# Patient Record
Sex: FEMALE | Race: WHITE | NOT HISPANIC OR LATINO | Employment: OTHER | URBAN - METROPOLITAN AREA
[De-identification: names, ages, dates, MRNs, and addresses within clinical notes are randomized per-mention and may not be internally consistent; named-entity substitution may affect disease eponyms.]

---

## 2016-10-25 LAB
ANION GAP SERPL CALCULATED.3IONS-SCNC: NORMAL MMOL/L
BUN SERPL-MCNC: 14 MG/DL
CALCIUM SERPL-MCNC: 9.1 MG/DL
CHLORIDE SERPLBLD-SCNC: 99 MMOL/L
CO2 SERPL-SCNC: 26 MMOL/L
CREAT SERPL-MCNC: 0.69 MG/DL
GFR SERPL CREATININE-BSD FRML MDRD: 93 ML/MIN/1.73M2
GLUCOSE SERPL-MCNC: 83 MG/DL (ref 70–99)
POTASSIUM SERPL-SCNC: 4 MMOL/L
SODIUM SERPL-SCNC: 133 MMOL/L

## 2017-01-12 ENCOUNTER — TELEPHONE (OUTPATIENT)
Dept: NEUROLOGY | Facility: CLINIC | Age: 62
End: 2017-01-12

## 2017-01-12 NOTE — TELEPHONE ENCOUNTER
Message received in in basket:    Subject: NFUL                                             Caller: Adia     Relationship to Patient: Self     Call Back Number: [phone]     Reason for Call: Patient has an antidepressant from  and would like to discuss possibly having Dr. Barboza prescribe it. She will be away from her phone from 12:30-3pm today. Thanks.     Marta Ulrich CMA     Before writer had a chance to respond to message, patient called ft. Desk and left a message to disregard THIS message.    Neeraj Nieto

## 2017-01-19 ENCOUNTER — PRE VISIT (OUTPATIENT)
Dept: CARDIOLOGY | Facility: CLINIC | Age: 62
End: 2017-01-19

## 2017-01-19 DIAGNOSIS — R55 SYNCOPE AND COLLAPSE: Primary | ICD-10-CM

## 2017-01-23 DIAGNOSIS — R55 SYNCOPE AND COLLAPSE: Primary | ICD-10-CM

## 2017-02-27 ENCOUNTER — TELEPHONE (OUTPATIENT)
Dept: NEUROLOGY | Facility: CLINIC | Age: 62
End: 2017-02-27

## 2017-02-27 NOTE — TELEPHONE ENCOUNTER
[facility] pharmacist wanted ok to give patient her Fycompa one month earlier, because she was going on a trip.  Approved to give her the medication for this purpose one month ahead.

## 2017-03-29 DIAGNOSIS — G40.211 LOCALZ-RLTED SYMPTOMATIC EPILEPSY W CMPLX PARTL SZ, INTRACT, W STATUS (H): ICD-10-CM

## 2017-03-29 RX ORDER — PERAMPANEL 4 MG/1
TABLET ORAL
Qty: 30 TABLET | Refills: 5 | Status: SHIPPED | OUTPATIENT
Start: 2017-03-29 | End: 2017-10-02

## 2017-04-04 ENCOUNTER — TELEPHONE (OUTPATIENT)
Dept: NEUROLOGY | Facility: CLINIC | Age: 62
End: 2017-04-04

## 2017-04-04 DIAGNOSIS — G40.219 PARTIAL EPILEPSY WITH IMPAIRMENT OF CONSCIOUSNESS, WITH INTRACTABLE EPILEPSY (H): Primary | ICD-10-CM

## 2017-04-04 NOTE — TELEPHONE ENCOUNTER
----- Message from Blair Reyes RN sent at 4/4/2017 11:38 AM CDT -----  Regarding: FW: trinity      ----- Message -----     From: Jessie Brumfield CMA     Sent: 4/4/2017  10:50 AM       To: Wes Gutierrez Rn Pool  Subject: trinity                                             Caller: andi    Relationship to Patient: self    Call Back Number: [phone]    Reason for Call: patient has been having frequent auras and dizziness.

## 2017-04-04 NOTE — TELEPHONE ENCOUNTER
Patient feels she has been having increased aura's and dizziness.  Patient was reducing her TPM (under MD direction), but felt auras were worsening so increased the dose again. Currently she is taking 0.5 tabs noon and 1.5 pm.  Aura's have been happening once a month or there about.  Dizziness has increased, for a period of up to three hours after taking meds in the morning.  It is to the point she will need to steady herself because she is concerned she may fall. She has not fallen  Patient has a cardiac pacemaker and is scheduled to see the cardiologist in a few week time. She is not aware that it has needed to pace at any time recently  Patient has a VNS in place, however at the last visit it was reading EOS, and the decision was made to not replace it.  It was not turned off at that time so it is not possible to say when it ceased to provide stimulation. She does not feel it has stimulated at all this year, possible for the past 6 months.  She has recently had a sodium level checked, patient reports it was 134.  Follow up with  is scheduled for June, this was reviewed with Adia.  Plan at last visit was to switch to [facility] if dizziness continued, and if financially viable.  Discussed with .  Will plan to check levels of AEDs  Follow up with  to discuss conversion to [facility], and other treatment options

## 2017-04-06 ENCOUNTER — PRE VISIT (OUTPATIENT)
Dept: CARDIOLOGY | Facility: CLINIC | Age: 62
End: 2017-04-06

## 2017-04-13 ENCOUNTER — TRANSFERRED RECORDS (OUTPATIENT)
Dept: HEALTH INFORMATION MANAGEMENT | Facility: CLINIC | Age: 62
End: 2017-04-13

## 2017-04-13 LAB
LEVETIRACETAM SERPL-MCNC: 28.3 UG/ML
OXCARBAZEPINE: 29.2 MCG/ML (ref 8–35)
TOPIRAMATE: 1.6 MCG/ML

## 2017-04-19 ENCOUNTER — HOSPITAL ENCOUNTER (OUTPATIENT)
Dept: BONE DENSITY | Facility: CLINIC | Age: 62
Discharge: HOME | End: 2017-04-19
Attending: PEDIATRICS | Admitting: PEDIATRICS
Payer: COMMERCIAL

## 2017-04-19 DIAGNOSIS — Z78.0 POST-MENOPAUSE: ICD-10-CM

## 2017-04-19 DIAGNOSIS — Z13.820 SCREENING FOR OSTEOPOROSIS: ICD-10-CM

## 2017-04-19 DIAGNOSIS — G40.219 PARTIAL EPILEPSY WITH IMPAIRMENT OF CONSCIOUSNESS, WITH INTRACTABLE EPILEPSY (H): ICD-10-CM

## 2017-04-19 PROCEDURE — 77080 DXA BONE DENSITY AXIAL: CPT

## 2017-04-24 ENCOUNTER — ALLIED HEALTH/NURSE VISIT (OUTPATIENT)
Dept: CARDIOLOGY | Facility: CLINIC | Age: 62
End: 2017-04-24
Payer: COMMERCIAL

## 2017-04-24 ENCOUNTER — OFFICE VISIT (OUTPATIENT)
Dept: CARDIOLOGY | Facility: CLINIC | Age: 62
End: 2017-04-24
Payer: COMMERCIAL

## 2017-04-24 VITALS
HEIGHT: 66 IN | WEIGHT: 151 LBS | HEART RATE: 66 BPM | SYSTOLIC BLOOD PRESSURE: 130 MMHG | BODY MASS INDEX: 24.27 KG/M2 | DIASTOLIC BLOOD PRESSURE: 84 MMHG

## 2017-04-24 DIAGNOSIS — Z95.0 CARDIAC PACEMAKER IN SITU: Primary | ICD-10-CM

## 2017-04-24 DIAGNOSIS — R55 SYNCOPE AND COLLAPSE: Primary | ICD-10-CM

## 2017-04-24 DIAGNOSIS — Z82.49 FAMILY HISTORY OF ISCHEMIC HEART DISEASE: ICD-10-CM

## 2017-04-24 DIAGNOSIS — I49.5 SINOATRIAL NODE DYSFUNCTION (H): Chronic | ICD-10-CM

## 2017-04-24 DIAGNOSIS — R06.02 SHORTNESS OF BREATH: ICD-10-CM

## 2017-04-24 DIAGNOSIS — Z95.0 CARDIAC PACEMAKER IN SITU: Chronic | ICD-10-CM

## 2017-04-24 PROCEDURE — 93280 PM DEVICE PROGR EVAL DUAL: CPT | Performed by: INTERNAL MEDICINE

## 2017-04-24 PROCEDURE — 99214 OFFICE O/P EST MOD 30 MIN: CPT | Performed by: INTERNAL MEDICINE

## 2017-04-24 NOTE — PROGRESS NOTES
HPI and Plan:   See dictation    Orders Placed This Encounter   Procedures     CT Coronary Calcium Scan     Follow-Up with Cardiac Advanced Practice Provider     Follow-Up with Cardiologist       No orders of the defined types were placed in this encounter.      Medications Discontinued During This Encounter   Medication Reason     escitalopram (LEXAPRO) 10 MG tablet          Encounter Diagnoses   Name Primary?     Syncope and collapse Yes     Shortness of breath      Cardiac pacemaker in situ      Family history of ischemic heart disease      Sinoatrial node dysfunction (H)        CURRENT MEDICATIONS:  Current Outpatient Prescriptions   Medication Sig Dispense Refill     FYCOMPA 4 MG tablet TAKE ONE TABLET BY MOUTH EVERY NIGHT AT BEDTIME 30 tablet 5     OXcarbazepine (TRILEPTAL) 300 MG tablet TAKE 1.5 TABS BY MOUTH IN THE MORNING, 1.5 TABLETS AT NOON, 2 TABLETS IN THE EVENING AND 2 TABLETS AT BEDTIME 210 tablet 5     clonazePAM (KLONOPIN) 1 MG tablet 1 tab  po at night 30 tablet 2     escitalopram (LEXAPRO) 5 MG tablet Take 1 tablet (5 mg) by mouth daily 30 tablet 11     escitalopram (LEXAPRO) 10 MG tablet Take one at bedtime 30 tablet 11     SUMAtriptan (IMITREX) 100 MG tablet Take one by mouth prn severe headache. No more than four per month. 9 tablet 0     topiramate (TOPAMAX) 100 MG tablet Take 1 tablet (100 mg) by mouth 2 times daily 60 tablet 11     levETIRAcetam (KEPPRA) 750 MG tablet Take one po TID and one and one half at HS (total 3375 mg per day) 135 tablet 11     diazepam (DIAZEPAM INTENSOL) 5 MG/ML solution Administer 1 ml as needed into cheek pouch for seizures 30 mL 0     acetaZOLAMIDE (DIAMOX) 250 MG tablet 1 tab BID after 2 consecutive days of seizures 14 tablet 0     esomeprazole (NEXIUM) 40 MG capsule Take by mouth every morning (before breakfast) Take 30-60 minutes before eating.       Cholecalciferol (VITAMIN D) 2000 UNITS CAPS daily.       Cetirizine HCl (ZYRTEC PO) daily.       Multiple  "Vitamin (MULTIVITAMINS PO) daily.         ALLERGIES     Allergies   Allergen Reactions     Lactose        PAST MEDICAL HISTORY:  Past Medical History:   Diagnosis Date     Depression      Epilepsy (H)      GERD (gastroesophageal reflux disease)      Migraine      Seizure (H)     vagal nerve stimulator     Shortness of breath      Syncope and collapse     bradyarrhythmia: dual chamber pacemaker implanted 2011       PAST SURGICAL HISTORY:  Past Surgical History:   Procedure Laterality Date     IMPLANT PACEMAKER  April 2011    [place] [facility] [place] [address],      IMPLANT STIMULATOR VAGUS NERVE  DEC  2008       FAMILY HISTORY:  No family history on file.    SOCIAL HISTORY:  Social History     Social History     Marital status:      Spouse name: N/A     Number of children: N/A     Years of education: N/A     Social History Main Topics     Smoking status: Never Smoker     Smokeless tobacco: Never Used     Alcohol use No     Drug use: No     Sexual activity: Not Asked     Other Topics Concern     Caffeine Concern Yes     3 CUPS coffee daily      Weight Concern No     Special Diet No     Exercise Yes     Seat Belt Yes     Social History Narrative       Review of Systems:  Skin:  Negative       Eyes:  Positive for glasses    ENT:  Negative      Respiratory:  Negative       Cardiovascular:    syncope or near-syncope (due to medications)    Gastroenterology: Positive for reflux    Genitourinary:  Negative      Musculoskeletal:  Positive for back pain;neck pain;joint stiffness    Neurologic:  Negative      Psychiatric:  Negative      Heme/Lymph/Imm:  Positive for allergies    Endocrine:  Negative        Physical Exam:  Vitals: /84  Pulse 66  Ht 1.676 m (5' 6\")  Wt 68.5 kg (151 lb)  BMI 24.37 kg/m2    Constitutional:  cooperative, alert and oriented, well developed, well nourished, in no acute distress        Skin:  warm and dry to the touch, no apparent skin lesions or masses noted        Head:  normocephalic, no " masses or lesions        Eyes:  pupils equal and round, conjunctivae and lids unremarkable, sclera white, no xanthalasma, EOMS intact, no nystagmus        ENT:  no pallor or cyanosis, dentition good        Neck:  carotid pulses are full and equal bilaterally;no carotid bruit        Chest:  normal breath sounds, clear to auscultation, normal A-P diameter, normal symmetry, normal respiratory excursion, no use of accessory muscles   pacemaker incision in the left infraclavicular area was well-healed      Cardiac: regular rhythm;normal S1 and S2;no S3 or S4       systolic ejection murmur;RUSB;grade 1          Abdomen:           Vascular: pulses full and equal                                        Extremities and Back:  no edema;no spinal abnormalities noted;normal muscle strength and tone              Neurological:  affect appropriate, oriented to time, person and place;no gross motor deficits              CC  Noam Barboza MD  [facility] EPILEPSY [facility]  [address]  [place] [address]

## 2017-04-24 NOTE — MR AVS SNAPSHOT
After Visit Summary   4/24/2017    Adia Briceno    MRN: [id number]           Patient Information     Date Of Birth          1955        Visit Information        Provider Department      4/24/2017 3:45 PM Lance Richmond MD [facility] [place]        Today's Diagnoses     Syncope and collapse    -  1    Shortness of breath        Cardiac pacemaker in situ        Family history of ischemic heart disease        Sinoatrial node dysfunction (H)           Follow-ups after your visit        Additional Services     Follow-Up with Cardiac Advanced Practice Provider           Follow-Up with Cardiologist                 Your next 10 appointments already scheduled     Jun 13, 2017 11:00 AM CDT   Vagus Nerve Stimulation with Noam Barboza MD   [facility] ([facility])    [address], [address]  [facility] [address] [phone]              Future tests that were ordered for you today     Open Future Orders        Priority Expected Expires Ordered    CT Coronary Calcium Scan Routine 4/25/2017 4/24/2018 4/24/2017    Follow-Up with Cardiac Advanced Practice Provider Routine 4/24/2018 9/6/2018 4/24/2017    Follow-Up with Cardiologist Routine 4/24/2019 5/14/2019 4/24/2017            Who to contact     If you have questions or need follow up information about today's clinic visit or your schedule please contact [facility] directly at [phone].  Normal or non-critical lab and imaging results will be communicated to you by MyChart, letter or phone within 4 business days after the clinic has received the results. If you do not hear from us within 7 days, please contact the clinic through MyChart or phone. If you have a critical or abnormal lab result, we will notify you by phone as soon as possible.  Submit refill requests through [employer] or call your pharmacy and they will forward the  "refill request to us. Please allow 3 business days for your refill to be completed.          Additional Information About Your Visit        [employer] Information     [employer] lets you send messages to your doctor, view your test results, renew your prescriptions, schedule appointments and more. To sign up, go to [email or web address] . Click on \"Log in\" on the left side of the screen, which will take you to the Welcome page. Then click on \"Sign up Now\" on the right side of the page.     You will be asked to enter the access code listed below, as well as some personal information. Please follow the directions to create your username and password.     Your access code is: [email or web address]  Expires: 2017  4:48 PM     Your access code will  in 90 days. If you need help or a new code, please call your [place] clinic or [phone].        Care EveryWhere ID     This is your Care EveryWhere ID. This could be used by other organizations to access your [place] medical records  [id number]        Your Vitals Were     Pulse Height BMI (Body Mass Index)             66 1.676 m (5' 6\") 24.37 kg/m2          Blood Pressure from Last 3 Encounters:   17 130/84   16 170/87   16 142/74    Weight from Last 3 Encounters:   17 68.5 kg (151 lb)   16 68 kg (150 lb)   16 67.6 kg (149 lb)                 Today's Medication Changes          These changes are accurate as of: 17  5:19 PM.  If you have any questions, ask your nurse or doctor.               These medicines have changed or have updated prescriptions.        Dose/Directions    * escitalopram 5 MG tablet   Commonly known as:  LEXAPRO   This may have changed:  Another medication with the same name was removed. Continue taking this medication, and follow the directions you see here.   Used for:  Adjustment disorder with depressed mood   Changed by:  Noam Barboza MD        Dose:  5 mg   Take 1 tablet (5 mg) by mouth daily "   Quantity:  30 tablet   Refills:  11       * escitalopram 10 MG tablet   Commonly known as:  LEXAPRO   This may have changed:  Another medication with the same name was removed. Continue taking this medication, and follow the directions you see here.   Used for:  Adjustment disorder with depressed mood   Changed by:  Noam Barboza MD        Take one at bedtime   Quantity:  30 tablet   Refills:  11       * Notice:  This list has 2 medication(s) that are the same as other medications prescribed for you. Read the directions carefully, and ask your doctor or other care provider to review them with you.             Primary Care Provider Office Phone # Fax #    Mary Rodgers [phone]       [facility] [address]  [facility] [address]        Thank you!     Thank you for choosing [facility] AT [place]  for your care. Our goal is always to provide you with excellent care. Hearing back from our patients is one way we can continue to improve our services. Please take a few minutes to complete the written survey that you may receive in the mail after your visit with us. Thank you!             Your Updated Medication List - Protect others around you: Learn how to safely use, store and throw away your medicines at www.disposemymeds.org.          This list is accurate as of: 4/24/17  5:19 PM.  Always use your most recent med list.                   Brand Name Dispense Instructions for use    acetaZOLAMIDE 250 MG tablet    DIAMOX    14 tablet    1 tab BID after 2 consecutive days of seizures       clonazePAM 1 MG tablet    klonoPIN    30 tablet    1 tab  po at night       diazepam 5 MG/ML (HIGH CONC) solution    DIAZEPAM INTENSOL    30 mL    Administer 1 ml as needed into cheek pouch for seizures       * escitalopram 5 MG tablet    LEXAPRO    30 tablet    Take 1 tablet (5 mg) by mouth daily       * escitalopram 10 MG tablet    LEXAPRO    30 tablet     Take one at bedtime       FYCOMPA 4 MG tablet   Generic drug:  perampanel     30 tablet    TAKE ONE TABLET BY MOUTH EVERY NIGHT AT BEDTIME       levETIRAcetam 750 MG tablet    KEPPRA    135 tablet    Take one po TID and one and one half at HS (total 3375 mg per day)       MULTIVITAMINS PO      daily.       nexIUM 40 MG CR capsule   Generic drug:  esomeprazole      Take by mouth every morning (before breakfast) Take 30-60 minutes before eating.       OXcarbazepine 300 MG tablet    TRILEPTAL    210 tablet    TAKE 1.5 TABS BY MOUTH IN THE MORNING, 1.5 TABLETS AT NOON, 2 TABLETS IN THE EVENING AND 2 TABLETS AT BEDTIME       SUMAtriptan 100 MG tablet    IMITREX    9 tablet    Take one by mouth prn severe headache. No more than four per month.       topiramate 100 MG tablet    TOPAMAX    60 tablet    Take 1 tablet (100 mg) by mouth 2 times daily       vitamin D 2000 UNITS Caps      daily.       ZYRTEC PO      daily.       * Notice:  This list has 2 medication(s) that are the same as other medications prescribed for you. Read the directions carefully, and ask your doctor or other care provider to review them with you.

## 2017-04-24 NOTE — MR AVS SNAPSHOT
After Visit Summary   4/24/2017    Adia Briceno    MRN: [id number]           Patient Information     Date Of Birth          1955        Visit Information        Provider Department      4/24/2017 4:45 PM [facility]        Today's Diagnoses     Cardiac pacemaker in situ    -  1       Follow-ups after your visit        Your next 10 appointments already scheduled     Jun 13, 2017 11:00 AM CDT   Vagus Nerve Stimulation with Noam Barboza MD   [facility] ([facility])    [address], [address]  [facility] [address] [phone]              Future tests that were ordered for you today     Open Future Orders        Priority Expected Expires Ordered    CT Coronary Calcium Scan Routine 4/25/2017 4/24/2018 4/24/2017    Follow-Up with Cardiac Advanced Practice Provider Routine 4/24/2018 9/6/2018 4/24/2017    Follow-Up with Cardiologist Routine 4/24/2019 5/14/2019 4/24/2017            Who to contact     If you have questions or need follow up information about today's clinic visit or your schedule please contact [facility] directly at [phone].  Normal or non-critical lab and imaging results will be communicated to you by MyChart, letter or phone within 4 business days after the clinic has received the results. If you do not hear from us within 7 days, please contact the clinic through MyChart or phone. If you have a critical or abnormal lab result, we will notify you by phone as soon as possible.  Submit refill requests through [employer] or call your pharmacy and they will forward the refill request to us. Please allow 3 business days for your refill to be completed.          Additional Information About Your Visit        [employer] Information     [employer] lets you send messages to your doctor, view your test results, renew your prescriptions, schedule appointments  "and more. To sign up, go to [email or web address] . Click on \"Log in\" on the left side of the screen, which will take you to the Welcome page. Then click on \"Sign up Now\" on the right side of the page.     You will be asked to enter the access code listed below, as well as some personal information. Please follow the directions to create your username and password.     Your access code is: [email or web address]  Expires: 2017  4:48 PM     Your access code will  in 90 days. If you need help or a new code, please call your [place] clinic or [phone].        Care EveryWhere ID     This is your Care EveryWhere ID. This could be used by other organizations to access your [place] medical records  [id number]         Blood Pressure from Last 3 Encounters:   17 130/84   16 170/87   16 142/74    Weight from Last 3 Encounters:   17 68.5 kg (151 lb)   16 68 kg (150 lb)   16 67.6 kg (149 lb)              We Performed the Following     PM DEVICE PROGRAMMING EVAL, DUAL LEAD PACER (07950)          Today's Medication Changes          These changes are accurate as of: 17  4:48 PM.  If you have any questions, ask your nurse or doctor.               These medicines have changed or have updated prescriptions.        Dose/Directions    * escitalopram 5 MG tablet   Commonly known as:  LEXAPRO   This may have changed:  Another medication with the same name was removed. Continue taking this medication, and follow the directions you see here.   Used for:  Adjustment disorder with depressed mood   Changed by:  Noam Barboza MD        Dose:  5 mg   Take 1 tablet (5 mg) by mouth daily   Quantity:  30 tablet   Refills:  11       * escitalopram 10 MG tablet   Commonly known as:  LEXAPRO   This may have changed:  Another medication with the same name was removed. Continue taking this medication, and follow the directions you see here.   Used for:  Adjustment disorder with depressed " mood   Changed by:  Noam Barbzoa MD        Take one at bedtime   Quantity:  30 tablet   Refills:  11       * Notice:  This list has 2 medication(s) that are the same as other medications prescribed for you. Read the directions carefully, and ask your doctor or other care provider to review them with you.             Primary Care Provider Office Phone # Fax #    Mary Rodgers [phone]       [facility] [address]  [facility] [address]        Thank you!     Thank you for choosing [facility] AT [place]  for your care. Our goal is always to provide you with excellent care. Hearing back from our patients is one way we can continue to improve our services. Please take a few minutes to complete the written survey that you may receive in the mail after your visit with us. Thank you!             Your Updated Medication List - Protect others around you: Learn how to safely use, store and throw away your medicines at www.disposemymeds.org.          This list is accurate as of: 4/24/17  4:48 PM.  Always use your most recent med list.                   Brand Name Dispense Instructions for use    acetaZOLAMIDE 250 MG tablet    DIAMOX    14 tablet    1 tab BID after 2 consecutive days of seizures       clonazePAM 1 MG tablet    klonoPIN    30 tablet    1 tab  po at night       diazepam 5 MG/ML (HIGH CONC) solution    DIAZEPAM INTENSOL    30 mL    Administer 1 ml as needed into cheek pouch for seizures       * escitalopram 5 MG tablet    LEXAPRO    30 tablet    Take 1 tablet (5 mg) by mouth daily       * escitalopram 10 MG tablet    LEXAPRO    30 tablet    Take one at bedtime       FYCOMPA 4 MG tablet   Generic drug:  perampanel     30 tablet    TAKE ONE TABLET BY MOUTH EVERY NIGHT AT BEDTIME       levETIRAcetam 750 MG tablet    KEPPRA    135 tablet    Take one po TID and one and one half at HS (total 3375 mg per day)       MULTIVITAMINS PO       daily.       nexIUM 40 MG CR capsule   Generic drug:  esomeprazole      Take by mouth every morning (before breakfast) Take 30-60 minutes before eating.       OXcarbazepine 300 MG tablet    TRILEPTAL    210 tablet    TAKE 1.5 TABS BY MOUTH IN THE MORNING, 1.5 TABLETS AT NOON, 2 TABLETS IN THE EVENING AND 2 TABLETS AT BEDTIME       SUMAtriptan 100 MG tablet    IMITREX    9 tablet    Take one by mouth prn severe headache. No more than four per month.       topiramate 100 MG tablet    TOPAMAX    60 tablet    Take 1 tablet (100 mg) by mouth 2 times daily       vitamin D 2000 UNITS Caps      daily.       ZYRTEC PO      daily.       * Notice:  This list has 2 medication(s) that are the same as other medications prescribed for you. Read the directions carefully, and ask your doctor or other care provider to review them with you.

## 2017-04-24 NOTE — PROGRESS NOTES
[employer] Pacemaker Device Check  AP: 17 % : 0 %  Mode: DDD        Underlying Rhythm: SR  Heart Rate: Adequate variation per histogram  Sensing: stable    Pacing Threshold: stable   Impedance: stable  Battery Status: 5 years  Atrial Arrhythmia: none  Ventricular Arrhythmia: none  Setting Change: none    Care Plan: f/u 6 months threshold. Sharyn

## 2017-04-24 NOTE — LETTER
4/24/2017    Noam Barboza MD  [facility] EPILEPSY [facility]  [address]  [place] [address]    RE: Adia Briceno       Dear Colleague,    I had the pleasure of seeing Adia Briceno in the [facility].    Adia is a very nice 61-year-old woman.  She is the wife of Nemesio Briceno in Endocrinology.  Her past cardiac history is significant for a question of seizure disorder versus syncopal spells.  CardioNet monitoring did demonstrate 7- and 10-second pauses.  When [employer] called the daughter, Ms. Briceno appeared to be confused and consultation with electrophysiologist decided to proceed with pacemaker insertion.  This was unfortunately complicated by a micro lead dislodgement with R-wave sensing reduced to 3.  Adjustments were made in her pacemaker and appeared to function appropriately.        Other issues include a seizure disorder, depression, migraines and gastroesophageal reflux disease.  She also has a strong family history of coronary artery disease with her father dying in his 60s of myocardial infarction, but having heart problems as early as mid to late 40s.  Adia also has significant hypercholesterolemia with a very elevated HDL.      Adia returns to clinic stating she is doing quite well.  She has had no further syncopal spells that she is aware of.  She does continue to have seizure activity.  She has no exertional chest, arm, neck, jaw or shoulder discomfort.  No dyspnea on exertion, orthopnea or PND.  No palpitations, lightheadedness, dizziness, syncope or near-syncope.      Adia has not been following up with the [facility] Device [facility].  Notes in the chart state that it was due to the fact that she did not think the pacemaker was even necessary or indicated.     Outpatient Encounter Prescriptions as of 4/24/2017   Medication Sig Dispense Refill     FYCOMPA 4 MG tablet TAKE ONE TABLET BY MOUTH EVERY NIGHT AT BEDTIME 30 tablet 5     clonazePAM  (KLONOPIN) 1 MG tablet 1 tab  po at night (Patient taking differently: 1/2 tab  po at night) 30 tablet 2     escitalopram (LEXAPRO) 5 MG tablet Take 1 tablet (5 mg) by mouth daily 30 tablet 11     escitalopram (LEXAPRO) 10 MG tablet Take one at bedtime 30 tablet 11     [DISCONTINUED] OXcarbazepine (TRILEPTAL) 300 MG tablet TAKE 1.5 TABS BY MOUTH IN THE MORNING, 1.5 TABLETS AT NOON, 2 TABLETS IN THE EVENING AND 2 TABLETS AT BEDTIME 210 tablet 5     SUMAtriptan (IMITREX) 100 MG tablet Take one by mouth prn severe headache. No more than four per month. 9 tablet 0     topiramate (TOPAMAX) 100 MG tablet Take 1 tablet (100 mg) by mouth 2 times daily (Patient taking differently: Take 100 mg by mouth 2 times daily 1/2 TABLET 3 TIMES DAILY) 60 tablet 11     levETIRAcetam (KEPPRA) 750 MG tablet Take one po TID and one and one half at HS (total 3375 mg per day) 135 tablet 11     diazepam (DIAZEPAM INTENSOL) 5 MG/ML solution Administer 1 ml as needed into cheek pouch for seizures 30 mL 0     acetaZOLAMIDE (DIAMOX) 250 MG tablet 1 tab BID after 2 consecutive days of seizures 14 tablet 0     esomeprazole (NEXIUM) 40 MG capsule Take by mouth every morning (before breakfast) Take 30-60 minutes before eating.       Cholecalciferol (VITAMIN D) 2000 UNITS CAPS daily.       Cetirizine HCl (ZYRTEC PO) daily.       Multiple Vitamin (MULTIVITAMINS PO) daily.       [DISCONTINUED] escitalopram (LEXAPRO) 10 MG tablet Take 1 tab po am 30 tablet 5     No facility-administered encounter medications on file as of 4/24/2017.       ASSESSMENT AND PLAN:  Adia appears to be doing well from a cardiac standpoint without clinical evidence of ischemia, heart failure or significant arrhythmia.  We will interrogate her pacemaker today she is here.  I have explained to her that at our last interrogation, she had 19% atrial pacing, which is as the pacemaker is expected to do with her atrial pauses.      At this time, she has no symptoms to suggest  ischemia, although she does have a very strong family history.  I discussed her cholesterol with Nemesio, who does check her numbers.  Last fasting lipid profile I see in Care Everywhere is from 04/2016 at which time her total cholesterol was 294, triglycerides were 50, her HDL was 111 and her LDL was 173.  We talked about this.  As she is having no symptoms and she is hurt her knee and is unable to perform an exercise test, we decided we would just do a calcium score at this time to get a feel for whether we should be doing more about her cholesterol and whether we should consider putting her on an aspirin a day as she is now 61.  As stated, she has no symptoms to suggest angina, so I do not feel strongly inclined to proceed with any sort of stress testing.      Blood pressure is okay today at 130/84.  I will follow up on her calcium scoring test and her pacemaker interrogation.  Otherwise, will have her follow up in 1 year.  She states she does want to set up pacemaker followups again and we will do this on a quarterly basis over the phone.     Again, thank you for allowing me to participate in the care of your patient.      Sincerely,    Lance Richmond MD     [facility]    cc:   Mary Rodgers MD  [facility]   [address]  [facility] [address]

## 2017-04-25 NOTE — PROGRESS NOTES
HISTORY OF PRESENT ILLNESS:  Adia is a very nice 61-year-old woman.  She is the wife of Nemesio Briceno in Endocrinology.  Her past cardiac history is significant for a question of seizure disorder versus syncopal spells.  CardioNet monitoring did demonstrate 7- and 10-second pauses.  When [employer] called the daughter, Ms. Briceno appeared to be confused and consultation with electrophysiologist decided to proceed with pacemaker insertion.  This was unfortunately complicated by a micro lead dislodgement with R-wave sensing reduced to 3.  Adjustments were made in her pacemaker and appeared to function appropriately.        Other issues include a seizure disorder, depression, migraines and gastroesophageal reflux disease.  She also has a strong family history of coronary artery disease with her father dying in his 60s of myocardial infarction, but having heart problems as early as mid to late 40s.  Adia also has significant hypercholesterolemia with a very elevated HDL.      Adia returns to clinic stating she is doing quite well.  She has had no further syncopal spells that she is aware of.  She does continue to have seizure activity.  She has no exertional chest, arm, neck, jaw or shoulder discomfort.  No dyspnea on exertion, orthopnea or PND.  No palpitations, lightheadedness, dizziness, syncope or near-syncope.      Adia has not been following up with the Pacemaker Device Clinic.  Notes in the chart state that it was due to the fact that she did not think the pacemaker was even necessary or indicated.      ASSESSMENT AND PLAN:  Adia appears to be doing well from a cardiac standpoint without clinical evidence of ischemia, heart failure or significant arrhythmia.  We will interrogate her pacemaker today she is here.  I have explained to her that at our last interrogation, she had 19% atrial pacing, which is as the pacemaker is expected to do with her atrial pauses.      At this time, she has no symptoms to suggest  ischemia, although she does have a very strong family history.  I discussed her cholesterol with Nemesio, who does check her numbers.  Last fasting lipid profile I see in Care Everywhere is from 2016 at which time her total cholesterol was 294, triglycerides were 50, her HDL was 111 and her LDL was 173.  We talked about this.  As she is having no symptoms and she is hurt her knee and is unable to perform an exercise test, we decided we would just do a calcium score at this time to get a feel for whether we should be doing more about her cholesterol and whether we should consider putting her on an aspirin a day as she is now 61.  As stated, she has no symptoms to suggest angina, so I do not feel strongly inclined to proceed with any sort of stress testing.      Blood pressure is okay today at 130/84.  I will follow up on her calcium scoring test and her pacemaker interrogation.  Otherwise, will have her follow up in 1 year.  She states she does want to set up pacemaker followups again and we will do this on a quarterly basis over the phone.         DAVY JOSEPH MD, [facility]             D: 2017 17:18   T: 2017 10:49   MT: JOSE RAFAEL      Name:     BESSY MORGAN   MRN:      [id number]        Account:      [id number]   :      1955           Service Date: 2017      Document: [id number]

## 2017-05-09 ENCOUNTER — TELEPHONE (OUTPATIENT)
Dept: NEUROLOGY | Facility: CLINIC | Age: 62
End: 2017-05-09

## 2017-05-09 DIAGNOSIS — G40.211 LOCALZ-RLTED SYMPTOMATIC EPILEPSY W CMPLX PARTL SZ, INTRACT, W STATUS (H): ICD-10-CM

## 2017-05-09 RX ORDER — OXCARBAZEPINE 300 MG/1
TABLET, FILM COATED ORAL
Qty: 210 TABLET | Refills: 0 | Status: SHIPPED | OUTPATIENT
Start: 2017-05-09 | End: 2017-07-02

## 2017-05-09 NOTE — TELEPHONE ENCOUNTER
----- Message from Rosie Roland CMA sent at 5/8/2017  3:57 PM CDT -----  Drug Name: OXcarbazepine (TRILEPTAL) 300 MG tablet      Dose: 300 MG    generic  SIG: TAKE 1.5 TABS BY MOUTH IN THE MORNING, 1.5 TABLETS AT NOON, 2 TABLETS IN THE EVENING AND 2 TABLETS AT BEDTIME                                 Days Supply Needed: Patient will need 5 days worth until she comes in on 06/13/17.  Patient has 30 days as of today.    Pharmacy: [facility] [address] [place] [address] [address] AT [facility] [address]    Date of Last Appointment: 12/18/2016  Next RTC Date: 06/13/2017  Has a script already been sent recently: No    Additonal Notes: Patient only needs enough to get through from 06/08/2017 until appointment 06/13/2017

## 2017-06-06 DIAGNOSIS — G40.211 LOCALZ-RLTED SYMPTOMATIC EPILEPSY W CMPLX PARTL SZ, INTRACT, W STATUS (H): ICD-10-CM

## 2017-06-06 RX ORDER — OXCARBAZEPINE 300 MG/1
TABLET, FILM COATED ORAL
Qty: 210 TABLET | Refills: 0 | Status: SHIPPED | OUTPATIENT
Start: 2017-06-06 | End: 2017-06-13

## 2017-07-02 DIAGNOSIS — G40.211 LOCALZ-RLTED SYMPTOMATIC EPILEPSY W CMPLX PARTL SZ, INTRACT, W STATUS (H): ICD-10-CM

## 2017-07-03 RX ORDER — OXCARBAZEPINE 300 MG/1
TABLET, FILM COATED ORAL
Qty: 210 TABLET | Refills: 5 | Status: SHIPPED | OUTPATIENT
Start: 2017-07-03 | End: 2017-12-21

## 2017-08-07 DIAGNOSIS — G40.211 LOCALZ-RLTED SYMPTOMATIC EPILEPSY W CMPLX PARTL SZ, INTRACT, W STATUS (H): ICD-10-CM

## 2017-08-08 RX ORDER — CLONAZEPAM 1 MG/1
TABLET ORAL
Qty: 30 TABLET | Refills: 5 | Status: SHIPPED | OUTPATIENT
Start: 2017-08-08 | End: 2018-03-30

## 2017-08-23 ENCOUNTER — HOSPITAL ENCOUNTER (OUTPATIENT)
Dept: MAMMOGRAPHY | Facility: CLINIC | Age: 62
Discharge: HOME | End: 2017-08-23
Attending: PEDIATRICS | Admitting: PEDIATRICS
Payer: COMMERCIAL

## 2017-08-23 DIAGNOSIS — Z12.31 VISIT FOR SCREENING MAMMOGRAM: ICD-10-CM

## 2017-08-23 PROCEDURE — 77063 BREAST TOMOSYNTHESIS BI: CPT

## 2017-08-28 ENCOUNTER — ALLIED HEALTH/NURSE VISIT (OUTPATIENT)
Dept: CARDIOLOGY | Facility: CLINIC | Age: 62
End: 2017-08-28
Payer: COMMERCIAL

## 2017-08-28 DIAGNOSIS — Z95.0 CARDIAC PACEMAKER IN SITU: ICD-10-CM

## 2017-08-28 PROCEDURE — 93293 PM PHONE R-STRIP DEVICE EVAL: CPT | Performed by: INTERNAL MEDICINE

## 2017-08-28 NOTE — PROGRESS NOTES
~ 90 day phone teletrace ~   Intrinsic rhythm at time of check. Magnet response WNL. F/U scheduled q 3 months. Swapnil SHEFFIELD

## 2017-08-28 NOTE — MR AVS SNAPSHOT
"              After Visit Summary   8/28/2017    Adia Briceno    MRN: [id number]           Patient Information     Date Of Birth          1955        Visit Information        Provider Department      8/28/2017 10:45 AM BECERRA TECH1 [facility]        Today's Diagnoses     Cardiac pacemaker in situ           Follow-ups after your visit        Your next 10 appointments already scheduled     Dec 04, 2017 10:45 AM CST   Phone Device Check with BECERRA TECH1   [facility] ([facility])    [address] [place] [id number] [phone]              Who to contact     If you have questions or need follow up information about today's clinic visit or your schedule please contact [facility] directly at [phone].  Normal or non-critical lab and imaging results will be communicated to you by op5hart, letter or phone within 4 business days after the clinic has received the results. If you do not hear from us within 7 days, please contact the clinic through MyChart or phone. If you have a critical or abnormal lab result, we will notify you by phone as soon as possible.  Submit refill requests through [employer] or call your pharmacy and they will forward the refill request to us. Please allow 3 business days for your refill to be completed.          Additional Information About Your Visit        op5hart Information     [employer] lets you send messages to your doctor, view your test results, renew your prescriptions, schedule appointments and more. To sign up, go to [email or web address] . Click on \"Log in\" on the left side of the screen, which will take you to the Welcome page. Then click on \"Sign up Now\" on the right side of the page.     You will be asked to enter the access code listed below, as well as some personal information. Please follow the directions to create your " username and password.     Your access code is: [email or web address]  Expires: 2017 10:51 AM     Your access code will  in 90 days. If you need help or a new code, please call your [place] clinic or [phone].        Care EveryWhere ID     This is your Care EveryWhere ID. This could be used by other organizations to access your [place] medical records  [id number]         Blood Pressure from Last 3 Encounters:   17 90/75   17 130/84   16 170/87    Weight from Last 3 Encounters:   17 69.6 kg (153 lb 6.4 oz)   17 68.5 kg (151 lb)   16 68 kg (150 lb)              We Performed the Following     Follow-Up with Device Clinic     PM PHONE R RHEA MADELINE UP TO 90 DAYS ([id number])          Today's Medication Changes          These changes are accurate as of: 17 10:51 AM.  If you have any questions, ask your nurse or doctor.               These medicines have changed or have updated prescriptions.        Dose/Directions    topiramate 100 MG tablet   Commonly known as:  TOPAMAX   This may have changed:  additional instructions   Used for:  Localz-rlted symptomatic epilepsy w cmplx partl sz, intract, w status (H)        Dose:  100 mg   Take 1 tablet (100 mg) by mouth 2 times daily   Quantity:  60 tablet   Refills:  11                Primary Care Provider Office Phone # Fax #    Mary Rodgers [phone]       [facility] [address] [id number]        Equal Access to Services     [facility] : Hadii kasys ku hadasho Soomaali, waaxda luqadaha, qaybta kaalmada adeeglorin, jeanne rodriguez . So [facility] [phone].    ATENCIÓN: Si habla español, tiene a castro disposición servicios gratuitos de asistencia lingüística. Llame al [phone].    We comply with applicable federal civil rights laws and [place] laws. We do not discriminate on the basis of race, color, national origin, age, disability sex, sexual orientation or  gender identity.            Thank you!     Thank you for choosing [facility] [place]  for your care. Our goal is always to provide you with excellent care. Hearing back from our patients is one way we can continue to improve our services. Please take a few minutes to complete the written survey that you may receive in the mail after your visit with us. Thank you!             Your Updated Medication List - Protect others around you: Learn how to safely use, store and throw away your medicines at www.disposemymeds.org.          This list is accurate as of: 8/28/17 10:51 AM.  Always use your most recent med list.                   Brand Name Dispense Instructions for use Diagnosis    acetaZOLAMIDE 250 MG tablet    DIAMOX    14 tablet    1 tab BID after 2 consecutive days of seizures    Localization-related (focal) (partial) epilepsy and epileptic syndromes with simple partial seizures, with intractable epilepsy       clonazePAM 1 MG tablet    klonoPIN    30 tablet    TAKE ONE TABLET BY MOUTH EVERY EVENING    Localz-rlted symptomatic epilepsy w cmplx partl sz, intract, w status (H)       diazepam 5 MG/ML (HIGH CONC) solution    DIAZEPAM INTENSOL    30 mL    Administer 1 ml as needed into cheek pouch for seizures    Localization-related (focal) (partial) epilepsy and epileptic syndromes with complex partial seizures, with intractable epilepsy       * escitalopram 5 MG tablet    LEXAPRO    30 tablet    Take 1 tablet (5 mg) by mouth daily    Adjustment disorder with depressed mood       * escitalopram 10 MG tablet    LEXAPRO    30 tablet    Take one at bedtime    Adjustment disorder with depressed mood       FYCOMPA 4 MG tablet   Generic drug:  perampanel     30 tablet    TAKE ONE TABLET BY MOUTH EVERY NIGHT AT BEDTIME    Localz-rlted symptomatic epilepsy w cmplx partl sz, intract, w status (H)       levETIRAcetam 750 MG tablet    KEPPRA    135 tablet    Take one po TID and one and one  half at HS (total 3375 mg per day)    Localization-related symptomatic epilepsy and epileptic syndromes with complex partial seizures, intractable, with status epilepticus (H)       MULTIVITAMINS PO      daily.        nexIUM 40 MG CR capsule   Generic drug:  esomeprazole      Take by mouth every morning (before breakfast) Take 30-60 minutes before eating.        OXcarbazepine 300 MG tablet    TRILEPTAL    210 tablet    TAKE ONE AND ONE-HALF TABLET IN THE MORNING, ONE AND ONE-HALF TABLET AT NOON, 2 TABLETS IN THE EVENING AND 2 TABLETS AT BEDTIME    Localz-rlted symptomatic epilepsy w cmplx partl sz, intract, w status (H)       SUMAtriptan 100 MG tablet    IMITREX    9 tablet    Take one by mouth prn severe headache. No more than four per month.    Localization-related symptomatic epilepsy and epileptic syndromes with complex partial seizures, intractable, with status epilepticus (H)       topiramate 100 MG tablet    TOPAMAX    60 tablet    Take 1 tablet (100 mg) by mouth 2 times daily    Localz-rlted symptomatic epilepsy w cmplx partl sz, intract, w status (H)       vitamin D 2000 UNITS Caps      daily.        ZYRTEC PO      daily.        * Notice:  This list has 2 medication(s) that are the same as other medications prescribed for you. Read the directions carefully, and ask your doctor or other care provider to review them with you.

## 2017-10-02 DIAGNOSIS — G40.211 LOCALZ-RLTED SYMPTOMATIC EPILEPSY W CMPLX PARTL SZ, INTRACT, W STATUS (H): ICD-10-CM

## 2017-10-02 DIAGNOSIS — G40.211 LOCALIZATION-RELATED SYMPTOMATIC EPILEPSY AND EPILEPTIC SYNDROMES WITH COMPLEX PARTIAL SEIZURES, INTRACTABLE, WITH STATUS EPILEPTICUS (H): Primary | ICD-10-CM

## 2017-10-05 RX ORDER — PERAMPANEL 4 MG/1
TABLET ORAL
Qty: 30 TABLET | Refills: 5 | Status: SHIPPED | OUTPATIENT
Start: 2017-10-05 | End: 2017-12-21

## 2017-10-06 RX ORDER — LEVETIRACETAM 750 MG/1
TABLET ORAL
Qty: 135 TABLET | Refills: 3 | Status: SHIPPED | OUTPATIENT
Start: 2017-10-06 | End: 2017-12-21

## 2017-10-06 RX ORDER — TOPIRAMATE 100 MG/1
TABLET, FILM COATED ORAL
Qty: 60 TABLET | Refills: 3 | Status: SHIPPED | OUTPATIENT
Start: 2017-10-06 | End: 2017-12-21

## 2017-12-04 ENCOUNTER — ALLIED HEALTH/NURSE VISIT (OUTPATIENT)
Dept: CARDIOLOGY | Facility: CLINIC | Age: 62
End: 2017-12-04
Payer: COMMERCIAL

## 2017-12-04 DIAGNOSIS — Z95.0 CARDIAC PACEMAKER IN SITU: Primary | ICD-10-CM

## 2017-12-04 PROCEDURE — 93293 PM PHONE R-STRIP DEVICE EVAL: CPT | Performed by: INTERNAL MEDICINE

## 2017-12-04 NOTE — PROGRESS NOTES
~90 day phone teletrace ~   Intrinsic rhythm at time of check. Magnet response WNL. Order placed for annual threshold to be scheduled in April. Swapnil SHEFFIELD

## 2017-12-04 NOTE — MR AVS SNAPSHOT
"              After Visit Summary   12/4/2017    Adia Briceno    MRN: [id number]           Patient Information     Date Of Birth          1955        Visit Information        Provider Department      12/4/2017 10:45 AM [facility] TECH1 [facility]        Today's Diagnoses     Cardiac pacemaker in situ    -  1       Follow-ups after your visit        Your next 10 appointments already scheduled     Dec 21, 2017  2:00 PM CST   Return Visit with Noam Barboza MD   [facility]    [address], [address]  [facility] [id number] [phone]              Who to contact     If you have questions or need follow up information about today's clinic visit or your schedule please contact [facility] directly at [phone].  Normal or non-critical lab and imaging results will be communicated to you by AppSensehart, letter or phone within 4 business days after the clinic has received the results. If you do not hear from us within 7 days, please contact the clinic through MyChart or phone. If you have a critical or abnormal lab result, we will notify you by phone as soon as possible.  Submit refill requests through [employer] or call your pharmacy and they will forward the refill request to us. Please allow 3 business days for your refill to be completed.          Additional Information About Your Visit        MyChart Information     [employer] lets you send messages to your doctor, view your test results, renew your prescriptions, schedule appointments and more. To sign up, go to [email or web address] . Click on \"Log in\" on the left side of the screen, which will take you to the Welcome page. Then click on \"Sign up Now\" on the right side of the page.     You will be asked to enter the access code listed below, as well as some personal information. Please follow the directions to create your username and " password.     Your access code is: [email or web address]  Expires: 3/4/2018 11:10 AM     Your access code will  in 90 days. If you need help or a new code, please call your [facility] or [phone].        Care EveryWhere ID     This is your Care EveryWhere ID. This could be used by other organizations to access your [place] medical records  [id number]         Blood Pressure from Last 3 Encounters:   17 90/75   17 130/84   16 170/87    Weight from Last 3 Encounters:   17 69.6 kg (153 lb 6.4 oz)   17 68.5 kg (151 lb)   16 68 kg (150 lb)              We Performed the Following     PM PHONE R STRIP EVAL UP TO 90 DAYS ([id number])        Primary Care Provider Office Phone # Fax #    Mary Rodgers [phone]       [facility] [address]  [facility] [address]        Equal Access to Services     [facility]: Hadii aad ku hadasho Soomaali, waaxda luqadaha, qaybta kaalmada adeegyada, waxay kiki rodriguez . So [facility] [phone].    ATENCIÓN: Si habla español, tiene a castro disposición servicios gratuitos de asistencia lingüística. Llame al [phone].    We comply with applicable federal civil rights laws and [place] laws. We do not discriminate on the basis of race, color, national origin, age, disability, sex, sexual orientation, or gender identity.            Thank you!     Thank you for choosing [facility]  for your care. Our goal is always to provide you with excellent care. Hearing back from our patients is one way we can continue to improve our services. Please take a few minutes to complete the written survey that you may receive in the mail after your visit with us. Thank you!             Your Updated Medication List - Protect others around you: Learn how to safely use, store and throw away your medicines at www.disposemymeds.org.          This list is accurate as of: 17 11:10  AM.  Always use your most recent med list.                   Brand Name Dispense Instructions for use Diagnosis    acetaZOLAMIDE 250 MG tablet    DIAMOX    14 tablet    1 tab BID after 2 consecutive days of seizures    Localization-related (focal) (partial) epilepsy and epileptic syndromes with simple partial seizures, with intractable epilepsy       clonazePAM 1 MG tablet    klonoPIN    30 tablet    TAKE ONE TABLET BY MOUTH EVERY EVENING    [facility] symptomatic epilepsy w cmplx partl sz, intract, w status (H)       diazepam 5 MG/ML (HIGH CONC) solution    DIAZEPAM INTENSOL    30 mL    Administer 1 ml as needed into cheek pouch for seizures    Localization-related (focal) (partial) epilepsy and epileptic syndromes with complex partial seizures, with intractable epilepsy       * escitalopram 5 MG tablet    LEXAPRO    30 tablet    Take 1 tablet (5 mg) by mouth daily    Adjustment disorder with depressed mood       * escitalopram 10 MG tablet    LEXAPRO    30 tablet    Take one at bedtime    Adjustment disorder with depressed mood       FYCOMPA 4 MG tablet   Generic drug:  perampanel     30 tablet    TAKE ONE TABLET BY MOUTH EVERY NIGHT AT BEDTIME    [facility] symptomatic epilepsy w cmplx partl sz, intract, w status (H)       levETIRAcetam 750 MG tablet    KEPPRA    135 tablet    TAKE 1 TABLET BY MOUTH THREE TIMES DAILY AND TAKE 1 1/2 TABLETS EVERY NIGHT AT BEDTIME(TOTAL 3375MG PER DAY).    Localization-related symptomatic epilepsy and epileptic syndromes with complex partial seizures, intractable, with status epilepticus (H)       MULTIVITAMINS PO      daily.        nexIUM 40 MG CR capsule   Generic drug:  esomeprazole      Take by mouth every morning (before breakfast) Take 30-60 minutes before eating.        OXcarbazepine 300 MG tablet    TRILEPTAL    210 tablet    TAKE ONE AND ONE-HALF TABLET IN THE MORNING, ONE AND ONE-HALF TABLET AT NOON, 2 TABLETS IN THE EVENING AND 2 TABLETS AT BEDTIME    [facility]  symptomatic epilepsy w cmplx partl sz, intract, w status (H)       SUMAtriptan 100 MG tablet    IMITREX    9 tablet    Take one by mouth prn severe headache. No more than four per month.    Localization-related symptomatic epilepsy and epileptic syndromes with complex partial seizures, intractable, with status epilepticus (H)       topiramate 100 MG tablet    TOPAMAX    60 tablet    TAKE 1 TABLET(100 MG) BY MOUTH TWICE DAILY    Localization-related symptomatic epilepsy and epileptic syndromes with complex partial seizures, intractable, with status epilepticus (H)       vitamin D 2000 UNITS Caps      daily.        ZYRTEC PO      daily.        * Notice:  This list has 2 medication(s) that are the same as other medications prescribed for you. Read the directions carefully, and ask your doctor or other care provider to review them with you.

## 2017-12-21 ENCOUNTER — OFFICE VISIT (OUTPATIENT)
Dept: NEUROLOGY | Facility: CLINIC | Age: 62
End: 2017-12-21

## 2017-12-21 VITALS
SYSTOLIC BLOOD PRESSURE: 123 MMHG | WEIGHT: 157 LBS | HEART RATE: 70 BPM | DIASTOLIC BLOOD PRESSURE: 90 MMHG | BODY MASS INDEX: 25.23 KG/M2 | HEIGHT: 66 IN

## 2017-12-21 DIAGNOSIS — G40.211 LOCALIZATION-RELATED SYMPTOMATIC EPILEPSY AND EPILEPTIC SYNDROMES WITH COMPLEX PARTIAL SEIZURES, INTRACTABLE, WITH STATUS EPILEPTICUS (H): ICD-10-CM

## 2017-12-21 DIAGNOSIS — G40.211 LOCALZ-RLTED SYMPTOMATIC EPILEPSY W CMPLX PARTL SZ, INTRACT, W STATUS (H): ICD-10-CM

## 2017-12-21 RX ORDER — TOPIRAMATE 100 MG/1
TABLET, FILM COATED ORAL
Qty: 30 TABLET | Refills: 11 | Status: SHIPPED | OUTPATIENT
Start: 2017-12-21 | End: 2018-12-28

## 2017-12-21 RX ORDER — LEVETIRACETAM 750 MG/1
TABLET ORAL
Qty: 135 TABLET | Refills: 11 | Status: SHIPPED | OUTPATIENT
Start: 2017-12-21 | End: 2018-12-28

## 2017-12-21 RX ORDER — OXCARBAZEPINE 300 MG/1
TABLET, FILM COATED ORAL
Qty: 210 TABLET | Refills: 11 | Status: SHIPPED | OUTPATIENT
Start: 2017-12-21 | End: 2018-12-28

## 2017-12-21 NOTE — PATIENT INSTRUCTIONS
Reduce your topiramate to one half of 100 mg tablet (50 mg) at lunch and one half of 100 mg tablet (50 mg) at bedtime.    If seizures worsen we can resume topiramate the way you are taking now or we can increase th fycompa to 6 mg. You would need a new perscription if we increase the fycompa.

## 2017-12-21 NOTE — PROGRESS NOTES
[facility] Progress Note      Patient:  Adia Briceno  :  1955   Age:  62 year old   Today's Office Visit:  2017    Epilepsy Data:    Patient History  Primary Epileptologist/Provider: Noam Barboza M.D.  Epilepsy Syndrome: Localization-related epilepsy unspecified  Epilepsy Syndrome Status: Final  Age of Onset: 10  Other Relevant Dx/ Issues: Inpatient evaluation 1998.  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  Asystole  with subsequent pacemaker placement.  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 Seizure-related falls/injuries.  is endocrinologist.     Tests/Surgery History  Last EE2005  Last MRI: 2008  Last Neuropsych Testin2008  Last Case Management Conference: 2008  Epilepsy Surgery #1 Date: 08  Epilepsy Related Surgery #1 : Type: VNS placement    Seizure Record  Current Visit Date: 17  Previous Visit Date: 17  Months since last visit: 6.28    Seizure Type 1: Complex partial seizures unspecified  Description of Sz Type 1: Head slumps, amnesia  # of Type 1 Seizure since last visit: 0  Freq. Type 1 / Month: 0    Seizure Type 2: Complex partial seizures with impairment of consciousness at onset  Description of Sz Type 2: Head slumps with collapse  # of Type 2 Seizure since last visit: 0  Freq. Type 2 / Month: 0    Seizure Type 3: Simple partial seizures unspecified  Description of Sz Type 3: strange feeling in her head, 30-60 seconds  # of Type 3 Seizure since last visit: 10  Freq. Type 3 / Month: 1.59    History of Present Illness:     Overall doing well. Two bad days  and  during which had six auras.  Fell during one aura but reports that she felt herself falling and felt herself hitting the ground.  Believes that she was dizzy because of frequent seizures and fell because of multiple auras.    Current Outpatient Prescriptions   Medication Sig Dispense Refill     topiramate (TOPAMAX) 100  MG tablet TAKE 1 TABLET(100 MG) BY MOUTH TWICE DAILY 60 tablet 3     levETIRAcetam (KEPPRA) 750 MG tablet TAKE 1 TABLET BY MOUTH THREE TIMES DAILY AND TAKE 1 1/2 TABLETS EVERY NIGHT AT BEDTIME(TOTAL 3375MG PER DAY). 135 tablet 3     FYCOMPA 4 MG tablet TAKE ONE TABLET BY MOUTH EVERY NIGHT AT BEDTIME 30 tablet 5     clonazePAM (KLONOPIN) 1 MG tablet TAKE ONE TABLET BY MOUTH EVERY EVENING 30 tablet 5     OXcarbazepine (TRILEPTAL) 300 MG tablet TAKE ONE AND ONE-HALF TABLET IN THE MORNING, ONE AND ONE-HALF TABLET AT NOON, 2 TABLETS IN THE EVENING AND 2 TABLETS AT BEDTIME 210 tablet 5     escitalopram (LEXAPRO) 5 MG tablet Take 1 tablet (5 mg) by mouth daily 30 tablet 11     escitalopram (LEXAPRO) 10 MG tablet Take one at bedtime 30 tablet 11     SUMAtriptan (IMITREX) 100 MG tablet Take one by mouth prn severe headache. No more than four per month. 9 tablet 0     diazepam (DIAZEPAM INTENSOL) 5 MG/ML solution Administer 1 ml as needed into cheek pouch for seizures 30 mL 0     acetaZOLAMIDE (DIAMOX) 250 MG tablet 1 tab BID after 2 consecutive days of seizures 14 tablet 0     esomeprazole (NEXIUM) 40 MG capsule Take by mouth every morning (before breakfast) Take 30-60 minutes before eating.       Cholecalciferol (VITAMIN D) 2000 UNITS CAPS daily.       Cetirizine HCl (ZYRTEC PO) daily.       Multiple Vitamin (MULTIVITAMINS PO) daily.          Perceived AED Side Effects:  Uncertain; continues with memory complaints.    Medication Notes:    Continues taking  mg 50 mg tid.  AED Side Effects Discussed: Yes  AED Medication Compliance:  compliant all of the time  Using a pill box:  Yes    Review of Systems:  Lethargy / Tiredness:  Yes  Nausea / Vomiting:  No  Double Vision:  No  Sleepiness:  Yes  Depression:  No  Slowed Cognitive Function:  No  Memory Problems:  Yes  Poor Balance:  No  Dizziness:  No  Appetite Changes:  No  Blurred Vision:  No  Decreased Libido:  na  Sleep Changes:  No  Behavioral Changes:  No  Skin:  "negative  Respiratory: No shortness of breath, dyspnea on exertion, cough, or hemoptysis and No shortness of breath  Cardiovascular: negative  Have you experienced a traumatic fall since your last visit: YES  Are these falls related to your seizures:  NO      Other Issues:      Headaches overall the same. Uses imitrex 5 to 6 times per month. Follwed by Manjula.  No depression, suicidality.    Urinary frequency, every hour while awake; four times per night. No hematuria. NO fevers. NO dysuria,  Pacemaker battery is normal.   Is patient safe to drive:  No    Woman Care:   Patient is:  Postmenopausal.    Exam:    /90 (BP Location: Right arm, Patient Position: Chair, Cuff Size: Adult Regular)  Pulse 70  Ht 5' 5.98\" (167.6 cm)  Wt 157 lb (71.2 kg)  Breastfeeding? No  BMI 25.35 kg/m2     Wt Readings from Last 5 Encounters:   12/21/17 157 lb (71.2 kg)   06/13/17 153 lb 6.4 oz (69.6 kg)   04/24/17 151 lb (68.5 kg)   12/08/16 150 lb (68 kg)   09/20/16 149 lb (67.6 kg)     Normal gait including tandem. Unable to maintain one foot stand for more than 3 seconds.Visual fields full. Extraocular movements intact without nystagmus. Smile symmetrical. Facial sensation equal. Tongue midline and strong. No drift, pronation, tremor, or asterixis. Finger finger nose is done well.     Heart exam without murmur. RRR. No carotid bruit.     IMPRESSION   1) Partial seizures, intractable; simple partial, simple partial to complex partial seizures. Complex partial seizures often associated with falls and occasionally with trama. Independent bitemporal likely neocortical onset by scalp EEG. Improvement starting approxmiately March 2015 persists now with more than 27 months of  Freedom from complex partial seizures with falls. Simple partial seizures are somewhat worse. Tho she begs to differ I still don't think fycompa entirely explains her good run; however,  fycompa and LEV appear to be playing important roles. Completing menopause " may have been important. Seizures no worse with reduction of OXC. Refractory to all AEDs other than barbiturates, felbamate, methsuximide, ezogabine, ethetoin, vigabatrin,CLB off label.  2) Migraine headaches; topiramate thought to have helped; continue. Neurologist currently treating headaches reportedly believes that TPM has not been helpful and that headachs would be better treated with higher doses of triptans. It is not clear that TPM has helped much with seizures and may be worsening cognitive status so reasonable to taper these medications.  3) Asystole; likely primary and not related to seizures or VNS tho this has never been formally examined; has pacemaker in place.   4) VNS at end of service. Course indicates that VNS was not playing much role in seizure control as seizures continue doing well tho VNS at [facility].  5) Depression, appears improved after increase of escitalopram. Therapy also helpful. Concern that higher dose in AM playing some role in sedtion tho I think tht XOC is more liekly to be responsible. Concern about somataform features.  6) Mild hyponatremia in past likely related to oxcarbazpine and escitalopram. Not playing a role in her symptoms.      PLAN:   1) Same OXC, levetiracetam, and fycompa. Asked to reduce topiramate to 50 mg BID.  2) Continue escitalopram  3) AED options as above. As she is uncomfortable with felbatol,  clobazam off label would probably be best choice. We could also consider replacing levetiracetam with brivaracetam or use eslicarbazpine instead of oxcarbazepine if formulary will allow. RNS should be a serious consideration given the trauma she occasinally experiences with her seizures but would require thorough evaluation. If RNS placed she would have three implanted devices which could also raise concern. Neither she nor her  were excited about this possibility when we last reviewed.  4) RTC 6 months. Sooner if seizures worsen.     Noam Barboza

## 2017-12-21 NOTE — MR AVS SNAPSHOT
After Visit Summary   12/21/2017    Adia Briceno    MRN: [id number]           Patient Information     Date Of Birth          1955        Visit Information        Provider Department      12/21/2017 2:00 PM Noam Barboza MD [facility]        Today's Diagnoses     Localz-rlted symptomatic epilepsy w cmplx partl sz, intract, w status (H)        Localization-related symptomatic epilepsy and epileptic syndromes with complex partial seizures, intractable, with status epilepticus (H)          Care Instructions    Reduce your topiramate to one half of 100 mg tablet (50 mg) at lunch and one half of 100 mg tablet (50 mg) at bedtime.    If seizures worsen we can resume topiramate the way you are taking now or we can increase th fycompa to 6 mg. You would need a new perscription if we increase the fycompa.          Follow-ups after your visit        Follow-up notes from your care team     Return in about 6 months (around 6/21/2018).      Your next 10 appointments already scheduled     Jun 20, 2018  1:00 PM CDT   Return Visit with Noam Barboza MD   [facility] ([facility])    [address], [address]  [facility] [id number] [phone]              Future tests that were ordered for you today     Open Future Orders        Priority Expected Expires Ordered    Oxcarbazepine level Routine  12/21/2018 12/21/2017    Keppra (Levetiracetam) Level Routine  12/21/2018 12/21/2017    Sodium Routine  12/21/2018 12/21/2017            Who to contact     Please call your clinic at [phone] to:    Ask questions about your health    Make or cancel appointments    Discuss your medicines    Learn about your test results    Speak to your doctor   If you have compliments or concerns about an experience at your clinic, or if you wish to file a complaint, please contact [facility] Patient Relations at [phone] or email us at  [email or web address]         Additional Information About Your Visit        [employer] Information     Inkling is an electronic gateway that provides easy, online access to your medical records. With Inkling, you can request a clinic appointment, read your test results, renew a prescription or communicate with your care team.     To sign up for Inkling visit the website at [email or web address]   You will be asked to enter the access code listed below, as well as some personal information. Please follow the directions to create your username and password.     Your access code is: [email or web address]  Expires: 3/4/2018 11:10 AM     Your access code will  in 90 days. If you need help or a new code, please contact your [facility] or call [phone] for assistance.        Care EveryWhere ID     This is your Care EveryWhere ID. This could be used by other organizations to access your [place] medical records  [id number]        Your Vitals Were     Pulse Height Breastfeeding? BMI (Body Mass Index)          70 5' 5.98\" (167.6 cm) No 25.35 kg/m2         Blood Pressure from Last 3 Encounters:   17 123/90   17 90/75   17 130/84    Weight from Last 3 Encounters:   17 157 lb (71.2 kg)   17 153 lb 6.4 oz (69.6 kg)   17 151 lb (68.5 kg)                 Today's Medication Changes          These changes are accurate as of: 17  2:46 PM.  If you have any questions, ask your nurse or doctor.               These medicines have changed or have updated prescriptions.        Dose/Directions    levETIRAcetam 750 MG tablet   Commonly known as:  KEPPRA   This may have changed:  See the new instructions.   Used for:  Localization-related symptomatic epilepsy and epileptic syndromes with complex partial seizures, intractable, with status epilepticus (H)   Changed by:  Noam Barboza MD        TAKE 1 TABLET BY MOUTH THREE TIMES DAILY AND " TAKE 1 1/2 TABLETS EVERY NIGHT AT BEDTIME(TOTAL 3375MG PER DAY).   Quantity:  135 tablet   Refills:  11       OXcarbazepine 300 MG tablet   Commonly known as:  TRILEPTAL   This may have changed:  See the new instructions.   Used for:  Localz-rlted symptomatic epilepsy w cmplx partl sz, intract, w status (H)   Changed by:  Noam Barboza MD        TAKE ONE AND ONE-HALF TABLET IN THE MORNING, ONE AND ONE-HALF TABLET AT NOON, 2 TABLETS IN THE EVENING AND 2 TABLETS AT BEDTIME   Quantity:  210 tablet   Refills:  11       perampanel 4 MG tablet   Commonly known as:  FYCOMPA   This may have changed:  See the new instructions.   Used for:  Localz-rlted symptomatic epilepsy w cmplx partl sz, intract, w status (H)   Changed by:  Noam Barboza MD        Dose:  4 mg   Take 1 tablet (4 mg) by mouth At Bedtime   Quantity:  30 tablet   Refills:  5       topiramate 100 MG tablet   Commonly known as:  TOPAMAX   This may have changed:  See the new instructions.   Used for:  Localization-related symptomatic epilepsy and epileptic syndromes with complex partial seizures, intractable, with status epilepticus (H)   Changed by:  Noam Barboza MD        Take one half at lunch time and one half at dinnertime   Quantity:  30 tablet   Refills:  11            Where to get your medicines      These medications were sent to [facility]  [place], [place] - [address] AT [address]  [address] [place] [address] [place] [address]     Phone:  [phone]     levETIRAcetam 750 MG tablet    OXcarbazepine 300 MG tablet    topiramate 100 MG tablet         Some of these will need a paper prescription and others can be bought over the counter.  Ask your nurse if you have questions.     Bring a paper prescription for each of these medications     perampanel 4 MG tablet                Primary Care Provider Office Phone # Fax #    Mary Rodgers [phone]  [phone]       [facility] [address]  [facility] [address]        Equal Access to Services     HOA WADSWORTH : Carol kassy macias cait Aleman, walorneda luqalana, qadinorata kaalmada katelynn, jeanne barretodiane janell. So [facility] [phone].    ATENCIÓN: Si habla español, tiene a castro disposición servicios gratuitos de asistencia lingüística. Sandra al [phone].    We comply with applicable federal civil rights laws and [place] laws. We do not discriminate on the basis of race, color, national origin, age, disability, sex, sexual orientation, or gender identity.            Thank you!     Thank you for choosing [facility]  for your care. Our goal is always to provide you with excellent care. Hearing back from our patients is one way we can continue to improve our services. Please take a few minutes to complete the written survey that you may receive in the mail after your visit with us. Thank you!             Your Updated Medication List - Protect others around you: Learn how to safely use, store and throw away your medicines at www.disposemymeds.org.          This list is accurate as of: 12/21/17  2:46 PM.  Always use your most recent med list.                   Brand Name Dispense Instructions for use Diagnosis    acetaZOLAMIDE 250 MG tablet    DIAMOX    14 tablet    1 tab BID after 2 consecutive days of seizures    Localization-related (focal) (partial) epilepsy and epileptic syndromes with simple partial seizures, with intractable epilepsy       clonazePAM 1 MG tablet    klonoPIN    30 tablet    TAKE ONE TABLET BY MOUTH EVERY EVENING    Localz-rlted symptomatic epilepsy w cmplx partl sz, intract, w status (H)       diazepam 5 MG/ML (HIGH CONC) solution    DIAZEPAM INTENSOL    30 mL    Administer 1 ml as needed into cheek pouch for seizures    Localization-related (focal) (partial) epilepsy and epileptic syndromes with complex partial seizures, with intractable epilepsy        * escitalopram 5 MG tablet    LEXAPRO    30 tablet    Take 1 tablet (5 mg) by mouth daily    Adjustment disorder with depressed mood       * escitalopram 10 MG tablet    LEXAPRO    30 tablet    Take one at bedtime    Adjustment disorder with depressed mood       levETIRAcetam 750 MG tablet    KEPPRA    135 tablet    TAKE 1 TABLET BY MOUTH THREE TIMES DAILY AND TAKE 1 1/2 TABLETS EVERY NIGHT AT BEDTIME(TOTAL 3375MG PER DAY).    Localization-related symptomatic epilepsy and epileptic syndromes with complex partial seizures, intractable, with status epilepticus (H)       MULTIVITAMINS PO      daily.        nexIUM 40 MG CR capsule   Generic drug:  esomeprazole      Take by mouth every morning (before breakfast) Take 30-60 minutes before eating.        OXcarbazepine 300 MG tablet    TRILEPTAL    210 tablet    TAKE ONE AND ONE-HALF TABLET IN THE MORNING, ONE AND ONE-HALF TABLET AT NOON, 2 TABLETS IN THE EVENING AND 2 TABLETS AT BEDTIME    Localz-rlted symptomatic epilepsy w cmplx partl sz, intract, w status (H)       perampanel 4 MG tablet    FYCOMPA    30 tablet    Take 1 tablet (4 mg) by mouth At Bedtime    Localz-rlted symptomatic epilepsy w cmplx partl sz, intract, w status (H)       SUMAtriptan 100 MG tablet    IMITREX    9 tablet    Take one by mouth prn severe headache. No more than four per month.    Localization-related symptomatic epilepsy and epileptic syndromes with complex partial seizures, intractable, with status epilepticus (H)       topiramate 100 MG tablet    TOPAMAX    30 tablet    Take one half at lunch time and one half at dinnertime    Localization-related symptomatic epilepsy and epileptic syndromes with complex partial seizures, intractable, with status epilepticus (H)       vitamin D 2000 UNITS Caps      daily.        ZYRTEC PO      daily.        * Notice:  This list has 2 medication(s) that are the same as other medications prescribed for you. Read the directions carefully, and ask your  doctor or other care provider to review them with you.

## 2017-12-21 NOTE — LETTER
2017       RE: Adia Briceno  : 1955   MRN: [id number]      Dear Colleague,    Thank you for referring your patient, Adia Briceno, to the [facility] EPILEPSY CARE at [facility]. Please see a copy of my visit note below.    [facility]      Patient:  Adia Briceno  :  1955   Age:  62 year old   Today's Office Visit:  2017    Epilepsy Data:    Patient History  Primary Epileptologist/Provider: Noam Barboza M.D.  Epilepsy Syndrome: Localization-related epilepsy unspecified  Epilepsy Syndrome Status: Final  Age of Onset: 10  Other Relevant Dx/ Issues: Inpatient evaluation 1998.  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  Asystole  with subsequent pacemaker placement.  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 Seizure-related falls/injuries.  is endocrinologist.     Tests/Surgery History  Last EE2005  Last MRI: 2008  Last Neuropsych Testin2008  Last Case Management Conference: 2008  Epilepsy Surgery #1 Date: 08  Epilepsy Related Surgery #1 : Type: VNS placement    Seizure Record  Current Visit Date: 17  Previous Visit Date: 17  Months since last visit: 6.28    Seizure Type 1: Complex partial seizures unspecified  Description of Sz Type 1: Head slumps, amnesia  # of Type 1 Seizure since last visit: 0  Freq. Type 1 / Month: 0    Seizure Type 2: Complex partial seizures with impairment of consciousness at onset  Description of Sz Type 2: Head slumps with collapse  # of Type 2 Seizure since last visit: 0  Freq. Type 2 / Month: 0    Seizure Type 3: Simple partial seizures unspecified  Description of Sz Type 3: strange feeling in her head, 30-60 seconds  # of Type 3 Seizure since last visit: 10  Freq. Type 3 / Month: 1.59    History of Present Illness:     Overall doing well. Two bad days  and  during which had six auras.  Fell during one aura  but reports that she felt herself falling and felt herself hitting the ground.  Believes that she was dizzy because of frequent seizures and fell because of multiple auras.    Current Outpatient Prescriptions   Medication Sig Dispense Refill     topiramate (TOPAMAX) 100 MG tablet TAKE 1 TABLET(100 MG) BY MOUTH TWICE DAILY 60 tablet 3     levETIRAcetam (KEPPRA) 750 MG tablet TAKE 1 TABLET BY MOUTH THREE TIMES DAILY AND TAKE 1 1/2 TABLETS EVERY NIGHT AT BEDTIME(TOTAL 3375MG PER DAY). 135 tablet 3     FYCOMPA 4 MG tablet TAKE ONE TABLET BY MOUTH EVERY NIGHT AT BEDTIME 30 tablet 5     clonazePAM (KLONOPIN) 1 MG tablet TAKE ONE TABLET BY MOUTH EVERY EVENING 30 tablet 5     OXcarbazepine (TRILEPTAL) 300 MG tablet TAKE ONE AND ONE-HALF TABLET IN THE MORNING, ONE AND ONE-HALF TABLET AT NOON, 2 TABLETS IN THE EVENING AND 2 TABLETS AT BEDTIME 210 tablet 5     escitalopram (LEXAPRO) 5 MG tablet Take 1 tablet (5 mg) by mouth daily 30 tablet 11     escitalopram (LEXAPRO) 10 MG tablet Take one at bedtime 30 tablet 11     SUMAtriptan (IMITREX) 100 MG tablet Take one by mouth prn severe headache. No more than four per month. 9 tablet 0     diazepam (DIAZEPAM INTENSOL) 5 MG/ML solution Administer 1 ml as needed into cheek pouch for seizures 30 mL 0     acetaZOLAMIDE (DIAMOX) 250 MG tablet 1 tab BID after 2 consecutive days of seizures 14 tablet 0     esomeprazole (NEXIUM) 40 MG capsule Take by mouth every morning (before breakfast) Take 30-60 minutes before eating.       Cholecalciferol (VITAMIN D) 2000 UNITS CAPS daily.       Cetirizine HCl (ZYRTEC PO) daily.       Multiple Vitamin (MULTIVITAMINS PO) daily.          Perceived AED Side Effects:  Uncertain; continues with memory complaints.    Medication Notes:    Continues taking  mg 50 mg tid.  AED Side Effects Discussed: Yes  AED Medication Compliance:  compliant all of the time  Using a pill box:  Yes    Review of Systems:  Lethargy / Tiredness:  Yes  Nausea / Vomiting:   "No  Double Vision:  No  Sleepiness:  Yes  Depression:  No  Slowed Cognitive Function:  No  Memory Problems:  Yes  Poor Balance:  No  Dizziness:  No  Appetite Changes:  No  Blurred Vision:  No  Decreased Libido:  na  Sleep Changes:  No  Behavioral Changes:  No  Skin: negative  Respiratory: No shortness of breath, dyspnea on exertion, cough, or hemoptysis and No shortness of breath  Cardiovascular: negative  Have you experienced a traumatic fall since your last visit: YES  Are these falls related to your seizures:  NO      Other Issues:      Headaches overall the same. Uses imitrex 5 to 6 times per month. Follwed by Ryberg.  No depression, suicidality.    Urinary frequency, every hour while awake; four times per night. No hematuria. NO fevers. NO dysuria,  Pacemaker battery is normal.   Is patient safe to drive:  No    Woman Care:   Patient is:  Postmenopausal.    Exam:    /90 (BP Location: Right arm, Patient Position: Chair, Cuff Size: Adult Regular)  Pulse 70  Ht 5' 5.98\" (167.6 cm)  Wt 157 lb (71.2 kg)  Breastfeeding? No  BMI 25.35 kg/m2     Wt Readings from Last 5 Encounters:   12/21/17 157 lb (71.2 kg)   06/13/17 153 lb 6.4 oz (69.6 kg)   04/24/17 151 lb (68.5 kg)   12/08/16 150 lb (68 kg)   09/20/16 149 lb (67.6 kg)     Normal gait including tandem. Unable to maintain one foot stand for more than 3 seconds.Visual fields full. Extraocular movements intact without nystagmus. Smile symmetrical. Facial sensation equal. Tongue midline and strong. No drift, pronation, tremor, or asterixis. Finger finger nose is done well.     Heart exam without murmur. RRR. No carotid bruit.     IMPRESSION   1) Partial seizures, intractable; simple partial, simple partial to complex partial seizures. Complex partial seizures often associated with falls and occasionally with trama. Independent bitemporal likely neocortical onset by scalp EEG. Improvement starting approxmiately March 2015 persists now with more than 27 months " of  [place] from complex partial seizures with falls. Simple partial seizures are somewhat worse. Tho she begs to differ I still don't think fycompa entirely explains her good run; however,  fycompa and LEV appear to be playing important roles. Completing menopause may have been important. Seizures no worse with reduction of OXC. Refractory to all AEDs other than barbiturates, felbamate, methsuximide, ezogabine, ethetoin, vigabatrin,CLB off label.  2) Migraine headaches; topiramate thought to have helped; continue. Neurologist currently treating headaches reportedly believes that TPM has not been helpful and that headachs would be better treated with higher doses of triptans. It is not clear that TPM has helped much with seizures and may be worsening cognitive status so reasonable to taper these medications.  3) Asystole; likely primary and not related to seizures or VNS tho this has never been formally examined; has pacemaker in place.   4) VNS at end of service. Course indicates that VNS was not playing much role in seizure control as seizures continue doing well tho VNS at EOS.  5) Depression, appears improved after increase of escitalopram. Therapy also helpful. Concern that higher dose in AM playing some role in sedtion tho I think tht XOC is more liekly to be responsible. Concern about somataform features.  6) Mild hyponatremia in past likely related to oxcarbazpine and escitalopram. Not playing a role in her symptoms.      PLAN:   1) Same OXC, levetiracetam, and fycompa. Asked to reduce topiramate to 50 mg BID.  2) Continue escitalopram  3) AED options as above. As she is uncomfortable with felbatol,  clobazam off label would probably be best choice. We could also consider replacing levetiracetam with brivaracetam or use eslicarbazpine instead of oxcarbazepine if formulary will allow. RNS should be a serious consideration given the trauma she occasinally experiences with her seizures but would require  thorough evaluation. If RNS placed she would have three implanted devices which could also raise concern. Neither she nor her  were excited about this possibility when we last reviewed.  4) RTC 6 months. Sooner if seizures worsen.     Noam Barboza

## 2017-12-27 DIAGNOSIS — F43.21 ADJUSTMENT DISORDER WITH DEPRESSED MOOD: ICD-10-CM

## 2017-12-28 RX ORDER — ESCITALOPRAM OXALATE 10 MG/1
TABLET ORAL
Qty: 30 TABLET | Refills: 5 | Status: SHIPPED | OUTPATIENT
Start: 2017-12-28 | End: 2018-06-20

## 2017-12-28 RX ORDER — ESCITALOPRAM OXALATE 5 MG/1
TABLET ORAL
Qty: 30 TABLET | Refills: 5 | Status: SHIPPED | OUTPATIENT
Start: 2017-12-28 | End: 2018-06-20

## 2018-02-07 ENCOUNTER — TRANSFERRED RECORDS (OUTPATIENT)
Dept: HEALTH INFORMATION MANAGEMENT | Facility: CLINIC | Age: 63
End: 2018-02-07

## 2018-02-13 LAB
BUN SERPL-MCNC: 15 MG/DL (ref 7–25)
BUN/CREATININE RATIO: ABNORMAL (ref 6–22)
CALCIUM SERPL-MCNC: 8.7 MG/DL (ref 8.6–10.4)
CHLORIDE SERPLBLD-SCNC: 98 MMOL/L (ref 98–110)
CO2 SERPL-SCNC: 25 MMOL/L (ref 20–31)
CREAT SERPL-MCNC: 0.84 MG/DL (ref 0.5–0.99)
EGFR IF AFRICN AM: 85 ML/MIN/1.73
EGFR IF NONAFRICN AM: 73 ML/MIN/1.73
GLUCOSE SERPL-MCNC: 75 MG/DL (ref 65–99)
KEPPRA (LEVETIRACETAM) LEVEL: 39.5 UG/ML
OXCARBAZEPINE METABOLITE (MHC) S: 32.2 MCG/ML (ref 8–35)
POTASSIUM SERPL-SCNC: 3.8 MMOL/L (ref 3.5–5.3)
SODIUM SERPL-SCNC: 132 MMOL/L (ref 135–146)
SODIUM SERPL-SCNC: 132 MMOL/L (ref 135–146)
TSH SERPL-ACNC: 1.85 UIU/ML (ref 0.3–5)

## 2018-03-06 ENCOUNTER — VIRTUAL VISIT (OUTPATIENT)
Dept: NEUROLOGY | Facility: CLINIC | Age: 63
End: 2018-03-06
Payer: COMMERCIAL

## 2018-03-06 DIAGNOSIS — G40.211 LOCALIZATION-RELATED SYMPTOMATIC EPILEPSY AND EPILEPTIC SYNDROMES WITH COMPLEX PARTIAL SEIZURES, INTRACTABLE, WITH STATUS EPILEPTICUS (H): Primary | ICD-10-CM

## 2018-03-06 NOTE — MR AVS SNAPSHOT
After Visit Summary   3/6/2018    Adia Briceno    MRN: [id number]           Patient Information     Date Of Birth          1955        Visit Information        Provider Department      3/6/2018 11:30 AM Noam Barboza MD [facility]        Today's Diagnoses     Localization-related symptomatic epilepsy and epileptic syndromes with complex partial seizures, intractable, with status epilepticus (H)    -  1       Follow-ups after your visit        Your next 10 appointments already scheduled     Apr 13, 2018  9:45 AM CDT   Pacemaker Check with MARIAM MARTINEZ   [facility] ([facility])    [address]  [place] [address]   [phone]            Apr 13, 2018 10:30 AM CDT   Return Visit with Samia Wheeler PA-C   [facility] ([facility])    [address]  [place] [address]   [phone]            Jun 20, 2018  1:00 PM CDT   Return Visit with Noam Barboza MD   [facility] ([facility])    [address], [address]  [place] [address] [phone]              Who to contact     Please call your clinic at [phone] to:    Ask questions about your health    Make or cancel appointments    Discuss your medicines    Learn about your test results    Speak to your doctor            Additional Information About Your Visit        MyChart Information     [employer] is an electronic gateway that provides easy, online access to your medical records. With [employer], you can request a clinic appointment, read your test results, renew a prescription or communicate with your care team.     To sign up for [employer] visit the website at [email or web address]   You will be asked to enter the access code listed below, as well as some personal information. Please follow the directions to create your username and  password.     Your access code is: [email or web address]  Expires: 2018 11:49 AM     Your access code will  in 90 days. If you need help or a new code, please contact your [facility] or call [phone] for assistance.        Care EveryWhere ID     This is your Care EveryWhere ID. This could be used by other organizations to access your [place] medical records  [id number]         Blood Pressure from Last 3 Encounters:   17 123/90   17 90/75   17 130/84    Weight from Last 3 Encounters:   17 157 lb (71.2 kg)   17 153 lb 6.4 oz (69.6 kg)   17 151 lb (68.5 kg)              Today, you had the following     No orders found for display       Primary Care Provider Office Phone # Fax #    Mary Rodgers [phone]       [facility] [address]  [place] [address]        Equal Access to Services     [facility]: Hadii aad ku hadasho Soomaali, waaxda luqadaha, qaybta kaalmada adeegyada, waxay idiin hayreji rodriguez . So [facility] [phone].    ATENCIÓN: Si habla español, tiene a castro disposición servicios gratuitos de asistencia lingüística. Llame al [phone].    We comply with applicable federal civil rights laws and [place] laws. We do not discriminate on the basis of race, color, national origin, age, disability, sex, sexual orientation, or gender identity.            Thank you!     Thank you for choosing [facility] EPI[facility]  for your care. Our goal is always to provide you with excellent care. Hearing back from our patients is one way we can continue to improve our services. Please take a few minutes to complete the written survey that you may receive in the mail after your visit with us. Thank you!             Your Updated Medication List - Protect others around you: Learn how to safely use, store and throw away your medicines at www.disposemymeds.org.          This list is accurate as of 3/6/18  11:58 AM.  Always use your most recent med list.                   Brand Name Dispense Instructions for use Diagnosis    acetaZOLAMIDE 250 MG tablet    DIAMOX    14 tablet    1 tab BID after 2 consecutive days of seizures    Localization-related (focal) (partial) epilepsy and epileptic syndromes with simple partial seizures, with intractable epilepsy       clonazePAM 1 MG tablet    klonoPIN    30 tablet    TAKE ONE TABLET BY MOUTH EVERY EVENING    Localz-rlted symptomatic epilepsy w cmplx partl sz, intract, w status (H)       diazepam 5 MG/ML (HIGH CONC) solution    DIAZEPAM INTENSOL    30 mL    Administer 1 ml as needed into cheek pouch for seizures    Localization-related (focal) (partial) epilepsy and epileptic syndromes with complex partial seizures, with intractable epilepsy       * escitalopram 5 MG tablet    LEXAPRO    30 tablet    TAKE 1 TABLET(5 MG) BY MOUTH DAILY    Adjustment disorder with depressed mood       * escitalopram 10 MG tablet    LEXAPRO    30 tablet    TAKE 1 TABLET BY MOUTH AT BEDTIME    Adjustment disorder with depressed mood       levETIRAcetam 750 MG tablet    KEPPRA    135 tablet    TAKE 1 TABLET BY MOUTH THREE TIMES DAILY AND TAKE 1 1/2 TABLETS EVERY NIGHT AT BEDTIME(TOTAL 3375MG PER DAY).    Localization-related symptomatic epilepsy and epileptic syndromes with complex partial seizures, intractable, with status epilepticus (H)       MULTIVITAMINS PO      daily.        nexIUM 40 MG CR capsule   Generic drug:  esomeprazole      Take by mouth every morning (before breakfast) Take 30-60 minutes before eating.        OXcarbazepine 300 MG tablet    TRILEPTAL    210 tablet    TAKE ONE AND ONE-HALF TABLET IN THE MORNING, ONE AND ONE-HALF TABLET AT NOON, 2 TABLETS IN THE EVENING AND 2 TABLETS AT BEDTIME    Localz-rlted symptomatic epilepsy w cmplx partl sz, intract, w status (H)       perampanel 4 MG tablet    FYCOMPA    30 tablet    Take 1 tablet (4 mg) by mouth At Bedtime    Localz-rlted  symptomatic epilepsy w cmplx partl sz, intract, w status (H)       SUMAtriptan 100 MG tablet    IMITREX    9 tablet    Take one by mouth prn severe headache. No more than four per month.    Localization-related symptomatic epilepsy and epileptic syndromes with complex partial seizures, intractable, with status epilepticus (H)       topiramate 100 MG tablet    TOPAMAX    30 tablet    Take one half at lunch time and one half at dinnertime    Localization-related symptomatic epilepsy and epileptic syndromes with complex partial seizures, intractable, with status epilepticus (H)       vitamin D 2000 UNITS Caps      daily.        ZYRTEC PO      daily.        * Notice:  This list has 2 medication(s) that are the same as other medications prescribed for you. Read the directions carefully, and ask your doctor or other care provider to review them with you.

## 2018-03-06 NOTE — PROGRESS NOTES
Increased SPS. She describes this as unusual sensation in the head. Usually several minutes. She feels a need to move her bowels and usually goes to bathroom to do so. Retains full memory and no loss of consciousness, mess in toilet, etc. No collapses.    Denies obvious seizure precipitants. Taking medications as directed. No fevers, diarrhea, vomiting. No new medications. No significant sleep deprivation, alcohol use, street drug use. No new significant stressors. Notes however that this occurs more often when traveling.    Last visit we had reduced TPM to 50 twice a day. Asked to increase to 50-50-50. Same OXC. Can consider increasing OXC or fycompa next if needed.    Noam Barboza

## 2018-03-29 DIAGNOSIS — G40.211 LOCALZ-RLTED SYMPTOMATIC EPILEPSY W CMPLX PARTL SZ, INTRACT, W STATUS (H): ICD-10-CM

## 2018-03-29 RX ORDER — CLONAZEPAM 1 MG/1
TABLET ORAL
Qty: 30 TABLET | Refills: 0 | Status: CANCELLED | OUTPATIENT
Start: 2018-03-29

## 2018-03-30 DIAGNOSIS — G40.211 LOCALZ-RLTED SYMPTOMATIC EPILEPSY W CMPLX PARTL SZ, INTRACT, W STATUS (H): Primary | ICD-10-CM

## 2018-03-30 RX ORDER — CLONAZEPAM 1 MG/1
1 TABLET ORAL EVERY MORNING
Qty: 30 TABLET | Refills: 5 | Status: SHIPPED | OUTPATIENT
Start: 2018-03-30 | End: 2018-07-18

## 2018-04-04 ENCOUNTER — HOSPITAL ENCOUNTER (OUTPATIENT)
Dept: BONE DENSITY | Facility: CLINIC | Age: 63
Discharge: HOME | End: 2018-04-04
Attending: PEDIATRICS | Admitting: PEDIATRICS
Payer: COMMERCIAL

## 2018-04-04 DIAGNOSIS — M85.80 OSTEOPENIA, UNSPECIFIED LOCATION: ICD-10-CM

## 2018-04-04 PROCEDURE — 77080 DXA BONE DENSITY AXIAL: CPT

## 2018-06-20 DIAGNOSIS — F43.21 ADJUSTMENT DISORDER WITH DEPRESSED MOOD: ICD-10-CM

## 2018-06-21 DIAGNOSIS — G40.211 LOCALZ-RLTED SYMPTOMATIC EPILEPSY W CMPLX PARTL SZ, INTRACT, W STATUS (H): ICD-10-CM

## 2018-06-21 RX ORDER — ESCITALOPRAM OXALATE 5 MG/1
TABLET ORAL
Qty: 30 TABLET | Refills: 0 | Status: SHIPPED | OUTPATIENT
Start: 2018-06-21 | End: 2018-07-24

## 2018-06-21 RX ORDER — ESCITALOPRAM OXALATE 10 MG/1
TABLET ORAL
Qty: 30 TABLET | Refills: 0 | Status: SHIPPED | OUTPATIENT
Start: 2018-06-21 | End: 2018-07-24

## 2018-06-22 RX ORDER — PERAMPANEL 4 MG/1
TABLET ORAL
Qty: 30 TABLET | Refills: 5 | Status: SHIPPED | OUTPATIENT
Start: 2018-06-22 | End: 2018-12-28

## 2018-06-29 ENCOUNTER — OFFICE VISIT (OUTPATIENT)
Dept: CARDIOLOGY | Facility: CLINIC | Age: 63
End: 2018-06-29
Attending: INTERNAL MEDICINE
Payer: COMMERCIAL

## 2018-06-29 VITALS
HEIGHT: 66 IN | DIASTOLIC BLOOD PRESSURE: 72 MMHG | SYSTOLIC BLOOD PRESSURE: 124 MMHG | HEART RATE: 65 BPM | WEIGHT: 159 LBS | BODY MASS INDEX: 25.55 KG/M2

## 2018-06-29 DIAGNOSIS — E78.2 MIXED HYPERLIPIDEMIA: ICD-10-CM

## 2018-06-29 DIAGNOSIS — I49.5 SINOATRIAL NODE DYSFUNCTION (H): Primary | Chronic | ICD-10-CM

## 2018-06-29 DIAGNOSIS — Z95.0 CARDIAC PACEMAKER IN SITU: ICD-10-CM

## 2018-06-29 PROCEDURE — 99213 OFFICE O/P EST LOW 20 MIN: CPT | Performed by: PHYSICIAN ASSISTANT

## 2018-06-29 PROCEDURE — 93280 PM DEVICE PROGR EVAL DUAL: CPT | Performed by: INTERNAL MEDICINE

## 2018-06-29 NOTE — PROGRESS NOTES
HPI and Plan:   See dictation #[id number]    No orders of the defined types were placed in this encounter.      No orders of the defined types were placed in this encounter.      Medications Discontinued During This Encounter   Medication Reason     Multiple Vitamin (MULTIVITAMINS PO) Stopped by Patient         Encounter Diagnoses   Name Primary?     Sinoatrial node dysfunction (H) Yes     Mixed hyperlipidemia        CURRENT MEDICATIONS:  Current Outpatient Prescriptions   Medication Sig Dispense Refill     Cetirizine HCl (ZYRTEC PO) daily.       Cholecalciferol (VITAMIN D) 2000 UNITS CAPS daily.       clonazePAM (KLONOPIN) 1 MG tablet Take 1 tablet (1 mg) by mouth every morning 30 tablet 5     diazepam (DIAZEPAM INTENSOL) 5 MG/ML solution Administer 1 ml as needed into cheek pouch for seizures 30 mL 0     escitalopram (LEXAPRO) 10 MG tablet TAKE 1 TABLET BY MOUTH AT BEDTIME 30 tablet 0     escitalopram (LEXAPRO) 5 MG tablet TAKE 1 TABLET(5 MG) BY MOUTH DAILY 30 tablet 0     esomeprazole (NEXIUM) 40 MG capsule Take by mouth every morning (before breakfast) Take 30-60 minutes before eating.       FYCOMPA 4 MG tablet TAKE 1 TABLET BY MOUTH AT BEDTIME. 30 tablet 5     levETIRAcetam (KEPPRA) 750 MG tablet TAKE 1 TABLET BY MOUTH THREE TIMES DAILY AND TAKE 1 1/2 TABLETS EVERY NIGHT AT BEDTIME(TOTAL 3375MG PER DAY). 135 tablet 11     OXcarbazepine (TRILEPTAL) 300 MG tablet TAKE ONE AND ONE-HALF TABLET IN THE MORNING, ONE AND ONE-HALF TABLET AT NOON, 2 TABLETS IN THE EVENING AND 2 TABLETS AT BEDTIME 210 tablet 11     SUMAtriptan (IMITREX) 100 MG tablet Take one by mouth prn severe headache. No more than four per month. 9 tablet 0     topiramate (TOPAMAX) 100 MG tablet Take one half at lunch time and one half at dinnertime 30 tablet 11     acetaZOLAMIDE (DIAMOX) 250 MG tablet 1 tab BID after 2 consecutive days of seizures (Patient not taking: Reported on 6/29/2018) 14 tablet 0       ALLERGIES     Allergies   Allergen Reactions  "    Lactose        PAST MEDICAL HISTORY:  Past Medical History:   Diagnosis Date     Depression      Epilepsy (H)      GERD (gastroesophageal reflux disease)      Migraine      Seizure (H)     vagal nerve stimulator     Shortness of breath      Syncope and collapse     bradyarrhythmia: dual chamber pacemaker implanted 2011       PAST SURGICAL HISTORY:  Past Surgical History:   Procedure Laterality Date     IMPLANT PACEMAKER  April 2011    [place] [address] [place] [address],      IMPLANT STIMULATOR VAGUS NERVE  DEC  2008       FAMILY HISTORY:  No family history on file.    SOCIAL HISTORY:  Social History     Social History     Marital status:      Spouse name: N/A     Number of children: N/A     Years of education: N/A     Social History Main Topics     Smoking status: Never Smoker     Smokeless tobacco: Never Used     Alcohol use No     Drug use: No     Sexual activity: Not Asked     Other Topics Concern     Caffeine Concern Yes     3 CUPS coffee daily      Weight Concern No     Special Diet No     Exercise Yes     Seat Belt Yes     Social History Narrative       Review of Systems:  Skin:  Negative       Eyes:  Positive for glasses    ENT:  Negative      Respiratory:  Negative for shortness of breath;dyspnea on exertion;cough     Cardiovascular:  Negative;Negative for;palpitations;chest pain;edema;fatigue;lightheadedness;dizziness Positive for;exercise intolerance limited activity due to foot arthritis  Gastroenterology: Positive for reflux    Genitourinary:  Negative      Musculoskeletal:  Positive for back pain;neck pain;joint stiffness;foot pain    Neurologic:  Positive for seizures No seizures x 3 months (Dr. Sanchez)  Psychiatric:  Negative      Heme/Lymph/Imm:  Positive for allergies    Endocrine:  Negative        Physical Exam:  Vitals: /72  Pulse 65  Ht 1.676 m (5' 6\")  Wt 72.1 kg (159 lb)  BMI 25.66 kg/m2    Constitutional:  cooperative, alert and oriented, well developed, well nourished, in no acute " distress        Skin:  warm and dry to the touch, no apparent skin lesions or masses noted   pacemaker incision in the left infraclavicular area was well-healed      Head:  normocephalic, no masses or lesions        Eyes:  pupils equal and round;conjunctivae and lids unremarkable;sclera white;no xanthalasma        Lymph:      ENT:  no pallor or cyanosis, dentition good        Neck:  carotid pulses are full and equal bilaterally;no carotid bruit        Respiratory:  normal breath sounds, clear to auscultation, normal A-P diameter, normal symmetry, normal respiratory excursion, no use of accessory muscles         Cardiac: regular rhythm;normal S1 and S2;no S3 or S4       systolic ejection murmur;RUSB;grade 1        pulses full and equal                                        Extremities and Muscular Skeletal:  no edema;no spinal abnormalities noted;normal muscle strength and tone              Neurological:  no gross motor deficits        Psych:  Alert and Oriented x 3

## 2018-06-29 NOTE — LETTER
6/29/2018    Mary Rodgers MD  [facility] [address]  [facility] [address]    RE: Adia Briceno       Dear Colleague,    I had the pleasure of seeing Adia Briceno in the [facility].    HPI and Plan:   See dictation #[id number]    No orders of the defined types were placed in this encounter.      No orders of the defined types were placed in this encounter.      Medications Discontinued During This Encounter   Medication Reason     Multiple Vitamin (MULTIVITAMINS PO) Stopped by Patient         Encounter Diagnoses   Name Primary?     Sinoatrial node dysfunction (H) Yes     Mixed hyperlipidemia        CURRENT MEDICATIONS:  Current Outpatient Prescriptions   Medication Sig Dispense Refill     Cetirizine HCl (ZYRTEC PO) daily.       Cholecalciferol (VITAMIN D) 2000 UNITS CAPS daily.       clonazePAM (KLONOPIN) 1 MG tablet Take 1 tablet (1 mg) by mouth every morning 30 tablet 5     diazepam (DIAZEPAM INTENSOL) 5 MG/ML solution Administer 1 ml as needed into cheek pouch for seizures 30 mL 0     escitalopram (LEXAPRO) 10 MG tablet TAKE 1 TABLET BY MOUTH AT BEDTIME 30 tablet 0     escitalopram (LEXAPRO) 5 MG tablet TAKE 1 TABLET(5 MG) BY MOUTH DAILY 30 tablet 0     esomeprazole (NEXIUM) 40 MG capsule Take by mouth every morning (before breakfast) Take 30-60 minutes before eating.       FYCOMPA 4 MG tablet TAKE 1 TABLET BY MOUTH AT BEDTIME. 30 tablet 5     levETIRAcetam (KEPPRA) 750 MG tablet TAKE 1 TABLET BY MOUTH THREE TIMES DAILY AND TAKE 1 1/2 TABLETS EVERY NIGHT AT BEDTIME(TOTAL 3375MG PER DAY). 135 tablet 11     OXcarbazepine (TRILEPTAL) 300 MG tablet TAKE ONE AND ONE-HALF TABLET IN THE MORNING, ONE AND ONE-HALF TABLET AT NOON, 2 TABLETS IN THE EVENING AND 2 TABLETS AT BEDTIME 210 tablet 11     SUMAtriptan (IMITREX) 100 MG tablet Take one by mouth prn severe headache. No more than four per month. 9 tablet 0     topiramate (TOPAMAX) 100 MG tablet Take one half at lunch  time and one half at dinnertime 30 tablet 11     acetaZOLAMIDE (DIAMOX) 250 MG tablet 1 tab BID after 2 consecutive days of seizures (Patient not taking: Reported on 6/29/2018) 14 tablet 0       ALLERGIES     Allergies   Allergen Reactions     Lactose        PAST MEDICAL HISTORY:  Past Medical History:   Diagnosis Date     Depression      Epilepsy (H)      GERD (gastroesophageal reflux disease)      Migraine      Seizure (H)     vagal nerve stimulator     Shortness of breath      Syncope and collapse     bradyarrhythmia: dual chamber pacemaker implanted 2011       PAST SURGICAL HISTORY:  Past Surgical History:   Procedure Laterality Date     IMPLANT PACEMAKER  April 2011    [facility] [place] [address],      IMPLANT STIMULATOR VAGUS NERVE  DEC  2008       FAMILY HISTORY:  No family history on file.    SOCIAL HISTORY:  Social History     Social History     Marital status:      Spouse name: N/A     Number of children: N/A     Years of education: N/A     Social History Main Topics     Smoking status: Never Smoker     Smokeless tobacco: Never Used     Alcohol use No     Drug use: No     Sexual activity: Not Asked     Other Topics Concern     Caffeine Concern Yes     3 CUPS coffee daily      Weight Concern No     Special Diet No     Exercise Yes     Seat Belt Yes     Social History Narrative       Review of Systems:  Skin:  Negative       Eyes:  Positive for glasses    ENT:  Negative      Respiratory:  Negative for shortness of breath;dyspnea on exertion;cough     Cardiovascular:  Negative;Negative for;palpitations;chest pain;edema;fatigue;lightheadedness;dizziness Positive for;exercise intolerance limited activity due to foot arthritis  Gastroenterology: Positive for reflux    Genitourinary:  Negative      Musculoskeletal:  Positive for back pain;neck pain;joint stiffness;foot pain    Neurologic:  Positive for seizures No seizures x 3 months (Dr. Sanchez)  Psychiatric:  Negative      Heme/Lymph/Imm:  Positive for  "allergies    Endocrine:  Negative        Physical Exam:  Vitals: /72  Pulse 65  Ht 1.676 m (5' 6\")  Wt 72.1 kg (159 lb)  BMI 25.66 kg/m2    Constitutional:  cooperative, alert and oriented, well developed, well nourished, in no acute distress        Skin:  warm and dry to the touch, no apparent skin lesions or masses noted   pacemaker incision in the left infraclavicular area was well-healed      Head:  normocephalic, no masses or lesions        Eyes:  pupils equal and round;conjunctivae and lids unremarkable;sclera white;no xanthalasma        Lymph:      ENT:  no pallor or cyanosis, dentition good        Neck:  carotid pulses are full and equal bilaterally;no carotid bruit        Respiratory:  normal breath sounds, clear to auscultation, normal A-P diameter, normal symmetry, normal respiratory excursion, no use of accessory muscles         Cardiac: regular rhythm;normal S1 and S2;no S3 or S4       systolic ejection murmur;RUSB;grade 1        pulses full and equal                                        Extremities and Muscular Skeletal:  no edema;no spinal abnormalities noted;normal muscle strength and tone              Neurological:  no gross motor deficits        Psych:  Alert and Oriented x 3              Thank you for allowing me to participate in the care of your patient.      Sincerely,     Samia Wheeler PA-C     [facility]    cc:   Lance Richmond MD  [address]  [place] [address]        "

## 2018-06-29 NOTE — MR AVS SNAPSHOT
After Visit Summary   6/29/2018    Adia Briceno    MRN: [id number]           Patient Information     Date Of Birth          1955        Visit Information        Provider Department      6/29/2018 2:30 PM Samia Wheeler PA-C [facility]   [place]        Today's Diagnoses     Sinoatrial node dysfunction (H)    -  1    Mixed hyperlipidemia          Care Instructions    1. Nannette Leung checked device and found using top wire of pacemaker ~19% of time and bottom wire ~1% of time. No arrhythmias seen.       2. Things today look good. Slight heart murmur on exam but consistent with the echo you had in 2014 showing mild calcification of one of the valves.     3. Discussed the calcium score Dr. Richmond ordered. Scheduling's # is [phone] if you'd like to schedule this.    4. See Dr. Richmond in 2 years with cholesterol at that time (here or your 's office)    5. My nurses' # is [phone]          Follow-ups after your visit        Your next 10 appointments already scheduled     Jul 18, 2018  1:30 PM CDT   Vagus Nerve Stimulation with Noam Barboza MD   [facility] ([facility])    [address], [address]  [facility] [id number] [phone]              Future tests that were ordered for you today     Open Future Orders        Priority Expected Expires Ordered    Follow-Up with Device Clinic Routine 12/31/2018 6/29/2019 6/29/2018            Who to contact     If you have questions or need follow up information about today's clinic visit or your schedule please contact [facility]   [place] directly at [phone].  Normal or non-critical lab and imaging results will be communicated to you by MyChart, letter or phone within 4 business days after the clinic has received the results. If you do not hear from us within 7 days, please contact the clinic through MyChart or phone. If you  "have a critical or abnormal lab result, we will notify you by phone as soon as possible.  Submit refill requests through [facility] or call your pharmacy and they will forward the refill request to us. Please allow 3 business days for your refill to be completed.          Additional Information About Your Visit        Care EveryWhere ID     This is your Care EveryWhere ID. This could be used by other organizations to access your [facility] medical records  [id number]        Your Vitals Were     Pulse Height BMI (Body Mass Index)             65 1.676 m (5' 6\") 25.66 kg/m2          Blood Pressure from Last 3 Encounters:   06/29/18 124/72   12/21/17 123/90   06/13/17 90/75    Weight from Last 3 Encounters:   06/29/18 72.1 kg (159 lb)   12/21/17 71.2 kg (157 lb)   06/13/17 69.6 kg (153 lb 6.4 oz)              We Performed the Following     Follow-Up with Cardiac Advanced Practice Provider        Primary Care Provider Office Phone # Fax #    Mary Rodgers [phone]       [facility] [address]  [place] [address]        Equal Access to Services     [facility]: Hadii aad ku hadasho Soomaali, waaxda luqadaha, qaybta kaalmada adeegyada, jeanne valdesin haylisbetn remi rodriguez . So [facility] [phone].    ATENCIÓN: Si habla español, tiene a castro disposición servicios gratuitos de asistencia lingüística. Moisesame al [phone].    We comply with applicable federal civil rights laws and [place] laws. We do not discriminate on the basis of race, color, national origin, age, disability, sex, sexual orientation, or gender identity.            Thank you!     Thank you for choosing [facility]  for your care. Our goal is always to provide you with excellent care. Hearing back from our patients is one way we can continue to improve our services. Please take a few minutes to complete the written survey that you may receive in the mail after your visit with " us. Thank you!             Your Updated Medication List - Protect others around you: Learn how to safely use, store and throw away your medicines at www.disposemymeds.org.          This list is accurate as of 6/29/18  2:42 PM.  Always use your most recent med list.                   Brand Name Dispense Instructions for use Diagnosis    acetaZOLAMIDE 250 MG tablet    DIAMOX    14 tablet    1 tab BID after 2 consecutive days of seizures    Localization-related (focal) (partial) epilepsy and epileptic syndromes with simple partial seizures, with intractable epilepsy       clonazePAM 1 MG tablet    klonoPIN    30 tablet    Take 1 tablet (1 mg) by mouth every morning    Localz-rlted symptomatic epilepsy w cmplx partl sz, intract, w status (H)       diazepam 5 MG/ML (HIGH CONC) solution    DIAZEPAM INTENSOL    30 mL    Administer 1 ml as needed into cheek pouch for seizures    Localization-related (focal) (partial) epilepsy and epileptic syndromes with complex partial seizures, with intractable epilepsy       * escitalopram 5 MG tablet    LEXAPRO    30 tablet    TAKE 1 TABLET(5 MG) BY MOUTH DAILY    Adjustment disorder with depressed mood       * escitalopram 10 MG tablet    LEXAPRO    30 tablet    TAKE 1 TABLET BY MOUTH AT BEDTIME    Adjustment disorder with depressed mood       FYCOMPA 4 MG tablet   Generic drug:  perampanel     30 tablet    TAKE 1 TABLET BY MOUTH AT BEDTIME.    Localz-rlted symptomatic epilepsy w cmplx partl sz, intract, w status (H)       levETIRAcetam 750 MG tablet    KEPPRA    135 tablet    TAKE 1 TABLET BY MOUTH THREE TIMES DAILY AND TAKE 1 1/2 TABLETS EVERY NIGHT AT BEDTIME(TOTAL 3375MG PER DAY).    Localization-related symptomatic epilepsy and epileptic syndromes with complex partial seizures, intractable, with status epilepticus (H)       nexIUM 40 MG CR capsule   Generic drug:  esomeprazole      Take by mouth every morning (before breakfast) Take 30-60 minutes before eating.         OXcarbazepine 300 MG tablet    TRILEPTAL    210 tablet    TAKE ONE AND ONE-HALF TABLET IN THE MORNING, ONE AND ONE-HALF TABLET AT NOON, 2 TABLETS IN THE EVENING AND 2 TABLETS AT BEDTIME    Localz-rlted symptomatic epilepsy w cmplx partl sz, intract, w status (H)       SUMAtriptan 100 MG tablet    IMITREX    9 tablet    Take one by mouth prn severe headache. No more than four per month.    Localization-related symptomatic epilepsy and epileptic syndromes with complex partial seizures, intractable, with status epilepticus (H)       topiramate 100 MG tablet    TOPAMAX    30 tablet    Take one half at lunch time and one half at dinnertime    Localization-related symptomatic epilepsy and epileptic syndromes with complex partial seizures, intractable, with status epilepticus (H)       vitamin D 2000 units Caps      daily.        ZYRTEC PO      daily.        * Notice:  This list has 2 medication(s) that are the same as other medications prescribed for you. Read the directions carefully, and ask your doctor or other care provider to review them with you.

## 2018-06-29 NOTE — MR AVS SNAPSHOT
After Visit Summary   6/29/2018    Adia Briceno    MRN: [id number]           Patient Information     Date Of Birth          1955        Visit Information        Provider Department      6/29/2018 1:45 PM BECERRA DCRN [facility]        Today's Diagnoses     Cardiac pacemaker in situ           Follow-ups after your visit        Additional Services     Follow-Up with Device Clinic                 Your next 10 appointments already scheduled     Jul 18, 2018  1:30 PM CDT   Vagus Nerve Stimulation with Noam Barboza MD   [facility])    [address], [address]  [facility] [id number] [phone]              Future tests that were ordered for you today     Open Future Orders        Priority Expected Expires Ordered    Follow-Up with Device Clinic Routine 12/31/2018 6/29/2019 6/29/2018            Who to contact     If you have questions or need follow up information about today's clinic visit or your schedule please contact [facility] directly at [phone].  Normal or non-critical lab and imaging results will be communicated to you by MyChart, letter or phone within 4 business days after the clinic has received the results. If you do not hear from us within 7 days, please contact the clinic through MyChart or phone. If you have a critical or abnormal lab result, we will notify you by phone as soon as possible.  Submit refill requests through [facility] or call your pharmacy and they will forward the refill request to us. Please allow 3 business days for your refill to be completed.          Additional Information About Your Visit        Care EveryWhere ID     This is your Care EveryWhere ID. This could be used by other organizations to access your [facility] medical records  [id number]         Blood Pressure from Last 3 Encounters:   06/29/18 139/83   12/21/17 123/90    06/13/17 90/75    Weight from Last 3 Encounters:   06/29/18 72.1 kg (159 lb)   12/21/17 71.2 kg (157 lb)   06/13/17 69.6 kg (153 lb 6.4 oz)              We Performed the Following     Follow-Up with Device Clinic     PM DEVICE PROGRAMMING EVAL, DUAL LEAD PACER ([id number])        Primary Care Provider Office Phone # Fax #    Mary Rodgers [phone]       [facility] [address]  [facility] [address]        Equal Access to Services     [facility]: Hadii aad ku hadasho Soomaali, waaxda luqadaha, qaybta kaalmada adeegyada, jeanne rodriguez . So [facility] [phone].    ATENCIÓN: Si habla español, tiene a castro disposición servicios gratuitos de asistencia lingüística. [facility] [phone].    We comply with applicable federal civil rights laws and [place] laws. We do not discriminate on the basis of race, color, national origin, age, disability, sex, sexual orientation, or gender identity.            Thank you!     Thank you for choosing [facility]  for your care. Our goal is always to provide you with excellent care. Hearing back from our patients is one way we can continue to improve our services. Please take a few minutes to complete the written survey that you may receive in the mail after your visit with us. Thank you!             Your Updated Medication List - Protect others around you: Learn how to safely use, store and throw away your medicines at www.disposemymeds.org.          This list is accurate as of 6/29/18  2:30 PM.  Always use your most recent med list.                   Brand Name Dispense Instructions for use Diagnosis    acetaZOLAMIDE 250 MG tablet    DIAMOX    14 tablet    1 tab BID after 2 consecutive days of seizures    Localization-related (focal) (partial) epilepsy and epileptic syndromes with simple partial seizures, with intractable epilepsy       clonazePAM 1 MG tablet    klonoPIN    30 tablet     Take 1 tablet (1 mg) by mouth every morning    Localz-rlted symptomatic epilepsy w cmplx partl sz, intract, w status (H)       diazepam 5 MG/ML (HIGH CONC) solution    DIAZEPAM INTENSOL    30 mL    Administer 1 ml as needed into cheek pouch for seizures    Localization-related (focal) (partial) epilepsy and epileptic syndromes with complex partial seizures, with intractable epilepsy       * escitalopram 5 MG tablet    LEXAPRO    30 tablet    TAKE 1 TABLET(5 MG) BY MOUTH DAILY    Adjustment disorder with depressed mood       * escitalopram 10 MG tablet    LEXAPRO    30 tablet    TAKE 1 TABLET BY MOUTH AT BEDTIME    Adjustment disorder with depressed mood       FYCOMPA 4 MG tablet   Generic drug:  perampanel     30 tablet    TAKE 1 TABLET BY MOUTH AT BEDTIME.    Localz-rlted symptomatic epilepsy w cmplx partl sz, intract, w status (H)       levETIRAcetam 750 MG tablet    KEPPRA    135 tablet    TAKE 1 TABLET BY MOUTH THREE TIMES DAILY AND TAKE 1 1/2 TABLETS EVERY NIGHT AT BEDTIME(TOTAL 3375MG PER DAY).    Localization-related symptomatic epilepsy and epileptic syndromes with complex partial seizures, intractable, with status epilepticus (H)       nexIUM 40 MG CR capsule   Generic drug:  esomeprazole      Take by mouth every morning (before breakfast) Take 30-60 minutes before eating.        OXcarbazepine 300 MG tablet    TRILEPTAL    210 tablet    TAKE ONE AND ONE-HALF TABLET IN THE MORNING, ONE AND ONE-HALF TABLET AT NOON, 2 TABLETS IN THE EVENING AND 2 TABLETS AT BEDTIME    Localz-rlted symptomatic epilepsy w cmplx partl sz, intract, w status (H)       SUMAtriptan 100 MG tablet    IMITREX    9 tablet    Take one by mouth prn severe headache. No more than four per month.    Localization-related symptomatic epilepsy and epileptic syndromes with complex partial seizures, intractable, with status epilepticus (H)       topiramate 100 MG tablet    TOPAMAX    30 tablet    Take one half at lunch time and one half at  dinnertime    Localization-related symptomatic epilepsy and epileptic syndromes with complex partial seizures, intractable, with status epilepticus (H)       vitamin D 2000 units Caps      daily.        ZYRTEC PO      daily.        * Notice:  This list has 2 medication(s) that are the same as other medications prescribed for you. Read the directions carefully, and ask your doctor or other care provider to review them with you.

## 2018-06-29 NOTE — PROGRESS NOTES
[employer] Pacemaker Device Check  AP: 19 % : 1 %  Mode: DDD        Underlying Rhythm: SR  Heart Rate: Adequate variation per histogram   Sensing: stable    Pacing Threshold: stable   Impedance: stable  Battery Status: > 5 years  Device Site: well healed  Atrial Arrhythmia: none  Ventricular Arrhythmia: none  Setting Change: none    Care Plan: f/u 6 months threshold check. Order placed. And with Miroslava Wheeler today.  Sharyn

## 2018-06-29 NOTE — LETTER
6/29/2018      Mary Rodgers MD  [facility] [address]  [facility] [address]      RE: Adia K Janak       Dear Colleague,    I had the pleasure of seeing Adia Briceno in the [facility].    Service Date: 06/29/2018      HISTORY OF PRESENT ILLNESS:  I had the pleasure of seeing Adia today when she came for routine followup.  She is a very pleasant 63-year-old who sees Dr. Richmond every few years.  She has a history of migraine headaches, acid reflux, depression and seizure and epilepsy followed by Dr. Barboza.  She had episodes of syncope that were concerning for seizure versus arrhythmia, and a CardioNet was placed.  She was noted to have multiple pauses of almost 10 seconds and underwent placement of a dual-chamber [employer] pacemaker 06/2011.      She saw Dr. Richmond in 04/2017.  At that time, she was doing well without complaints.  Dr. Richmond spoke with her , Dr. Nemesio Briceno (Endocrinology) about her total cholesterol of 294, triglycerides 50,  and .  She was having absolutely no symptoms, and Dr. Richmond recommended a coronary calcium score to help determine aggressiveness of treatment.  She has not yet done this but does continue to consider it.      She denies any problems with edema, orthopnea or PND.  She continues to walk as much as she can, but she notes significant foot and ankle discomfort.  She has had no chest pain, pressure or tightness.  She denies palpitations, lightheadedness or tawanna syncope.  Overall, she feels like she has done well.      Device was interrogated by Nannette Leung.  She was 19% A paced and 1% V paced.  She had no atrial fibrillation, and battery status was stable.  They are planning on rechecking this again in 6 months instead of having her do quarterly device checks.  In 11/2014 echocardiogram showed an EF of 55%-60%.  She did have aortic sclerosis noted, which is consistent with her murmur  today.        ASSESSMENT AND PLAN:     1.  Sinus node dysfunction.  As above, she was found to have a pause on CardioNet which she was wearing after an episode of syncope.  She had a dual-chamber [employer] pacemaker placed in  that is working well with normal battery status.      At this time, she will continue routine device checks through our office, though as she has been so stable, they will likely be done only every 6 months.      2.  Dyslipidemia.  Her non-HDL is 183, which is higher than we would like it to be.  Dr. Richmond recommended a calcium score to help guide how aggressive therapy should be for her cholesterol. She has not proceeded with this but does think that she would like to proceed with that eventually.  I have given her our schedulers' number.      We did briefly talk about aspirin therapy given that she is now 63 but she is hesitant to add anything before her calcium score is done.      She will see Dr. Richmond in 2 years with a repeat lipid panel done here or at Dr. Morgan's office.  She will call us in the interim with any issues.         NERY DAWSON PA-C             D: 2018   T: 2018   MT: [facility]      Name:     BESSY MORGAN   MRN:      [id number]        Account:      [id number]   :      1955           Service Date: 2018      Document: [id number]           Outpatient Encounter Prescriptions as of 2018   Medication Sig Dispense Refill     Cetirizine HCl (ZYRTEC PO) daily.       Cholecalciferol (VITAMIN D) 2000 UNITS CAPS daily.       clonazePAM (KLONOPIN) 1 MG tablet Take 1 tablet (1 mg) by mouth every morning 30 tablet 5     diazepam (DIAZEPAM INTENSOL) 5 MG/ML solution Administer 1 ml as needed into cheek pouch for seizures 30 mL 0     escitalopram (LEXAPRO) 10 MG tablet TAKE 1 TABLET BY MOUTH AT BEDTIME 30 tablet 0     escitalopram (LEXAPRO) 5 MG tablet TAKE 1 TABLET(5 MG) BY MOUTH DAILY 30 tablet 0     esomeprazole (NEXIUM) 40 MG capsule  Take by mouth every morning (before breakfast) Take 30-60 minutes before eating.       FYCOMPA 4 MG tablet TAKE 1 TABLET BY MOUTH AT BEDTIME. 30 tablet 5     levETIRAcetam (KEPPRA) 750 MG tablet TAKE 1 TABLET BY MOUTH THREE TIMES DAILY AND TAKE 1 1/2 TABLETS EVERY NIGHT AT BEDTIME(TOTAL 3375MG PER DAY). 135 tablet 11     OXcarbazepine (TRILEPTAL) 300 MG tablet TAKE ONE AND ONE-HALF TABLET IN THE MORNING, ONE AND ONE-HALF TABLET AT NOON, 2 TABLETS IN THE EVENING AND 2 TABLETS AT BEDTIME 210 tablet 11     SUMAtriptan (IMITREX) 100 MG tablet Take one by mouth prn severe headache. No more than four per month. 9 tablet 0     topiramate (TOPAMAX) 100 MG tablet Take one half at lunch time and one half at dinnertime 30 tablet 11     acetaZOLAMIDE (DIAMOX) 250 MG tablet 1 tab BID after 2 consecutive days of seizures (Patient not taking: Reported on 6/29/2018) 14 tablet 0     [DISCONTINUED] Multiple Vitamin (MULTIVITAMINS PO) daily.       No facility-administered encounter medications on file as of 6/29/2018.        Again, thank you for allowing me to participate in the care of your patient.      Sincerely,    Samia Wheeler PA-C     [facility]

## 2018-06-29 NOTE — PATIENT INSTRUCTIONS
1. Nannette Leung checked device and found using top wire of pacemaker ~19% of time and bottom wire ~1% of time. No arrhythmias seen.       2. Things today look good. Slight heart murmur on exam but consistent with the echo you had in 2014 showing mild calcification of one of the valves.     3. Discussed the calcium score Dr. Richmond ordered. Scheduling's # is [phone] if you'd like to schedule this.    4. See Dr. Richmond in 2 years with cholesterol at that time (here or your 's office)    5. My nurses' # is [phone]

## 2018-06-30 NOTE — PROGRESS NOTES
Service Date: 06/29/2018      HISTORY OF PRESENT ILLNESS:  I had the pleasure of seeing Adia today when she came for routine followup.  She is a very pleasant 63-year-old who sees Dr. Richmond every few years.  She has a history of migraine headaches, acid reflux, depression and seizure and epilepsy followed by Dr. Barboza.  She had episodes of syncope that were concerning for seizure versus arrhythmia, and a CardioNet was placed.  She was noted to have multiple pauses of almost 10 seconds and underwent placement of a dual-chamber [employer] pacemaker 06/2011.      She saw Dr. Richmond in 04/2017.  At that time, she was doing well without complaints.  Dr. Richmond spoke with her , Dr. Nemesio Briceno (Endocrinology) about her total cholesterol of 294, triglycerides 50,  and .  She was having absolutely no symptoms, and Dr. Richmond recommended a coronary calcium score to help determine aggressiveness of treatment.  She has not yet done this but does continue to consider it.      She denies any problems with edema, orthopnea or PND.  She continues to walk as much as she can, but she notes significant foot and ankle discomfort.  She has had no chest pain, pressure or tightness.  She denies palpitations, lightheadedness or tawanna syncope.  Overall, she feels like she has done well.      Device was interrogated by Nannette Leung.  She was 19% A paced and 1% V paced.  She had no atrial fibrillation, and battery status was stable.  They are planning on rechecking this again in 6 months instead of having her do quarterly device checks.  In 11/2014 echocardiogram showed an EF of 55%-60%.  She did have aortic sclerosis noted, which is consistent with her murmur today.        ASSESSMENT AND PLAN:     1.  Sinus node dysfunction.  As above, she was found to have a pause on CardioNet which she was wearing after an episode of syncope.  She had a dual-chamber [employer] pacemaker placed in 2011 that is  working well with normal battery status.      At this time, she will continue routine device checks through our office, though as she has been so stable, they will likely be done only every 6 months.      2.  Dyslipidemia.  Her non-HDL is 183, which is higher than we would like it to be.  Dr. Richmond recommended a calcium score to help guide how aggressive therapy should be for her cholesterol. She has not proceeded with this but does think that she would like to proceed with that eventually.  I have given her our schedulers' number.      We did briefly talk about aspirin therapy given that she is now 63 but she is hesitant to add anything before her calcium score is done.      She will see Dr. Richmond in 2 years with a repeat lipid panel done here or at Dr. Morgan's office.  She will call us in the interim with any issues.         NERY DAWSON PA-C             D: 2018   T: 2018   MT: [facility]      Name:     BESSY MORGAN   MRN:      [id number]        Account:      [id number]   :      1955           Service Date: 2018      Document: [id number]

## 2018-07-18 ENCOUNTER — OFFICE VISIT (OUTPATIENT)
Dept: NEUROLOGY | Facility: CLINIC | Age: 63
End: 2018-07-18
Payer: COMMERCIAL

## 2018-07-18 VITALS
BODY MASS INDEX: 27.28 KG/M2 | HEART RATE: 63 BPM | TEMPERATURE: 96.4 F | WEIGHT: 169 LBS | SYSTOLIC BLOOD PRESSURE: 165 MMHG | DIASTOLIC BLOOD PRESSURE: 86 MMHG

## 2018-07-18 DIAGNOSIS — G40.211 LOCALZ-RLTED SYMPTOMATIC EPILEPSY W CMPLX PARTL SZ, INTRACT, W STATUS (H): ICD-10-CM

## 2018-07-18 RX ORDER — CLONAZEPAM 1 MG/1
TABLET ORAL
Qty: 30 TABLET | Refills: 5 | Status: SHIPPED | OUTPATIENT
Start: 2018-07-18 | End: 2019-10-16

## 2018-07-18 ASSESSMENT — PAIN SCALES - GENERAL: PAINLEVEL: NO PAIN (1)

## 2018-07-18 NOTE — LETTER
2018       RE: Adia Briceno  : 1955   MRN: [id number]      Dear Colleague,    Thank you for referring your patient, Adia Briceno, to the [facility]. Please see a copy of my visit note below.    [facility]      Patient:  Adia Briceno  :  1955   Age:  63 year old   Today's Office Visit:  2018    Epilepsy Data:    Patient History  Primary Epileptologist/Provider: Noam Barboza M.D.  Epilepsy Syndrome: Localization-related epilepsy unspecified  Epilepsy Syndrome Status: Final  Age of Onset: 10  Other Relevant Dx/ Issues: Inpatient evaluation 1998.  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  Asystole  with subsequent pacemaker placement.  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 Seizure-related falls/injuries.  is endocrinologist.     Tests/Surgery History  Last EE2005  Last MRI: 2008  Last Neuropsych Testin2008  Last Case Management Conference: 2008  Epilepsy Surgery #1 Date: 08  Epilepsy Related Surgery #1 : Type: VNS placement    Seizure Record  Current Visit Date: 18  Previous Visit Date: 17  Months since last visit: 6.87    Seizure Type 1: Complex partial seizures unspecified  Description of Sz Type 1: Head slumps, amnesia  # of Type 1 Seizure since last visit: 0  Freq. Type 1 / Month: 0    Seizure Type 2: Complex partial seizures with impairment of consciousness at onset  Description of Sz Type 2: Head slumps with collapse  # of Type 2 Seizure since last visit: 0  Freq. Type 2 / Month: 0    Seizure Type 3: Simple partial seizures unspecified  Description of Sz Type 3: strange feeling in her head, 30-60 seconds  # of Type 3 Seizure since last visit: 5  Freq. Type 3 / Month: 0.73    History of Present Illness:     No seizures with impariment or collapse. One spell with decreased awareness lasting one minute in March  after travel when very hot in [place].  She had a series of SPS in late February after a trip. These went away after transient increase of TPM. This caused side effects so TPM decreased back to 100 mg per day.    Current Outpatient Prescriptions   Medication Sig Dispense Refill     Cetirizine HCl (ZYRTEC PO) daily.       Cholecalciferol (VITAMIN D) 2000 UNITS CAPS daily.       clonazePAM (KLONOPIN) 1 MG tablet Take 1 tablet (1 mg) by mouth every morning 30 tablet 5     diazepam (DIAZEPAM INTENSOL) 5 MG/ML solution Administer 1 ml as needed into cheek pouch for seizures 30 mL 0     escitalopram (LEXAPRO) 10 MG tablet TAKE 1 TABLET BY MOUTH AT BEDTIME 30 tablet 0     escitalopram (LEXAPRO) 5 MG tablet TAKE 1 TABLET(5 MG) BY MOUTH DAILY 30 tablet 0     esomeprazole (NEXIUM) 40 MG capsule Take by mouth every morning (before breakfast) Take 30-60 minutes before eating.       FYCOMPA 4 MG tablet TAKE 1 TABLET BY MOUTH AT BEDTIME. 30 tablet 5     levETIRAcetam (KEPPRA) 750 MG tablet TAKE 1 TABLET BY MOUTH THREE TIMES DAILY AND TAKE 1 1/2 TABLETS EVERY NIGHT AT BEDTIME(TOTAL 3375MG PER DAY). 135 tablet 11     OXcarbazepine (TRILEPTAL) 300 MG tablet TAKE ONE AND ONE-HALF TABLET IN THE MORNING, ONE AND ONE-HALF TABLET AT NOON, 2 TABLETS IN THE EVENING AND 2 TABLETS AT BEDTIME 210 tablet 11     SUMAtriptan (IMITREX) 100 MG tablet Take one by mouth prn severe headache. No more than four per month. 9 tablet 0     topiramate (TOPAMAX) 100 MG tablet Take one half at lunch time and one half at dinnertime (Patient taking differently: Take one half at lunch time and one half at 10pm) 30 tablet 11     acetaZOLAMIDE (DIAMOX) 250 MG tablet 1 tab BID after 2 consecutive days of seizures (Patient not taking: Reported on 6/29/2018) 14 tablet 0        Perceived AED Side Effects:  Uncertain    Medication Notes:  Uses imitrex 2 to six times per month. Dr Fair perscribes this medication for her.      AED Medication Compliance:   compliant all of the time  Using a pill box:  Yes    Review of Systems:  Patient unable to provide, information obtained from caregivers. No evidence of dysphagia, cough, wheezing, hemoptysis, chest pain, leg swelling, significant weight change, fevers, nausea, vomiting, change in bowel habits, blood per rectum, frequency, kidney stones.   Have you experienced a traumatic fall since your last visit: NO  Are these falls related to your seizures:  Not Applicable    Other Issues:    DJD continues problematic.  Headaches continue about the same.  Reports that pacemaker is going off more often than prevoiusly but feels no lightheadedness or faints.  Is patient safe to drive:  No, does not want to drive.  Worried about weight gain but can't exercise because of DJD.    Woman Care:   Patient is:  postmenopausal    Exam:    /86 (BP Location: Right arm, Patient Position: Chair, Cuff Size: Adult Regular)  Pulse 63  Temp 96.4  F (35.8  C)  Wt 169 lb (76.7 kg)  BMI 27.28 kg/m2     Wt Readings from Last 5 Encounters:   07/18/18 169 lb (76.7 kg)   06/29/18 159 lb (72.1 kg)   12/21/17 157 lb (71.2 kg)   06/13/17 153 lb 6.4 oz (69.6 kg)   04/24/17 151 lb (68.5 kg)     Normal gait including tandem. Unable to maintain one foot stand for more than 3 seconds.Visual fields full. Extraocular movements intact without nystagmus. Smile symmetrical. Facial sensation equal. Tongue midline and strong. No drift, pronation, tremor, or asterixis. Finger finger nose is done well.      Heart exam without murmur. RRR. No carotid bruit.    IMPRESSION   1) Partial seizures, intractable; simple partial, simple partial to complex partial seizures. Complex partial seizures often associated with falls and occasionally with trama. Independent bitemporal likely neocortical onset by scalp EEG. Improvement starting approxmiately March 2015 persists now with more than 3 years of  freedom from complex partial seizures with falls. Simple partial  seizures continue intermittently. Tho she begs to differ I still don't think fycompa entirely explains her good run; however,  fycompa and LEV appear to be playing important roles. Completing menopause may have been important. Seizures no worse with reduction of OXC. Refractory to all AEDs other than barbiturates, felbamate, methsuximide, ezogabine, ethetoin, vigabatrin,CLB off label.  2) Migraine headaches; topiramate thought to have helped; continue. Neurologist currently treating headaches reportedly believes that TPM has not been helpful and that headachs would be better treated with higher doses of triptans. It is not clear that TPM has helped much with seizures and may be worsening cognitive status so reasonable to taper these medications.  3) Asystole; likely primary and not related to seizures or VNS tho this has never been formally examined; has pacemaker in place.   4) VNS at end of service. Course indicates that VNS was not playing much role in seizure control as seizures continue doing well tho VNS at EOS.  5) Depression, appears improved after increase of escitalopram. Therapy also helpful. Concern that higher dose in AM playing some role in sedtion tho I think tht XOC is more liekly to be responsible. Concern about somataform features.  6) Mild hyponatremia in past likely related to oxcarbazpine and escitalopram. Not playing a role in her symptoms.      PLAN:   1) Same OXC, levetiracetam, and fycompa. Asked to reduce topiramate to 50 mg BID.  2) Continue escitalopram  3) AED options as above. As she is uncomfortable with felbatol,  clobazam off label would probably be best choice. We could also consider replacing levetiracetam with brivaracetam or use eslicarbazpine instead of oxcarbazepine if formulary will allow. RNS should be a serious consideration given the trauma she occasinally experiences with her seizures but would require thorough evaluation. If RNS placed she would have three implanted  devices which could also raise concern. Neither she nor her  were excited about this possibility when we last reviewed.  4) RTC 6 months. Sooner if seizures worsen.      Noam Barboza

## 2018-07-18 NOTE — MR AVS SNAPSHOT
After Visit Summary   7/18/2018    Adia Briceno    MRN: [id number]           Patient Information     Date Of Birth          1955        Visit Information        Provider Department      7/18/2018 1:30 PM Noam Barboza MD [facility]        Today's Diagnoses     Localz-rlted symptomatic epilepsy w cmplx partl sz, intract, w status (H)          Care Instructions      Don't worry about effect of travel unless move more than four timezones.    If moving more than four time zones we need time of departure, time of arrival and duration of flight.  Call Dr Kramer with these and she will come up with a regimen for you to keep your medications straightl.          Follow-ups after your visit        Follow-up notes from your care team     Return in about 6 months (around 1/18/2019).      Who to contact     Please call your clinic at [phone] to:    Ask questions about your health    Make or cancel appointments    Discuss your medicines    Learn about your test results    Speak to your doctor            Additional Information About Your Visit        Care EveryWhere ID     This is your Care EveryWhere ID. This could be used by other organizations to access your [place] medical records  [id number]        Your Vitals Were     Pulse Temperature BMI (Body Mass Index)             63 96.4  F (35.8  C) 27.28 kg/m2          Blood Pressure from Last 3 Encounters:   07/18/18 165/86   06/29/18 124/72   12/21/17 123/90    Weight from Last 3 Encounters:   07/18/18 169 lb (76.7 kg)   06/29/18 159 lb (72.1 kg)   12/21/17 157 lb (71.2 kg)              Today, you had the following     No orders found for display         Today's Medication Changes          These changes are accurate as of 7/18/18  2:16 PM.  If you have any questions, ask your nurse or doctor.               These medicines have changed or have updated prescriptions.        Dose/Directions    topiramate 100 MG tablet   Commonly known  as:  TOPAMAX   This may have changed:  additional instructions   Used for:  Localization-related symptomatic epilepsy and epileptic syndromes with complex partial seizures, intractable, with status epilepticus (H)        Take one half at lunch time and one half at dinnertime   Quantity:  30 tablet   Refills:  11                Primary Care Provider Office Phone # Fax #    Mary Rodgers [phone]       [facility] [address]  [facility] [id number]        Equal Access to Services     [facility] ERMELINDA : Hadii aad ku hadasho Soomaali, waaxda luqadaha, qaybta kaalmada adeegyada, waxay idiin hayaan adeeg kharash laakhil . So [facility] [phone].    ATENCIÓN: Si habla español, tiene a castro disposición servicios gratuitos de asistencia lingüística. [facility] [phone].    We comply with applicable federal civil rights laws and [place] laws. We do not discriminate on the basis of race, color, national origin, age, disability, sex, sexual orientation, or gender identity.            Thank you!     Thank you for choosing [facility]  for your care. Our goal is always to provide you with excellent care. Hearing back from our patients is one way we can continue to improve our services. Please take a few minutes to complete the written survey that you may receive in the mail after your visit with us. Thank you!             Your Updated Medication List - Protect others around you: Learn how to safely use, store and throw away your medicines at www.disposemymeds.org.          This list is accurate as of 7/18/18  2:16 PM.  Always use your most recent med list.                   Brand Name Dispense Instructions for use Diagnosis    acetaZOLAMIDE 250 MG tablet    DIAMOX    14 tablet    1 tab BID after 2 consecutive days of seizures    Localization-related (focal) (partial) epilepsy and epileptic syndromes with simple partial seizures, with intractable epilepsy       clonazePAM 1 MG tablet     klonoPIN    30 tablet    Take 1 tablet (1 mg) by mouth every morning    Localz-rlted symptomatic epilepsy w cmplx partl sz, intract, w status (H)       diazepam 5 MG/ML (HIGH CONC) solution    DIAZEPAM INTENSOL    30 mL    Administer 1 ml as needed into cheek pouch for seizures    Localization-related (focal) (partial) epilepsy and epileptic syndromes with complex partial seizures, with intractable epilepsy       * escitalopram 5 MG tablet    LEXAPRO    30 tablet    TAKE 1 TABLET(5 MG) BY MOUTH DAILY    Adjustment disorder with depressed mood       * escitalopram 10 MG tablet    LEXAPRO    30 tablet    TAKE 1 TABLET BY MOUTH AT BEDTIME    Adjustment disorder with depressed mood       FYCOMPA 4 MG tablet   Generic drug:  perampanel     30 tablet    TAKE 1 TABLET BY MOUTH AT BEDTIME.    Localz-rlted symptomatic epilepsy w cmplx partl sz, intract, w status (H)       levETIRAcetam 750 MG tablet    KEPPRA    135 tablet    TAKE 1 TABLET BY MOUTH THREE TIMES DAILY AND TAKE 1 1/2 TABLETS EVERY NIGHT AT BEDTIME(TOTAL 3375MG PER DAY).    Localization-related symptomatic epilepsy and epileptic syndromes with complex partial seizures, intractable, with status epilepticus (H)       nexIUM 40 MG CR capsule   Generic drug:  esomeprazole      Take by mouth every morning (before breakfast) Take 30-60 minutes before eating.        OXcarbazepine 300 MG tablet    TRILEPTAL    210 tablet    TAKE ONE AND ONE-HALF TABLET IN THE MORNING, ONE AND ONE-HALF TABLET AT NOON, 2 TABLETS IN THE EVENING AND 2 TABLETS AT BEDTIME    Localz-rlted symptomatic epilepsy w cmplx partl sz, intract, w status (H)       SUMAtriptan 100 MG tablet    IMITREX    9 tablet    Take one by mouth prn severe headache. No more than four per month.    Localization-related symptomatic epilepsy and epileptic syndromes with complex partial seizures, intractable, with status epilepticus (H)       topiramate 100 MG tablet    TOPAMAX    30 tablet    Take one half at lunch  time and one half at dinnertime    Localization-related symptomatic epilepsy and epileptic syndromes with complex partial seizures, intractable, with status epilepticus (H)       vitamin D 2000 units Caps      daily.        ZYRTEC PO      daily.        * Notice:  This list has 2 medication(s) that are the same as other medications prescribed for you. Read the directions carefully, and ask your doctor or other care provider to review them with you.

## 2018-07-18 NOTE — PROGRESS NOTES
[facility] Progress Note      Patient:  Adia Briceno  :  1955   Age:  63 year old   Today's Office Visit:  2018    Epilepsy Data:    Patient History  Primary Epileptologist/Provider: Noam Barboza M.D.  Epilepsy Syndrome: Localization-related epilepsy unspecified  Epilepsy Syndrome Status: Final  Age of Onset: 10  Other Relevant Dx/ Issues: Inpatient evaluation 1998.  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  Asystole  with subsequent pacemaker placement.  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 Seizure-related falls/injuries.  is endocrinologist.     Tests/Surgery History  Last EE2005  Last MRI: 2008  Last Neuropsych Testin2008  Last Case Management Conference: 2008  Epilepsy Surgery #1 Date: 08  Epilepsy Related Surgery #1 : Type: VNS placement    Seizure Record  Current Visit Date: 18  Previous Visit Date: 17  Months since last visit: 6.87    Seizure Type 1: Complex partial seizures unspecified  Description of Sz Type 1: Head slumps, amnesia  # of Type 1 Seizure since last visit: 0  Freq. Type 1 / Month: 0    Seizure Type 2: Complex partial seizures with impairment of consciousness at onset  Description of Sz Type 2: Head slumps with collapse  # of Type 2 Seizure since last visit: 0  Freq. Type 2 / Month: 0    Seizure Type 3: Simple partial seizures unspecified  Description of Sz Type 3: strange feeling in her head, 30-60 seconds  # of Type 3 Seizure since last visit: 5  Freq. Type 3 / Month: 0.73    History of Present Illness:     No seizures with impariment or collapse. One spell with decreased awareness lasting one minute in March after travel when very hot in [place].  She had a series of SPS in late February after a trip. These went away after transient increase of TPM. This caused side effects so TPM decreased back to 100 mg per day.    Current Outpatient Prescriptions   Medication Sig  Dispense Refill     Cetirizine HCl (ZYRTEC PO) daily.       Cholecalciferol (VITAMIN D) 2000 UNITS CAPS daily.       clonazePAM (KLONOPIN) 1 MG tablet Take 1 tablet (1 mg) by mouth every morning 30 tablet 5     diazepam (DIAZEPAM INTENSOL) 5 MG/ML solution Administer 1 ml as needed into cheek pouch for seizures 30 mL 0     escitalopram (LEXAPRO) 10 MG tablet TAKE 1 TABLET BY MOUTH AT BEDTIME 30 tablet 0     escitalopram (LEXAPRO) 5 MG tablet TAKE 1 TABLET(5 MG) BY MOUTH DAILY 30 tablet 0     esomeprazole (NEXIUM) 40 MG capsule Take by mouth every morning (before breakfast) Take 30-60 minutes before eating.       FYCOMPA 4 MG tablet TAKE 1 TABLET BY MOUTH AT BEDTIME. 30 tablet 5     levETIRAcetam (KEPPRA) 750 MG tablet TAKE 1 TABLET BY MOUTH THREE TIMES DAILY AND TAKE 1 1/2 TABLETS EVERY NIGHT AT BEDTIME(TOTAL 3375MG PER DAY). 135 tablet 11     OXcarbazepine (TRILEPTAL) 300 MG tablet TAKE ONE AND ONE-HALF TABLET IN THE MORNING, ONE AND ONE-HALF TABLET AT NOON, 2 TABLETS IN THE EVENING AND 2 TABLETS AT BEDTIME 210 tablet 11     SUMAtriptan (IMITREX) 100 MG tablet Take one by mouth prn severe headache. No more than four per month. 9 tablet 0     topiramate (TOPAMAX) 100 MG tablet Take one half at lunch time and one half at dinnertime (Patient taking differently: Take one half at lunch time and one half at 10pm) 30 tablet 11     acetaZOLAMIDE (DIAMOX) 250 MG tablet 1 tab BID after 2 consecutive days of seizures (Patient not taking: Reported on 6/29/2018) 14 tablet 0        Perceived AED Side Effects:  Uncertain    Medication Notes:  Uses imitrex 2 to six times per month. Dr Fair perscribes this medication for her.      AED Medication Compliance:  compliant all of the time  Using a pill box:  Yes    Review of Systems:  Patient unable to provide, information obtained from caregivers. No evidence of dysphagia, cough, wheezing, hemoptysis, chest pain, leg swelling, significant weight change, fevers, nausea, vomiting,  change in bowel habits, blood per rectum, frequency, kidney stones.   Have you experienced a traumatic fall since your last visit: NO  Are these falls related to your seizures:  Not Applicable    Other Issues:    DJD continues problematic.  Headaches continue about the same.  Reports that pacemaker is going off more often than prevoiusly but feels no lightheadedness or faints.  Is patient safe to drive:  No, does not want to drive.  Worried about weight gain but can't exercise because of DJD.    Woman Care:   Patient is:  postmenopausal    Exam:    /86 (BP Location: Right arm, Patient Position: Chair, Cuff Size: Adult Regular)  Pulse 63  Temp 96.4  F (35.8  C)  Wt 169 lb (76.7 kg)  BMI 27.28 kg/m2     Wt Readings from Last 5 Encounters:   07/18/18 169 lb (76.7 kg)   06/29/18 159 lb (72.1 kg)   12/21/17 157 lb (71.2 kg)   06/13/17 153 lb 6.4 oz (69.6 kg)   04/24/17 151 lb (68.5 kg)     Normal gait including tandem. Unable to maintain one foot stand for more than 3 seconds.Visual fields full. Extraocular movements intact without nystagmus. Smile symmetrical. Facial sensation equal. Tongue midline and strong. No drift, pronation, tremor, or asterixis. Finger finger nose is done well.      Heart exam without murmur. RRR. No carotid bruit.    IMPRESSION   1) Partial seizures, intractable; simple partial, simple partial to complex partial seizures. Complex partial seizures often associated with falls and occasionally with trama. Independent bitemporal likely neocortical onset by scalp EEG. Improvement starting approxmiately March 2015 persists now with more than 3 years of  freedom from complex partial seizures with falls. Simple partial seizures continue intermittently. Tho she begs to differ I still don't think fycompa entirely explains her good run; however,  fycompa and LEV appear to be playing important roles. Completing menopause may have been important. Seizures no worse with reduction of OXC. Refractory  to all AEDs other than barbiturates, felbamate, methsuximide, ezogabine, ethetoin, vigabatrin,CLB off label.  2) Migraine headaches; topiramate thought to have helped; continue. Neurologist currently treating headaches reportedly believes that TPM has not been helpful and that headachs would be better treated with higher doses of triptans. It is not clear that TPM has helped much with seizures and may be worsening cognitive status so reasonable to taper these medications.  3) Asystole; likely primary and not related to seizures or VNS tho this has never been formally examined; has pacemaker in place.   4) VNS at end of service. Course indicates that VNS was not playing much role in seizure control as seizures continue doing well tho VNS at EOS.  5) Depression, appears improved after increase of escitalopram. Therapy also helpful. Concern that higher dose in AM playing some role in sedtion tho I think tht XOC is more liekly to be responsible. Concern about somataform features.  6) Mild hyponatremia in past likely related to oxcarbazpine and escitalopram. Not playing a role in her symptoms.      PLAN:   1) Same OXC, levetiracetam, and fycompa. Asked to reduce topiramate to 50 mg BID.  2) Continue escitalopram  3) AED options as above. As she is uncomfortable with felbatol,  clobazam off label would probably be best choice. We could also consider replacing levetiracetam with brivaracetam or use eslicarbazpine instead of oxcarbazepine if formulary will allow. RNS should be a serious consideration given the trauma she occasinally experiences with her seizures but would require thorough evaluation. If RNS placed she would have three implanted devices which could also raise concern. Neither she nor her  were excited about this possibility when we last reviewed.  4) RTC 6 months. Sooner if seizures worsen.      Noam Barboza

## 2018-07-18 NOTE — PATIENT INSTRUCTIONS
Don't worry about effect of travel unless move more than four timezones.    If moving more than four time zones we need time of departure, time of arrival and duration of flight.  Call Dr Kramer with these and she will come up with a regimen for you to keep your medications straightl.

## 2018-07-24 DIAGNOSIS — F43.21 ADJUSTMENT DISORDER WITH DEPRESSED MOOD: ICD-10-CM

## 2018-07-24 RX ORDER — ESCITALOPRAM OXALATE 10 MG/1
TABLET ORAL
Qty: 30 TABLET | Refills: 5 | Status: SHIPPED | OUTPATIENT
Start: 2018-07-24 | End: 2019-01-16

## 2018-07-24 RX ORDER — ESCITALOPRAM OXALATE 5 MG/1
TABLET ORAL
Qty: 30 TABLET | Refills: 5 | Status: SHIPPED | OUTPATIENT
Start: 2018-07-24 | End: 2019-01-16

## 2018-08-15 ENCOUNTER — TRANSFERRED RECORDS (OUTPATIENT)
Dept: HEALTH INFORMATION MANAGEMENT | Facility: CLINIC | Age: 63
End: 2018-08-15

## 2018-08-20 LAB
BUN SERPL-MCNC: 16 MG/DL (ref 7–25)
BUN/CREATININE RATIO: ABNORMAL (ref 6–22)
CALCIUM SERPL-MCNC: 8.9 MG/DL (ref 8.6–10.4)
CHLORIDE SERPLBLD-SCNC: 99 MMOL/L (ref 98–110)
CO2 SERPL-SCNC: 24 MMOL/L (ref 20–32)
CREAT SERPL-MCNC: 0.89 MG/DL (ref 0.5–0.99)
EGFR CALCULATED (BLACK REFERENCE): 79 ML/MIN
EGFR CALCULATED (NON BLACK REFERENCE): 68 ML/MIN
GLUCOSE SERPL-MCNC: 73 MG/DL (ref 65–99)
LEVETIRACETAM SERPL-MCNC: 29.4 MCG/ML (ref 5–45)
OXCARBAZEPINE METABOLITE (MHC) S: 29.6 MCG/ML (ref 8–35)
POTASSIUM SERPL-SCNC: 4 MMOL/L (ref 3.5–5.3)
SODIUM SERPL-SCNC: 133 MMOL/L (ref 135–146)

## 2018-10-03 LAB
ANION GAP SERPL CALCULATED.3IONS-SCNC: 6 MMOL/L (ref 5–18)
BUN SERPL-MCNC: 14 MG/DL (ref 8–25)
CALCIUM SERPL-MCNC: 8.7 MG/DL (ref 8.5–10.5)
CHLORIDE SERPLBLD-SCNC: 101 MMOL/L (ref 98–110)
CHOLEST SERPL-MCNC: 309 MG/DL (ref 100–199)
CO2 SERPL-SCNC: 25 MMOL/L (ref 21–31)
CREAT SERPL-MCNC: 0.74 MG/DL (ref 0.57–1.11)
GFR SERPL CREATININE-BSD FRML MDRD: >60 ML/MIN/1.73M2
GLUCOSE SERPL-MCNC: 86 MG/DL (ref 65–100)
HDLC SERPL-MCNC: 113 MG/DL
LDLC SERPL CALC-MCNC: 185 MG/DL
NONHDLC SERPL-MCNC: ABNORMAL MG/DL
POTASSIUM SERPL-SCNC: 3.7 MMOL/L (ref 3.5–5)
SODIUM SERPL-SCNC: 132 MMOL/L (ref 135–145)
TRIGL SERPL-MCNC: 54 MG/DL

## 2018-11-14 ENCOUNTER — HOSPITAL ENCOUNTER (OUTPATIENT)
Dept: MAMMOGRAPHY | Facility: CLINIC | Age: 63
Discharge: HOME | End: 2018-11-14
Attending: PEDIATRICS | Admitting: PEDIATRICS
Payer: COMMERCIAL

## 2018-11-14 DIAGNOSIS — Z12.31 VISIT FOR SCREENING MAMMOGRAM: ICD-10-CM

## 2018-11-14 PROCEDURE — 77067 SCR MAMMO BI INCL CAD: CPT

## 2018-11-27 ENCOUNTER — TELEPHONE (OUTPATIENT)
Dept: NEUROLOGY | Facility: CLINIC | Age: 63
End: 2018-11-27

## 2018-11-27 NOTE — TELEPHONE ENCOUNTER
Nurse received In-Basket message as follows:    Caller: Dr. Rodgers     Relationship to Patient: Dr tammy Velarde in [place]     Call Back Number: [phone]     Reason for Call: Dr would like to start the pt on a new medication and would like to discuss that with someone.     Nurse returned call to Dr. Rodgers, who indicates that at patient request she is calling to ask if Dr. Barboza has any concerns about starting a Statin drug, she is looking at specifically at starting Rosuvastatin ( Crestor ), but ask if Dr. Barboza has any specific concern about statins.     Nurse indicated he would speak to Dr. Barboza and get back to her.

## 2018-12-27 DIAGNOSIS — G40.211 LOCALIZATION-RELATED SYMPTOMATIC EPILEPSY AND EPILEPTIC SYNDROMES WITH COMPLEX PARTIAL SEIZURES, INTRACTABLE, WITH STATUS EPILEPTICUS (H): ICD-10-CM

## 2018-12-27 DIAGNOSIS — G40.211 LOCALZ-RLTED SYMPTOMATIC EPILEPSY W CMPLX PARTL SZ, INTRACT, W STATUS (H): ICD-10-CM

## 2018-12-27 RX ORDER — OXCARBAZEPINE 300 MG/1
TABLET, FILM COATED ORAL
Qty: 210 TABLET | Refills: 0 | Status: CANCELLED | OUTPATIENT
Start: 2018-12-27

## 2018-12-27 RX ORDER — LEVETIRACETAM 750 MG/1
TABLET ORAL
Qty: 135 TABLET | Refills: 0 | Status: CANCELLED | OUTPATIENT
Start: 2018-12-27

## 2018-12-27 RX ORDER — TOPIRAMATE 100 MG/1
TABLET, FILM COATED ORAL
Qty: 30 TABLET | Refills: 0 | Status: CANCELLED | OUTPATIENT
Start: 2018-12-27

## 2018-12-28 DIAGNOSIS — G40.211 LOCALZ-RLTED SYMPTOMATIC EPILEPSY W CMPLX PARTL SZ, INTRACT, W STATUS (H): ICD-10-CM

## 2018-12-28 DIAGNOSIS — G40.211 LOCALIZATION-RELATED SYMPTOMATIC EPILEPSY AND EPILEPTIC SYNDROMES WITH COMPLEX PARTIAL SEIZURES, INTRACTABLE, WITH STATUS EPILEPTICUS (H): ICD-10-CM

## 2018-12-28 RX ORDER — TOPIRAMATE 100 MG/1
TABLET, FILM COATED ORAL
Qty: 30 TABLET | Refills: 3 | Status: SHIPPED | OUTPATIENT
Start: 2018-12-28 | End: 2019-01-16

## 2018-12-28 RX ORDER — PERAMPANEL 4 MG/1
TABLET ORAL
Qty: 30 TABLET | Refills: 3 | Status: SHIPPED | OUTPATIENT
Start: 2018-12-28 | End: 2019-01-16

## 2018-12-28 RX ORDER — LEVETIRACETAM 750 MG/1
TABLET ORAL
Qty: 135 TABLET | Refills: 3 | Status: SHIPPED | OUTPATIENT
Start: 2018-12-28 | End: 2019-01-16

## 2018-12-28 RX ORDER — OXCARBAZEPINE 300 MG/1
TABLET, FILM COATED ORAL
Qty: 210 TABLET | Refills: 3 | Status: SHIPPED | OUTPATIENT
Start: 2018-12-28 | End: 2019-01-16

## 2019-01-11 ENCOUNTER — ANCILLARY PROCEDURE (OUTPATIENT)
Dept: CARDIOLOGY | Facility: CLINIC | Age: 64
End: 2019-01-11
Attending: INTERNAL MEDICINE
Payer: COMMERCIAL

## 2019-01-11 DIAGNOSIS — Z95.0 PACEMAKER: ICD-10-CM

## 2019-01-11 PROCEDURE — 93280 PM DEVICE PROGR EVAL DUAL: CPT | Performed by: INTERNAL MEDICINE

## 2019-01-11 NOTE — PROGRESS NOTES
[employer] Altjuliana (D) Pacemaker Device Check  AP: *** % : *** %  Mode: ***        Underlying Rhythm: ***  Heart Rate: ***  Sensing: ***    Pacing Threshold: ***   Impedance: ***  Battery Status: ***  Device Site: ***  Atrial Arrhythmia: ***  Ventricular Arrhythmia: ***  Setting Change: ***    Care Plan: ***

## 2019-01-16 ENCOUNTER — OFFICE VISIT (OUTPATIENT)
Dept: NEUROLOGY | Facility: CLINIC | Age: 64
End: 2019-01-16

## 2019-01-16 ENCOUNTER — TELEPHONE (OUTPATIENT)
Dept: NEUROLOGY | Facility: CLINIC | Age: 64
End: 2019-01-16

## 2019-01-16 VITALS
TEMPERATURE: 97 F | WEIGHT: 160 LBS | DIASTOLIC BLOOD PRESSURE: 80 MMHG | HEART RATE: 66 BPM | BODY MASS INDEX: 25.82 KG/M2 | SYSTOLIC BLOOD PRESSURE: 143 MMHG

## 2019-01-16 DIAGNOSIS — G40.211 PARTIAL SYMPTOMATIC EPILEPSY WITH COMPLEX PARTIAL SEIZURES, INTRACTABLE, WITH STATUS EPILEPTICUS (H): ICD-10-CM

## 2019-01-16 DIAGNOSIS — F43.21 ADJUSTMENT DISORDER WITH DEPRESSED MOOD: ICD-10-CM

## 2019-01-16 DIAGNOSIS — G40.211 LOCALIZATION-RELATED SYMPTOMATIC EPILEPSY AND EPILEPTIC SYNDROMES WITH COMPLEX PARTIAL SEIZURES, INTRACTABLE, WITH STATUS EPILEPTICUS (H): ICD-10-CM

## 2019-01-16 DIAGNOSIS — G40.211 LOCALZ-RLTED SYMPTOMATIC EPILEPSY W CMPLX PARTL SZ, INTRACT, W STATUS (H): ICD-10-CM

## 2019-01-16 RX ORDER — LEVETIRACETAM 750 MG/1
TABLET ORAL
Qty: 135 TABLET | Refills: 11 | Status: SHIPPED | OUTPATIENT
Start: 2019-01-16 | End: 2019-12-20

## 2019-01-16 RX ORDER — TOPIRAMATE 100 MG/1
TABLET, FILM COATED ORAL
Qty: 30 TABLET | Refills: 3 | Status: SHIPPED | OUTPATIENT
Start: 2019-01-16 | End: 2019-06-28

## 2019-01-16 RX ORDER — OXCARBAZEPINE 300 MG/1
TABLET, FILM COATED ORAL
Qty: 210 TABLET | Refills: 11 | Status: SHIPPED | OUTPATIENT
Start: 2019-01-16 | End: 2019-12-20

## 2019-01-16 ASSESSMENT — PAIN SCALES - GENERAL: PAINLEVEL: NO PAIN (0)

## 2019-01-16 NOTE — PROCEDURES
Adia returns to clinic today for VNS interrogation.  Writer was unable to interrogate the VNS. Likely EOS.   Adia reported that she does not plan to replace the VNS.

## 2019-01-16 NOTE — PROGRESS NOTES
[facility] Progress Note      Patient:  Adia Briceno  :  1955   Age:  63 year old   Today's Office Visit:  2019    Epilepsy Data:    Patient History  Primary Epileptologist/Provider: Noam Barboza M.D.  Epilepsy Syndrome: Localization-related epilepsy unspecified  Epilepsy Syndrome Status: Final  Age of Onset: 10  Other Relevant Dx/ Issues: Inpatient evaluation 1998.  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  Asystole  with subsequent pacemaker placement.  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 Seizure-related falls/injuries.  is endocrinologist.     Tests/Surgery History  Last EE2005  Last MRI: 2008  Last Neuropsych Testin2008  Last Case Management Conference: 2008  Epilepsy Surgery #1 Date: 08  Epilepsy Related Surgery #1 : Type: VNS placement    Seizure Record  Current Visit Date: 19  Previous Visit Date: 18  Months since last visit: 5.98  Seizure Type 1: Complex partial seizures unspecified  Description of Sz Type 1: Head slumps, amnesia  # of Type 1 Seizure since last visit: 0  Freq. Type 1 / Month: 0  Seizure Type 2: Complex partial seizures with impairment of consciousness at onset  Description of Sz Type 2: Head slumps with collapse  # of Type 2 Seizure since last visit: 0  Freq. Type 2 / Month: 0  Seizure Type 3: Simple partial seizures unspecified  Description of Sz Type 3: strange feeling in her head, 30-60 seconds  # of Type 3 Seizure since last visit: 2  Freq. Type 3 / Month: 0.33    History of Present Illness:     Seizures remain under excellent control Last CPS with collapse was 2017.  She has had intermittent auras which she believes are not associated with impairment.  Feels she had worsening auras when she tapered TPM so resumed previous dose.    Current Outpatient Medications   Medication Sig Dispense Refill     Cetirizine HCl (ZYRTEC PO) daily.       Cholecalciferol (VITAMIN D)  2000 UNITS CAPS daily.       clonazePAM (KLONOPIN) 1 MG tablet Taek one by mouth at bedtime 30 tablet 5     diazepam (DIAZEPAM INTENSOL) 5 MG/ML solution Administer 1 ml as needed into cheek pouch for seizures 30 mL 0     escitalopram (LEXAPRO) 10 MG tablet TAKE 1 TABLET BY MOUTH AT BEDTIME 30 tablet 5     escitalopram (LEXAPRO) 5 MG tablet TAKE 1 TABLET(5 MG) BY MOUTH DAILY 30 tablet 5     esomeprazole (NEXIUM) 40 MG capsule Take by mouth every morning (before breakfast) Take 30-60 minutes before eating.       FYCOMPA 4 MG tablet TAKE 1 TABLET BY MOUTH AT BEDTIME 30 tablet 3     levETIRAcetam (KEPPRA) 750 MG tablet TAKE 1 TABLET BY MOUTH THREE TIMES DAILY AND TAKE 1 1/2 TABLETS EVERY NIGHT AT BEDTIME(TOTAL 3375MG PER DAY). 135 tablet 3     OXcarbazepine (TRILEPTAL) 300 MG tablet TAKE ONE AND ONE-HALF TABLET IN THE MORNING, ONE AND ONE-HALF TABLET AT NOON, 2 TABLETS IN THE EVENING AND 2 TABLETS AT BEDTIME 210 tablet 3     SUMAtriptan (IMITREX) 100 MG tablet Take one by mouth prn severe headache. No more than four per month. 9 tablet 0     topiramate (TOPAMAX) 100 MG tablet Take one half at lunch time and one half at dinnertime 30 tablet 3     acetaZOLAMIDE (DIAMOX) 250 MG tablet 1 tab BID after 2 consecutive days of seizures (Patient not taking: Reported on 6/29/2018) 14 tablet 0        Perceived AED Side Effects:  No    Medication Notes:    Rosuvastatin was starteed; started expereincing right muscle pain. She stopped this.  Migraines are very sporadic and she believes they are due to barometric presure.  Uses imitrex when has several days of headaches.      AED Medication Compliance:  compliant all of the time  Using a pill box:  Yes    Review of Systems:  Headaches as above. Denies loss of vision. Denies double vision. Denies dysphagia. Denies cough, wheezing, hemoptysis. Denies chest pain, leg swelling. Denies significant weight change, abdominal pain, nausea, vomiting, change in bowel habits, blood per rectum.  Denies dysuria, hematuria, flank pain, or kidney stones.  Have you experienced a traumatic fall since your last visit: No  Are these falls related to your seizures:  Not Applicable      Other Issues:  Pacemaker check this week went well she was told.  PHQ-2 Score:     PHQ-2 ( 1999 Pfizer) 1/16/2019 7/18/2018   Q1: Little interest or pleasure in doing things 0 0   Q2: Feeling down, depressed or hopeless 0 0   PHQ-2 Score 0 0     Not suicidal.    Is patient safe to drive:  No    Woman Care:   Patient is:  Postmenopausal.    Exam:    /80 (BP Location: Right arm, Patient Position: Chair, Cuff Size: Adult Regular)   Pulse 66   Temp 97  F (36.1  C)   Wt 160 lb (72.6 kg)   BMI 25.82 kg/m       Wt Readings from Last 5 Encounters:   01/16/19 160 lb (72.6 kg)   07/18/18 169 lb (76.7 kg)   06/29/18 159 lb (72.1 kg)   12/21/17 157 lb (71.2 kg)   06/13/17 153 lb 6.4 oz (69.6 kg)     Normal gait including tandem. Visual fields full. Extraocular movements intact without nystagmus. Mild bilateral ptosis as usual. Smile symmetrical. Facial sensation equal. Tongue midline and strong. No drift, pronation, tremor, or asterixis. Finger finger nose is done well.      Heart exam without murmur. RRR. No carotid bruit.     IMPRESSION   1) Partial seizures, intractable; simple partial, simple partial to complex partial seizures. Complex partial seizures often associated with falls and occasionally with trama. Independent bitemporal likely neocortical onset by scalp EEG. Improvement starting approxmiately March 2015 persists now with more than 1.5 years of  freedom from complex partial seizures with falls. Simple partial seizures continue intermittently. Tho she begs to differ I still don't think fycompa entirely explains her good run; however,  fycompa and LEV appear to be playing important roles. Completing menopause may have been important. Seizures no worse with reduction of OXC but appear to have worsened with reduction of TPM  tho no CPS.  Refractory to all AEDs other than barbiturates, felbamate, methsuximide, ezogabine, ethetoin, vigabatrin,CLB off label.  2) Migraine headaches; topiramate thought to have helped; continue. Neurologist currently treating headaches reportedly believes that TPM has not been helpful and that headachs would be better treated with higher doses of triptans.   3) Asystole; likely primary and not related to seizures or VNS tho this has never been formally examined; has pacemaker in place.   4) VNS at end of service. Course indicates that VNS was not playing much role in seizure control as seizures continue doing well tho VNS at EOS.  5) Depression, appears improved after increase of escitalopram. Therapy also helpful.   6) Mild hyponatremia in past likely related to oxcarbazpine and escitalopram. Not playing a role in her symptoms.      PLAN:   1) Same OXC, levetiracetam, and fycompa. Same TPM for now, consider reduction again in future.  2) Continue escitalopram  3) AED options as above. As she is uncomfortable with felbatol,  clobazam off label would probably be best choice. We could also consider replacing levetiracetam with brivaracetam or use eslicarbazpine instead of oxcarbazepine if formulary will allow. RNS should be a serious consideration if seizures with falls return and become more frequent. If RNS placed she would have three implanted devices which could also raise concern. Neither she nor her  were excited about this possibility when we last reviewed.  4) RTC 6 months. Sooner if seizures worsen.      Noam Barboza

## 2019-01-16 NOTE — LETTER
2019       RE: Adia Briceno  : 1955   MRN: [id number]      Dear Colleague,    Thank you for referring your patient, Adia Briceno, to the [facility] EPILEPSY [facility]ARE at [facility]. Please see a copy of my visit note below.    [facility] [place] [facility]      Patient:  Adia Briceno  :  1955   Age:  63 year old   Today's Office Visit:  2019    Epilepsy Data:    Patient History  Primary Epileptologist/Provider: Noam Barboza M.D.  Epilepsy Syndrome: Localization-related epilepsy unspecified  Epilepsy Syndrome Status: Final  Age of Onset: 10  Other Relevant Dx/ Issues: Inpatient evaluation 1998.  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  Asystole  with subsequent pacemaker placement.  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 Seizure-related falls/injuries.  is endocrinologist.     Tests/Surgery History  Last EE2005  Last MRI: 2008  Last Neuropsych Testin2008  Last Case Management Conference: 2008  Epilepsy Surgery #1 Date: 08  Epilepsy Related Surgery #1 : Type: VNS placement    Seizure Record  Current Visit Date: 19  Previous Visit Date: 18  Months since last visit: 5.98  Seizure Type 1: Complex partial seizures unspecified  Description of Sz Type 1: Head slumps, amnesia  # of Type 1 Seizure since last visit: 0  Freq. Type 1 / Month: 0  Seizure Type 2: Complex partial seizures with impairment of consciousness at onset  Description of Sz Type 2: Head slumps with collapse  # of Type 2 Seizure since last visit: 0  Freq. Type 2 / Month: 0  Seizure Type 3: Simple partial seizures unspecified  Description of Sz Type 3: strange feeling in her head, 30-60 seconds  # of Type 3 Seizure since last visit: 2  Freq. Type 3 / Month: 0.33    History of Present Illness:     Seizures remain under excellent control Last CPS with collapse was 2017.  She has had intermittent auras  which she believes are not associated with impairment.  Feels she had worsening auras when she tapered TPM so resumed previous dose.    Current Outpatient Medications   Medication Sig Dispense Refill     Cetirizine HCl (ZYRTEC PO) daily.       Cholecalciferol (VITAMIN D) 2000 UNITS CAPS daily.       clonazePAM (KLONOPIN) 1 MG tablet Taek one by mouth at bedtime 30 tablet 5     diazepam (DIAZEPAM INTENSOL) 5 MG/ML solution Administer 1 ml as needed into cheek pouch for seizures 30 mL 0     escitalopram (LEXAPRO) 10 MG tablet TAKE 1 TABLET BY MOUTH AT BEDTIME 30 tablet 5     escitalopram (LEXAPRO) 5 MG tablet TAKE 1 TABLET(5 MG) BY MOUTH DAILY 30 tablet 5     esomeprazole (NEXIUM) 40 MG capsule Take by mouth every morning (before breakfast) Take 30-60 minutes before eating.       FYCOMPA 4 MG tablet TAKE 1 TABLET BY MOUTH AT BEDTIME 30 tablet 3     levETIRAcetam (KEPPRA) 750 MG tablet TAKE 1 TABLET BY MOUTH THREE TIMES DAILY AND TAKE 1 1/2 TABLETS EVERY NIGHT AT BEDTIME(TOTAL 3375MG PER DAY). 135 tablet 3     OXcarbazepine (TRILEPTAL) 300 MG tablet TAKE ONE AND ONE-HALF TABLET IN THE MORNING, ONE AND ONE-HALF TABLET AT NOON, 2 TABLETS IN THE EVENING AND 2 TABLETS AT BEDTIME 210 tablet 3     SUMAtriptan (IMITREX) 100 MG tablet Take one by mouth prn severe headache. No more than four per month. 9 tablet 0     topiramate (TOPAMAX) 100 MG tablet Take one half at lunch time and one half at dinnertime 30 tablet 3     acetaZOLAMIDE (DIAMOX) 250 MG tablet 1 tab BID after 2 consecutive days of seizures (Patient not taking: Reported on 6/29/2018) 14 tablet 0        Perceived AED Side Effects:  No    Medication Notes:    Rosuvastatin was starteed; started expereincing right muscle pain. She stopped this.  Migraines are very sporadic and she believes they are due to barometric presure.  Uses imitrex when has several days of headaches.      AED Medication Compliance:  compliant all of the time  Using a pill box:  Yes    Other  Issues:  Pacemaker check this week went well she was told.  PHQ-2 Score:     PHQ-2 ( 1999 Pfizer) 1/16/2019 7/18/2018   Q1: Little interest or pleasure in doing things 0 0   Q2: Feeling down, depressed or hopeless 0 0   PHQ-2 Score 0 0     Not suicidal.    Is patient safe to drive:  No    Woman Care:   Patient is:  Postmenopausal.    Exam:    /80 (BP Location: Right arm, Patient Position: Chair, Cuff Size: Adult Regular)   Pulse 66   Temp 97  F (36.1  C)   Wt 160 lb (72.6 kg)   BMI 25.82 kg/m        Wt Readings from Last 5 Encounters:   01/16/19 160 lb (72.6 kg)   07/18/18 169 lb (76.7 kg)   06/29/18 159 lb (72.1 kg)   12/21/17 157 lb (71.2 kg)   06/13/17 153 lb 6.4 oz (69.6 kg)     Normal gait including tandem. Visual fields full. Extraocular movements intact without nystagmus. Mild bilateral ptosis as usual. Smile symmetrical. Facial sensation equal. Tongue midline and strong. No drift, pronation, tremor, or asterixis. Finger finger nose is done well.      Heart exam without murmur. RRR. No carotid bruit.     IMPRESSION   1) Partial seizures, intractable; simple partial, simple partial to complex partial seizures. Complex partial seizures often associated with falls and occasionally with trama. Independent bitemporal likely neocortical onset by scalp EEG. Improvement starting approxmiately March 2015 persists now with more than  1.5 years of  freedom from complex partial seizures with falls. Simple partial seizures continue intermittently. Tho she begs to differ I still don't think fycompa entirely explains her good run; however,  fycompa and LEV appear to be playing important roles. Completing menopause may have been important. Seizures no worse with reduction of OXC but appear to have worsened with reduction of TPM tho no CPS.  Refractory to all AEDs other than barbiturates, felbamate, methsuximide, ezogabine, ethetoin, vigabatrin,CLB off label.  2) Migraine headaches; topiramate thought to have  helped; continue. Neurologist currently treating headaches reportedly believes that TPM has not been helpful and that headachs would be better treated with higher doses of triptans.   3) Asystole; likely primary and not related to seizures or VNS tho this has never been formally examined; has pacemaker in place.   4) VNS at end of service. Course indicates that VNS was not playing much role in seizure control as seizures continue doing well tho VNS at EOS.  5) Depression, appears improved after increase of escitalopram. Therapy also helpful.   6) Mild hyponatremia in past likely related to oxcarbazpine and escitalopram. Not playing a role in her symptoms.      PLAN:   1) Same OXC, levetiracetam, and fycompa. Same TPM for now, consider reduction again in future.  2) Continue escitalopram  3) AED options as above. As she is uncomfortable with felbatol,  clobazam off label would probably be best choice. We could also consider replacing levetiracetam with brivaracetam or use eslicarbazpine instead of oxcarbazepine if formulary will allow. RNS should be a serious consideration if seizures with falls return and become more frequent. If RNS placed she would have three implanted devices which could also raise concern. Neither she nor her  were excited about this possibility when we last reviewed.  4) RTC 6 months. Sooner if seizures worsen.      Noam Barboza

## 2019-01-16 NOTE — PATIENT INSTRUCTIONS
After return from your trip, reduce klonopin to one qurter tablet at night for two weeks.  If no insomnia, stop klonopin. Maybe that will help with reducing duration of sleeping.    After you have done above, can reduce topiramate by half a pill for one month.  Then stop. If seizures worsen, resume torpiramate.

## 2019-01-23 RX ORDER — ESCITALOPRAM OXALATE 10 MG/1
TABLET ORAL
Qty: 30 TABLET | Refills: 0 | Status: SHIPPED | OUTPATIENT
Start: 2019-01-23 | End: 2019-02-23

## 2019-01-23 RX ORDER — ESCITALOPRAM OXALATE 5 MG/1
TABLET ORAL
Qty: 30 TABLET | Refills: 0 | Status: SHIPPED | OUTPATIENT
Start: 2019-01-23 | End: 2019-02-23

## 2019-02-23 DIAGNOSIS — F43.21 ADJUSTMENT DISORDER WITH DEPRESSED MOOD: ICD-10-CM

## 2019-02-25 RX ORDER — ESCITALOPRAM OXALATE 10 MG/1
TABLET ORAL
Qty: 30 TABLET | Refills: 10 | Status: SHIPPED | OUTPATIENT
Start: 2019-02-25 | End: 2019-12-18

## 2019-02-25 RX ORDER — ESCITALOPRAM OXALATE 5 MG/1
TABLET ORAL
Qty: 30 TABLET | Refills: 10 | Status: SHIPPED | OUTPATIENT
Start: 2019-02-25 | End: 2019-12-18

## 2019-06-20 ENCOUNTER — TELEPHONE (OUTPATIENT)
Dept: NEUROLOGY | Facility: CLINIC | Age: 64
End: 2019-06-20

## 2019-06-20 DIAGNOSIS — G40.211 LOCALZ-RLTED SYMPTOMATIC EPILEPSY W CMPLX PARTL SZ, INTRACT, W STATUS (H): Primary | ICD-10-CM

## 2019-06-20 NOTE — TELEPHONE ENCOUNTER
"Adia reports that she had been doing well without seizures. She calls today to report she's had a few aura's which lasted approx. 2 minutes. Unable to speak during the aura's.     Adia also states she \"had a true seizure where she fell on the bathroom floor\". This occurred last Saturday, 6/15/19. After the seizure she took one extra dose of Trileptal.      She would like to increase medication.    Current/confirmed ASD's    LEV [phone]  Fycompa (4 mg HS)  Oxcarbazepine [phone]    PLAN: Discussed with Dr. Abelino Cheek is agreeable to increase Fycompa to 6 mg/daily.                      "

## 2019-06-20 NOTE — TELEPHONE ENCOUNTER
What is the concern that needs to be addressed by a nurse? Pt had 3 seizures this week. One today and would like a call back.     May a detailed message be left on voicemail? Yes, [phone]    Date of last office visit: 1/16/19    Message routed to: nurse

## 2019-06-21 DIAGNOSIS — G40.211 LOCALZ-RLTED SYMPTOMATIC EPILEPSY W CMPLX PARTL SZ, INTRACT, W STATUS (H): Primary | ICD-10-CM

## 2019-06-24 DIAGNOSIS — G40.211 LOCALIZATION-RELATED SYMPTOMATIC EPILEPSY AND EPILEPTIC SYNDROMES WITH COMPLEX PARTIAL SEIZURES, INTRACTABLE, WITH STATUS EPILEPTICUS (H): ICD-10-CM

## 2019-06-25 NOTE — TELEPHONE ENCOUNTER
Fails refill protocol because of labs.  needed labs entered as future orders, and pended.  Routed to provider for approval.  Has an appointment scheduled for August this year

## 2019-06-27 DIAGNOSIS — G40.211 LOCALIZATION-RELATED SYMPTOMATIC EPILEPSY AND EPILEPTIC SYNDROMES WITH COMPLEX PARTIAL SEIZURES, INTRACTABLE, WITH STATUS EPILEPTICUS (H): Primary | ICD-10-CM

## 2019-06-28 RX ORDER — TOPIRAMATE 100 MG/1
TABLET, FILM COATED ORAL
Qty: 30 TABLET | Refills: 3 | Status: SHIPPED | OUTPATIENT
Start: 2019-06-28 | End: 2020-06-25

## 2019-07-22 RX ORDER — TOPIRAMATE 100 MG/1
TABLET, FILM COATED ORAL
Qty: 30 TABLET | Refills: 1 | Status: SHIPPED | OUTPATIENT
Start: 2019-07-22 | End: 2019-08-21

## 2019-08-19 ENCOUNTER — TELEPHONE (OUTPATIENT)
Dept: CARDIOLOGY | Facility: CLINIC | Age: 64
End: 2019-08-19

## 2019-08-19 NOTE — TELEPHONE ENCOUNTER
Received a message from Dr. Richmond nurse that pt wants to schedule a pacemaker check because she is having seizures and she thinks the PPM battery is almost dead.     Chart reviewed. Pt has Qulsar [employer]. It was last checked in January 2019 and at that time had an estimated 5 yrs estimated longevity. Pt routinely does q 6 month thresholds and declines the phone checks q 3 months in between.     Called pt. She said she has had 3 auras today. Her  is a physician and checked her pulse during these auras and it was very weak, and her hands were ice cold. She said it's urgent that she gets in to have PPM checked because the battery is probably running out. I did reassure her that in January of this year her battery had about 5 yrs remaining. Pt wanted to come in on Wednesday morning because her  can give her a ride that day, we had an opening at 11am, scheduled pt for this time. She agrees with this plan.

## 2019-08-21 ENCOUNTER — ANCILLARY PROCEDURE (OUTPATIENT)
Dept: CARDIOLOGY | Facility: CLINIC | Age: 64
End: 2019-08-21
Attending: INTERNAL MEDICINE
Payer: COMMERCIAL

## 2019-08-21 ENCOUNTER — OFFICE VISIT (OUTPATIENT)
Dept: NEUROLOGY | Facility: CLINIC | Age: 64
End: 2019-08-21
Payer: MEDICARE

## 2019-08-21 VITALS
DIASTOLIC BLOOD PRESSURE: 82 MMHG | HEART RATE: 79 BPM | BODY MASS INDEX: 25.95 KG/M2 | WEIGHT: 160.8 LBS | SYSTOLIC BLOOD PRESSURE: 154 MMHG

## 2019-08-21 DIAGNOSIS — Z95.0 PACEMAKER: ICD-10-CM

## 2019-08-21 DIAGNOSIS — G40.211 LOCALIZATION-RELATED SYMPTOMATIC EPILEPSY AND EPILEPTIC SYNDROMES WITH COMPLEX PARTIAL SEIZURES, INTRACTABLE, WITH STATUS EPILEPTICUS (H): Primary | ICD-10-CM

## 2019-08-21 PROCEDURE — 93280 PM DEVICE PROGR EVAL DUAL: CPT | Performed by: INTERNAL MEDICINE

## 2019-08-21 RX ORDER — TOPIRAMATE 100 MG/1
TABLET, FILM COATED ORAL
Qty: 62 TABLET | Refills: 11 | Status: SHIPPED | OUTPATIENT
Start: 2019-08-21 | End: 2020-10-28

## 2019-08-21 ASSESSMENT — PAIN SCALES - GENERAL: PAINLEVEL: NO PAIN (0)

## 2019-08-21 NOTE — LETTER
8/21/2019       RE: Adia Briceno  [address]  [place] [address]      Dear Ms. Briceno,    During our visit today I spoke of using natural progesterone with your  and suggested a dose that you may want to share with your gynecologist. The dose I suggested was 600 mg to 900 mg per day. I reviewed the best study about treatment of seizures after you left. The study is by Edgardo and colleauges and appears in Neurology 2012; volume 78 pages 1959 to 1966. The dose used in this study was 200 mg three times per day or 600 mg. This is a little less than I suggested. As we spoke, the Edgardo study emphasized that natural progesterone rather than synthetic progesterone needs to be used.    Please share this with your  and with the gynecologist you will be working with. As I mentioned, I would be happy to discuss further with your gynecologist.    Sincerely,    Noam Barboza MD

## 2019-08-21 NOTE — LETTER
2019       RE: Adia Briceno  : 1955   MRN: [id number]      Dear Colleague,    Thank you for referring your patient, Adia Briceno, to the [facility] EPILEPS[facility]ARE at [facility]. Please see a copy of my visit note below.    VNS Interrogated; generator not found. This indicated that the battery has depleated and it is no longer functioning. Device placed in , model 102 (this model did not have battery reserve programed to allow interrogation after battery depletion).     Dafne Rhodes RN     [facility] Progress Note      Patient:  Adia Briceno  :  1955   Age:  64 year old   Today's Office Visit:  2019    Epilepsy Data:    Patient History  Primary Epileptologist/Provider: Noam Barboza M.D.  Epilepsy Syndrome: Localization-related epilepsy unspecified  Epilepsy Syndrome Status: Final  Age of Onset: 10  Other Relevant Dx/ Issues: Inpatient evaluation 1998.  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  Asystole  with subsequent pacemaker placement.  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 Seizure-related falls/injuries.  is endocrinologist.     Tests/Surgery History  Last EE2005  Last MRI: 2008  Last Neuropsych Testin2008  Last Case Management Conference: 2008  Epilepsy Surgery #1 Date: 08  Epilepsy Related Surgery #1 : Type: VNS placement    Seizure Record  Current Visit Date: 19  Previous Visit Date: 19  Months since last visit: 7.13    Seizure Type 1: Complex partial seizures unspecified  Description of Sz Type 1: Head slumps, amnesia  # of Type 1 Seizure since last visit: 6  Freq. Type 1 / Month: 0.84    Seizure Type 2: Complex partial seizures with impairment of consciousness at onset  Description of Sz Type 2: Head slumps with collapse  Seizure Type 3: Simple partial seizures unspecified    Description of Sz Type 3: strange feeling in her head, 30-60  seconds  # of Type 3 Seizure since last visit: 18  Freq. Type 3 / Month: 2.52    History of Present Illness:     She had been doing relatively well with a long period of complete seizure control.  Seizures have worsened in late March. At first focal unimpaired, what she calls auras.  Starting in June worsening focal impaired seizures. Three falls have occurred with seizures.  Called clinic and was told to increase fycompa to 6 mg which she did in late June. This has not helped much and caused increased sedation. While calendar indicates that she did well in July, she has had a series of both focal unimpaired and focal impaired seizures in early to mid August.    Hot flashes have gotten worse again sometime in June. This has been associated with increased insomnia.  Otherwise denies obvious seizure precipitants. Taking medications as directed. No fevers, diarrhea, vomiting. No new medications. No significant sleep deprivation (other than above), alcohol use, street drug use.   No new significant stressors    She presented to clinic today with her , a respected local endocrinologist and he confirms the above.      Current Outpatient Medications   Medication Sig Dispense Refill     acetaZOLAMIDE (DIAMOX) 250 MG tablet 1 tab BID after 2 consecutive days of seizures 14 tablet 0     Cetirizine HCl (ZYRTEC PO) daily.       Cholecalciferol (VITAMIN D) 2000 UNITS CAPS daily.       clonazePAM (KLONOPIN) 1 MG tablet Taek one by mouth at bedtime 30 tablet 5     diazepam (DIAZEPAM INTENSOL) 5 MG/ML solution Administer 1 ml as needed into cheek pouch for seizures 30 mL 0     escitalopram (LEXAPRO) 10 MG tablet TAKE 1 TABLET BY MOUTH AT BEDTIME 30 tablet 10     escitalopram (LEXAPRO) 5 MG tablet TAKE 1 TABLET(5 MG) BY MOUTH DAILY 30 tablet 10     esomeprazole (NEXIUM) 40 MG capsule Take by mouth every morning (before breakfast) Take 30-60 minutes before eating.       levETIRAcetam (KEPPRA) 750 MG tablet TAKE 1 TABLET BY  MOUTH THREE TIMES DAILY AND TAKE 1 1/2 TABLETS EVERY NIGHT AT BEDTIME(TOTAL 3375MG PER DAY). 135 tablet 11     OXcarbazepine (TRILEPTAL) 300 MG tablet TAKE ONE AND ONE-HALF TABLET IN THE MORNING, ONE AND ONE-HALF TABLET AT NOON, 2 TABLETS IN THE EVENING AND 2 TABLETS AT BEDTIME 210 tablet 11     perampanel (FYCOMPA) 6 MG tablet Take 1 tablet (6 mg) by mouth At Bedtime 30 tablet 3     SUMAtriptan (IMITREX) 100 MG tablet Take one by mouth prn severe headache. No more than four per month. 9 tablet 0     topiramate (TOPAMAX) 100 MG tablet TAKE 1/2 TABLET BY MOUTH AT LUNCH TIME AND 1/2 TABLET AT DINNERTIME 30 tablet 1     topiramate (TOPAMAX) 100 MG tablet Take one half at lunch time and one half at dinnertime 30 tablet 3        Perceived AED Side Effects:  Yes    Medication Notes:    No side effedts    AED Medication Compliance:  compliant all of the time  Using a pill box:  Yes    Review of Systems:  Occasional migraine headaches as below. Denies loss of vision. Denies double vision. Denies dysphagia. Denies cough, wheezing, hemoptysis. Denies chest pain, leg swelling. Denies significant weight change, abdominal pain, nausea, vomiting, change in bowel habits, blood per rectum. Denies dysuria, hematuria, flank pain, or kidney stones. Denies falls or fractures.  Have you experienced a traumatic fall since your last visit: YES  Are these falls related to your seizures:  YES: see above.      Other Issues:    Headaches not particularly problematic.  Is patient safe to drive:  No    Woman Care:   Patient is:  Postmenopausal. She was free of hot flashes for a period of time but these have now recurred.    Exam:    BP (!) 154/82   Pulse 79   Wt 160 lb 12.8 oz (72.9 kg)   Breastfeeding? No   BMI 25.95 kg/m        Wt Readings from Last 5 Encounters:   08/21/19 160 lb 12.8 oz (72.9 kg)   01/16/19 160 lb (72.6 kg)   07/18/18 169 lb (76.7 kg)   06/29/18 159 lb (72.1 kg)   12/21/17 157 lb (71.2 kg)     Normal gait including  tandem. Visual fields full. Extraocular movements intact without nystagmus. Mild bilateral ptosis as usual. Smile symmetrical. Facial sensation equal. Tongue midline and strong. No drift, pronation, tremor, or asterixis. Finger finger nose is done well.    Heart exam without murmur. RRR. No carotid bruit.   checks her levels at his clinic and reports that sodium is normal, oxcarbazepine levels are high therapeutic.    IMPRESSION   1) Partial seizures, intractable; simple partial, simple partial to complex partial seizures. Complex partial seizures often associated with falls and occasionally with trama. Independent bitemporal likely neocortical onset by scalp EEG. Improvement starting approxmiately March 2015 led to almost 1 3/4 years of  freedom from complex partial seizures with falls. Simple partial seizures continue intermittently. Fycompa and levetiracetam appeared to play important roles in this period of seizure freedom but I thought that completing menopause may have been important. There was some indication that focal unimpaired seizures may have worsened with reduction of TPM. Now with significant worsening of seizures which appears to be associated with recurrence of hot flashes. This raises possibility that siezures may be progesterone responsive. She is refractory to all AEDs other than barbiturates, felbamate, methsuximide, ethetoin, vigabatrin, and CLB off label.  2) Migraine headaches; topiramate thought to have helped; continue tho not particularly severe.  3) Asystole; likely primary and not related to seizures or VNS tho this has never been formally examined; has pacemaker in place.   4) VNS at end of service. Course indicates that VNS was not playing much role in seizure control as seizures continued doing well tho VNS at EOS for at least one year.  5) Depression, appears improved after increase of escitalopram. Therapy also helpful.   6) Mild hyponatremia in past likely related to  oxcarbazpine and escitalopram. Not playing a role in her symptoms.  7) Appears unable to handle higher dose of fycompa and increasing fycompa did not appear to help much either.    DISCUSSION  Above discussed frankly and supportively. There are few remaining AED options and we reviewed these. Current situation does not appear severe enough to warrant evaluation for DBS or RNS at this point. It is possible that current increase in seizures will taper down as she works through menopause. Progesterone supplementation could be considered temporarily. We discussed potential side effects including increasing clotting tendency, and increased risks of some cancers. It is important to utilize natural progesterone such as prometria. This would best be done under the care of a gynecologist.    PLAN:   1) Same OXC, levetiracetam. Provided with regimen to decrease fycompa to 4 mg per day while increasing topiramate gradually to 100 mg twice a day. Asked to consult with gynecology and consider treatment with natural progesterone. The dose supported by the NIH randomized trial was 200 mg tid; one could start with 200 mg daily and increase slowly as tolerated.  2) Continue escitalopram  3) Other AED options as above. As she is uncomfortable with felbatol,  clobazam off label would probably be best choice. We could also consider replacing levetiracetam with brivaracetam or use eslicarbazpine instead of oxcarbazepine if formulary will allow. RNS should be a serious consideration if seizures with falls return and become more frequent. If RNS placed she would have three implanted devices which could also raise concern. Neither she nor her  were excited about this possibility when we last reviewed.  4) RTC  4 months. Sooner if seizures worsen.  5) I had erroneously suggested that 600 mg to 900 mg were standard progesterone doses for treatment of catamenial epilepsy but best data suggests 200 mg tid; wrote letter correcting this  statement to Dr Briceno.      Noam Barboza    Again, thank you for allowing me to participate in the care of your patient.      Sincerely,    Noam Barboza MD

## 2019-08-21 NOTE — PROGRESS NOTES
VNS Interrogated; generator not found. This indicated that the battery has depleated and it is no longer functioning. Device placed in 2008, model 102 (this model did not have battery reserve programed to allow interrogation after battery depletion).     Dafne Rhodes RN

## 2019-08-21 NOTE — LETTER
2019       RE: Adia Briceno  [address]   [place] [address]     Dear Colleague,    Thank you for referring your patient, Adia Briceno, to the [facility] EPILEPS[facility]ARE at [facility]. Please see a copy of my visit note below.    VNS Interrogated; generator not found. This indicated that the battery has depleated and it is no longer functioning. Device placed in , model 102 (this model did not have battery reserve programed to allow interrogation after battery depletion).     Dafne Rhodes RN     [facility] Progress Note      Patient:  Adia Briceno  :  1955   Age:  64 year old   Today's Office Visit:  2019    Epilepsy Data:    Patient History  Primary Epileptologist/Provider: Noam Barboza M.D.  Epilepsy Syndrome: Localization-related epilepsy unspecified  Epilepsy Syndrome Status: Final  Age of Onset: 10  Other Relevant Dx/ Issues: Inpatient evaluation 1998.  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  Asystole  with subsequent pacemaker placement.  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 Seizure-related falls/injuries.  is endocrinologist.     Tests/Surgery History  Last EE2005  Last MRI: 2008  Last Neuropsych Testin2008  Last Case Management Conference: 2008  Epilepsy Surgery #1 Date: 08  Epilepsy Related Surgery #1 : Type: VNS placement    Seizure Record  Current Visit Date: 19  Previous Visit Date: 19  Months since last visit: 7.13    Seizure Type 1: Complex partial seizures unspecified  Description of Sz Type 1: Head slumps, amnesia  # of Type 1 Seizure since last visit: 6  Freq. Type 1 / Month: 0.84    Seizure Type 2: Complex partial seizures with impairment of consciousness at onset  Description of Sz Type 2: Head slumps with collapse  Seizure Type 3: Simple partial seizures unspecified    Description of Sz Type 3: strange feeling in  her head, 30-60 seconds  # of Type 3 Seizure since last visit: 18  Freq. Type 3 / Month: 2.52    History of Present Illness:     She had been doing relatively well with a long period of complete seizure control.  Seizures have worsened in late March. At first focal unimpaired, what she calls auras.  Starting in June worsening focal impaired seizures. Three falls have occurred with seizures.  Called clinic and was told to increase fycompa to 6 mg which she did in late June. This has not helped much and caused increased sedation. While calendar indicates that she did well in July, she has had a series of both focal unimpaired and focal impaired seizures in early to mid August.    Hot flashes have gotten worse again sometime in June. This has been associated with increased insomnia.  Otherwise denies obvious seizure precipitants. Taking medications as directed. No fevers, diarrhea, vomiting. No new medications. No significant sleep deprivation (other than above), alcohol use, street drug use.   No new significant stressors    She presented to clinic today with her , a respected local endocrinologist and he confirms the above.    Current Outpatient Medications   Medication Sig Dispense Refill     acetaZOLAMIDE (DIAMOX) 250 MG tablet 1 tab BID after 2 consecutive days of seizures 14 tablet 0     Cetirizine HCl (ZYRTEC PO) daily.       Cholecalciferol (VITAMIN D) 2000 UNITS CAPS daily.       clonazePAM (KLONOPIN) 1 MG tablet Taek one by mouth at bedtime 30 tablet 5     diazepam (DIAZEPAM INTENSOL) 5 MG/ML solution Administer 1 ml as needed into cheek pouch for seizures 30 mL 0     escitalopram (LEXAPRO) 10 MG tablet TAKE 1 TABLET BY MOUTH AT BEDTIME 30 tablet 10     escitalopram (LEXAPRO) 5 MG tablet TAKE 1 TABLET(5 MG) BY MOUTH DAILY 30 tablet 10     esomeprazole (NEXIUM) 40 MG capsule Take by mouth every morning (before breakfast) Take 30-60 minutes before eating.       levETIRAcetam (KEPPRA) 750 MG tablet TAKE 1  TABLET BY MOUTH THREE TIMES DAILY AND TAKE 1 1/2 TABLETS EVERY NIGHT AT BEDTIME(TOTAL 3375MG PER DAY). 135 tablet 11     OXcarbazepine (TRILEPTAL) 300 MG tablet TAKE ONE AND ONE-HALF TABLET IN THE MORNING, ONE AND ONE-HALF TABLET AT NOON, 2 TABLETS IN THE EVENING AND 2 TABLETS AT BEDTIME 210 tablet 11     perampanel (FYCOMPA) 6 MG tablet Take 1 tablet (6 mg) by mouth At Bedtime 30 tablet 3     SUMAtriptan (IMITREX) 100 MG tablet Take one by mouth prn severe headache. No more than four per month. 9 tablet 0     topiramate (TOPAMAX) 100 MG tablet TAKE 1/2 TABLET BY MOUTH AT LUNCH TIME AND 1/2 TABLET AT DINNERTIME 30 tablet 1     topiramate (TOPAMAX) 100 MG tablet Take one half at lunch time and one half at dinnertime 30 tablet 3     Perceived AED Side Effects:  Yes    Medication Notes:    No side effedts    AED Medication Compliance:  compliant all of the time  Using a pill box:  Yes    Review of Systems:  Occasional migraine headaches as below. Denies loss of vision. Denies double vision. Denies dysphagia. Denies cough, wheezing, hemoptysis. Denies chest pain, leg swelling. Denies significant weight change, abdominal pain, nausea, vomiting, change in bowel habits, blood per rectum. Denies dysuria, hematuria, flank pain, or kidney stones. Denies falls or fractures.  Have you experienced a traumatic fall since your last visit: YES  Are these falls related to your seizures:  YES: see above.    Other Issues:    Headaches not particularly problematic.  Is patient safe to drive:  No    Woman Care:   Patient is:  Postmenopausal. She was free of hot flashes for a period of time but these have now recurred.    Exam:    BP (!) 154/82   Pulse 79   Wt 160 lb 12.8 oz (72.9 kg)   Breastfeeding? No   BMI 25.95 kg/m        Wt Readings from Last 5 Encounters:   08/21/19 160 lb 12.8 oz (72.9 kg)   01/16/19 160 lb (72.6 kg)   07/18/18 169 lb (76.7 kg)   06/29/18 159 lb (72.1 kg)   12/21/17 157 lb (71.2 kg)     Normal gait including  tandem. Visual fields full. Extraocular movements intact without nystagmus. Mild bilateral ptosis as usual. Smile symmetrical. Facial sensation equal. Tongue midline and strong. No drift, pronation, tremor, or asterixis. Finger finger nose is done well.    Heart exam without murmur. RRR. No carotid bruit.   checks her levels at his clinic and reports that sodium is normal, oxcarbazepine levels are high therapeutic.    IMPRESSION   1) Partial seizures, intractable; simple partial, simple partial to complex partial seizures. Complex partial seizures often associated with falls and occasionally with trama. Independent bitemporal likely neocortical onset by scalp EEG. Improvement starting approxmiately March 2015 led to almost 1 3/4 years of  freedom from complex partial seizures with falls. Simple partial seizures continue intermittently. Fycompa and levetiracetam appeared to play important roles in this period of seizure freedom but I thought that completing menopause may have been important. There was some indication that focal unimpaired seizures may have worsened with reduction of TPM. Now with significant worsening of seizures which appears to be associated with recurrence of hot flashes. This raises possibility that siezures may be progesterone responsive. She is refractory to all AEDs other than barbiturates, felbamate, methsuximide, ethetoin, vigabatrin, and CLB off label.  2) Migraine headaches; topiramate thought to have helped; continue tho not particularly severe.  3) Asystole; likely primary and not related to seizures or VNS tho this has never been formally examined; has pacemaker in place.   4) VNS at end of service. Course indicates that VNS was not playing much role in seizure control as seizures continued doing well tho VNS at EOS for at least one year.  5) Depression, appears improved after increase of escitalopram. Therapy also helpful.   6) Mild hyponatremia in past likely related to  oxcarbazpine and escitalopram. Not playing a role in her symptoms.  7) Appears unable to handle higher dose of fycompa and increasing fycompa did not appear to help much either.    DISCUSSION  Above discussed frankly and supportively. There are few remaining AED options and we reviewed these. Current situation does not appear severe enough to warrant evaluation for DBS or RNS at this point. It is possible that current increase in seizures will taper down as she works through menopause. Progesterone supplementation could be considered temporarily. We discussed potential side effects including increasing clotting tendency, and increased risks of some cancers. It is important to utilize natural progesterone such as prometria. This would best be done under the care of a gynecologist.    PLAN:   1) Same OXC, levetiracetam. Provided with regimen to decrease fycompa to 4 mg per day while increasing topiramate gradually to 100 mg twice a day. Asked to consult with gynecology and consider treatment with natural progesterone. The dose supported by the NIH randomized trial was 200 mg tid; one could start with 200 mg daily and increase slowly as tolerated.  2) Continue escitalopram  3) Other AED options as above. As she is uncomfortable with felbatol,  clobazam off label would probably be best choice. We could also consider replacing levetiracetam with brivaracetam or use eslicarbazpine instead of oxcarbazepine if formulary will allow. RNS should be a serious consideration if seizures with falls return and become more frequent. If RNS placed she would have three implanted devices which could also raise concern. Neither she nor her  were excited about this possibility when we last reviewed.  4) RTC  4 months. Sooner if seizures worsen.  5) I had erroneously suggested that 600 mg to 900 mg were standard progesterone doses for treatment of catamenial epilepsy but best data suggests 200 mg tid; wrote letter correcting this  statement to Dr Briceno.      Noam Barboza MD

## 2019-08-21 NOTE — PROGRESS NOTES
[facility] Progress Note      Patient:  Adia Briceno  :  1955   Age:  64 year old   Today's Office Visit:  2019    Epilepsy Data:    Patient History  Primary Epileptologist/Provider: Noam Barboza M.D.  Epilepsy Syndrome: Localization-related epilepsy unspecified  Epilepsy Syndrome Status: Final  Age of Onset: 10  Other Relevant Dx/ Issues: Inpatient evaluation 1998.  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  Asystole  with subsequent pacemaker placement.  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 Seizure-related falls/injuries.  is endocrinologist.     Tests/Surgery History  Last EE2005  Last MRI: 2008  Last Neuropsych Testin2008  Last Case Management Conference: 2008  Epilepsy Surgery #1 Date: 08  Epilepsy Related Surgery #1 : Type: VNS placement    Seizure Record  Current Visit Date: 19  Previous Visit Date: 19  Months since last visit: 7.13    Seizure Type 1: Complex partial seizures unspecified  Description of Sz Type 1: Head slumps, amnesia  # of Type 1 Seizure since last visit: 6  Freq. Type 1 / Month: 0.84    Seizure Type 2: Complex partial seizures with impairment of consciousness at onset  Description of Sz Type 2: Head slumps with collapse  Seizure Type 3: Simple partial seizures unspecified    Description of Sz Type 3: strange feeling in her head, 30-60 seconds  # of Type 3 Seizure since last visit: 18  Freq. Type 3 / Month: 2.52    History of Present Illness:     She had been doing relatively well with a long period of complete seizure control.  Seizures have worsened in late March. At first focal unimpaired, what she calls auras.  Starting in  worsening focal impaired seizures. Three falls have occurred with seizures.  Called clinic and was told to increase fycompa to 6 mg which she did in late . This has not helped much and caused increased sedation. While calendar indicates that she  did well in July, she has had a series of both focal unimpaired and focal impaired seizures in early to mid August.    Hot flashes have gotten worse again sometime in June. This has been associated with increased insomnia.  Otherwise denies obvious seizure precipitants. Taking medications as directed. No fevers, diarrhea, vomiting. No new medications. No significant sleep deprivation (other than above), alcohol use, street drug use.   No new significant stressors    She presented to clinic today with her , a respected local endocrinologist and he confirms the above.      Current Outpatient Medications   Medication Sig Dispense Refill     acetaZOLAMIDE (DIAMOX) 250 MG tablet 1 tab BID after 2 consecutive days of seizures 14 tablet 0     Cetirizine HCl (ZYRTEC PO) daily.       Cholecalciferol (VITAMIN D) 2000 UNITS CAPS daily.       clonazePAM (KLONOPIN) 1 MG tablet Taek one by mouth at bedtime 30 tablet 5     diazepam (DIAZEPAM INTENSOL) 5 MG/ML solution Administer 1 ml as needed into cheek pouch for seizures 30 mL 0     escitalopram (LEXAPRO) 10 MG tablet TAKE 1 TABLET BY MOUTH AT BEDTIME 30 tablet 10     escitalopram (LEXAPRO) 5 MG tablet TAKE 1 TABLET(5 MG) BY MOUTH DAILY 30 tablet 10     esomeprazole (NEXIUM) 40 MG capsule Take by mouth every morning (before breakfast) Take 30-60 minutes before eating.       levETIRAcetam (KEPPRA) 750 MG tablet TAKE 1 TABLET BY MOUTH THREE TIMES DAILY AND TAKE 1 1/2 TABLETS EVERY NIGHT AT BEDTIME(TOTAL 3375MG PER DAY). 135 tablet 11     OXcarbazepine (TRILEPTAL) 300 MG tablet TAKE ONE AND ONE-HALF TABLET IN THE MORNING, ONE AND ONE-HALF TABLET AT NOON, 2 TABLETS IN THE EVENING AND 2 TABLETS AT BEDTIME 210 tablet 11     perampanel (FYCOMPA) 6 MG tablet Take 1 tablet (6 mg) by mouth At Bedtime 30 tablet 3     SUMAtriptan (IMITREX) 100 MG tablet Take one by mouth prn severe headache. No more than four per month. 9 tablet 0     topiramate (TOPAMAX) 100 MG tablet TAKE 1/2  TABLET BY MOUTH AT LUNCH TIME AND 1/2 TABLET AT DINNERTIME 30 tablet 1     topiramate (TOPAMAX) 100 MG tablet Take one half at lunch time and one half at dinnertime 30 tablet 3        Perceived AED Side Effects:  Yes    Medication Notes:    No side effedts    AED Medication Compliance:  compliant all of the time  Using a pill box:  Yes    Review of Systems:  Occasional migraine headaches as below. Denies loss of vision. Denies double vision. Denies dysphagia. Denies cough, wheezing, hemoptysis. Denies chest pain, leg swelling. Denies significant weight change, abdominal pain, nausea, vomiting, change in bowel habits, blood per rectum. Denies dysuria, hematuria, flank pain, or kidney stones. Denies falls or fractures.  Have you experienced a traumatic fall since your last visit: YES  Are these falls related to your seizures:  YES: see above.      Other Issues:    Headaches not particularly problematic.  Is patient safe to drive:  No    Woman Care:   Patient is:  Postmenopausal. She was free of hot flashes for a period of time but these have now recurred.    Exam:    BP (!) 154/82   Pulse 79   Wt 160 lb 12.8 oz (72.9 kg)   Breastfeeding? No   BMI 25.95 kg/m       Wt Readings from Last 5 Encounters:   08/21/19 160 lb 12.8 oz (72.9 kg)   01/16/19 160 lb (72.6 kg)   07/18/18 169 lb (76.7 kg)   06/29/18 159 lb (72.1 kg)   12/21/17 157 lb (71.2 kg)     Normal gait including tandem. Visual fields full. Extraocular movements intact without nystagmus. Mild bilateral ptosis as usual. Smile symmetrical. Facial sensation equal. Tongue midline and strong. No drift, pronation, tremor, or asterixis. Finger finger nose is done well.    Heart exam without murmur. RRR. No carotid bruit.   checks her levels at his clinic and reports that sodium is normal, oxcarbazepine levels are high therapeutic.    IMPRESSION   1) Partial seizures, intractable; simple partial, simple partial to complex partial seizures. Complex partial  seizures often associated with falls and occasionally with trama. Independent bitemporal likely neocortical onset by scalp EEG. Improvement starting approxmiately March 2015 led to almost 1 3/4 years of  freedom from complex partial seizures with falls. Simple partial seizures continue intermittently. Fycompa and levetiracetam appeared to play important roles in this period of seizure freedom but I thought that completing menopause may have been important. There was some indication that focal unimpaired seizures may have worsened with reduction of TPM. Now with significant worsening of seizures which appears to be associated with recurrence of hot flashes. This raises possibility that siezures may be progesterone responsive. She is refractory to all AEDs other than barbiturates, felbamate, methsuximide, ethetoin, vigabatrin, and CLB off label.  2) Migraine headaches; topiramate thought to have helped; continue tho not particularly severe.  3) Asystole; likely primary and not related to seizures or VNS tho this has never been formally examined; has pacemaker in place.   4) VNS at end of service. Course indicates that VNS was not playing much role in seizure control as seizures continued doing well tho VNS at EOS for at least one year.  5) Depression, appears improved after increase of escitalopram. Therapy also helpful.   6) Mild hyponatremia in past likely related to oxcarbazpine and escitalopram. Not playing a role in her symptoms.  7) Appears unable to handle higher dose of fycompa and increasing fycompa did not appear to help much either.    DISCUSSION  Above discussed frankly and supportively. There are few remaining AED options and we reviewed these. Current situation does not appear severe enough to warrant evaluation for DBS or RNS at this point. It is possible that current increase in seizures will taper down as she works through menopause. Progesterone supplementation could be considered temporarily. We  discussed potential side effects including increasing clotting tendency, and increased risks of some cancers. It is important to utilize natural progesterone such as prometria. This would best be done under the care of a gynecologist.    PLAN:   1) Same OXC, levetiracetam. Provided with regimen to decrease fycompa to 4 mg per day while increasing topiramate gradually to 100 mg twice a day. Asked to consult with gynecology and consider treatment with natural progesterone. The dose supported by the NIH randomized trial was 200 mg tid; one could start with 200 mg daily and increase slowly as tolerated.  2) Continue escitalopram  3) Other AED options as above. As she is uncomfortable with felbatol,  clobazam off label would probably be best choice. We could also consider replacing levetiracetam with brivaracetam or use eslicarbazpine instead of oxcarbazepine if formulary will allow. RNS should be a serious consideration if seizures with falls return and become more frequent. If RNS placed she would have three implanted devices which could also raise concern. Neither she nor her  were excited about this possibility when we last reviewed.  4) RTC  4 months. Sooner if seizures worsen.  5) I had erroneously suggested that 600 mg to 900 mg were standard progesterone doses for treatment of catamenial epilepsy but best data suggests 200 mg tid; wrote letter correcting this statement to Dr Briceno.      Noam Barboza

## 2019-08-21 NOTE — PATIENT INSTRUCTIONS
Times of Days  AM  PM       Medication Tablet Size Number of Tablets/Capsules Total Daily Dosage    now fycompa 6 mg  0    1      6 mg             topiramate  50 mg  1    1      100 mg                      week 1  fycompa 4 mg  0    1      4 mg                  topiramate 50 mg  1    2      150 mg                      week 2 fycompa 4 mg  0    1      4 mg                  topiramate 50 mg  2    2      200 mg       Can use one half fycompa 6 mg (3 mg) for several days instead of fycmpa 4 mg.    Please hook up with gyne and try natural progesterone (e.g. Prometria). Typically 300 mg tablett.\  Typically 600 mg to 900 mg has antiepileptic properties. This may help with both seizures and hot flashes.  Important to use natural rather than synthetic progeterone. Synthetic progesterone is not metabolized to allopregnalone which has antiepileptic properties.    Would be happy to talk to the gynecologist about this; have them call me at [phone]. [phone].      Carry this with you at all times.  CONTINUE TAKING YOUR OTHER MEDICATIONS AS PREVIOUSLY DIRECTED.      * * *Do not store medications in the bathroom.  Keep medications away from children!* * *

## 2019-08-23 ENCOUNTER — TELEPHONE (OUTPATIENT)
Dept: NEUROLOGY | Facility: CLINIC | Age: 64
End: 2019-08-23

## 2019-08-23 NOTE — TELEPHONE ENCOUNTER
Returned call to MD who indicates that patient came to her (her PCP), and stated that Dr. Barboza had indicated that she was supposed to order progesterone for the patient.  Since the call was made, Dr. Rodgers indicates she is able to see Dr. Haque not in shared chart and notes that Dr. Barboza indicated that patient should get a consult with gynecology and consider treatment with progesterone.  Patient is going to see PCP on Monday and they will speak about a consult at that time.  No further questions.

## 2019-08-23 NOTE — TELEPHONE ENCOUNTER
What is the concern that needs to be addressed by a nurse?  would like a call back to discuss a medication patient would to be on.    May a detailed message be left on voicemail? yes    Date of last office visit: 08/21/2019    Message routed to: [facility] RN POOL.

## 2019-08-24 LAB
MDC_IDC_LEAD_IMPLANT_DT: NORMAL
MDC_IDC_LEAD_IMPLANT_DT: NORMAL
MDC_IDC_LEAD_LOCATION: NORMAL
MDC_IDC_LEAD_LOCATION: NORMAL
MDC_IDC_LEAD_MFG: NORMAL
MDC_IDC_LEAD_MFG: NORMAL
MDC_IDC_LEAD_MODEL: NORMAL
MDC_IDC_LEAD_MODEL: NORMAL
MDC_IDC_LEAD_POLARITY_TYPE: NORMAL
MDC_IDC_LEAD_POLARITY_TYPE: NORMAL
MDC_IDC_LEAD_SERIAL: NORMAL
MDC_IDC_LEAD_SERIAL: NORMAL
MDC_IDC_MSMT_BATTERY_REMAINING_LONGEVITY: 48 MO
MDC_IDC_MSMT_BATTERY_STATUS: NORMAL
MDC_IDC_MSMT_LEADCHNL_RA_IMPEDANCE_VALUE: 530 OHM
MDC_IDC_MSMT_LEADCHNL_RA_PACING_THRESHOLD_AMPLITUDE: 0.6 V
MDC_IDC_MSMT_LEADCHNL_RA_PACING_THRESHOLD_AMPLITUDE: 0.6 V
MDC_IDC_MSMT_LEADCHNL_RA_PACING_THRESHOLD_PULSEWIDTH: 0.5 MS
MDC_IDC_MSMT_LEADCHNL_RA_PACING_THRESHOLD_PULSEWIDTH: 0.5 MS
MDC_IDC_MSMT_LEADCHNL_RA_SENSING_INTR_AMPL: 2.2 MV
MDC_IDC_MSMT_LEADCHNL_RV_IMPEDANCE_VALUE: 470 OHM
MDC_IDC_MSMT_LEADCHNL_RV_PACING_THRESHOLD_AMPLITUDE: 0.5 V
MDC_IDC_MSMT_LEADCHNL_RV_PACING_THRESHOLD_PULSEWIDTH: 0.5 MS
MDC_IDC_MSMT_LEADCHNL_RV_SENSING_INTR_AMPL: 1.3 MV
MDC_IDC_PG_IMPLANT_DTM: NORMAL
MDC_IDC_PG_MFG: NORMAL
MDC_IDC_PG_MODEL: NORMAL
MDC_IDC_PG_SERIAL: NORMAL
MDC_IDC_PG_TYPE: NORMAL
MDC_IDC_SESS_CLINIC_NAME: NORMAL
MDC_IDC_SESS_DTM: NORMAL
MDC_IDC_SESS_TYPE: NORMAL
MDC_IDC_SET_BRADY_AT_MODE_SWITCH_MODE: NORMAL
MDC_IDC_SET_BRADY_AT_MODE_SWITCH_RATE: 170 {BEATS}/MIN
MDC_IDC_SET_BRADY_HYSTRATE: NORMAL
MDC_IDC_SET_BRADY_LOWRATE: 70 {BEATS}/MIN
MDC_IDC_SET_BRADY_MAX_SENSOR_RATE: 140 {BEATS}/MIN
MDC_IDC_SET_BRADY_MAX_TRACKING_RATE: 140 {BEATS}/MIN
MDC_IDC_SET_BRADY_MODE: NORMAL
MDC_IDC_SET_BRADY_PAV_DELAY_HIGH: 180 MS
MDC_IDC_SET_BRADY_PAV_DELAY_LOW: 300 MS
MDC_IDC_SET_BRADY_SAV_DELAY_HIGH: 180 MS
MDC_IDC_SET_BRADY_SAV_DELAY_LOW: 300 MS
MDC_IDC_SET_LEADCHNL_RA_PACING_AMPLITUDE: 2 V
MDC_IDC_SET_LEADCHNL_RA_PACING_ANODE_ELECTRODE_1: NORMAL
MDC_IDC_SET_LEADCHNL_RA_PACING_ANODE_LOCATION_1: NORMAL
MDC_IDC_SET_LEADCHNL_RA_PACING_CAPTURE_MODE: NORMAL
MDC_IDC_SET_LEADCHNL_RA_PACING_CATHODE_ELECTRODE_1: NORMAL
MDC_IDC_SET_LEADCHNL_RA_PACING_CATHODE_LOCATION_1: NORMAL
MDC_IDC_SET_LEADCHNL_RA_PACING_POLARITY: NORMAL
MDC_IDC_SET_LEADCHNL_RA_PACING_PULSEWIDTH: 0.5 MS
MDC_IDC_SET_LEADCHNL_RA_SENSING_ADAPTATION_MODE: NORMAL
MDC_IDC_SET_LEADCHNL_RA_SENSING_ANODE_ELECTRODE_1: NORMAL
MDC_IDC_SET_LEADCHNL_RA_SENSING_ANODE_LOCATION_1: NORMAL
MDC_IDC_SET_LEADCHNL_RA_SENSING_CATHODE_ELECTRODE_1: NORMAL
MDC_IDC_SET_LEADCHNL_RA_SENSING_CATHODE_LOCATION_1: NORMAL
MDC_IDC_SET_LEADCHNL_RA_SENSING_POLARITY: NORMAL
MDC_IDC_SET_LEADCHNL_RA_SENSING_SENSITIVITY: 0.75 MV
MDC_IDC_SET_LEADCHNL_RV_PACING_AMPLITUDE: 2 V
MDC_IDC_SET_LEADCHNL_RV_PACING_ANODE_ELECTRODE_1: NORMAL
MDC_IDC_SET_LEADCHNL_RV_PACING_ANODE_LOCATION_1: NORMAL
MDC_IDC_SET_LEADCHNL_RV_PACING_CAPTURE_MODE: NORMAL
MDC_IDC_SET_LEADCHNL_RV_PACING_CATHODE_ELECTRODE_1: NORMAL
MDC_IDC_SET_LEADCHNL_RV_PACING_CATHODE_LOCATION_1: NORMAL
MDC_IDC_SET_LEADCHNL_RV_PACING_POLARITY: NORMAL
MDC_IDC_SET_LEADCHNL_RV_PACING_PULSEWIDTH: 0.5 MS
MDC_IDC_SET_LEADCHNL_RV_SENSING_ADAPTATION_MODE: NORMAL
MDC_IDC_SET_LEADCHNL_RV_SENSING_ANODE_ELECTRODE_1: NORMAL
MDC_IDC_SET_LEADCHNL_RV_SENSING_ANODE_LOCATION_1: NORMAL
MDC_IDC_SET_LEADCHNL_RV_SENSING_CATHODE_ELECTRODE_1: NORMAL
MDC_IDC_SET_LEADCHNL_RV_SENSING_CATHODE_LOCATION_1: NORMAL
MDC_IDC_SET_LEADCHNL_RV_SENSING_POLARITY: NORMAL
MDC_IDC_SET_LEADCHNL_RV_SENSING_SENSITIVITY: 0.75 MV
MDC_IDC_STAT_AT_DTM_END: NORMAL
MDC_IDC_STAT_AT_DTM_START: NORMAL
MDC_IDC_STAT_AT_MODE_SW_COUNT: 0
MDC_IDC_STAT_AT_MODE_SW_MAX_DURATION: 0 S
MDC_IDC_STAT_BRADY_DTM_END: NORMAL
MDC_IDC_STAT_BRADY_DTM_START: NORMAL
MDC_IDC_STAT_BRADY_RA_PERCENT_PACED: 9 %
MDC_IDC_STAT_BRADY_RV_PERCENT_PACED: 55 %
MDC_IDC_STAT_EPISODE_RECENT_COUNT: 0
MDC_IDC_STAT_EPISODE_RECENT_COUNT_DTM_END: NORMAL
MDC_IDC_STAT_EPISODE_RECENT_COUNT_DTM_START: NORMAL
MDC_IDC_STAT_EPISODE_TYPE: NORMAL

## 2019-08-26 ENCOUNTER — TELEPHONE (OUTPATIENT)
Dept: NEUROLOGY | Facility: CLINIC | Age: 64
End: 2019-08-26

## 2019-08-26 NOTE — TELEPHONE ENCOUNTER
from [facility] has a question regarding patient and would like a call back before patient appt with her today at 2:40 pm.She spoke with Neeraj on Friday but had another question and was hoping she can speak to .

## 2019-08-28 NOTE — TELEPHONE ENCOUNTER
Received call from Dr. Rodgers who stated she wasn't aware Dr. Barboza is out until next week 9/4. Asked if this is urgent, we would be able to connect her with another on call physician. She stated she can wait until 9/4 and would refer to speak with Dr. Barboza only.     Her personal # is [phone]

## 2019-09-04 LAB
MDC_IDC_LEAD_IMPLANT_DT: NORMAL
MDC_IDC_LEAD_IMPLANT_DT: NORMAL
MDC_IDC_LEAD_LOCATION: NORMAL
MDC_IDC_LEAD_LOCATION: NORMAL
MDC_IDC_LEAD_MFG: NORMAL
MDC_IDC_LEAD_MFG: NORMAL
MDC_IDC_LEAD_MODEL: NORMAL
MDC_IDC_LEAD_MODEL: NORMAL
MDC_IDC_LEAD_POLARITY_TYPE: NORMAL
MDC_IDC_LEAD_POLARITY_TYPE: NORMAL
MDC_IDC_LEAD_SERIAL: NORMAL
MDC_IDC_LEAD_SERIAL: NORMAL
MDC_IDC_MSMT_BATTERY_STATUS: NORMAL
MDC_IDC_MSMT_LEADCHNL_RA_IMPEDANCE_VALUE: 530 OHM
MDC_IDC_MSMT_LEADCHNL_RA_PACING_THRESHOLD_AMPLITUDE: 0.3 V
MDC_IDC_MSMT_LEADCHNL_RA_PACING_THRESHOLD_AMPLITUDE: 0.8 V
MDC_IDC_MSMT_LEADCHNL_RA_PACING_THRESHOLD_PULSEWIDTH: 0.5 MS
MDC_IDC_MSMT_LEADCHNL_RA_PACING_THRESHOLD_PULSEWIDTH: 0.5 MS
MDC_IDC_MSMT_LEADCHNL_RA_SENSING_INTR_AMPL: 2.6 MV
MDC_IDC_MSMT_LEADCHNL_RV_IMPEDANCE_VALUE: 460 OHM
MDC_IDC_MSMT_LEADCHNL_RV_PACING_THRESHOLD_AMPLITUDE: 0.5 V
MDC_IDC_MSMT_LEADCHNL_RV_PACING_THRESHOLD_PULSEWIDTH: 0.5 MS
MDC_IDC_MSMT_LEADCHNL_RV_SENSING_INTR_AMPL: 1.7 MV
MDC_IDC_PG_IMPLANT_DTM: NORMAL
MDC_IDC_PG_MFG: NORMAL
MDC_IDC_PG_MODEL: NORMAL
MDC_IDC_PG_SERIAL: NORMAL
MDC_IDC_PG_TYPE: NORMAL
MDC_IDC_SESS_CLINIC_NAME: NORMAL
MDC_IDC_SESS_DTM: NORMAL
MDC_IDC_SESS_TYPE: NORMAL
MDC_IDC_SET_BRADY_AT_MODE_SWITCH_MODE: NORMAL
MDC_IDC_SET_BRADY_AT_MODE_SWITCH_RATE: 170 {BEATS}/MIN
MDC_IDC_SET_BRADY_HYSTRATE: NORMAL
MDC_IDC_SET_BRADY_LOWRATE: 60 {BEATS}/MIN
MDC_IDC_SET_BRADY_MAX_SENSOR_RATE: 140 {BEATS}/MIN
MDC_IDC_SET_BRADY_MAX_TRACKING_RATE: 140 {BEATS}/MIN
MDC_IDC_SET_BRADY_MODE: NORMAL
MDC_IDC_SET_BRADY_PAV_DELAY_HIGH: 180 MS
MDC_IDC_SET_BRADY_PAV_DELAY_LOW: 300 MS
MDC_IDC_SET_BRADY_SAV_DELAY_HIGH: 180 MS
MDC_IDC_SET_BRADY_SAV_DELAY_LOW: 300 MS
MDC_IDC_SET_LEADCHNL_RA_PACING_AMPLITUDE: 2 V
MDC_IDC_SET_LEADCHNL_RA_PACING_ANODE_ELECTRODE_1: NORMAL
MDC_IDC_SET_LEADCHNL_RA_PACING_ANODE_LOCATION_1: NORMAL
MDC_IDC_SET_LEADCHNL_RA_PACING_CAPTURE_MODE: NORMAL
MDC_IDC_SET_LEADCHNL_RA_PACING_CATHODE_ELECTRODE_1: NORMAL
MDC_IDC_SET_LEADCHNL_RA_PACING_CATHODE_LOCATION_1: NORMAL
MDC_IDC_SET_LEADCHNL_RA_PACING_POLARITY: NORMAL
MDC_IDC_SET_LEADCHNL_RA_PACING_PULSEWIDTH: 0.5 MS
MDC_IDC_SET_LEADCHNL_RA_SENSING_ADAPTATION_MODE: NORMAL
MDC_IDC_SET_LEADCHNL_RA_SENSING_ANODE_ELECTRODE_1: NORMAL
MDC_IDC_SET_LEADCHNL_RA_SENSING_ANODE_LOCATION_1: NORMAL
MDC_IDC_SET_LEADCHNL_RA_SENSING_CATHODE_ELECTRODE_1: NORMAL
MDC_IDC_SET_LEADCHNL_RA_SENSING_CATHODE_LOCATION_1: NORMAL
MDC_IDC_SET_LEADCHNL_RA_SENSING_POLARITY: NORMAL
MDC_IDC_SET_LEADCHNL_RA_SENSING_SENSITIVITY: 0.5 MV
MDC_IDC_SET_LEADCHNL_RV_PACING_AMPLITUDE: 2 V
MDC_IDC_SET_LEADCHNL_RV_PACING_ANODE_ELECTRODE_1: NORMAL
MDC_IDC_SET_LEADCHNL_RV_PACING_ANODE_LOCATION_1: NORMAL
MDC_IDC_SET_LEADCHNL_RV_PACING_CAPTURE_MODE: NORMAL
MDC_IDC_SET_LEADCHNL_RV_PACING_CATHODE_ELECTRODE_1: NORMAL
MDC_IDC_SET_LEADCHNL_RV_PACING_CATHODE_LOCATION_1: NORMAL
MDC_IDC_SET_LEADCHNL_RV_PACING_POLARITY: NORMAL
MDC_IDC_SET_LEADCHNL_RV_PACING_PULSEWIDTH: 0.5 MS
MDC_IDC_SET_LEADCHNL_RV_SENSING_ADAPTATION_MODE: NORMAL
MDC_IDC_SET_LEADCHNL_RV_SENSING_ANODE_ELECTRODE_1: NORMAL
MDC_IDC_SET_LEADCHNL_RV_SENSING_ANODE_LOCATION_1: NORMAL
MDC_IDC_SET_LEADCHNL_RV_SENSING_CATHODE_ELECTRODE_1: NORMAL
MDC_IDC_SET_LEADCHNL_RV_SENSING_CATHODE_LOCATION_1: NORMAL
MDC_IDC_SET_LEADCHNL_RV_SENSING_POLARITY: NORMAL
MDC_IDC_SET_LEADCHNL_RV_SENSING_SENSITIVITY: 1.5 MV
MDC_IDC_STAT_AT_DTM_END: NORMAL
MDC_IDC_STAT_AT_DTM_START: NORMAL
MDC_IDC_STAT_AT_MODE_SW_COUNT: 0
MDC_IDC_STAT_AT_MODE_SW_MAX_DURATION: 0 S
MDC_IDC_STAT_BRADY_DTM_END: NORMAL
MDC_IDC_STAT_BRADY_DTM_START: NORMAL
MDC_IDC_STAT_BRADY_RA_PERCENT_PACED: 15 %
MDC_IDC_STAT_BRADY_RV_PERCENT_PACED: 25 %
MDC_IDC_STAT_EPISODE_RECENT_COUNT: 0
MDC_IDC_STAT_EPISODE_RECENT_COUNT_DTM_END: NORMAL
MDC_IDC_STAT_EPISODE_RECENT_COUNT_DTM_START: NORMAL
MDC_IDC_STAT_EPISODE_TYPE: NORMAL

## 2019-10-16 DIAGNOSIS — G40.211 LOCALZ-RLTED SYMPTOMATIC EPILEPSY W CMPLX PARTL SZ, INTRACT, W STATUS (H): ICD-10-CM

## 2019-10-16 RX ORDER — CLONAZEPAM 1 MG/1
TABLET ORAL
Qty: 30 TABLET | Refills: 0 | Status: SHIPPED | OUTPATIENT
Start: 2019-10-16 | End: 2020-01-14

## 2019-11-22 ENCOUNTER — TELEPHONE (OUTPATIENT)
Dept: NEUROLOGY | Facility: CLINIC | Age: 64
End: 2019-11-22

## 2019-11-22 NOTE — TELEPHONE ENCOUNTER
What is the concern that needs to be addressed by a nurse? Pt would like a call back - did not want to give me any information    May a detailed message be left on voicemail? Yes     Date of last office visit:     Message routed to: [facility] RN Pool

## 2019-11-25 DIAGNOSIS — G40.909 SEIZURE DISORDER (H): ICD-10-CM

## 2019-11-25 DIAGNOSIS — G40.211 LOCALIZATION-RELATED SYMPTOMATIC EPILEPSY AND EPILEPTIC SYNDROMES WITH COMPLEX PARTIAL SEIZURES, INTRACTABLE, WITH STATUS EPILEPTICUS (H): Primary | ICD-10-CM

## 2019-11-25 NOTE — TELEPHONE ENCOUNTER
Refill request received from the pharmacy for diamox. Upon chart review, it is noted that the patient has not had a signed prescription for this since 2014. Patient called the clinic last Friday 11/22/19 as well. I have been unable to reach her upon returning her call. It seems that diamox use should be discussed with MD, as it has been inactive for many years. Chart forwarded to Dr. Barboza to review as well.

## 2019-11-27 NOTE — TELEPHONE ENCOUNTER
Patient takes my call today. She asks for diamox, which hasn't been prescribed for 4 years. She would like this on hand just in case she has worsened seizures. She recently has had poor sleep due to bladder spasms, which has caused a few auras. Now taking ditropan for bladder spasms and getting better sleep.     See refill encounter.

## 2019-12-04 RX ORDER — ACETAZOLAMIDE 250 MG/1
TABLET ORAL
Qty: 14 TABLET | Refills: 0 | Status: SHIPPED | OUTPATIENT
Start: 2019-12-04 | End: 2020-06-25

## 2019-12-18 DIAGNOSIS — G40.211 LOCALZ-RLTED SYMPTOMATIC EPILEPSY W CMPLX PARTL SZ, INTRACT, W STATUS (H): ICD-10-CM

## 2019-12-18 DIAGNOSIS — F43.21 ADJUSTMENT DISORDER WITH DEPRESSED MOOD: ICD-10-CM

## 2019-12-18 DIAGNOSIS — G40.211 LOCALIZATION-RELATED SYMPTOMATIC EPILEPSY AND EPILEPTIC SYNDROMES WITH COMPLEX PARTIAL SEIZURES, INTRACTABLE, WITH STATUS EPILEPTICUS (H): ICD-10-CM

## 2019-12-20 RX ORDER — ESCITALOPRAM OXALATE 10 MG/1
10 TABLET ORAL AT BEDTIME
Qty: 30 TABLET | Refills: 10 | Status: SHIPPED | OUTPATIENT
Start: 2019-12-20 | End: 2020-10-28

## 2019-12-20 RX ORDER — LEVETIRACETAM 750 MG/1
TABLET ORAL
Qty: 135 TABLET | Refills: 11 | Status: SHIPPED | OUTPATIENT
Start: 2019-12-20 | End: 2020-10-28

## 2019-12-20 RX ORDER — ESCITALOPRAM OXALATE 5 MG/1
TABLET ORAL
Qty: 30 TABLET | Refills: 10 | Status: SHIPPED | OUTPATIENT
Start: 2019-12-20 | End: 2020-10-19

## 2019-12-20 RX ORDER — OXCARBAZEPINE 300 MG/1
TABLET, FILM COATED ORAL
Qty: 210 TABLET | Refills: 11 | Status: SHIPPED | OUTPATIENT
Start: 2019-12-20 | End: 2020-10-28

## 2019-12-20 NOTE — TELEPHONE ENCOUNTER
Last seen: 8/21/2019  RTC: 4 months  Cancel: none  No-show: none  Next appt: not scheduled    Incoming refill from patient via phone.    Medication requested:   oxcarbazepine 300 mg tablet  TAKE ONE AND ONE-HALF TABLET IN THE MORNING, ONE AND ONE-HALF TABLET AT NOON, 2 TABLETS IN THE EVENING AND 2 TABLETS AT BEDTIME  Last Disp: 1/16/19 Qty: 210 R: 11    levetiracetam 750 mg tablet  TAKE 1 TABLET BY MOUTH THREE TIMES DAILY AND TAKE 1 1/2 TABLETS EVERY NIGHT AT BEDTIME(TOTAL 3375MG PER DAY  Last Disp: 1/16/19 Qty: 135 R: 11    escitalopram 10 mg tablet  TAKE 1 TABLET BY MOUTH AT BEDTIME  Last disp: 2/25/19 Qty: 30 R: 10    escitalopram 5 mg tablet  TAKE 1 TABLET(5 MG) BY MOUTH DAILY  Last disp: 2/25/19 Qty: 30 R:10    Medication refills forwarded to MD because: prescriptions are due for yearly MD signature.

## 2020-01-10 DIAGNOSIS — G40.211 LOCALZ-RLTED SYMPTOMATIC EPILEPSY W CMPLX PARTL SZ, INTRACT, W STATUS (H): ICD-10-CM

## 2020-01-13 NOTE — TELEPHONE ENCOUNTER
clonazePAM (KLONOPIN) 1 MG tablet      Last Written Prescription Date:  10-16-19  Last Fill Quantity: 30,   # refills: 0  Last Office Visit : 8-21-19  Future Office visit:  2-5-2020    Routing refill request to provider for review/approval because:  Controlled medication.        Kathleen M Doege RN

## 2020-01-14 RX ORDER — CLONAZEPAM 1 MG/1
TABLET ORAL
Qty: 30 TABLET | Refills: 1 | Status: SHIPPED | OUTPATIENT
Start: 2020-01-14 | End: 2020-05-19

## 2020-02-05 ENCOUNTER — OFFICE VISIT (OUTPATIENT)
Dept: NEUROLOGY | Facility: CLINIC | Age: 65
End: 2020-02-05
Payer: MEDICARE

## 2020-02-05 VITALS
WEIGHT: 171.4 LBS | SYSTOLIC BLOOD PRESSURE: 146 MMHG | DIASTOLIC BLOOD PRESSURE: 83 MMHG | BODY MASS INDEX: 27.66 KG/M2 | HEART RATE: 63 BPM

## 2020-02-05 DIAGNOSIS — G40.211 LOCALZ-RLTED SYMPTOMATIC EPILEPSY W CMPLX PARTL SZ, INTRACT, W STATUS (H): Primary | ICD-10-CM

## 2020-02-05 ASSESSMENT — PAIN SCALES - GENERAL: PAINLEVEL: NO PAIN (0)

## 2020-02-05 NOTE — PATIENT INSTRUCTIONS
Get oxcarabazepine level, levetiracetam level, topiramate level and sodium.  Please send results to us.    You can increase oxcarbazepine to 2 pills in AM during periods when you are seizure prone.  Can also increase progesterone to 100 mg twice a day during periods when you are seizure prone.    Cenobamate (brand Xcopri) will be available in about three months.

## 2020-02-05 NOTE — LETTER
2020     RE: Adia Briceno  : 1955   MRN: [id number]      Dear Colleague,    Thank you for referring your patient, Adia Briceno, to the [facility] at [facility]. Please see a copy of my visit note below.    [facility]      Patient:  Adia Briceno  :  1955   Age:  64 year old   Today's Office Visit:  2020    Epilepsy Data:    Patient History  Primary Epileptologist/Provider: Noam Barboza M.D.  Epilepsy Syndrome: Localization-related epilepsy unspecified  Epilepsy Syndrome Status: Final  Age of Onset: 10  Other Relevant Dx/ Issues: Inpatient evaluation 1998.  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  Asystole  with subsequent pacemaker placement.  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 Seizure-related falls/injuries.  is endocrinologist.     Tests/Surgery History  Last EE2005  Last MRI: 2008  Last Neuropsych Testin2008  Last Case Management Conference: 2008  Epilepsy Surgery #1 Date: 08  Epilepsy Related Surgery #1 : Type: VNS placement    Seizure Record  Current Visit Date: 20  Previous Visit Date: 19  Months since last visit: 5.52    Seizure Type 1: Complex partial seizures unspecified  Description of Sz Type 1: Head slumps, amnesia  # of Type 1 Seizure since last visit: 10  Freq. Type 1 / Month: 1.81    Seizure Type 2: Complex partial seizures with impairment of consciousness at onset  Description of Sz Type 2: Head slumps with collapse    Seizure Type 3: Simple partial seizures unspecified  Description of Sz Type 3: strange feeling in her head, 30-60 seconds  # of Type 3 Seizure since last visit: 9  Freq. Type 3 / Month: 1.63    History of Present Illness:     As usual, brings a detailed diary. She did really well with almost no spells for several months. This appeared to be associated with the initiation of progesterone. She  "got up to 100 mg twice a day but too much sedation so reduced to 100 mg per day.  Travelled to [place] late January and early February. Cluster of probable seizures Jan 29th with four seizures. Feb to 3 also had cluster of three seizures.  describes seizures as stares and unresponsiveness with a slump of head.    Some of what she calls auras may actually be focal impaired seizures.  reports she stares off, states \"oh no\" and mumbles looking to one side. She puts  off when he tries to interact with her saying \"I'm OK\". Not clear whether she remembers any of this or not.    No further seizures since Sunday.    Current Outpatient Medications   Medication Sig Dispense Refill     acetaZOLAMIDE (DIAMOX) 250 MG tablet 1 tab BID after 2 consecutive days of seizures 14 tablet 0     Cetirizine HCl (ZYRTEC PO) daily.       Cholecalciferol (VITAMIN D) 2000 UNITS CAPS daily.       clonazePAM (KLONOPIN) 1 MG tablet TAKE 1 TABLET BY MOUTH AT BEDTIME 30 tablet 1     diazepam (DIAZEPAM INTENSOL) 5 MG/ML solution Administer 1 ml as needed into cheek pouch for seizures 30 mL 0     escitalopram (LEXAPRO) 10 MG tablet Take 1 tablet (10 mg) by mouth At Bedtime 30 tablet 10     escitalopram (LEXAPRO) 5 MG tablet TAKE 1 TABLET(5 MG) BY MOUTH DAILY 30 tablet 10     esomeprazole (NEXIUM) 40 MG capsule Take by mouth every morning (before breakfast) Take 30-60 minutes before eating.       levETIRAcetam (KEPPRA) 750 MG tablet TAKE 1 TABLET BY MOUTH THREE TIMES DAILY AND TAKE 1 1/2 TABLETS EVERY NIGHT AT BEDTIME(TOTAL 3375MG PER DAY). 135 tablet 11     OXcarbazepine (TRILEPTAL) 300 MG tablet TAKE ONE AND ONE-HALF TABLET IN THE MORNING, ONE AND ONE-HALF TABLET AT NOON, 2 TABLETS IN THE EVENING AND 2 TABLETS AT BEDTIME 210 tablet 11     perampanel (FYCOMPA) 4 MG tablet Take 1 tablet (4 mg) by mouth At Bedtime 31 tablet 5     SUMAtriptan (IMITREX) 100 MG tablet Take one by mouth prn severe headache. No more than four per month. " 9 tablet 0     topiramate (TOPAMAX) 100 MG tablet Increase as instructed until taking one twice per day 62 tablet 11     topiramate (TOPAMAX) 100 MG tablet Take one half at lunch time and one half at dinnertime 30 tablet 3        Perceived AED Side Effects:  Uncertain, overall little sedation on current regimen.    Medication Notes:  Taking TPM 1/2 x 100 mg at lunch and 1x100 mg at HS, total 150 mg/day.  Taking escitalopram 5 at noon and 10 at HS. Taking micronized progesterone 100 mg per day.   Oxybutinin 5 mg has been started at bedtime. Progesterone 200 mg per day worsened sedatoin.    AED Medication Compliance:  compliant all of the time  Using a pill box:  Yes    Review of Systems:  Headaches are under reasonable control. Denies loss of vision. Denies double vision. Denies dysphagia. Denies cough, wheezing, hemoptysis. Denies chest pain, leg swelling. Denies significant weight change, abdominal pain, nausea, vomiting, change in bowel habits, blood per rectum. Denies dysuria, hematuria, flank pain, or kidney stones. Bladder spasms have started and oxybutinin perscribed.  Have you experienced a traumatic fall since your last visit: Yes  Are these falls related to your seizures:  Yes, fell onto bed and athen floor, no trauma.    Other Issues:    Headaches under good control. Cardiac issues are fine. VNS is dead.  Mood is same, depression no worse. No anxiety or panic attacks.  Bladder spasms are present, ditripoan started.   Is patient safe to drive:  Yes    Woman Care:   Patient is:  Perimenopausal. Irregular spotting.    Exam:    BP (!) 146/83   Pulse 63   Wt 171 lb 6.4 oz (77.7 kg)   Breastfeeding No   BMI 27.66 kg/m        Wt Readings from Last 5 Encounters:   02/05/20 171 lb 6.4 oz (77.7 kg)   08/21/19 160 lb 12.8 oz (72.9 kg)   01/16/19 160 lb (72.6 kg)   07/18/18 169 lb (76.7 kg)   06/29/18 159 lb (72.1 kg)     Normal gait including tandem. Visual fields full. Extraocular movements intact without  nystagmus. Little ptosis today. Smile symmetrical. Facial sensation equal. Tongue midline and strong. No drift, pronation, tremor, or asterixis. Finger finger nose is done well. Strength is full.     Heart exam without murmur. RRR. No carotid bruit.     IMPRESSION   1) Focal seizures, intractable; focal unimpaired, focal unimpaired to focal impaired. Some focal impaired seizures associated with falls and occasionally with trama. Independent bitemporal likely neocortical onset by scalp EEG. Improvement starting approxmiately March 2015 led to almost 1 3/4 years of  freedom from focal impaired seizures with falls. Fycompa and levetiracetam appeared to play important roles in this period of seizure freedom but I thought that completing menopause may have been important. There was some indication that focal unimpaired seizures may have worsened with reduction of TPM. Last visit significant worsening of seizures which appears to be associated with recurrence of hot flashes. Some improvement with natural progesterone supporting idea that siezures may be progesterone responsive. She is refractory to all AEDs other than barbiturates, felbamate, methsuximide, ethetoin, vigabatrin, and CLB off label. Now with some worsening in context of travels.  2) Migraine headaches; topiramate thought to have helped; under better control.  3) Asystole; likely primary and not related to seizures or VNS tho this has never been formally examined; has pacemaker in place.   4) VNS at end of service. Course indicates that VNS was not playing much role in seizure control as seizures continued doing well tho VNS at EOS for at least one year.  5) Depression, appears improved after increase of escitalopram to 15 mg per day. Therapy also helpful.   6) Mild hyponatremia in past likely related to oxcarbazpine and escitalopram. Not playing a role in her symptoms.     DISCUSSION  Above discussed frankly and supportively. There are few remaining AED  options and we reviewed these. We addressed whether to utilize any of these following her recent exacerbation or whether to attribute this to recent travels. Current situation does not appear severe enough to warrant evaluation for DBS or RNS at this point. They agreed. After further give and take we decided to use somewhat more oxcarbazepine and progesterone when she feels seizure prone.     PLAN:   1) Same OXC, levetiracetam, fycompa, topiramate, and micronized progesterone. Increase progesterone to 100 mg twice a day and increase oxcarbazepine to [phone] during periods when more seizure prone.  2) Continue escitalopram  3) Other AED options as above. As she is uncomfortable with felbatol,  clobazam off label would probably be best choice. We could also consider replacing levetiracetam with brivaracetam or use eslicarbazpine instead of oxcarbazepine if formulary will allow. Cenobamate should be considered when available. RNS should be a serious consideration if seizures with falls return and become more frequent. If RNS placed she would have three implanted devices which could also raise concern. Neither she nor her  were excited about this possibility when we last reviewed.  4) RTC  4 months. Sooner if seizures worsen.    Noam Barboza MD

## 2020-02-05 NOTE — PROGRESS NOTES
[facility] Progress Note      Patient:  Adia Briceno  :  1955   Age:  64 year old   Today's Office Visit:  2020    Epilepsy Data:    Patient History  Primary Epileptologist/Provider: Noam Barboza M.D.  Epilepsy Syndrome: Localization-related epilepsy unspecified  Epilepsy Syndrome Status: Final  Age of Onset: 10  Other Relevant Dx/ Issues: Inpatient evaluation 1998.  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  Asystole  with subsequent pacemaker placement.  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 Seizure-related falls/injuries.  is endocrinologist.     Tests/Surgery History  Last EE2005  Last MRI: 2008  Last Neuropsych Testin2008  Last Case Management Conference: 2008  Epilepsy Surgery #1 Date: 08  Epilepsy Related Surgery #1 : Type: VNS placement    Seizure Record  Current Visit Date: 20  Previous Visit Date: 19  Months since last visit: 5.52    Seizure Type 1: Complex partial seizures unspecified  Description of Sz Type 1: Head slumps, amnesia  # of Type 1 Seizure since last visit: 10  Freq. Type 1 / Month: 1.81    Seizure Type 2: Complex partial seizures with impairment of consciousness at onset  Description of Sz Type 2: Head slumps with collapse    Seizure Type 3: Simple partial seizures unspecified  Description of Sz Type 3: strange feeling in her head, 30-60 seconds  # of Type 3 Seizure since last visit: 9  Freq. Type 3 / Month: 1.63    History of Present Illness:     As usual, brings a detailed diary. She did really well with almost no spells for several months. This appeared to be associated with the initiation of progesterone. She got up to 100 mg twice a day but too much sedation so reduced to 100 mg per day.  Travelled to [place] late January and early February. Cluster of probable seizures  with four seizures. Feb to 3 also had cluster of three seizures.  describes seizures as  "stares and unresponsiveness with a slump of head.    Some of what she calls auras may actually be focal impaired seizures.  reports she stares off, states \"oh no\" and mumbles looking to one side. She puts  off when he tries to interact with her saying \"I'm OK\". Not clear whether she remembers any of this or not.    No further seizures since Sunday.    Current Outpatient Medications   Medication Sig Dispense Refill     acetaZOLAMIDE (DIAMOX) 250 MG tablet 1 tab BID after 2 consecutive days of seizures 14 tablet 0     Cetirizine HCl (ZYRTEC PO) daily.       Cholecalciferol (VITAMIN D) 2000 UNITS CAPS daily.       clonazePAM (KLONOPIN) 1 MG tablet TAKE 1 TABLET BY MOUTH AT BEDTIME 30 tablet 1     diazepam (DIAZEPAM INTENSOL) 5 MG/ML solution Administer 1 ml as needed into cheek pouch for seizures 30 mL 0     escitalopram (LEXAPRO) 10 MG tablet Take 1 tablet (10 mg) by mouth At Bedtime 30 tablet 10     escitalopram (LEXAPRO) 5 MG tablet TAKE 1 TABLET(5 MG) BY MOUTH DAILY 30 tablet 10     esomeprazole (NEXIUM) 40 MG capsule Take by mouth every morning (before breakfast) Take 30-60 minutes before eating.       levETIRAcetam (KEPPRA) 750 MG tablet TAKE 1 TABLET BY MOUTH THREE TIMES DAILY AND TAKE 1 1/2 TABLETS EVERY NIGHT AT BEDTIME(TOTAL 3375MG PER DAY). 135 tablet 11     OXcarbazepine (TRILEPTAL) 300 MG tablet TAKE ONE AND ONE-HALF TABLET IN THE MORNING, ONE AND ONE-HALF TABLET AT NOON, 2 TABLETS IN THE EVENING AND 2 TABLETS AT BEDTIME 210 tablet 11     perampanel (FYCOMPA) 4 MG tablet Take 1 tablet (4 mg) by mouth At Bedtime 31 tablet 5     SUMAtriptan (IMITREX) 100 MG tablet Take one by mouth prn severe headache. No more than four per month. 9 tablet 0     topiramate (TOPAMAX) 100 MG tablet Increase as instructed until taking one twice per day 62 tablet 11     topiramate (TOPAMAX) 100 MG tablet Take one half at lunch time and one half at dinnertime 30 tablet 3        Perceived AED Side Effects:  " Uncertain, overall little sedation on current regimen.    Medication Notes:  Taking TPM 1/2 x 100 mg at lunch and 1x100 mg at HS, total 150 mg/day.  Taking escitalopram 5 at noon and 10 at HS. Taking micronized progesterone 100 mg per day.   Oxybutinin 5 mg has been started at bedtime. Progesterone 200 mg per day worsened sedatoin.    AED Medication Compliance:  compliant all of the time  Using a pill box:  Yes    Review of Systems:  Headaches are under reasonable control. Denies loss of vision. Denies double vision. Denies dysphagia. Denies cough, wheezing, hemoptysis. Denies chest pain, leg swelling. Denies significant weight change, abdominal pain, nausea, vomiting, change in bowel habits, blood per rectum. Denies dysuria, hematuria, flank pain, or kidney stones. Bladder spasms have started and oxybutinin perscribed.  Have you experienced a traumatic fall since your last visit: Yes  Are these falls related to your seizures:  Yes, fell onto bed and athen floor, no trauma.    Other Issues:    Headaches under good control. Cardiac issues are fine. VNS is dead.  Mood is same, depression no worse. No anxiety or panic attacks.  Bladder spasms are present, ditripoan started.   Is patient safe to drive:  Yes    Woman Care:   Patient is:  Perimenopausal. Irregular spotting.    Exam:    BP (!) 146/83   Pulse 63   Wt 171 lb 6.4 oz (77.7 kg)   Breastfeeding No   BMI 27.66 kg/m       Wt Readings from Last 5 Encounters:   02/05/20 171 lb 6.4 oz (77.7 kg)   08/21/19 160 lb 12.8 oz (72.9 kg)   01/16/19 160 lb (72.6 kg)   07/18/18 169 lb (76.7 kg)   06/29/18 159 lb (72.1 kg)     Normal gait including tandem. Visual fields full. Extraocular movements intact without nystagmus. Little ptosis today. Smile symmetrical. Facial sensation equal. Tongue midline and strong. No drift, pronation, tremor, or asterixis. Finger finger nose is done well. Strength is full.     Heart exam without murmur. RRR. No carotid  bruit.     IMPRESSION   1) Focal seizures, intractable; focal unimpaired, focal unimpaired to focal impaired. Some focal impaired seizures associated with falls and occasionally with trama. Independent bitemporal likely neocortical onset by scalp EEG. Improvement starting approxmiately March 2015 led to almost 1 3/4 years of  freedom from focal impaired seizures with falls. Fycompa and levetiracetam appeared to play important roles in this period of seizure freedom but I thought that completing menopause may have been important. There was some indication that focal unimpaired seizures may have worsened with reduction of TPM. Last visit significant worsening of seizures which appears to be associated with recurrence of hot flashes. Some improvement with natural progesterone supporting idea that siezures may be progesterone responsive. She is refractory to all AEDs other than barbiturates, felbamate, methsuximide, ethetoin, vigabatrin, and CLB off label. Now with some worsening in context of travels.  2) Migraine headaches; topiramate thought to have helped; under better control.  3) Asystole; likely primary and not related to seizures or VNS tho this has never been formally examined; has pacemaker in place.   4) VNS at end of service. Course indicates that VNS was not playing much role in seizure control as seizures continued doing well tho VNS at EOS for at least one year.  5) Depression, appears improved after increase of escitalopram to 15 mg per day. Therapy also helpful.   6) Mild hyponatremia in past likely related to oxcarbazpine and escitalopram. Not playing a role in her symptoms.     DISCUSSION  Above discussed frankly and supportively. There are few remaining AED options and we reviewed these. We addressed whether to utilize any of these following her recent exacerbation or whether to attribute this to recent travels. Current situation does not appear severe enough to warrant evaluation for DBS or RNS at  this point. They agreed. After further give and take we decided to use somewhat more oxcarbazepine and progesterone when she feels seizure prone.     PLAN:   1) Same OXC, levetiracetam, fycompa, topiramate, and micronized progesterone. Increase progesterone to 100 mg twice a day and increase oxcarbazepine to [phone] during periods when more seizure prone.  2) Continue escitalopram  3) Other AED options as above. As she is uncomfortable with felbatol,  clobazam off label would probably be best choice. We could also consider replacing levetiracetam with brivaracetam or use eslicarbazpine instead of oxcarbazepine if formulary will allow. Cenobamate should be considered when available. RNS should be a serious consideration if seizures with falls return and become more frequent. If RNS placed she would have three implanted devices which could also raise concern. Neither she nor her  were excited about this possibility when we last reviewed.  4) RTC  4 months. Sooner if seizures worsen.    Noam Barboza MD

## 2020-02-11 DIAGNOSIS — G40.211 LOCALIZATION-RELATED SYMPTOMATIC EPILEPSY AND EPILEPTIC SYNDROMES WITH COMPLEX PARTIAL SEIZURES, INTRACTABLE, WITH STATUS EPILEPTICUS (H): ICD-10-CM

## 2020-02-11 DIAGNOSIS — G40.211 LOCALZ-RLTED SYMPTOMATIC EPILEPSY W CMPLX PARTL SZ, INTRACT, W STATUS (H): ICD-10-CM

## 2020-02-11 NOTE — TELEPHONE ENCOUNTER
FYCOMPA 4MG TABLETS     Last Written Prescription Date:  8/21/2019  Last Fill Quantity: 31,   # refills: 5  Last Office Visit : 2/5/2020  Future Office visit:  6/10/2020    Routing refill request to provider for review/approval because:  Drug not on the [facility], [facility] or  Health refill protocol or controlled substance    Rachel Doe RN  Central Triage Red Flags/Med Refills

## 2020-02-14 RX ORDER — OXCARBAZEPINE 300 MG/1
TABLET, FILM COATED ORAL
Qty: 210 TABLET | Refills: 11 | OUTPATIENT
Start: 2020-02-14

## 2020-05-18 DIAGNOSIS — G40.211 LOCALZ-RLTED SYMPTOMATIC EPILEPSY W CMPLX PARTL SZ, INTRACT, W STATUS (H): ICD-10-CM

## 2020-05-19 NOTE — TELEPHONE ENCOUNTER
clonazePAM (KLONOPIN) 1 MG tablet   Last Written Prescription Date:  1/14/2020  Last Fill Quantity: 30,   # refills: 1  Last Office Visit : 2/5/2020  Future Office visit:  6/10/2020    Routing refill request to provider for review/approval because:  Drug not on the [facility], [facility] or [facility] refill protocol or controlled substance      Rachel Doe RN  Central Triage Red Flags/Med Refills

## 2020-05-26 RX ORDER — CLONAZEPAM 1 MG/1
1 TABLET ORAL AT BEDTIME
Qty: 30 TABLET | Refills: 1 | Status: SHIPPED | OUTPATIENT
Start: 2020-05-26 | End: 2020-08-21

## 2020-06-25 ENCOUNTER — VIRTUAL VISIT (OUTPATIENT)
Dept: NEUROLOGY | Facility: CLINIC | Age: 65
End: 2020-06-25
Payer: MEDICARE

## 2020-06-25 DIAGNOSIS — G40.909 SEIZURE DISORDER (H): ICD-10-CM

## 2020-06-25 DIAGNOSIS — G40.211 LOCALIZATION-RELATED SYMPTOMATIC EPILEPSY AND EPILEPTIC SYNDROMES WITH COMPLEX PARTIAL SEIZURES, INTRACTABLE, WITH STATUS EPILEPTICUS (H): ICD-10-CM

## 2020-06-25 RX ORDER — ACETAZOLAMIDE 250 MG/1
TABLET ORAL
Qty: 30 TABLET | Refills: 1 | Status: SHIPPED | OUTPATIENT
Start: 2020-06-25 | End: 2020-10-28

## 2020-06-25 RX ORDER — TOPIRAMATE 100 MG/1
TABLET, FILM COATED ORAL
Qty: 46 TABLET | Refills: 11 | Status: SHIPPED | OUTPATIENT
Start: 2020-06-25 | End: 2021-12-01

## 2020-06-25 NOTE — PROGRESS NOTES
"Adia Briceno is a 65 year old female who is being evaluated via a billable video visit.      The patient has been notified of following:     \"This video visit will be conducted via a call between you and your physician/provider. We have found that certain health care needs can be provided without the need for an in-person physical exam.  This service lets us provide the care you need with a video conversation.  If a prescription is necessary we can send it directly to your pharmacy.  If lab work is needed we can place an order for that and you can then stop by our lab to have the test done at a later time.    Video visits are billed at different rates depending on your insurance coverage.  Please reach out to your insurance provider with any questions.    If during the course of the call the physician/provider feels a video visit is not appropriate, you will not be charged for this service.\"    Patient has given verbal consent for Video visit? Yes    How would you like to obtain your AVS? Mail a copy  Patient would like the video invitation sent by: Send to e-mail at: [email or web address] or [email or web address]    Will anyone else be joining your video visit? No      Thank you  Shadia Hawley LPN    Video-Visit Details    Type of service:  Video Visit    Please see physician note for televisit parameters.    Noam Barboza MD      [facility] Progress Note      Patient:  Adia Briceno  :  1955   Age:  65 year old   Today's Office Visit:  2020    Epilepsy Data:    Patient History  Primary Epileptologist/Provider: Noam Barboza M.D.  Epilepsy Syndrome: Localization-related epilepsy unspecified  Epilepsy Syndrome Status: Final  Age of Onset: 10  Other Relevant Dx/ Issues: Inpatient evaluation 1998.  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  Asystole  with subsequent pacemaker placement.  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 Seizure-related " falls/injuries.  is endocrinologist.     Tests/Surgery History  Last EE2005  Last MRI: 2008  Last Neuropsych Testin2008  Last Case Management Conference: 2008  Epilepsy Surgery #1 Date: 08  Epilepsy Related Surgery #1 : Type: VNS placement    Seizure Record  Current Visit Date: 20  Previous Visit Date: 20  Months since last visit: 4.63  Seizure Type 1: Complex partial seizures unspecified  Description of Sz Type 1: Head slumps, amnesia  # of Type 1 Seizure since last visit: 11  Freq. Type 1 / Month: 2.38  Seizure Type 2: Complex partial seizures with impairment of consciousness at onset  Description of Sz Type 2: Head slumps with collapse  Seizure Type 3: Simple partial seizures unspecified  Description of Sz Type 3: strange feeling in her head, 30-60 seconds      History of Present Illness:     One seizure in [facility], ten in April, none in May and none in .  During all seizures she reports impairment and head slumps. No collapses to ground tho head did slump. In April she had seizure clusters and felt exhausted and seizure prone for hours at a time tho she did not have long periods of amnesia as best as she can tell. Felt acetazolamide helped at that time but used only 125 mg; feels that higher doses worsen sedation. On the other hand had moderate sedation throughout this period of time.    Current Outpatient Medications   Medication Sig Dispense Refill     acetaZOLAMIDE (DIAMOX) 250 MG tablet 1 tab BID after 2 consecutive days of seizures 14 tablet 0     Cetirizine HCl (ZYRTEC PO) daily.       Cholecalciferol (VITAMIN D) 2000 UNITS CAPS daily.       clonazePAM (KLONOPIN) 1 MG tablet Take 1 tablet (1 mg) by mouth At Bedtime 30 tablet 1     diazepam (DIAZEPAM INTENSOL) 5 MG/ML solution Administer 1 ml as needed into cheek pouch for seizures 30 mL 0     escitalopram (LEXAPRO) 10 MG tablet Take 1 tablet (10 mg) by mouth At Bedtime 30 tablet 10     escitalopram  (LEXAPRO) 5 MG tablet TAKE 1 TABLET(5 MG) BY MOUTH DAILY 30 tablet 10     esomeprazole (NEXIUM) 40 MG capsule Take by mouth every morning (before breakfast) Take 30-60 minutes before eating.       levETIRAcetam (KEPPRA) 750 MG tablet TAKE 1 TABLET BY MOUTH THREE TIMES DAILY AND TAKE 1 1/2 TABLETS EVERY NIGHT AT BEDTIME(TOTAL 3375MG PER DAY). 135 tablet 11     OXcarbazepine (TRILEPTAL) 300 MG tablet TAKE ONE AND ONE-HALF TABLET IN THE MORNING, ONE AND ONE-HALF TABLET AT NOON, 2 TABLETS IN THE EVENING AND 2 TABLETS AT BEDTIME 210 tablet 11     perampanel (FYCOMPA) 4 MG tablet Take 1 tablet (4 mg) by mouth At Bedtime 30 tablet 5     progesterone (PROMETRIUM) 100 MG capsule 100 mg daily        SUMAtriptan (IMITREX) 100 MG tablet Take one by mouth prn severe headache. No more than four per month. 9 tablet 0     topiramate (TOPAMAX) 100 MG tablet Take one half at lunch time and one half at dinnertime (Patient taking differently: Take one half at lunch time and one tab at dinnertime) 30 tablet 3     topiramate (TOPAMAX) 100 MG tablet Increase as instructed until taking one twice per day (Patient not taking: Reported on 6/25/2020) 62 tablet 11        Perceived AED Side Effects:  No    Medication Notes:  No side effects. Taking ditropan for spastic bladder; 5 mg once per day.  AED Medication Compliance:  compliant all of the time  Using a pill box:  Yes    Review of Systems:  Denies headaches. Denies loss of vision. Denies double vision. Denies dysphagia. Denies cough, wheezing, hemoptysis. Denies chest pain, leg swelling. Denies significant weight change, abdominal pain, nausea, vomiting, change in bowel habits, blood per rectum. Denies dysuria, hematuria, flank pain, or kidney stones.  Have you experienced a traumatic fall since your last visit: NO  Are these falls related to your seizures:  Not Applicable    Other Issues:    Headaches are under good control; she has no complaints regarding this.  Cardiac wise she is not  "aware of any problems; hasn't connected with cardiology for a while tho she has been trying.  Mood has been doing well; taking escitalopram 15 mg per day.  Denies anxiety, anhedonia or suicidal ideation.  Is patient safe to drive:  No    Woman Care:   Patient is:  Postmenopausal. Hot flashes controlled with prometria.      Exam:    There were no vitals taken for this visit.     Wt Readings from Last 5 Encounters:   02/05/20 171 lb 6.4 oz (77.7 kg)   08/21/19 160 lb 12.8 oz (72.9 kg)   01/16/19 160 lb (72.6 kg)   07/18/18 169 lb (76.7 kg)   06/29/18 159 lb (72.1 kg)     ADAS: Date June 25, 2020. STM 2/3 after distractor, does recall third item from list. Spells \"world\" backward correctly. Twelve animals in 30 seconds including some rare choices \"puffins\", \"I'm an ornithologist\". Neurological examination was performed over video link. Alert and fully oriented. Follows commands well. Mood was normal with no indication of depression or anxiety. Extraocular movements intact without nystagmus. Smile symmetrial. Tongue midline. There is no drift, pronation, or tremor. Finger finger nose was done well. Rapid fine movements were done well and symmetrically.    IMPRESSION   1) Focal seizures, intractable; focal aware, focal aware to focal impaired seizures. Some focal impaired seizures associated with falls and occasionally with trama. Independent bitemporal likely neocortical onset by scalp EEG. Improvement starting approxmiately March 2015 led to almost 1 3/4 years of  freedom from focal impaired seizures with falls. Fycompa and levetiracetam appeared to play important roles in this period of seizure freedom but I thought that completing menopause may have been important. There was some indication that focal unimpaired seizures may have worsened with reduction of TPM. Last visit significant worsening of seizures which appears to be associated with recurrence of hot flashes. Some improvement with natural progesterone " supporting idea that siezures may be progesterone responsive. She is refractory to all AEDs other than barbiturates, felbamate, methsuximide, ethetoin, vigabatrin, and CLB off label. Seizures wax and wane. Over last year number about the same but she is having longer seizure free periods, up to two months in duration. She is happy with her current situation.  2) Migraine headaches; topiramate thought to have helped; under good control.  3) Asystole; likely primary and not related to seizures or VNS tho this has never been formally examined; has pacemaker in place.   4) VNS at end of service. Course indicates that VNS was not playing much role in seizure control as seizures continued doing well tho VNS at EOS for at least one year.  5) Depression, appears improved after increase of escitalopram to 15 mg per day. Therapy also helpful.   6) Mild hyponatremia in past likely related to oxcarbazpine and escitalopram. Not playing a role in her symptoms.     DISCUSSION  She is happy with current situation. We had previously discussed remaining AED options and she is not interested in these. Current situation does not appear severe enough to warrant evaluation for DBS or RNS at this point. She agreed. We suggested that she could get more benefit from acetazolamide during periods of seizure exacerbation and she was interested in this.     PLAN:   1) Same OXC, levetiracetam, fycompa, topiramate, and micronized progesterone. Suggested she can take acetazolamide up to 250 mg po every four hours during periods of seizure exacerbation.  2) Continue escitalopram  3) Other AED options as above. As she is uncomfortable with felbatol,  clobazam off label would probably be best choice. We could also consider replacing levetiracetam with brivaracetam or use eslicarbazpine instead of oxcarbazepine if formulary will allow. Cenobamate should be considered when available. RNS should be a serious consideration if seizures with falls return  and become more frequent. If RNS placed she would have three implanted devices which could also raise concern. Neither she nor her  were excited about this possibility when we last reviewed.  4) RTC 3 months in person at [facility]. Sooner if seizures worsen.     Noam Barboza MD    VIDEO VISIT PARAMETERS  Video visit application used: [employer]; we could not get [employer] to work.  Patient or representative agreed to visit? Yes  Patient initiated visit: Yes  Patient location during visit: Home  [facility]  Time of visit start: 1 PM; approx five minute interruption due to cell phone failure, resumed approx 120 PM  Time of visit finish: 138 PM  Total time of visit: 27 minutes     Noam Barboza MD

## 2020-06-25 NOTE — LETTER
"2020     RE: Adia Briceno  : 1955   MRN: [id number]      Dear Colleague,    Thank you for referring your patient, Adia Briceno, to the [facility] EPILEPSY CARE at [facility]. Please see a copy of my visit note below.    Adia Briceno is a 65 year old female who is being evaluated via a billable video visit.      The patient has been notified of following:     \"This video visit will be conducted via a call between you and your physician/provider. We have found that certain health care needs can be provided without the need for an in-person physical exam.  This service lets us provide the care you need with a video conversation.  If a prescription is necessary we can send it directly to your pharmacy.  If lab work is needed we can place an order for that and you can then stop by our lab to have the test done at a later time.    Video visits are billed at different rates depending on your insurance coverage.  Please reach out to your insurance provider with any questions.    If during the course of the call the physician/provider feels a video visit is not appropriate, you will not be charged for this service.\"    Patient has given verbal consent for Video visit? Yes    How would you like to obtain your AVS? Mail a copy  Patient would like the video invitation sent by: Send to e-mail at: [email or web address] or [email or web address]    Will anyone else be joining your video visit? No      Thank you  Shadia Hawley LPN    Video-Visit Details    Type of service:  Video Visit    Please see physician note for televisit parameters.    Noam Barboza MD    [facility] Epilepsy Care Progress Note      Patient:  Adia Briceno  :  1955   Age:  65 year old   Today's Office Visit:  2020    Epilepsy Data:    Patient History  Primary Epileptologist/Provider: Noam Barboza M.D.  Epilepsy Syndrome: Localization-related epilepsy unspecified  Epilepsy Syndrome " Status: Final  Age of Onset: 10  Other Relevant Dx/ Issues: Inpatient evaluation 1998.  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  Asystole  with subsequent pacemaker placement.  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 Seizure-related falls/injuries.  is endocrinologist.     Tests/Surgery History  Last EE2005  Last MRI: 2008  Last Neuropsych Testin2008  Last Case Management Conference: 2008  Epilepsy Surgery #1 Date: 08  Epilepsy Related Surgery #1 : Type: VNS placement    Seizure Record  Current Visit Date: 20  Previous Visit Date: 20  Months since last visit: 4.63  Seizure Type 1: Complex partial seizures unspecified  Description of Sz Type 1: Head slumps, amnesia  # of Type 1 Seizure since last visit: 11  Freq. Type 1 / Month: 2.38  Seizure Type 2: Complex partial seizures with impairment of consciousness at onset  Description of Sz Type 2: Head slumps with collapse  Seizure Type 3: Simple partial seizures unspecified  Description of Sz Type 3: strange feeling in her head, 30-60 seconds      History of Present Illness:   One seizure in [facility], ten in April, none in May and none in .  During all seizures she reports impairment and head slumps. No collapses to ground tho head did slump. In April she had seizure clusters and felt exhausted and seizure prone for hours at a time tho she did not have long periods of amnesia as best as she can tell. Felt acetazolamide helped at that time but used only 125 mg; feels that higher doses worsen sedation. On the other hand had moderate sedation throughout this period of time.    Current Outpatient Medications   Medication Sig Dispense Refill     acetaZOLAMIDE (DIAMOX) 250 MG tablet 1 tab BID after 2 consecutive days of seizures 14 tablet 0     Cetirizine HCl (ZYRTEC PO) daily.       Cholecalciferol (VITAMIN D) 2000 UNITS CAPS daily.       clonazePAM (KLONOPIN) 1 MG tablet Take 1 tablet (1 mg)  by mouth At Bedtime 30 tablet 1     diazepam (DIAZEPAM INTENSOL) 5 MG/ML solution Administer 1 ml as needed into cheek pouch for seizures 30 mL 0     escitalopram (LEXAPRO) 10 MG tablet Take 1 tablet (10 mg) by mouth At Bedtime 30 tablet 10     escitalopram (LEXAPRO) 5 MG tablet TAKE 1 TABLET(5 MG) BY MOUTH DAILY 30 tablet 10     esomeprazole (NEXIUM) 40 MG capsule Take by mouth every morning (before breakfast) Take 30-60 minutes before eating.       levETIRAcetam (KEPPRA) 750 MG tablet TAKE 1 TABLET BY MOUTH THREE TIMES DAILY AND TAKE 1 1/2 TABLETS EVERY NIGHT AT BEDTIME(TOTAL 3375MG PER DAY). 135 tablet 11     OXcarbazepine (TRILEPTAL) 300 MG tablet TAKE ONE AND ONE-HALF TABLET IN THE MORNING, ONE AND ONE-HALF TABLET AT NOON, 2 TABLETS IN THE EVENING AND 2 TABLETS AT BEDTIME 210 tablet 11     perampanel (FYCOMPA) 4 MG tablet Take 1 tablet (4 mg) by mouth At Bedtime 30 tablet 5     progesterone (PROMETRIUM) 100 MG capsule 100 mg daily        SUMAtriptan (IMITREX) 100 MG tablet Take one by mouth prn severe headache. No more than four per month. 9 tablet 0     topiramate (TOPAMAX) 100 MG tablet Take one half at lunch time and one half at dinnertime (Patient taking differently: Take one half at lunch time and one tab at dinnertime) 30 tablet 3     topiramate (TOPAMAX) 100 MG tablet Increase as instructed until taking one twice per day (Patient not taking: Reported on 6/25/2020) 62 tablet 11      Perceived AED Side Effects:  No    Medication Notes:  No side effects. Taking ditropan for spastic bladder; 5 mg once per day.  AED Medication Compliance:  compliant all of the time  Using a pill box:  Yes    Review of Systems:  Denies headaches. Denies loss of vision. Denies double vision. Denies dysphagia. Denies cough, wheezing, hemoptysis. Denies chest pain, leg swelling. Denies significant weight change, abdominal pain, nausea, vomiting, change in bowel habits, blood per rectum. Denies dysuria, hematuria, flank pain, or  "kidney stones.  Have you experienced a traumatic fall since your last visit: NO  Are these falls related to your seizures:  Not Applicable    Other Issues:    Headaches are under good control; she has no complaints regarding this.  Cardiac wise she is not aware of any problems; hasn't connected with cardiology for a while tho she has been trying.  Mood has been doing well; taking escitalopram 15 mg per day.  Denies anxiety, anhedonia or suicidal ideation.  Is patient safe to drive:  No    Woman Care:   Patient is:  Postmenopausal. Hot flashes controlled with prometria.    Exam:  There were no vitals taken for this visit.     Wt Readings from Last 5 Encounters:   02/05/20 171 lb 6.4 oz (77.7 kg)   08/21/19 160 lb 12.8 oz (72.9 kg)   01/16/19 160 lb (72.6 kg)   07/18/18 169 lb (76.7 kg)   06/29/18 159 lb (72.1 kg)     ADAS: Date June 25, 2020. STM 2/3 after distractor, does recall third item from list. Spells \"world\" backward correctly. Twelve animals in 30 seconds including some rare choices \"puffins\", \"I'm an ornithologist\". Neurological examination was performed over video link. Alert and fully oriented. Follows commands well. Mood was normal with no indication of depression or anxiety. Extraocular movements intact without nystagmus. Smile symmetrial. Tongue midline. There is no drift, pronation, or tremor. Finger finger nose was done well. Rapid fine movements were done well and symmetrically.    IMPRESSION   1) Focal seizures, intractable; focal aware, focal aware to focal impaired seizures. Some focal impaired seizures associated with falls and occasionally with trama. Independent bitemporal likely neocortical onset by scalp EEG. Improvement starting approxmiately March 2015 led to almost 1 3/4 years of  freedom from focal impaired seizures with falls. Fycompa and levetiracetam appeared to play important roles in this period of seizure freedom but I thought that completing menopause may have been " important. There was some indication that focal unimpaired seizures may have worsened with reduction of TPM. Last visit significant worsening of seizures which appears to be associated with recurrence of hot flashes. Some improvement with natural progesterone supporting idea that siezures may be progesterone responsive. She is refractory to all AEDs other than barbiturates, felbamate, methsuximide, ethetoin, vigabatrin, and CLB off label. Seizures wax and wane. Over last year number about the same but she is having longer seizure free periods, up to two months in duration. She is happy with her current situation.  2) Migraine headaches; topiramate thought to have helped; under good control.  3) Asystole; likely primary and not related to seizures or VNS tho this has never been formally examined; has pacemaker in place.   4) VNS at end of service. Course indicates that VNS was not playing much role in seizure control as seizures continued doing well tho VNS at EOS for at least one year.  5) Depression, appears improved after increase of escitalopram to 15 mg per day. Therapy also helpful.   6) Mild hyponatremia in past likely related to oxcarbazpine and escitalopram. Not playing a role in her symptoms.     DISCUSSION  She is happy with current situation. We had previously discussed remaining AED options and she is not interested in these. Current situation does not appear severe enough to warrant evaluation for DBS or RNS at this point. She agreed. We suggested that she could get more benefit from acetazolamide during periods of seizure exacerbation and she was interested in this.     PLAN:   1) Same OXC, levetiracetam, fycompa, topiramate, and micronized progesterone. Suggested she can take acetazolamide up to 250 mg po every four hours during periods of seizure exacerbation.  2) Continue escitalopram  3) Other AED options as above. As she is uncomfortable with felbatol,  clobazam off label would probably be best  choice. We could also consider replacing levetiracetam with brivaracetam or use eslicarbazpine instead of oxcarbazepine if formulary will allow. Cenobamate should be considered when available. RNS should be a serious consideration if seizures with falls return and become more frequent. If RNS placed she would have three implanted devices which could also raise concern. Neither she nor her  were excited about this possibility when we last reviewed.  4) RTC 3 months in person at [facility]. Sooner if seizures worsen.     Noam Barboza MD    VIDEO VISIT PARAMETERS  Video visit application used: [employer]; we could not get [employer] to work.  Patient or representative agreed to visit? Yes  Patient initiated visit: Yes  Patient location during visit: Home  Physician location during visit: [facility]  Time of visit start: 1 PM; approx five minute interruption due to cell phone failure, resumed approx 120 PM  Time of visit finish: 138 PM  Total time of visit: 27 minutes     Noam Barboza MD

## 2020-06-30 ENCOUNTER — TELEPHONE (OUTPATIENT)
Dept: CARDIOLOGY | Facility: CLINIC | Age: 65
End: 2020-06-30

## 2020-06-30 DIAGNOSIS — Z95.0 PACEMAKER: Primary | ICD-10-CM

## 2020-06-30 DIAGNOSIS — I49.5 SINOATRIAL NODE DYSFUNCTION (H): Chronic | ICD-10-CM

## 2020-06-30 NOTE — TELEPHONE ENCOUNTER
Pt left VM. She missed her in-clinic PPM check on 6/24/2020 because she was under the impression it would be changed to a phone check. I called pt back. She had previously declined phone checks so we were planning on seeing her in the clinic. But pt said because of covid19 she would rather do the phone check right now. Scheduled for phone check on 7/10/2020.

## 2020-07-22 DIAGNOSIS — G40.211 LOCALIZATION-RELATED SYMPTOMATIC EPILEPSY AND EPILEPTIC SYNDROMES WITH COMPLEX PARTIAL SEIZURES, INTRACTABLE, WITH STATUS EPILEPTICUS (H): ICD-10-CM

## 2020-07-27 NOTE — TELEPHONE ENCOUNTER
perampanel (FYCOMPA) 4 MG  Last Written Prescription Date:  2/14/20  Last Fill Quantity: 30,   # refills: 5  Last Office Visit : 6/25/20  Future Office visit:  10/7/20   * Routing refill request to provider for review/approval because:  Drug not on the refill protocol or controlled substance

## 2020-08-07 ENCOUNTER — ANCILLARY PROCEDURE (OUTPATIENT)
Dept: CARDIOLOGY | Facility: CLINIC | Age: 65
End: 2020-08-07
Attending: INTERNAL MEDICINE
Payer: MEDICARE

## 2020-08-07 DIAGNOSIS — I49.5 SINOATRIAL NODE DYSFUNCTION (H): Chronic | ICD-10-CM

## 2020-08-07 DIAGNOSIS — Z95.0 PACEMAKER: ICD-10-CM

## 2020-08-07 LAB
MDC_IDC_LEAD_IMPLANT_DT: NORMAL
MDC_IDC_LEAD_IMPLANT_DT: NORMAL
MDC_IDC_LEAD_LOCATION: NORMAL
MDC_IDC_LEAD_LOCATION: NORMAL
MDC_IDC_LEAD_MFG: NORMAL
MDC_IDC_LEAD_MFG: NORMAL
MDC_IDC_LEAD_MODEL: NORMAL
MDC_IDC_LEAD_MODEL: NORMAL
MDC_IDC_LEAD_POLARITY_TYPE: NORMAL
MDC_IDC_LEAD_POLARITY_TYPE: NORMAL
MDC_IDC_LEAD_SERIAL: NORMAL
MDC_IDC_LEAD_SERIAL: NORMAL
MDC_IDC_PG_IMPLANT_DTM: NORMAL
MDC_IDC_PG_MFG: NORMAL
MDC_IDC_PG_MODEL: NORMAL
MDC_IDC_PG_SERIAL: NORMAL
MDC_IDC_PG_TYPE: NORMAL
MDC_IDC_SESS_CLINIC_NAME: NORMAL
MDC_IDC_SESS_DTM: NORMAL
MDC_IDC_SESS_TYPE: NORMAL

## 2020-08-07 PROCEDURE — 93293 PM PHONE R-STRIP DEVICE EVAL: CPT | Performed by: INTERNAL MEDICINE

## 2020-08-21 DIAGNOSIS — G40.211 LOCALZ-RLTED SYMPTOMATIC EPILEPSY W CMPLX PARTL SZ, INTRACT, W STATUS (H): ICD-10-CM

## 2020-08-21 NOTE — TELEPHONE ENCOUNTER
clonazePAM (KLONOPIN) 1 MG tablet    Last Written Prescription Date:  5/26/2020  Last Fill Quantity: 30,   # refills: 1  Last Office Visit : 6/25/2020  Future Office visit:  10/7/2020    Routing refill request to provider for review/approval because:  Drug not on the [facility], [facility] or [facility] refill protocol or controlled substance      Rachel Doe RN  Central Triage Red Flags/Med Refills

## 2020-08-27 RX ORDER — CLONAZEPAM 1 MG/1
1 TABLET ORAL AT BEDTIME
Qty: 30 TABLET | Refills: 5 | Status: SHIPPED | OUTPATIENT
Start: 2020-08-27 | End: 2021-02-26

## 2020-10-02 ENCOUNTER — HOSPITAL ENCOUNTER (OUTPATIENT)
Dept: BONE DENSITY | Facility: CLINIC | Age: 65
End: 2020-10-02
Attending: PEDIATRICS
Payer: MEDICARE

## 2020-10-02 ENCOUNTER — HOSPITAL ENCOUNTER (OUTPATIENT)
Dept: MAMMOGRAPHY | Facility: CLINIC | Age: 65
End: 2020-10-02
Attending: PEDIATRICS
Payer: MEDICARE

## 2020-10-02 DIAGNOSIS — M85.89 OTHER SPECIFIED DISORDERS OF BONE DENSITY AND STRUCTURE, MULTIPLE SITES: ICD-10-CM

## 2020-10-02 DIAGNOSIS — Z12.31 VISIT FOR SCREENING MAMMOGRAM: ICD-10-CM

## 2020-10-02 DIAGNOSIS — M85.80 OSTEOPENIA, UNSPECIFIED LOCATION: ICD-10-CM

## 2020-10-02 PROCEDURE — 77080 DXA BONE DENSITY AXIAL: CPT

## 2020-10-02 PROCEDURE — 77063 BREAST TOMOSYNTHESIS BI: CPT

## 2020-10-16 DIAGNOSIS — F43.21 ADJUSTMENT DISORDER WITH DEPRESSED MOOD: ICD-10-CM

## 2020-10-19 RX ORDER — ESCITALOPRAM OXALATE 5 MG/1
TABLET ORAL
Qty: 30 TABLET | Refills: 8 | Status: SHIPPED | OUTPATIENT
Start: 2020-10-19 | End: 2020-10-28

## 2020-10-19 NOTE — TELEPHONE ENCOUNTER
escitalopram (LEXAPRO) 5 MG tablet  Last Written Prescription Date:  12/20/19  Last Fill Quantity: 30,   # refills: 10  Last Office Visit : 6/25/20  Future Office visit: 10/28/20

## 2020-10-28 ENCOUNTER — OFFICE VISIT (OUTPATIENT)
Dept: NEUROLOGY | Facility: CLINIC | Age: 65
End: 2020-10-28
Payer: MEDICARE

## 2020-10-28 VITALS — TEMPERATURE: 97.5 F | BODY MASS INDEX: 27.66 KG/M2 | HEART RATE: 76 BPM | WEIGHT: 171.4 LBS

## 2020-10-28 DIAGNOSIS — G40.211 LOCALIZATION-RELATED SYMPTOMATIC EPILEPSY AND EPILEPTIC SYNDROMES WITH COMPLEX PARTIAL SEIZURES, INTRACTABLE, WITH STATUS EPILEPTICUS (H): ICD-10-CM

## 2020-10-28 DIAGNOSIS — G40.211 LOCALZ-RLTED SYMPTOMATIC EPILEPSY W CMPLX PARTL SZ, INTRACT, W STATUS (H): ICD-10-CM

## 2020-10-28 DIAGNOSIS — G40.909 SEIZURE DISORDER (H): ICD-10-CM

## 2020-10-28 DIAGNOSIS — F43.21 ADJUSTMENT DISORDER WITH DEPRESSED MOOD: ICD-10-CM

## 2020-10-28 RX ORDER — LEVETIRACETAM 750 MG/1
TABLET ORAL
Qty: 135 TABLET | Refills: 11 | Status: SHIPPED | OUTPATIENT
Start: 2020-10-28 | End: 2021-11-16

## 2020-10-28 RX ORDER — ACETAZOLAMIDE 250 MG/1
TABLET ORAL
Qty: 30 TABLET | Refills: 1 | Status: SHIPPED | OUTPATIENT
Start: 2020-10-28 | End: 2021-07-06

## 2020-10-28 RX ORDER — ESCITALOPRAM OXALATE 5 MG/1
TABLET ORAL
Qty: 30 TABLET | Refills: 8 | Status: SHIPPED | OUTPATIENT
Start: 2020-10-28 | End: 2021-08-26

## 2020-10-28 RX ORDER — ESCITALOPRAM OXALATE 10 MG/1
10 TABLET ORAL AT BEDTIME
Qty: 30 TABLET | Refills: 10 | Status: SHIPPED | OUTPATIENT
Start: 2020-10-28 | End: 2021-08-26

## 2020-10-28 RX ORDER — OXCARBAZEPINE 300 MG/1
TABLET, FILM COATED ORAL
Qty: 210 TABLET | Refills: 11 | Status: SHIPPED | OUTPATIENT
Start: 2020-10-28 | End: 2021-11-16

## 2020-10-28 RX ORDER — TOPIRAMATE 100 MG/1
TABLET, FILM COATED ORAL
Qty: 62 TABLET | Refills: 11 | Status: SHIPPED | OUTPATIENT
Start: 2020-10-28 | End: 2021-11-05

## 2020-10-28 NOTE — PROGRESS NOTES
[facility] Epilepsy Care Progress Note      Patient:  Adia Briceno  :  1955   Age:  65 year old   Today's Office Visit:  10/28/2020    Epilepsy Data:    Patient History  Primary Epileptologist/Provider: Noam Barboza M.D.  Epilepsy Syndrome: Localization-related epilepsy unspecified  Epilepsy Syndrome Status: Final  Age of Onset: 10  Other Relevant Dx/ Issues: Inpatient evaluation 1998.  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  Asystole  with subsequent pacemaker placement.  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 Seizure-related falls/injuries.  is endocrinologist.     Tests/Surgery History  Last EE2005  Last MRI: 2008  Last Neuropsych Testin2008  Last Case Management Conference: 2008  Epilepsy Surgery #1 Date: 08  Epilepsy Related Surgery #1 : Type: VNS placement    Seizure Record  Current Visit Date: 10/28/20  Previous Visit Date: 20  Months since last visit: 4.11    Seizure Type 1: Complex partial seizures unspecified  Description of Sz Type 1: Head slumps, amnesia  # of Type 1 Seizure since last visit: 7  Freq. Type 1 / Month: 1.7  Seizure Type 2: Complex partial seizures with impairment of consciousness at onset  Description of Sz Type 2: Head slumps with collapse  Seizure Type 3: Simple partial seizures unspecified  Description of Sz Type 3: strange feeling in her head, 30-60 seconds  # of Type 3 Seizure since last visit: 8  Freq. Type 3 / Month: 1.95      History of Present Illness:     Seizures continue, sometimes in clusters.  describes stare and head slump. She will respond when he interacts with her but does not speak in sentences, mumbles, appears confused. Thinks she is amnestic. Duration one to two minutes. Uses acetazolamide when she feels seizure prone or when has seizures occurring every 24 hours and feels that this helps significantly but sometimes needs to take 3 tablets per day.    She reported no  seizures in August and June. All of her seizures occurred in July, September and October.    There was one seizure with fall in July during which she had an avulsive fracture of right metatarsals. Did not require surgery. No other trauamas.  Only seizure with a fall.    Current Outpatient Medications   Medication Sig Dispense Refill     acetaZOLAMIDE (DIAMOX) 250 MG tablet Take one to three times per day during seizure clusters 30 tablet 1     Cetirizine HCl (ZYRTEC PO) daily.       Cholecalciferol (VITAMIN D) 2000 UNITS CAPS daily.       clonazePAM (KLONOPIN) 1 MG tablet Take 1 tablet (1 mg) by mouth At Bedtime 30 tablet 5     diazepam (DIAZEPAM INTENSOL) 5 MG/ML solution Administer 1 ml as needed into cheek pouch for seizures 30 mL 0     escitalopram (LEXAPRO) 10 MG tablet Take 1 tablet (10 mg) by mouth At Bedtime 30 tablet 10     escitalopram (LEXAPRO) 5 MG tablet TAKE 1 TABLET(5 MG) BY MOUTH DAILY. Please keep appt 10/28/20. 30 tablet 8     esomeprazole (NEXIUM) 40 MG capsule Take by mouth every morning (before breakfast) Take 30-60 minutes before eating.       levETIRAcetam (KEPPRA) 750 MG tablet TAKE 1 TABLET BY MOUTH THREE TIMES DAILY AND TAKE 1 1/2 TABLETS EVERY NIGHT AT BEDTIME(TOTAL 3375MG PER DAY). 135 tablet 11     OXcarbazepine (TRILEPTAL) 300 MG tablet TAKE ONE AND ONE-HALF TABLET IN THE MORNING, ONE AND ONE-HALF TABLET AT NOON, 2 TABLETS IN THE EVENING AND 2 TABLETS AT BEDTIME 210 tablet 11     perampanel (FYCOMPA) 4 MG tablet Take 1 tablet (4 mg) by mouth At Bedtime 30 tablet 5     progesterone (PROMETRIUM) 100 MG capsule 100 mg daily        SUMAtriptan (IMITREX) 100 MG tablet Take one by mouth prn severe headache. No more than four per month. 9 tablet 0     topiramate (TOPAMAX) 100 MG tablet Increase as instructed until taking one twice per day 62 tablet 11     topiramate (TOPAMAX) 100 MG tablet Take one half at lunch time and one tab at dinnertime 46 tablet 11        Perceived AED Side Effects:   No    Medication Notes:  Little sedation. Gets tired when uses acetazolamide. Still taking prometria 100 mg daily. Higher doses caused sedation.      AED Medication Compliance:  compliant all of the time  Using a pill box:  Yes    Review of Systems:  Headaches under reasonable control. Denies loss of vision. Denies double vision. Denies dysphagia. Denies cough, wheezing, hemoptysis. Denies chest pain, leg swelling. Denies significant weight change, abdominal pain, nausea, vomiting, change in bowel habits, blood per rectum. Denies dysuria, hematuria, flank pain, or kidney stones.  Have you experienced a traumatic fall since your last visit: YES  Are these falls related to your seizures:  YES: see above.      Other Issues:    Migraines not a big problem.  Pacemaker still effective. They have not found detections.  Mood is stable. Stress continues somewhat worse. Not suicidal.    Is patient safe to drive:  No    Woman Care:   Patient is:  Postmenopausal. No hot flashes. Continues with low dose prometria.    Exam:    Pulse 76   Temp 97.5  F (36.4  C)   Wt 171 lb 6.4 oz (77.7 kg)   BMI 27.66 kg/m       Wt Readings from Last 5 Encounters:   10/28/20 171 lb 6.4 oz (77.7 kg)   02/05/20 171 lb 6.4 oz (77.7 kg)   08/21/19 160 lb 12.8 oz (72.9 kg)   01/16/19 160 lb (72.6 kg)   07/18/18 169 lb (76.7 kg)     Normal gait including tandem. Visual fields full. Extraocular movements intact without nystagmus. Smile symmetrical. Facial sensation equal. Tongue midline and strong.   No drift, pronation, or tremor. Reflexes symmetrical. Finger finger nose is done well. Sensation grossly intact. Mental status grossly intact without evidence of overt depression or anxiety.    , a physician, reports that labs performed at his office showed low normal sodium. Reports that antiseizure medications were therapeutic. We do not have formal results.    IMPRESSION   1) Focal seizures, intractable; focal aware, focal aware to focal  impaired seizures. Some focal impaired seizures associated with falls and occasionally with trama. Independent bitemporal likely neocortical onset by scalp EEG. Improvement starting approxmiately March 2015 led to almost 1 3/4 years of  freedom from focal impaired seizures with falls. Fycompa and levetiracetam appeared to play important roles in this period of seizure freedom but I thought that completing menopause may have been important. There was some indication that focal unimpaired seizures may have worsened with reduction of TPM. She is refractory to all AEDs other than barbiturates, felbamate, methsuximide, ethetoin, vigabatrin, and CLB off label. Seizures wax and wane. Over last year number about the same but she continues with longer seizure free periods, up to two months in duration. Given recent fracture with fall she thinks we should make some changes.  2) Migraine headaches; topiramate thought to have helped; under good control.  3) Asystole; likely primary and not related to seizures or VNS tho this has never been formally examined; has pacemaker in place.   4) VNS at end of service. Course indicates that VNS was not playing much role in seizure control as seizures continued doing well tho VNS at EOS for at least one year.  5) Depression, continues doing well after increase of escitalopram to 15 mg per day. Therapy also helpful.   6) Mild hyponatremia in past likely related to oxcarbazpine and escitalopram. Per  this is unchanged.     DISCUSSION  She would like some intervention and we discussed the remaining antiseizure medication options. She remains uncomfortable with felbatol. Replacing levetiracetam with brivarecetam does not appear high yield. Clobazam could be used off label but tolerance typically develops with focal epilepsy. Another attempt at increasing fycompa is reasonable, very gradual increase can sometimes have an effect. Other option was cenobamate; we discussed this today and  discussed information regarding DRESS and need for very slow increase. We discussed evaluation for DBS or RNS and she does not feel situation severe enough to warrant this at this point. She feels acetazolamide helping and wants to continue this.     PLAN:   1) Same OXC, levetiracetam, topiramate, and micronized progesterone. She can continue using acetazolamide up to 250 mg po every four hours during periods of seizure exacerbation. Increase fycompa to 6 mg at bedtime.  2) Continue escitalopram at current dose, it appears associated with sustained response.  3) Other AED options as above. As she is uncomfortable with felbatol,  cenobamate would probably be best choice, perhaps replacing levetiracetam. We could also consider replacing levetiracetam with brivaracetam or use eslicarbazpine instead of oxcarbazepine if formulary will allow. RNS should be a serious consideration if seizures with falls return and become more frequent. If RNS placed she would have three implanted devices which could also raise concern. Neither she nor her  were excited about this possibility when we last reviewed.  4) RTC 4 months in person at [facility]. Sooner if seizures worsen.    Total time 28 minutes, more than half counselling and coordinating cares.     Noam Barboza MD

## 2020-10-28 NOTE — LETTER
10/28/2020       RE: Adia Briceno  : 1955   MRN: [id number]      Dear Colleague,    Thank you for referring your patient, Adia Briceno, to the [facility] EPILEPSY CARE at [facility]. Please see a copy of my visit note below.    [facility] [facility]      Patient:  Adia Briceno  :  1955   Age:  65 year old   Today's Office Visit:  10/28/2020    Epilepsy Data:    Patient History  Primary Epileptologist/Provider: Noam Barboza M.D.  Epilepsy Syndrome: Localization-related epilepsy unspecified  Epilepsy Syndrome Status: Final  Age of Onset: 10  Other Relevant Dx/ Issues: Inpatient evaluation 1998.  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  Asystole  with subsequent pacemaker placement.  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 Seizure-related falls/injuries.  is endocrinologist.     Tests/Surgery History  Last EE2005  Last MRI: 2008  Last Neuropsych Testin2008  Last Case Management Conference: 2008  Epilepsy Surgery #1 Date: 08  Epilepsy Related Surgery #1 : Type: VNS placement    Seizure Record  Current Visit Date: 10/28/20  Previous Visit Date: 20  Months since last visit: 4.11    Seizure Type 1: Complex partial seizures unspecified  Description of Sz Type 1: Head slumps, amnesia  # of Type 1 Seizure since last visit: 7  Freq. Type 1 / Month: 1.7  Seizure Type 2: Complex partial seizures with impairment of consciousness at onset  Description of Sz Type 2: Head slumps with collapse  Seizure Type 3: Simple partial seizures unspecified  Description of Sz Type 3: strange feeling in her head, 30-60 seconds  # of Type 3 Seizure since last visit: 8  Freq. Type 3 / Month: 1.95      History of Present Illness:     Seizures continue, sometimes in clusters.  describes stare and head slump. She will respond when he interacts with her but does not speak in sentences, mumbles,  appears confused. Thinks she is amnestic. Duration one to two minutes. Uses acetazolamide when she feels seizure prone or when has seizures occurring every 24 hours and feels that this helps significantly but sometimes needs to take 3 tablets per day.    She reported no seizures in August and June. All of her seizures occurred in July, September and October.    There was one seizure with fall in July during which she had an avulsive fracture of right metatarsals. Did not require surgery. No other trauamas.  Only seizure with a fall.    Current Outpatient Medications   Medication Sig Dispense Refill     acetaZOLAMIDE (DIAMOX) 250 MG tablet Take one to three times per day during seizure clusters 30 tablet 1     Cetirizine HCl (ZYRTEC PO) daily.       Cholecalciferol (VITAMIN D) 2000 UNITS CAPS daily.       clonazePAM (KLONOPIN) 1 MG tablet Take 1 tablet (1 mg) by mouth At Bedtime 30 tablet 5     diazepam (DIAZEPAM INTENSOL) 5 MG/ML solution Administer 1 ml as needed into cheek pouch for seizures 30 mL 0     escitalopram (LEXAPRO) 10 MG tablet Take 1 tablet (10 mg) by mouth At Bedtime 30 tablet 10     escitalopram (LEXAPRO) 5 MG tablet TAKE 1 TABLET(5 MG) BY MOUTH DAILY. Please keep appt 10/28/20. 30 tablet 8     esomeprazole (NEXIUM) 40 MG capsule Take by mouth every morning (before breakfast) Take 30-60 minutes before eating.       levETIRAcetam (KEPPRA) 750 MG tablet TAKE 1 TABLET BY MOUTH THREE TIMES DAILY AND TAKE 1 1/2 TABLETS EVERY NIGHT AT BEDTIME(TOTAL 3375MG PER DAY). 135 tablet 11     OXcarbazepine (TRILEPTAL) 300 MG tablet TAKE ONE AND ONE-HALF TABLET IN THE MORNING, ONE AND ONE-HALF TABLET AT NOON, 2 TABLETS IN THE EVENING AND 2 TABLETS AT BEDTIME 210 tablet 11     perampanel (FYCOMPA) 4 MG tablet Take 1 tablet (4 mg) by mouth At Bedtime 30 tablet 5     progesterone (PROMETRIUM) 100 MG capsule 100 mg daily        SUMAtriptan (IMITREX) 100 MG tablet Take one by mouth prn severe headache. No more than four  per month. 9 tablet 0     topiramate (TOPAMAX) 100 MG tablet Increase as instructed until taking one twice per day 62 tablet 11     topiramate (TOPAMAX) 100 MG tablet Take one half at lunch time and one tab at dinnertime 46 tablet 11        Perceived AED Side Effects:  No    Medication Notes:  Little sedation. Gets tired when uses acetazolamide. Still taking prometria 100 mg daily. Higher doses caused sedation.      AED Medication Compliance:  compliant all of the time  Using a pill box:  Yes    Review of Systems:  Headaches under reasonable control. Denies loss of vision. Denies double vision. Denies dysphagia. Denies cough, wheezing, hemoptysis. Denies chest pain, leg swelling. Denies significant weight change, abdominal pain, nausea, vomiting, change in bowel habits, blood per rectum. Denies dysuria, hematuria, flank pain, or kidney stones.  Have you experienced a traumatic fall since your last visit: YES  Are these falls related to your seizures:  YES: see above.      Other Issues:    Migraines not a big problem.  Pacemaker still effective. They have not found detections.  Mood is stable. Stress continues somewhat worse. Not suicidal.    Is patient safe to drive:  No    Woman Care:   Patient is:  Postmenopausal. No hot flashes. Continues with low dose prometria.    Exam:    Pulse 76   Temp 97.5  F (36.4  C)   Wt 171 lb 6.4 oz (77.7 kg)   BMI 27.66 kg/m       Wt Readings from Last 5 Encounters:   10/28/20 171 lb 6.4 oz (77.7 kg)   02/05/20 171 lb 6.4 oz (77.7 kg)   08/21/19 160 lb 12.8 oz (72.9 kg)   01/16/19 160 lb (72.6 kg)   07/18/18 169 lb (76.7 kg)     Normal gait including tandem. Visual fields full. Extraocular movements intact without nystagmus. Smile symmetrical. Facial sensation equal. Tongue midline and strong.   No drift, pronation, or tremor. Reflexes symmetrical. Finger finger nose is done well. Sensation grossly intact. Mental status grossly intact without evidence of overt depression or  anxiety.    , a physician, reports that labs performed at his office showed low normal sodium. Reports that antiseizure medications were therapeutic. We do not have formal results.    IMPRESSION   1) Focal seizures, intractable; focal aware, focal aware to focal impaired seizures. Some focal impaired seizures associated with falls and occasionally with trama. Independent bitemporal likely neocortical onset by scalp EEG. Improvement starting approxmiately March 2015 led to almost 1 3/4 years of  freedom from focal impaired seizures with falls. Fycompa and levetiracetam appeared to play important roles in this period of seizure freedom but I thought that completing menopause may have been important. There was some indication that focal unimpaired seizures may have worsened with reduction of TPM. She is refractory to all AEDs other than barbiturates, felbamate, methsuximide, ethetoin, vigabatrin, and CLB off label. Seizures wax and wane. Over last year number about the same but she continues with longer seizure free periods, up to two months in duration. Given recent fracture with fall she thinks we should make some changes.  2) Migraine headaches; topiramate thought to have helped; under good control.  3) Asystole; likely primary and not related to seizures or VNS tho this has never been formally examined; has pacemaker in place.   4) VNS at end of service. Course indicates that VNS was not playing much role in seizure control as seizures continued doing well tho VNS at EOS for at least one year.  5) Depression, continues doing well after increase of escitalopram to 15 mg per day. Therapy also helpful.   6) Mild hyponatremia in past likely related to oxcarbazpine and escitalopram. Per  this is unchanged.     DISCUSSION  She would like some intervention and we discussed the remaining antiseizure medication options. She remains uncomfortable with felbatol. Replacing levetiracetam with brivarecetam does not  appear high yield. Clobazam could be used off label but tolerance typically develops with focal epilepsy. Another attempt at increasing fycompa is reasonable, very gradual increase can sometimes have an effect. Other option was cenobamate; we discussed this today and discussed information regarding DRESS and need for very slow increase. We discussed evaluation for DBS or RNS and she does not feel situation severe enough to warrant this at this point. She feels acetazolamide helping and wants to continue this.     PLAN:   1) Same OXC, levetiracetam, topiramate, and micronized progesterone. She can continue using acetazolamide up to 250 mg po every four hours during periods of seizure exacerbation. Increase fycompa to 6 mg at bedtime.  2) Continue escitalopram at current dose, it appears associated with sustained response.  3) Other AED options as above. As she is uncomfortable with felbatol,  cenobamate would probably be best choice, perhaps replacing levetiracetam. We could also consider replacing levetiracetam with brivaracetam or use eslicarbazpine instead of oxcarbazepine if formulary will allow. RNS should be a serious consideration if seizures with falls return and become more frequent. If RNS placed she would have three implanted devices which could also raise concern. Neither she nor her  were excited about this possibility when we last reviewed.  4) RTC 4 months in person at [facility]. Sooner if seizures worsen.    Total time 28 minutes, more than half counselling and coordinating cares.     Noam Barboza MD

## 2021-02-26 DIAGNOSIS — G40.211 LOCALZ-RLTED SYMPTOMATIC EPILEPSY W CMPLX PARTL SZ, INTRACT, W STATUS (H): ICD-10-CM

## 2021-02-26 NOTE — TELEPHONE ENCOUNTER
CLONAZEPAM 1MG TABLETS  Last Written Prescription Date:  8/27/2020  Last Fill Quantity: 30,   # refills: 5  Last Office Visit : 10/28/2020  Future Office visit:  None    Routing refill request to provider for review/approval because:  Drug not on the [facility], [facility] or [facility] refill protocol or controlled substance      Rachel Doe RN  Central Triage Red Flags/Med Refills

## 2021-03-01 RX ORDER — CLONAZEPAM 1 MG/1
1 TABLET ORAL AT BEDTIME
Qty: 30 TABLET | Refills: 5 | Status: SHIPPED | OUTPATIENT
Start: 2021-03-01 | End: 2021-03-30

## 2021-03-05 ENCOUNTER — TELEPHONE (OUTPATIENT)
Dept: NEUROLOGY | Facility: CLINIC | Age: 66
End: 2021-03-05

## 2021-03-05 NOTE — TELEPHONE ENCOUNTER
What is the concern that needs to be addressed by a nurse? Pt states they had a cortizone shot in October and it caused seizures, which makes the pt concerned about the side effects of the second covid vaccine shot. Pt is getting first dose covid vaccine next week, and has questions. Please call back on landline. Pt scheduled for Tuesday vaccine.     May a detailed message be left on voicemail? yes    Date of last office visit: 10/28/20    Message routed to: mincep rn pool

## 2021-03-05 NOTE — TELEPHONE ENCOUNTER
Call placed.  Message left.    I explained that there is no information specific to epilepsy / seizure, and the COVID-19 vaccine.  I explained that the potential effects of getting a COVID19 infection can be medically serious, potentially fatal.  I explained that after the vaccine administration, especially the second dose, some people will develop a transient fever, and feel aches and pains.  We recommend treating the symptoms with OTC medications so that the risk of a seizures can be reduced as much as possible    Call back number left in case there are any other questions

## 2021-03-24 DIAGNOSIS — G40.211 LOCALZ-RLTED SYMPTOMATIC EPILEPSY W CMPLX PARTL SZ, INTRACT, W STATUS (H): ICD-10-CM

## 2021-03-24 NOTE — TELEPHONE ENCOUNTER
Patient calls the office today with a request an increase in her clonazepam to deal with insomnia. She usually exercises but with the pandemic and her epilepsy she has been more isolated because she isn't going outside as much to exercise. The last several nights she's needed to take 1.5 mg of her clonazepam in order to get to sleep. She states she is asking for an increase in dose temporarily until it is warmer out, COVID-19 is better controlled, at which time her  is retiring so he will be home with her to get exercise.

## 2021-03-24 NOTE — TELEPHONE ENCOUNTER
What is the concern that needs to be addressed by a nurse? clonazePAM (KLONOPIN) 1 MG tablet current dose is not enough due to pandemic. Pt uses 1.5 instead of 1 to get to sleep. Please call back to change rx, pt is aware she will be called back this afternoon or tomorrow.     May a detailed message be left on voicemail? yes    Date of last office visit: 10/28/20    Message routed to: mincep rn pool

## 2021-03-30 NOTE — TELEPHONE ENCOUNTER
Per .  Noam Barboza MD    Caller: Unspecified              OK to increase clonazapam to 1.5 at bedtime for two months only. I will sign off tonite or tomorrow.          Call returned to patient and plan discussed.  Patient expressed understanding   Confirmed the preferred pharmacy as correct    Adia noted that she will call to arrange an appointment with  in the next few months.    No other questions at this time

## 2021-04-01 NOTE — TELEPHONE ENCOUNTER
Pharmacy called asking about the dose increase for the clonazePAM (KLONOPIN) 1 MG tablet. Pt told them it should be increased to 1.5mg. Please call back.

## 2021-04-02 RX ORDER — CLONAZEPAM 1 MG/1
1.5 TABLET ORAL AT BEDTIME
Qty: 45 TABLET | Refills: 1 | Status: SHIPPED | OUTPATIENT
Start: 2021-04-02 | End: 2021-07-06

## 2021-04-25 DIAGNOSIS — G40.211 LOCALIZATION-RELATED SYMPTOMATIC EPILEPSY AND EPILEPTIC SYNDROMES WITH COMPLEX PARTIAL SEIZURES, INTRACTABLE, WITH STATUS EPILEPTICUS (H): ICD-10-CM

## 2021-04-26 NOTE — TELEPHONE ENCOUNTER
FYCOMPA 6MG TABLETS      Last Written Prescription Date:  10-28-20  Last Fill Quantity: 60,   # refills: 5  Last Office Visit : 10-28-20 ( RTC 4 M)  Future Office visit:  7-6-21    Routing refill request to provider for review/approval because:  Drug not on the [facility], [facility] or [facility] refill protocol or controlled substance       Pt off unit to CT.

## 2021-04-28 RX ORDER — PERAMPANEL 6 MG/1
TABLET ORAL
Qty: 31 TABLET | Refills: 5 | Status: SHIPPED | OUTPATIENT
Start: 2021-04-28 | End: 2021-10-27

## 2021-07-06 ENCOUNTER — OFFICE VISIT (OUTPATIENT)
Dept: NEUROLOGY | Facility: CLINIC | Age: 66
End: 2021-07-06
Payer: MEDICARE

## 2021-07-06 VITALS
WEIGHT: 180.8 LBS | SYSTOLIC BLOOD PRESSURE: 152 MMHG | DIASTOLIC BLOOD PRESSURE: 86 MMHG | TEMPERATURE: 95.3 F | BODY MASS INDEX: 29.18 KG/M2 | HEART RATE: 69 BPM

## 2021-07-06 DIAGNOSIS — I49.5 SINOATRIAL NODE DYSFUNCTION (H): ICD-10-CM

## 2021-07-06 DIAGNOSIS — G40.909 SEIZURE DISORDER (H): ICD-10-CM

## 2021-07-06 DIAGNOSIS — G40.211 LOCALZ-RLTED SYMPTOMATIC EPILEPSY W CMPLX PARTL SZ, INTRACT, W STATUS (H): ICD-10-CM

## 2021-07-06 RX ORDER — ACETAZOLAMIDE 250 MG/1
TABLET ORAL
Qty: 30 TABLET | Refills: 3 | Status: SHIPPED | OUTPATIENT
Start: 2021-07-06 | End: 2023-03-16

## 2021-07-06 RX ORDER — OXYBUTYNIN CHLORIDE 10 MG/1
10 TABLET, EXTENDED RELEASE ORAL
COMMUNITY
Start: 2021-03-29 | End: 2023-06-28

## 2021-07-06 RX ORDER — CLONAZEPAM 1 MG/1
1.5 TABLET ORAL AT BEDTIME
Qty: 45 TABLET | Refills: 1 | Status: SHIPPED | OUTPATIENT
Start: 2021-07-06 | End: 2021-08-26

## 2021-07-06 NOTE — LETTER
2021     RE: Adia Briceno  : 1955   MRN: [id number]      Dear Colleague,    Thank you for referring your patient, Adia Briceno, to the [facility] EPILEPSY CARE at [facility]. Please see a copy of my visit note below.    [facility] Epilepsy Care Progress Note      Patient:  Adia Briceno  :  1955   Age:  66 year old   Today's Office Visit:  2021    Epilepsy Data:    Patient History  Primary Epileptologist/Provider: Noam Barboza M.D.  Epilepsy Syndrome: Localization-related epilepsy unspecified  Epilepsy Syndrome Status: Final  Age of Onset: 10  Other Relevant Dx/ Issues: Inpatient evaluation 1998.  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  Asystole  with subsequent pacemaker placement.  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 Seizure-related falls/injuries.  is endocrinologist.     Tests/Surgery History  Last EE2005  Last MRI: 2008  Last Neuropsych Testin2008  Last Case Management Conference: 2008  Epilepsy Surgery #1 Date: 08  Epilepsy Related Surgery #1 : Type: VNS placement    Seizure Record  Current Visit Date: 21  Previous Visit Date: 10/28/20  Months since last visit: 8.25  Seizure Type 1: Complex partial seizures unspecified  Description of Sz Type 1: Head slumps, amnesia  Seizure Type 2: Complex partial seizures with impairment of consciousness at onset  Description of Sz Type 2: Head slumps with collapse  # of Type 2 Seizure since last visit: 13  Freq. Type 2 / Month: 1.58  Seizure Type 3: Simple partial seizures unspecified  Description of Sz Type 3: strange feeling in her head, 30-60 seconds  # of Type 3 Seizure since last visit: 5  Freq. Type 3 / Month: 0.61    History of Present Illness:     Seizures continue.  was a difficult month with seizures. She collapsed during one seizure in .   , a physician describes following. Head  "slumps, mumbles, eyes are open, intially unresponsive. Then responds after about a minute. NOt toally alert fo 5 minutes. She reports a warning of \"feeling on edge, as if something is going to come but she cannot control the feeling\". Feels acetazolamide 250 mg helps, feels helps in 20 to 30 minutes.  agrees this is very helpful.      Current Outpatient Medications   Medication Sig Dispense Refill     acetaZOLAMIDE (DIAMOX) 250 MG tablet Take one to three times per day during seizure clusters 30 tablet 1     Cetirizine HCl (ZYRTEC PO) daily.       Cholecalciferol (VITAMIN D) 2000 UNITS CAPS daily.       clonazePAM (KLONOPIN) 1 MG tablet Take 1.5 tablets (1.5 mg) by mouth At Bedtime 45 tablet 1     diazepam (DIAZEPAM INTENSOL) 5 MG/ML solution Administer 1 ml as needed into cheek pouch for seizures 30 mL 0     escitalopram (LEXAPRO) 10 MG tablet Take 1 tablet (10 mg) by mouth At Bedtime 30 tablet 10     escitalopram (LEXAPRO) 5 MG tablet TAKE 1 TABLET(5 MG) BY MOUTH DAILY. Please keep appt 10/28/20. 30 tablet 8     esomeprazole (NEXIUM) 40 MG capsule Take by mouth every morning (before breakfast) Take 30-60 minutes before eating.       FYCOMPA 6 MG tablet TAKE 1 TABLET(6 MG) BY MOUTH AT BEDTIME 31 tablet 5     levETIRAcetam (KEPPRA) 750 MG tablet TAKE 1 TABLET BY MOUTH THREE TIMES DAILY AND TAKE 1 1/2 TABLETS EVERY NIGHT AT BEDTIME(TOTAL 3375MG PER DAY). 135 tablet 11     OXcarbazepine (TRILEPTAL) 300 MG tablet TAKE ONE AND ONE-HALF TABLET IN THE MORNING, ONE AND ONE-HALF TABLET AT NOON, 2 TABLETS IN THE EVENING AND 2 TABLETS AT BEDTIME 210 tablet 11     oxybutynin ER (DITROPAN-XL) 10 MG 24 hr tablet Take 10 mg by mouth       progesterone (PROMETRIUM) 100 MG capsule 100 mg daily        SUMAtriptan (IMITREX) 100 MG tablet Take one by mouth prn severe headache. No more than four per month. 9 tablet 0     topiramate (TOPAMAX) 100 MG tablet Increase as instructed until taking one twice per day 62 tablet 11     " topiramate (TOPAMAX) 100 MG tablet Take one half at lunch time and one tab at dinnertime 46 tablet 11        Medication Notes:    Taking topiramate (100 mg) one half at lunch. 100 mg at 9 PM.  Reports more dizziness, unsteadiness on feet.    AED Medication Compliance:  compliant all of the time  Using a pill box:  Yes    Review of Systems:  No vomiting, diarrhea, fevers, hematuria or kidney stones.  Have you experienced a traumatic fall since your last visit: YES  Are these falls related to your seizures: YES: no other falls.    Other Issues:    Migraines are rare. Uses imitrex once per month.  Feeling more stressed this summer, more family responsibilities. Frustration at various delays, other medical problems.  Not suicidal. Requires 1.25 mg clonopin at HS to go to sleep. Coughing gets her up at night but no more than 20 minute WASO. Wakes up at 6 AM because of bladder spasms and may have trouble going to back to sleep. Usually sleeps until 8 AM, goes to bed at 10 AM.  Pacemaker was checked last fall. Will be checked next month.    Her  has retired and is spending more time with her.    Is patient safe to drive:  No    Exam:    BP (!) 152/86   Pulse 69   Temp 95.3  F (35.2  C) (Temporal)   Wt 180 lb 12.8 oz (82 kg)   BMI 29.18 kg/m       Wt Readings from Last 5 Encounters:   07/06/21 180 lb 12.8 oz (82 kg)   10/28/20 171 lb 6.4 oz (77.7 kg)   02/05/20 171 lb 6.4 oz (77.7 kg)   08/21/19 160 lb 12.8 oz (72.9 kg)   01/16/19 160 lb (72.6 kg)     Normal gait including tandem. Visual fields full. Extraocular movements intact without nystagmus. Bilateral ptosis as usual; however able to open eyes fully and no lid drop with sustained upgaze. Smile symmetrical. Facial sensation equal. Tongue midline and strong. Proximal strength is full.  No drift, pronation, or tremor. Ankle jerks present and symmetrical. Finger finger nose is done well. Mental status grossly intact without evidence of overt depression or  anxiety.    IMPRESSION   1) Focal seizures, intractable; focal aware, focal aware to focal impaired seizures. Some focal impaired seizures associated with falls and occasionally with trama. Independent bitemporal likely neocortical onset by scalp EEG. Improvement starting approxmiately March 2015 led to almost 1 3/4 years of  freedom from focal impaired seizures with falls. Fycompa and levetiracetam appeared to play important roles in this period of seizure freedom but I thought that completing menopause may have been important. There was some indication that focal unimpaired seizures may have worsened with reduction of TPM. She is refractory to all AEDs other than barbiturates, felbamate, CLB off label, and cenobamate. Seizures wax and wane. Over last year number about the same but she continues with longer seizure free periods, up to two months in duration. Most recently we have been able to increase perampanel further. Acetazolamide appears to help when used PRN.  2) Migraine headaches; topiramate thought to have helped; under good control.  3) Asystole; likely primary and not related to seizures or VNS tho this has never been formally examined; has pacemaker in place.   4) VNS at end of service. Course indicates that VNS was not playing much role in seizure control as seizures continued doing well tho VNS at EOS for almost two years.  5) Depression, continues doing well after increase of escitalopram to 15 mg per day. Therapy also helpful.   6) Mild hyponatremia in past likely related to oxcarbazpine and escitalopram. Needs followup.     DISCUSSION  We again discussed remaining alternatives and they do not think that her situation is severe enough to warrant risks associated with new AED or more aggressive neuromodulation options. We talked about more organized anxiety treatment measures and they are open to this.     PLAN:   1) Same OXC, levetiracetam, topiramate, and micronized progesterone. She can continue  using acetazolamide up to 250 mg po every four hours during periods of seizure exacerbation. Continue with fycompa to 6 mg at bedtime.  2) Continue escitalopram at current dose, it appears associated with sustained response.  3) Other AED options as above. As she is uncomfortable with felbatol,  cenobamate would probably be best choice, perhaps replacing levetiracetam. We could also consider replacing levetiracetam with brivaracetam or use eslicarbazpine instead of oxcarbazepine if formulary will allow. RNS should be a serious consideration if seizures with falls return and become more frequent. If RNS placed she would have three implanted devices which could also raise concern. Neither she nor her  were excited about this possibility when we last reviewed.  4) We discussed how to examine during seizures now that  is present for long stretches of the day. Discussed teaching progressive muscle relaxation virtually. This may help with insomnia as well as generalized anxiety.  5) RTC 4 months.  6)  will obtain AED levels and sodium at his clinic and fax them to us.     Total time 30 minutes. Additional 5 minutes before clinic reviewing chart. Additional 9 minutes generating note following visit. Total 44 minutes today.     Noam Barboza MD

## 2021-07-06 NOTE — PROGRESS NOTES
"[facility] Epilepsy Care Progress Note      Patient:  Adia Briceno  :  1955   Age:  66 year old   Today's Office Visit:  2021    Epilepsy Data:    Patient History  Primary Epileptologist/Provider: Noam Barboza M.D.  Epilepsy Syndrome: Localization-related epilepsy unspecified  Epilepsy Syndrome Status: Final  Age of Onset: 10  Other Relevant Dx/ Issues: Inpatient evaluation 1998.  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  Asystole  with subsequent pacemaker placement.  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 Seizure-related falls/injuries.  is endocrinologist.     Tests/Surgery History  Last EE2005  Last MRI: 2008  Last Neuropsych Testin2008  Last Case Management Conference: 2008  Epilepsy Surgery #1 Date: 08  Epilepsy Related Surgery #1 : Type: VNS placement    Seizure Record  Current Visit Date: 21  Previous Visit Date: 10/28/20  Months since last visit: 8.25  Seizure Type 1: Complex partial seizures unspecified  Description of Sz Type 1: Head slumps, amnesia  Seizure Type 2: Complex partial seizures with impairment of consciousness at onset  Description of Sz Type 2: Head slumps with collapse  # of Type 2 Seizure since last visit: 13  Freq. Type 2 / Month: 1.58  Seizure Type 3: Simple partial seizures unspecified  Description of Sz Type 3: strange feeling in her head, 30-60 seconds  # of Type 3 Seizure since last visit: 5  Freq. Type 3 / Month: 0.61    History of Present Illness:     Seizures continue.  was a difficult month with seizures. She collapsed during one seizure in .   , a physician describes following. Head slumps, mumbles, eyes are open, intially unresponsive. Then responds after about a minute. NOt toally alert fo 5 minutes. She reports a warning of \"feeling on edge, as if something is going to come but she cannot control the feeling\". Feels acetazolamide 250 mg helps, feels helps " in 20 to 30 minutes.  agrees this is very helpful.      Current Outpatient Medications   Medication Sig Dispense Refill     acetaZOLAMIDE (DIAMOX) 250 MG tablet Take one to three times per day during seizure clusters 30 tablet 1     Cetirizine HCl (ZYRTEC PO) daily.       Cholecalciferol (VITAMIN D) 2000 UNITS CAPS daily.       clonazePAM (KLONOPIN) 1 MG tablet Take 1.5 tablets (1.5 mg) by mouth At Bedtime 45 tablet 1     diazepam (DIAZEPAM INTENSOL) 5 MG/ML solution Administer 1 ml as needed into cheek pouch for seizures 30 mL 0     escitalopram (LEXAPRO) 10 MG tablet Take 1 tablet (10 mg) by mouth At Bedtime 30 tablet 10     escitalopram (LEXAPRO) 5 MG tablet TAKE 1 TABLET(5 MG) BY MOUTH DAILY. Please keep appt 10/28/20. 30 tablet 8     esomeprazole (NEXIUM) 40 MG capsule Take by mouth every morning (before breakfast) Take 30-60 minutes before eating.       FYCOMPA 6 MG tablet TAKE 1 TABLET(6 MG) BY MOUTH AT BEDTIME 31 tablet 5     levETIRAcetam (KEPPRA) 750 MG tablet TAKE 1 TABLET BY MOUTH THREE TIMES DAILY AND TAKE 1 1/2 TABLETS EVERY NIGHT AT BEDTIME(TOTAL 3375MG PER DAY). 135 tablet 11     OXcarbazepine (TRILEPTAL) 300 MG tablet TAKE ONE AND ONE-HALF TABLET IN THE MORNING, ONE AND ONE-HALF TABLET AT NOON, 2 TABLETS IN THE EVENING AND 2 TABLETS AT BEDTIME 210 tablet 11     oxybutynin ER (DITROPAN-XL) 10 MG 24 hr tablet Take 10 mg by mouth       progesterone (PROMETRIUM) 100 MG capsule 100 mg daily        SUMAtriptan (IMITREX) 100 MG tablet Take one by mouth prn severe headache. No more than four per month. 9 tablet 0     topiramate (TOPAMAX) 100 MG tablet Increase as instructed until taking one twice per day 62 tablet 11     topiramate (TOPAMAX) 100 MG tablet Take one half at lunch time and one tab at dinnertime 46 tablet 11        Medication Notes:    Taking topiramate (100 mg) one half at lunch. 100 mg at 9 PM.  Reports more dizziness, unsteadiness on feet.    AED Medication Compliance:  compliant all  of the time  Using a pill box:  Yes    Review of Systems:  No vomiting, diarrhea, fevers, hematuria or kidney stones.  Have you experienced a traumatic fall since your last visit: YES  Are these falls related to your seizures: YES: no other falls.    Other Issues:    Migraines are rare. Uses imitrex once per month.  Feeling more stressed this summer, more family responsibilities. Frustration at various delays, other medical problems.  Not suicidal. Requires 1.25 mg clonopin at HS to go to sleep. Coughing gets her up at night but no more than 20 minute WASO. Wakes up at 6 AM because of bladder spasms and may have trouble going to back to sleep. Usually sleeps until 8 AM, goes to bed at 10 AM.  Pacemaker was checked last fall. Will be checked next month.    Her  has retired and is spending more time with her.    Is patient safe to drive:  No    Exam:    BP (!) 152/86   Pulse 69   Temp 95.3  F (35.2  C) (Temporal)   Wt 180 lb 12.8 oz (82 kg)   BMI 29.18 kg/m       Wt Readings from Last 5 Encounters:   07/06/21 180 lb 12.8 oz (82 kg)   10/28/20 171 lb 6.4 oz (77.7 kg)   02/05/20 171 lb 6.4 oz (77.7 kg)   08/21/19 160 lb 12.8 oz (72.9 kg)   01/16/19 160 lb (72.6 kg)     Normal gait including tandem. Visual fields full. Extraocular movements intact without nystagmus. Bilateral ptosis as usual; however able to open eyes fully and no lid drop with sustained upgaze. Smile symmetrical. Facial sensation equal. Tongue midline and strong. Proximal strength is full.  No drift, pronation, or tremor. Ankle jerks present and symmetrical. Finger finger nose is done well. Mental status grossly intact without evidence of overt depression or anxiety.    IMPRESSION   1) Focal seizures, intractable; focal aware, focal aware to focal impaired seizures. Some focal impaired seizures associated with falls and occasionally with trama. Independent bitemporal likely neocortical onset by scalp EEG. Improvement starting approxmiately  March 2015 led to almost 1 3/4 years of  freedom from focal impaired seizures with falls. Fycompa and levetiracetam appeared to play important roles in this period of seizure freedom but I thought that completing menopause may have been important. There was some indication that focal unimpaired seizures may have worsened with reduction of TPM. She is refractory to all AEDs other than barbiturates, felbamate, CLB off label, and cenobamate. Seizures wax and wane. Over last year number about the same but she continues with longer seizure free periods, up to two months in duration. Most recently we have been able to increase perampanel further. Acetazolamide appears to help when used PRN.  2) Migraine headaches; topiramate thought to have helped; under good control.  3) Asystole; likely primary and not related to seizures or VNS tho this has never been formally examined; has pacemaker in place.   4) VNS at end of service. Course indicates that VNS was not playing much role in seizure control as seizures continued doing well tho VNS at [facility] for almost two years.  5) Depression, continues doing well after increase of escitalopram to 15 mg per day. Therapy also helpful.   6) Mild hyponatremia in past likely related to oxcarbazpine and escitalopram. Needs followup.     DISCUSSION  We again discussed remaining alternatives and they do not think that her situation is severe enough to warrant risks associated with new AED or more aggressive neuromodulation options. We talked about more organized anxiety treatment measures and they are open to this.     PLAN:   1) Same OXC, levetiracetam, topiramate, and micronized progesterone. She can continue using acetazolamide up to 250 mg po every four hours during periods of seizure exacerbation. Continue with fycompa to 6 mg at bedtime.  2) Continue escitalopram at current dose, it appears associated with sustained response.  3) Other AED options as above. As she is uncomfortable with  felbatol,  cenobamate would probably be best choice, perhaps replacing levetiracetam. We could also consider replacing levetiracetam with brivaracetam or use eslicarbazpine instead of oxcarbazepine if formulary will allow. RNS should be a serious consideration if seizures with falls return and become more frequent. If RNS placed she would have three implanted devices which could also raise concern. Neither she nor her  were excited about this possibility when we last reviewed.  4) We discussed how to examine during seizures now that  is present for long stretches of the day. Discussed teaching progressive muscle relaxation virtually. This may help with insomnia as well as generalized anxiety.  5) RTC 4 months.  6)  will obtain AED levels and sodium at his clinic and fax them to us.     Total time 30 minutes. Additional 5 minutes before clinic reviewing chart. Additional 9 minutes generating note following visit. Total 44 minutes today.     Noam Barboza MD

## 2021-07-06 NOTE — PATIENT INSTRUCTIONS
Can stop topiramate at mid day. After two weeks can reduce topiramate in evening to half a pill (50 mg). If migraines no worse can stop topiramate.    We will try to set up training in progressive muscle relaxation.    Please get oxcarbazepine level, levetiracetam level, and sodium at your 's clinic. Call us with results or fax to [phone].

## 2021-07-07 ENCOUNTER — PRE VISIT (OUTPATIENT)
Dept: CARDIOLOGY | Facility: CLINIC | Age: 66
End: 2021-07-07

## 2021-07-14 ENCOUNTER — ANCILLARY PROCEDURE (OUTPATIENT)
Dept: CARDIOLOGY | Facility: CLINIC | Age: 66
End: 2021-07-14
Attending: INTERNAL MEDICINE
Payer: MEDICARE

## 2021-07-14 ENCOUNTER — OFFICE VISIT (OUTPATIENT)
Dept: CARDIOLOGY | Facility: CLINIC | Age: 66
End: 2021-07-14
Attending: PHYSICIAN ASSISTANT
Payer: MEDICARE

## 2021-07-14 VITALS
BODY MASS INDEX: 29.2 KG/M2 | DIASTOLIC BLOOD PRESSURE: 86 MMHG | OXYGEN SATURATION: 95 % | WEIGHT: 181.7 LBS | HEIGHT: 66 IN | HEART RATE: 70 BPM | SYSTOLIC BLOOD PRESSURE: 138 MMHG

## 2021-07-14 DIAGNOSIS — I49.5 SINOATRIAL NODE DYSFUNCTION (H): ICD-10-CM

## 2021-07-14 DIAGNOSIS — E78.2 MIXED HYPERLIPIDEMIA: ICD-10-CM

## 2021-07-14 DIAGNOSIS — I49.5 SINOATRIAL NODE DYSFUNCTION (H): Chronic | ICD-10-CM

## 2021-07-14 DIAGNOSIS — Z82.49 FAMILY HISTORY OF ISCHEMIC HEART DISEASE: ICD-10-CM

## 2021-07-14 DIAGNOSIS — E78.00 HYPERCHOLESTEREMIA: ICD-10-CM

## 2021-07-14 DIAGNOSIS — Z95.0 PACEMAKER: ICD-10-CM

## 2021-07-14 DIAGNOSIS — Z95.0 CARDIAC PACEMAKER IN SITU: Primary | Chronic | ICD-10-CM

## 2021-07-14 DIAGNOSIS — Z95.0 PACEMAKER: Primary | ICD-10-CM

## 2021-07-14 PROCEDURE — 99204 OFFICE O/P NEW MOD 45 MIN: CPT | Mod: 25 | Performed by: INTERNAL MEDICINE

## 2021-07-14 PROCEDURE — 93280 PM DEVICE PROGR EVAL DUAL: CPT | Performed by: INTERNAL MEDICINE

## 2021-07-14 ASSESSMENT — MIFFLIN-ST. JEOR: SCORE: 1370.94

## 2021-07-14 NOTE — PROGRESS NOTES
HPI and Plan:   See dictation    Orders Placed This Encounter   Procedures     Lipid Profile     Follow-Up with Cardiac Advanced Practice Provider     Follow-Up with Cardiologist       No orders of the defined types were placed in this encounter.      There are no discontinued medications.      Encounter Diagnoses   Name Primary?     Sinoatrial node dysfunction (H)      Mixed hyperlipidemia      Cardiac pacemaker in situ Yes     Hypercholesteremia      Family history of ischemic heart disease        CURRENT MEDICATIONS:  Current Outpatient Medications   Medication Sig Dispense Refill     acetaZOLAMIDE (DIAMOX) 250 MG tablet Take one to three times per day during seizure clusters 30 tablet 3     Cetirizine HCl (ZYRTEC PO) daily.       Cholecalciferol (VITAMIN D) 2000 UNITS CAPS daily.       clonazePAM (KLONOPIN) 1 MG tablet Take 1.5 tablets (1.5 mg) by mouth At Bedtime 45 tablet 1     diazepam (DIAZEPAM INTENSOL) 5 MG/ML solution Administer 1 ml as needed into cheek pouch for seizures 30 mL 0     escitalopram (LEXAPRO) 10 MG tablet Take 1 tablet (10 mg) by mouth At Bedtime 30 tablet 10     escitalopram (LEXAPRO) 5 MG tablet TAKE 1 TABLET(5 MG) BY MOUTH DAILY. Please keep appt 10/28/20. 30 tablet 8     esomeprazole (NEXIUM) 40 MG capsule Take by mouth every morning (before breakfast) Take 30-60 minutes before eating.       FYCOMPA 6 MG tablet TAKE 1 TABLET(6 MG) BY MOUTH AT BEDTIME 31 tablet 5     levETIRAcetam (KEPPRA) 750 MG tablet TAKE 1 TABLET BY MOUTH THREE TIMES DAILY AND TAKE 1 1/2 TABLETS EVERY NIGHT AT BEDTIME(TOTAL 3375MG PER DAY). 135 tablet 11     OXcarbazepine (TRILEPTAL) 300 MG tablet TAKE ONE AND ONE-HALF TABLET IN THE MORNING, ONE AND ONE-HALF TABLET AT NOON, 2 TABLETS IN THE EVENING AND 2 TABLETS AT BEDTIME 210 tablet 11     oxybutynin ER (DITROPAN-XL) 10 MG 24 hr tablet Take 10 mg by mouth       progesterone (PROMETRIUM) 100 MG capsule 100 mg daily        SUMAtriptan (IMITREX) 100 MG tablet Take one  by mouth prn severe headache. No more than four per month. 9 tablet 0     topiramate (TOPAMAX) 100 MG tablet Increase as instructed until taking one twice per day 62 tablet 11     topiramate (TOPAMAX) 100 MG tablet Take one half at lunch time and one tab at dinnertime 46 tablet 11       ALLERGIES     Allergies   Allergen Reactions     Lactose        PAST MEDICAL HISTORY:  Past Medical History:   Diagnosis Date     Depression      Epilepsy (H)      GERD (gastroesophageal reflux disease)      Migraine      Seizure (H)     vagal nerve stimulator     Shortness of breath      Syncope and collapse     bradyarrhythmia: dual chamber pacemaker implanted 2011       PAST SURGICAL HISTORY:  Past Surgical History:   Procedure Laterality Date     IMPLANT PACEMAKER  April 2011    [facility] [place] [address],      IMPLANT STIMULATOR VAGUS NERVE  DEC  2008    VNS batter depleated (8/21/2019)       FAMILY HISTORY:  No family history on file.    SOCIAL HISTORY:  Social History     Socioeconomic History     Marital status:      Spouse name: None     Number of children: None     Years of education: None     Highest education level: None   Occupational History     None   Tobacco Use     Smoking status: Never Smoker     Smokeless tobacco: Never Used   Substance and Sexual Activity     Alcohol use: No     Alcohol/week: 0.0 standard drinks     Drug use: No     Sexual activity: Yes     Partners: Male   Other Topics Concern     Parent/sibling w/ CABG, MI or angioplasty before 65F 55M? Not Asked      Service Not Asked     Blood Transfusions Not Asked     Caffeine Concern Yes     Comment: 3 CUPS coffee daily      Occupational Exposure Not Asked     Hobby Hazards Not Asked     Sleep Concern Not Asked     Stress Concern Not Asked     Weight Concern No     Special Diet No     Back Care Not Asked     Exercise Yes     Bike Helmet Not Asked     Seat Belt Yes     Self-Exams Not Asked   Social History Narrative     None     Social  "Determinants of Health     Financial Resource Strain:      Difficulty of Paying Living Expenses:    Food Insecurity:      Worried About Running Out of Food in the Last Year:      Ran Out of Food in the Last Year:    Transportation Needs:      Lack of Transportation (Medical):      Lack of Transportation (Non-Medical):    Physical Activity:      Days of Exercise per Week:      Minutes of Exercise per Session:    Stress:      Feeling of Stress :    Social Connections:      Frequency of Communication with Friends and Family:      Frequency of Social Gatherings with Friends and Family:      Attends [employer] Services:      Active Member of Clubs or Organizations:      Attends Club or Organization Meetings:      Marital Status:    Intimate Partner Violence:      Fear of Current or Ex-Partner:      Emotionally Abused:      Physically Abused:      Sexually Abused:        Review of Systems:  Skin:  Negative       Eyes:  Positive for glasses    ENT:  Negative      Respiratory:  Negative       Cardiovascular:  Negative      Gastroenterology: Negative      Genitourinary:  Positive for   bladder spasms  Musculoskeletal:  Positive for arthritis hands and feet  Neurologic:  Negative      Psychiatric:  Negative      Heme/Lymph/Imm:  Positive for allergies environmental, lactose  Endocrine:  Negative        Physical Exam:  Vitals: /86   Pulse 70   Ht 1.676 m (5' 6\")   Wt 82.4 kg (181 lb 11.2 oz)   SpO2 95%   BMI 29.33 kg/m      Constitutional:  cooperative, alert and oriented, well developed, well nourished, in no acute distress overweight      Skin:  warm and dry to the touch, no apparent skin lesions or masses noted   pacemaker incision in the left infraclavicular area was well-healed      Head:  normocephalic, no masses or lesions        Eyes:  pupils equal and round;conjunctivae and lids unremarkable;sclera white;no xanthalasma        Lymph:      ENT:  no pallor or cyanosis, dentition good        Neck:  carotid " pulses are full and equal bilaterally;no carotid bruit        Respiratory:  normal breath sounds, clear to auscultation, normal A-P diameter, normal symmetry, normal respiratory excursion, no use of accessory muscles         Cardiac: regular rhythm;normal S1 and S2;no S3 or S4       systolic ejection murmur;RUSB;grade 1        pulses full and equal                                        GI:           Extremities and Muscular Skeletal:  no edema;no spinal abnormalities noted;normal muscle strength and tone              Neurological:  no gross motor deficits        Psych:  affect appropriate, oriented to time, person and place          CC  Samia Wheeler PA-C  [id number]  [place] [address]

## 2021-07-14 NOTE — PROGRESS NOTES
Service Date: 07/14/2021    HISTORY OF PRESENT ILLNESS:  Adia is a very nice 66-year-old woman, who is wife of Nemesio Briceno, who just retired from Oncology 2 months ago.  I have not seen Adia since 2017.  She last saw Miroslava Wheeler PA-C in 2018.    Adia has a past medical history significant for seizure disorder, which she states consumes her life and if she were to die of a heart attack she states she would not care.  She was having syncopal spells and CardioNet ultimately demonstrated a 7-10 second for which a pacemaker was ultimately inserted.  This was unfortunately complicated by MicroLead dislodgement with R-wave sensing reduced at 3. adjustments were made to pacemaker and it has functioned fine since then.    Other issues as stated includes her seizure disorder, depression, migraines, gastroesophageal reflux disease.  She has a very strong family history of coronary disease with her father having his first heart attack at age 45, but ultimately dying in his 60s, although she states his early heart attack was due to stress as he was persecuted by the [place], had to flee, had problems subsequently, ultimately came to [place], was a practicing physician, had to repeat his residency here.  Adia herself has marked hypercholesterolemia with a very high HDL although her LDL is also 185.    Adia essentially has been lost to followup, but now returns.  She states from a heart standpoint, she is doing quite well, although she does not exercise.  She states her activity is markedly limited by problems with her feet as she has had multiple toe fractures.  She follows with [facility].  She states she needs to follow up closely with them, as she is concerned and has been told that other physical therapists may ruin the improvement that she has had.  She has no chest, arm, neck, jaw or shoulder discomfort.  No dyspnea on exertion, orthopnea or PND.  No palpitations, lightheadedness, dizziness, syncope or near syncope.   She has not been following in our Device [facility], but we did do a check today that shows she paces in the atrium 69% of the time and ventricle 4% of the time.  She has had no atrial or ventricular arrhythmias.  She has 3 years of battery life left.    ASSESSMENT AND PLAN:  Adia appears to be doing well from a cardiac standpoint without clinical evidence of ischemia.  We did talk about options.  I have recommended in the past that she have a calcium score to ascertain how much of a risk she is.  We again talked about it.  She is going to consider it and we will plan on scheduling it although as stated, she has not followed through in the past.  At this time, she does not have any clear symptoms to suggest ischemia.    She has no symptoms to suggest heart failure.  The pacemaker demonstrates no arrhythmias.  The pacemaker is functioning appropriately as noted above.    Fasting lipid profile, the last one that I have is from 2018, shows a total cholesterol of 309, HDL of 113, .  Again, I recommended a calcium score and a repeat fasting lipid profile.  Nemesio states that they will get that through his office as she is due for blood work in the next week and will send me a copy.  That in concert with a calcium score will decide on whether and how aggressive to be with her cholesterol.    In the past, she states she had severe problems with rosuvastatin causing myalgias.  We talked about potentially intermittent dosing, other statins and potentially PCSK9 inhibitor.  She is very concerned that some of this may interfere with her seizure medicines or control of her seizure activity.  I told her we have not even decided we will treat this.    Review of the chart also shows hyponatremia back in 10/2018.  Again, I do not see any recent lab.  It is possible it was done through Neurology or Dr. Briceno's clinic, although he thinks the last lab work in his clinic may be over 2 years old as well.  I will leave that to her  primary care doctor as she is on no cardiac medications at this time.    I have emphasized the importance of a regular exercise regimen and weight loss.  Her weight is up to 181 pounds, giving a body mass index of 29.3.  She is up 21 pounds from 2 years ago and I told her this is probably affected her cholesterol profile aging and I suspect her cholesterol profile will be worse.    Blood pressure is well controlled today at 138/86.    Over 45 minutes were spent with Adia and discussing her various issues and reviewing her studies,    Thank you for allowing me to participate in her care.    Lance Richmond MD, [facility]        D: 2021   T: 2021   MT: phylicia    Name:     ADIA MORGAN  MRN:      [id number]        Account:      [id number]   :      1955           Service Date: 2021       Document: [id number]

## 2021-07-14 NOTE — LETTER
7/14/2021    [facility]  [address] [address]    RE: Adia Briceno       Dear Colleague,    I had the pleasure of seeing Adia Briceno in the [facility].    HPI and Plan:   See dictation    Orders Placed This Encounter   Procedures     Lipid Profile     Follow-Up with Cardiac Advanced Practice Provider     Follow-Up with Cardiologist       No orders of the defined types were placed in this encounter.      There are no discontinued medications.      Encounter Diagnoses   Name Primary?     Sinoatrial node dysfunction (H)      Mixed hyperlipidemia      Cardiac pacemaker in situ Yes     Hypercholesteremia      Family history of ischemic heart disease        CURRENT MEDICATIONS:  Current Outpatient Medications   Medication Sig Dispense Refill     acetaZOLAMIDE (DIAMOX) 250 MG tablet Take one to three times per day during seizure clusters 30 tablet 3     Cetirizine HCl (ZYRTEC PO) daily.       Cholecalciferol (VITAMIN D) 2000 UNITS CAPS daily.       clonazePAM (KLONOPIN) 1 MG tablet Take 1.5 tablets (1.5 mg) by mouth At Bedtime 45 tablet 1     diazepam (DIAZEPAM INTENSOL) 5 MG/ML solution Administer 1 ml as needed into cheek pouch for seizures 30 mL 0     escitalopram (LEXAPRO) 10 MG tablet Take 1 tablet (10 mg) by mouth At Bedtime 30 tablet 10     escitalopram (LEXAPRO) 5 MG tablet TAKE 1 TABLET(5 MG) BY MOUTH DAILY. Please keep appt 10/28/20. 30 tablet 8     esomeprazole (NEXIUM) 40 MG capsule Take by mouth every morning (before breakfast) Take 30-60 minutes before eating.       FYCOMPA 6 MG tablet TAKE 1 TABLET(6 MG) BY MOUTH AT BEDTIME 31 tablet 5     levETIRAcetam (KEPPRA) 750 MG tablet TAKE 1 TABLET BY MOUTH THREE TIMES DAILY AND TAKE 1 1/2 TABLETS EVERY NIGHT AT BEDTIME(TOTAL 3375MG PER DAY). 135 tablet 11     OXcarbazepine (TRILEPTAL) 300 MG tablet TAKE ONE AND ONE-HALF TABLET IN THE MORNING, ONE AND ONE-HALF TABLET AT NOON, 2  TABLETS IN THE EVENING AND 2 TABLETS AT BEDTIME 210 tablet 11     oxybutynin ER (DITROPAN-XL) 10 MG 24 hr tablet Take 10 mg by mouth       progesterone (PROMETRIUM) 100 MG capsule 100 mg daily        SUMAtriptan (IMITREX) 100 MG tablet Take one by mouth prn severe headache. No more than four per month. 9 tablet 0     topiramate (TOPAMAX) 100 MG tablet Increase as instructed until taking one twice per day 62 tablet 11     topiramate (TOPAMAX) 100 MG tablet Take one half at lunch time and one tab at dinnertime 46 tablet 11       ALLERGIES     Allergies   Allergen Reactions     Lactose        PAST MEDICAL HISTORY:  Past Medical History:   Diagnosis Date     Depression      Epilepsy (H)      GERD (gastroesophageal reflux disease)      Migraine      Seizure (H)     vagal nerve stimulator     Shortness of breath      Syncope and collapse     bradyarrhythmia: dual chamber pacemaker implanted 2011       PAST SURGICAL HISTORY:  Past Surgical History:   Procedure Laterality Date     IMPLANT PACEMAKER  April 2011    [employer], [place] [address],      IMPLANT STIMULATOR VAGUS NERVE  DEC  2008    VNS batter depleated (8/21/2019)       FAMILY HISTORY:  No family history on file.    SOCIAL HISTORY:  Social History     Socioeconomic History     Marital status:      Spouse name: None     Number of children: None     Years of education: None     Highest education level: None   Occupational History     None   Tobacco Use     Smoking status: Never Smoker     Smokeless tobacco: Never Used   Substance and Sexual Activity     Alcohol use: No     Alcohol/week: 0.0 standard drinks     Drug use: No     Sexual activity: Yes     Partners: Male   Other Topics Concern     Parent/sibling w/ CABG, MI or angioplasty before 65F 55M? Not Asked      Service Not Asked     Blood Transfusions Not Asked     Caffeine Concern Yes     Comment: 3 CUPS coffee daily      Occupational Exposure Not Asked     Hobby Hazards Not Asked     Sleep Concern  "Not Asked     Stress Concern Not Asked     Weight Concern No     Special Diet No     Back Care Not Asked     Exercise Yes     Bike Helmet Not Asked     Seat Belt Yes     Self-Exams Not Asked   Social History Narrative     None     Social Determinants of Health     Financial Resource Strain:      Difficulty of Paying Living Expenses:    Food Insecurity:      Worried About Running Out of Food in the Last Year:      Ran Out of Food in the Last Year:    Transportation Needs:      Lack of Transportation (Medical):      Lack of Transportation (Non-Medical):    Physical Activity:      Days of Exercise per Week:      Minutes of Exercise per Session:    Stress:      Feeling of Stress :    Social Connections:      Frequency of Communication with Friends and Family:      Frequency of Social Gatherings with Friends and Family:      Attends Zoroastrianism Services:      Active Member of Clubs or Organizations:      Attends Club or Organization Meetings:      Marital Status:    Intimate Partner Violence:      Fear of Current or Ex-Partner:      Emotionally Abused:      Physically Abused:      Sexually Abused:        Review of Systems:  Skin:  Negative       Eyes:  Positive for glasses    ENT:  Negative      Respiratory:  Negative       Cardiovascular:  Negative      Gastroenterology: Negative      Genitourinary:  Positive for   bladder spasms  Musculoskeletal:  Positive for arthritis hands and feet  Neurologic:  Negative      Psychiatric:  Negative      Heme/Lymph/Imm:  Positive for allergies environmental, lactose  Endocrine:  Negative        Physical Exam:  Vitals: /86   Pulse 70   Ht 1.676 m (5' 6\")   Wt 82.4 kg (181 lb 11.2 oz)   SpO2 95%   BMI 29.33 kg/m      Constitutional:  cooperative, alert and oriented, well developed, well nourished, in no acute distress overweight      Skin:  warm and dry to the touch, no apparent skin lesions or masses noted   pacemaker incision in the left infraclavicular area was well-healed  "     Head:  normocephalic, no masses or lesions        Eyes:  pupils equal and round;conjunctivae and lids unremarkable;sclera white;no xanthalasma        Lymph:      ENT:  no pallor or cyanosis, dentition good        Neck:  carotid pulses are full and equal bilaterally;no carotid bruit        Respiratory:  normal breath sounds, clear to auscultation, normal A-P diameter, normal symmetry, normal respiratory excursion, no use of accessory muscles         Cardiac: regular rhythm;normal S1 and S2;no S3 or S4       systolic ejection murmur;RUSB;grade 1        pulses full and equal                                        GI:           Extremities and Muscular Skeletal:  no edema;no spinal abnormalities noted;normal muscle strength and tone              Neurological:  no gross motor deficits        Psych:  affect appropriate, oriented to time, person and place          CC  Samia Wheeler PA-C  [address]  [place] [address]                      Thank you for allowing me to participate in the care of your patient.      Sincerely,     Lance Richmond MD     [facility]  cc:   Samia Wheeler PA-C  [address]  [place] [address]

## 2021-07-15 LAB
MDC_IDC_LEAD_IMPLANT_DT: NORMAL
MDC_IDC_LEAD_IMPLANT_DT: NORMAL
MDC_IDC_LEAD_LOCATION: NORMAL
MDC_IDC_LEAD_LOCATION: NORMAL
MDC_IDC_LEAD_MFG: NORMAL
MDC_IDC_LEAD_MFG: NORMAL
MDC_IDC_LEAD_MODEL: NORMAL
MDC_IDC_LEAD_MODEL: NORMAL
MDC_IDC_LEAD_POLARITY_TYPE: NORMAL
MDC_IDC_LEAD_POLARITY_TYPE: NORMAL
MDC_IDC_LEAD_SERIAL: NORMAL
MDC_IDC_LEAD_SERIAL: NORMAL
MDC_IDC_MSMT_BATTERY_STATUS: NORMAL
MDC_IDC_MSMT_LEADCHNL_RA_IMPEDANCE_VALUE: 530 OHM
MDC_IDC_MSMT_LEADCHNL_RA_PACING_THRESHOLD_AMPLITUDE: 0.6 V
MDC_IDC_MSMT_LEADCHNL_RA_PACING_THRESHOLD_PULSEWIDTH: 0.5 MS
MDC_IDC_MSMT_LEADCHNL_RA_SENSING_INTR_AMPL: 2 MV
MDC_IDC_MSMT_LEADCHNL_RV_IMPEDANCE_VALUE: 470 OHM
MDC_IDC_MSMT_LEADCHNL_RV_PACING_THRESHOLD_AMPLITUDE: 0.5 V
MDC_IDC_MSMT_LEADCHNL_RV_PACING_THRESHOLD_PULSEWIDTH: 0.5 MS
MDC_IDC_MSMT_LEADCHNL_RV_SENSING_INTR_AMPL: 1.5 MV
MDC_IDC_PG_IMPLANT_DTM: NORMAL
MDC_IDC_PG_MFG: NORMAL
MDC_IDC_PG_MODEL: NORMAL
MDC_IDC_PG_SERIAL: NORMAL
MDC_IDC_PG_TYPE: NORMAL
MDC_IDC_SESS_CLINIC_NAME: NORMAL
MDC_IDC_SESS_DTM: NORMAL
MDC_IDC_SESS_TYPE: NORMAL
MDC_IDC_SET_BRADY_AT_MODE_SWITCH_MODE: NORMAL
MDC_IDC_SET_BRADY_AT_MODE_SWITCH_RATE: 170 {BEATS}/MIN
MDC_IDC_SET_BRADY_HYSTRATE: NORMAL
MDC_IDC_SET_BRADY_LOWRATE: 70 {BEATS}/MIN
MDC_IDC_SET_BRADY_MAX_SENSOR_RATE: 140 {BEATS}/MIN
MDC_IDC_SET_BRADY_MAX_TRACKING_RATE: 140 {BEATS}/MIN
MDC_IDC_SET_BRADY_MODE: NORMAL
MDC_IDC_SET_BRADY_PAV_DELAY_HIGH: 180 MS
MDC_IDC_SET_BRADY_PAV_DELAY_LOW: 300 MS
MDC_IDC_SET_BRADY_SAV_DELAY_HIGH: 180 MS
MDC_IDC_SET_BRADY_SAV_DELAY_LOW: 300 MS
MDC_IDC_SET_LEADCHNL_RA_PACING_AMPLITUDE: 2 V
MDC_IDC_SET_LEADCHNL_RA_PACING_CAPTURE_MODE: NORMAL
MDC_IDC_SET_LEADCHNL_RA_PACING_POLARITY: NORMAL
MDC_IDC_SET_LEADCHNL_RA_PACING_PULSEWIDTH: 0.5 MS
MDC_IDC_SET_LEADCHNL_RA_SENSING_ADAPTATION_MODE: NORMAL
MDC_IDC_SET_LEADCHNL_RA_SENSING_POLARITY: NORMAL
MDC_IDC_SET_LEADCHNL_RA_SENSING_SENSITIVITY: 0.75 MV
MDC_IDC_SET_LEADCHNL_RV_PACING_AMPLITUDE: 2 V
MDC_IDC_SET_LEADCHNL_RV_PACING_CAPTURE_MODE: NORMAL
MDC_IDC_SET_LEADCHNL_RV_PACING_POLARITY: NORMAL
MDC_IDC_SET_LEADCHNL_RV_PACING_PULSEWIDTH: 0.5 MS
MDC_IDC_SET_LEADCHNL_RV_SENSING_ADAPTATION_MODE: NORMAL
MDC_IDC_SET_LEADCHNL_RV_SENSING_POLARITY: NORMAL
MDC_IDC_SET_LEADCHNL_RV_SENSING_SENSITIVITY: 0.75 MV
MDC_IDC_STAT_AT_DTM_END: NORMAL
MDC_IDC_STAT_AT_DTM_START: NORMAL
MDC_IDC_STAT_AT_MODE_SW_COUNT: 0
MDC_IDC_STAT_AT_MODE_SW_MAX_DURATION: 0 S
MDC_IDC_STAT_BRADY_DTM_END: NORMAL
MDC_IDC_STAT_BRADY_DTM_START: NORMAL
MDC_IDC_STAT_BRADY_RA_PERCENT_PACED: 69 %
MDC_IDC_STAT_BRADY_RV_PERCENT_PACED: 4 %
MDC_IDC_STAT_EPISODE_RECENT_COUNT: 0
MDC_IDC_STAT_EPISODE_RECENT_COUNT_DTM_END: NORMAL
MDC_IDC_STAT_EPISODE_RECENT_COUNT_DTM_START: NORMAL
MDC_IDC_STAT_EPISODE_TYPE: NORMAL

## 2021-07-23 ENCOUNTER — TRANSFERRED RECORDS (OUTPATIENT)
Dept: HEALTH INFORMATION MANAGEMENT | Facility: CLINIC | Age: 66
End: 2021-07-23

## 2021-07-23 LAB
ANION GAP SERPL CALC-SCNC: NORMAL MMOL/L
BUN SERPL-MCNC: 14 MG/DL (ref 8–27)
CALCIUM (EXTERNAL): 9.7 MG/DL (ref 8.7–10.3)
CHLORIDE (EXTERNAL): 102 MMOL/L (ref 96–106)
CHOLESTEROL (EXTERNAL): 315 MG/DL (ref 100–199)
CHOLESTEROL (EXTERNAL): 315 MG/DL (ref 100–199)
CO2 (EXTERNAL): 20 MMOL/L (ref 20–29)
CREATININE (EXTERNAL): 0.94 MG/DL (ref 0.57–1)
GFR ESTIMATED (EXTERNAL): 63 ML/MIN/1.73M2
GFR ESTIMATED (IF AFRICAN AMERICAN) (EXTERNAL): 72 ML/MIN/1.73M2
GLUCOSE (EXTERNAL): 76 MG/DL (ref 65–99)
HDLC SERPL-MCNC: 116 MG/DL
HDLC SERPL-MCNC: 116 MG/DL (ref ?–0)
LDL CHOLESTEROL (EXTERNAL): 189 MG/DL (ref 0–99)
LDL CHOLESTEROL (EXTERNAL): ABNORMAL MG/DL
LDL CHOLESTEROL CALCULATED (EXTERNAL): 189 MG/DL (ref 0–99)
LDL CHOLESTEROL CALCULATED (EXTERNAL): ABNORMAL MG/DL
NON HDL CHOLESTEROL (EXTERNAL): ABNORMAL MG/DL
NON HDL CHOLESTEROL (EXTERNAL): ABNORMAL MG/DL
POTASSIUM (EXTERNAL): 4.2 MMOL/L (ref 3.5–5.2)
SODIUM (EXTERNAL): 139 MMOL/L (ref 134–144)
TRIGLYCERIDES (EXTERNAL): 72 MG/DL (ref 0–149)
TRIGLYCERIDES (EXTERNAL): 72 MG/DL (ref 0–149)

## 2021-07-26 ENCOUNTER — TELEPHONE (OUTPATIENT)
Dept: CARDIOLOGY | Facility: CLINIC | Age: 66
End: 2021-07-26

## 2021-07-26 NOTE — TELEPHONE ENCOUNTER
Received lab work done at PCP clinic 7/23/2021. Labs entered and sent to scan.    Patient was seen 7/14/2021 with Dr. Richmond and agreed to update her lab work post-visit. CT calcium score is scheduled for 8/5/2021  DUK=514  Ijrx=149  Trig=72  Sol=835    Unable to enter into grid - Labs sent to scan    Will message Dr. Richmond with update.      [id number] reply from Dr. Richmond:  Guidelines recommend treating any LDL of 189 or higher regardless of risk factors. She will be very reluctant so let s see what her calcium score shows. Thanks.

## 2021-08-10 ENCOUNTER — HOSPITAL ENCOUNTER (OUTPATIENT)
Dept: CARDIOLOGY | Facility: CLINIC | Age: 66
Discharge: HOME | End: 2021-08-10
Attending: INTERNAL MEDICINE | Admitting: INTERNAL MEDICINE
Payer: MEDICARE

## 2021-08-10 DIAGNOSIS — Z82.49 FAMILY HISTORY OF ISCHEMIC HEART DISEASE: ICD-10-CM

## 2021-08-10 PROCEDURE — G1004 CDSM NDSC: HCPCS | Performed by: INTERNAL MEDICINE

## 2021-08-10 PROCEDURE — 75571 CT HRT W/O DYE W/CA TEST: CPT | Mod: MG

## 2021-08-10 PROCEDURE — 75571 CT HRT W/O DYE W/CA TEST: CPT | Mod: 26 | Performed by: INTERNAL MEDICINE

## 2021-08-11 ENCOUNTER — TELEPHONE (OUTPATIENT)
Dept: CARDIOLOGY | Facility: CLINIC | Age: 66
End: 2021-08-11

## 2021-08-11 NOTE — TELEPHONE ENCOUNTER
Calcium score is very reassuring.  We do not need to treat her cholesterol.  Please send her radiology results to her primary care doctor.  Her  is a retired oncologist.

## 2021-08-12 NOTE — TELEPHONE ENCOUNTER
Attempted to contact patient to review CT calcium score of 0, Dr. Richmond's recommendation for no statin meds and to review incidental pulmonary nodules from CT scan. Left message for patient to call back.    CT results faxed to PCP clinic - Dr. Rodgers, -[phone]. 1913 spoke with patient and spouse to review CT results and the finding of the pulmonary nodules. Reviewed that reports were sent to Dr. Rodgers today. Patient and spouse will follow up at PCP clinic.

## 2021-08-23 DIAGNOSIS — G40.211 LOCALIZATION-RELATED SYMPTOMATIC EPILEPSY AND EPILEPTIC SYNDROMES WITH COMPLEX PARTIAL SEIZURES, INTRACTABLE, WITH STATUS EPILEPTICUS (H): ICD-10-CM

## 2021-08-23 DIAGNOSIS — F43.21 ADJUSTMENT DISORDER WITH DEPRESSED MOOD: ICD-10-CM

## 2021-08-23 DIAGNOSIS — G40.211 LOCALZ-RLTED SYMPTOMATIC EPILEPSY W CMPLX PARTL SZ, INTRACT, W STATUS (H): ICD-10-CM

## 2021-08-26 RX ORDER — ESCITALOPRAM OXALATE 10 MG/1
10 TABLET ORAL AT BEDTIME
Qty: 30 TABLET | Refills: 2 | Status: SHIPPED | OUTPATIENT
Start: 2021-08-26 | End: 2021-10-28

## 2021-08-26 RX ORDER — OXCARBAZEPINE 300 MG/1
TABLET, FILM COATED ORAL
Qty: 210 TABLET | Refills: 11 | OUTPATIENT
Start: 2021-08-26

## 2021-08-26 RX ORDER — LEVETIRACETAM 750 MG/1
TABLET ORAL
Qty: 135 TABLET | Refills: 11 | OUTPATIENT
Start: 2021-08-26

## 2021-08-26 RX ORDER — TOPIRAMATE 100 MG/1
TABLET, FILM COATED ORAL
Qty: 46 TABLET | Refills: 11 | OUTPATIENT
Start: 2021-08-26

## 2021-08-26 RX ORDER — ESCITALOPRAM OXALATE 5 MG/1
5 TABLET ORAL DAILY
Qty: 30 TABLET | Refills: 2 | Status: SHIPPED | OUTPATIENT
Start: 2021-08-26 | End: 2021-12-01

## 2021-08-27 RX ORDER — CLONAZEPAM 1 MG/1
1.5 TABLET ORAL AT BEDTIME
Qty: 45 TABLET | Refills: 2 | Status: SHIPPED | OUTPATIENT
Start: 2021-08-27 | End: 2021-11-16

## 2021-08-27 RX ORDER — DIAZEPAM ORAL SOLUTION (CONCENTRATE) 5 MG/ML
SOLUTION ORAL
Qty: 30 ML | Refills: 0 | Status: SHIPPED | OUTPATIENT
Start: 2021-08-27 | End: 2023-06-28

## 2021-08-27 NOTE — TELEPHONE ENCOUNTER
diazepam (DIAZEPAM INTENSOL) 5 MG/ML (HIGH CONC) solution  Last Written Prescription Date:  9/2/14  Last Fill Quantity: 30,   # refills: 0    clonazePAM (KLONOPIN) 1 MG tablet  Last Written Prescription Date:  7/6/21  Last Fill Quantity: 45,   # refills: 1    Last Office Visit : 7/6/21-RTC 4 months  Future Office visit:  0    Routing refill request to provider for review/approval because:  Drug not on the [facility], [facility] or [facility] refill protocol or controlled substance      Last Clinic Visit: 7/6/2021  [facility]  LEXAPRO 5 MG AND 10 REFILLED PER PROTOCOL

## 2021-10-06 ENCOUNTER — HOSPITAL ENCOUNTER (OUTPATIENT)
Dept: BONE DENSITY | Facility: CLINIC | Age: 66
End: 2021-10-06
Attending: FAMILY MEDICINE
Payer: MEDICARE

## 2021-10-06 ENCOUNTER — HOSPITAL ENCOUNTER (OUTPATIENT)
Dept: MAMMOGRAPHY | Facility: CLINIC | Age: 66
End: 2021-10-06
Attending: FAMILY MEDICINE
Payer: MEDICARE

## 2021-10-06 DIAGNOSIS — Z78.0 MENOPAUSE: ICD-10-CM

## 2021-10-06 DIAGNOSIS — Z12.31 ENCOUNTER FOR SCREENING MAMMOGRAM FOR BREAST CANCER: ICD-10-CM

## 2021-10-06 PROCEDURE — 77080 DXA BONE DENSITY AXIAL: CPT

## 2021-10-06 PROCEDURE — 77063 BREAST TOMOSYNTHESIS BI: CPT

## 2021-10-27 DIAGNOSIS — F43.21 ADJUSTMENT DISORDER WITH DEPRESSED MOOD: ICD-10-CM

## 2021-10-27 DIAGNOSIS — G40.211 LOCALIZATION-RELATED SYMPTOMATIC EPILEPSY AND EPILEPTIC SYNDROMES WITH COMPLEX PARTIAL SEIZURES, INTRACTABLE, WITH STATUS EPILEPTICUS (H): ICD-10-CM

## 2021-10-27 NOTE — TELEPHONE ENCOUNTER
"Request for perampanel.    Last ordered 4/28/2021  Last visit with  7/6/2021 RTC in 4 months (needs appointment scheduling - message sent to  to contact patient)      Plan at last visit  \"   PLAN:   1) Same OXC, levetiracetam, topiramate, and micronized progesterone. She can continue using acetazolamide up to 250 mg po every four hours during periods of seizure exacerbation. Continue with fycompa to 6 mg at bedtime.\"    Prescription routed to Dr. Barboza.          "

## 2021-10-29 RX ORDER — ESCITALOPRAM OXALATE 10 MG/1
10 TABLET ORAL AT BEDTIME
Qty: 30 TABLET | Refills: 0 | Status: SHIPPED | OUTPATIENT
Start: 2021-10-29 | End: 2022-01-27

## 2021-11-02 DIAGNOSIS — G40.211 LOCALIZATION-RELATED SYMPTOMATIC EPILEPSY AND EPILEPTIC SYNDROMES WITH COMPLEX PARTIAL SEIZURES, INTRACTABLE, WITH STATUS EPILEPTICUS (H): ICD-10-CM

## 2021-11-05 RX ORDER — TOPIRAMATE 100 MG/1
TABLET, FILM COATED ORAL
Qty: 48 TABLET | Refills: 11 | Status: SHIPPED | OUTPATIENT
Start: 2021-11-05 | End: 2022-11-23

## 2021-11-05 NOTE — TELEPHONE ENCOUNTER
TOPIRAMATE 100MG TABLETS  Last Written Prescription Date:  10/28/2020  Last Fill Quantity: 62,   # refills: 11  Last Office Visit :  7/6/2021  Future Office visit:  11/16/2021    Routing refill request to provider for review/approval because:  Please clarify dose of medication for Pt care.  Is it a 1/2 of tabs at lunch and a whole tab at night?  Or is it 100 Mg's twice a day?  Order in chart and order from pharmacy do not match.  Please advise     Rachel Doe RN  Central Triage Red Flags/Med Refills     Medication Notes:  7/6/2021  Taking topiramate (100 mg) one half at lunch. 100 mg at 9 PM.  Reports more dizziness, unsteadiness on feet.    AED Medication Compliance:  compliant all of the time  Using a pill box:  Yes

## 2021-11-16 ENCOUNTER — OFFICE VISIT (OUTPATIENT)
Dept: NEUROLOGY | Facility: CLINIC | Age: 66
End: 2021-11-16
Payer: MEDICARE

## 2021-11-16 VITALS
DIASTOLIC BLOOD PRESSURE: 68 MMHG | SYSTOLIC BLOOD PRESSURE: 118 MMHG | WEIGHT: 181.8 LBS | HEART RATE: 74 BPM | TEMPERATURE: 97.3 F | BODY MASS INDEX: 29.34 KG/M2

## 2021-11-16 DIAGNOSIS — G40.211 LOCALZ-RLTED SYMPTOMATIC EPILEPSY W CMPLX PARTL SZ, INTRACT, W STATUS (H): ICD-10-CM

## 2021-11-16 DIAGNOSIS — G40.211 LOCALIZATION-RELATED SYMPTOMATIC EPILEPSY AND EPILEPTIC SYNDROMES WITH COMPLEX PARTIAL SEIZURES, INTRACTABLE, WITH STATUS EPILEPTICUS (H): ICD-10-CM

## 2021-11-16 RX ORDER — OXCARBAZEPINE 300 MG/1
TABLET, FILM COATED ORAL
Qty: 210 TABLET | Refills: 11 | Status: SHIPPED | OUTPATIENT
Start: 2021-11-16 | End: 2022-11-28

## 2021-11-16 RX ORDER — CLONAZEPAM 1 MG/1
1.5 TABLET ORAL AT BEDTIME
Qty: 45 TABLET | Refills: 5 | Status: SHIPPED | OUTPATIENT
Start: 2021-11-16 | End: 2022-04-13

## 2021-11-16 RX ORDER — LEVETIRACETAM 750 MG/1
TABLET ORAL
Qty: 135 TABLET | Refills: 11 | Status: SHIPPED | OUTPATIENT
Start: 2021-11-16 | End: 2022-11-23

## 2021-11-16 ASSESSMENT — PAIN SCALES - GENERAL: PAINLEVEL: NO PAIN (0)

## 2021-11-16 NOTE — PATIENT INSTRUCTIONS
New antiseizure medication is cenobamate (Xcopri).    It is a very powerful medication but has to be started very slowly.  There have been rare reports of Murillo Timothy Syndrome, a severe rash; this appears to occur when the medication is started too quickly.

## 2021-11-16 NOTE — LETTER
2021     RE: Adia Briceno  : 1955   MRN: [id number]      Dear Colleague,    Thank you for referring your patient, Adia Briceno, to the [facility] at [facility]. Please see a copy of my visit note below.    Preventive Care:    Colorectal Cancer Screening: During our visit today, we discussed that it is recommended you receive colorectal cancer screening. Please call or make an appointment with your primary care provider to discuss this. You may also call the [facility] scheduling line ([phone]) to set up a colonoscopy appointment.    [facility] Progress Note    Patient:  Adia Briceno  :  1955   Age:  66 year old   Today's Office Visit:  2021    Epilepsy Data:    Patient History  Primary Epileptologist/Provider: Noam Barboza M.D.  Epilepsy Syndrome: Localization-related epilepsy unspecified  Epilepsy Syndrome Status: Final  Age of Onset: 10  Other Relevant Dx/ Issues: Inpatient evaluation 1998.  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  Asystole  with subsequent pacemaker placement.  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 Seizure-related falls/injuries.  is endocrinologist.     Tests/Surgery History  Last EE2005  Last MRI: 2008  Last Neuropsych Testin2008  Last Case Management Conference: 2008  Epilepsy Surgery #1 Date: 08  Epilepsy Related Surgery #1 : Type: VNS placement    Seizure Record  Current Visit Date: 21  Previous Visit Date: 21  Months since last visit: 4.37  Seizure Type 1: Complex partial seizures unspecified  Description of Sz Type 1: Head slumps, amnesia  Seizure Type 2: Complex partial seizures with impairment of consciousness at onset  Description of Sz Type 2: Head slumps with collapse  # of Type 2 Seizure since last visit: 8  Freq. Type 2 / Month: 1.83  Seizure Type 3: Simple partial seizures  unspecified  Description of Sz Type 3: strange feeling in her head, 30-60 seconds  # of Type 3 Seizure since last visit: 6  Freq. 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 Left more than right. All AEDs other than barbiturates, felbatol, ethetoin (but serum sickness w PHT), brivaracetam  have been used. Seizures have worsened following 2007;previously would go up to 3 months without sz but after 2007 baseline is three sz per month.  mg per did not help and poorly tolerated. VNS increased to rapid cycling. Asystole detected April 2011 not thought due to epilepsy or VNS tho this was never demonstrated; pacemaker implanted. She collapses with some seizures and has continued collapsing after pacemaker was placed. Perampanel added with seizure improvement but moderate doses resulted in significant ataxia; this improve on 2 mg QHS which also helped with insomnia. With brief seizure recurrence fycompa increased to 4 mg. Seizures much better following Mar 2015 for no particular reason, VNS reached EOS Sep 2016 without significant worsening.    History of Present Illness:     She presents with seizure calendar as usual. Seizure numbers as above.  Comparison to previous frequencies in synopsis view indicate stable seizure frequency over last four years or so.  She had a period of seizure freedom mid 2015 to mid 2016 on similar AEDs; subsequent escalation of AEDs did not help much.  Two falls, not while standing, one may have been related to seizures.    Current Outpatient Medications   Medication Sig Dispense Refill     acetaZOLAMIDE (DIAMOX) 250 MG tablet Take one to three times per day during seizure clusters 30 tablet 3     Cetirizine HCl (ZYRTEC PO) daily.       Cholecalciferol (VITAMIN D) 2000 UNITS CAPS daily.       clonazePAM (KLONOPIN) 1 MG tablet Take 1.5 tablets (1.5 mg) by mouth At Bedtime For more refills,schedule an appointment at  [phone] 45 tablet 2     diazepam (DIAZEPAM INTENSOL) 5 MG/ML (HIGH CONC) solution Administer 1 ml as needed into cheek pouch for seizures 30 mL 0     escitalopram (LEXAPRO) 10 MG tablet Take 1 tablet (10 mg) by mouth At Bedtime For more refills,schedule an appointment at [phone] 30 tablet 0     escitalopram (LEXAPRO) 5 MG tablet Take 1 tablet (5 mg) by mouth daily For more refills,schedule an appointment at [phone] 30 tablet 2     esomeprazole (NEXIUM) 40 MG capsule Take by mouth every morning (before breakfast) Take 30-60 minutes before eating.       levETIRAcetam (KEPPRA) 750 MG tablet TAKE 1 TABLET BY MOUTH THREE TIMES DAILY AND TAKE 1 1/2 TABLETS EVERY NIGHT AT BEDTIME(TOTAL 3375MG PER DAY). 135 tablet 11     OXcarbazepine (TRILEPTAL) 300 MG tablet TAKE ONE AND ONE-HALF TABLET IN THE MORNING, ONE AND ONE-HALF TABLET AT NOON, 2 TABLETS IN THE EVENING AND 2 TABLETS AT BEDTIME 210 tablet 11     oxybutynin ER (DITROPAN-XL) 10 MG 24 hr tablet Take 10 mg by mouth       perampanel (FYCOMPA) 6 MG tablet Take 1 tablet (6 mg) by mouth At Bedtime 31 tablet 5     progesterone (PROMETRIUM) 100 MG capsule 100 mg daily        SUMAtriptan (IMITREX) 100 MG tablet Take one by mouth prn severe headache. No more than four per month. 9 tablet 0     topiramate (TOPAMAX) 100 MG tablet Take one half in AM and one in PM (total 150 mg per day) 48 tablet 11     topiramate (TOPAMAX) 100 MG tablet Take one half at lunch time and one tab at dinnertime 46 tablet 11      Medication Notes:    No new side effects.    AED Medication Compliance:  compliant all of the time  Using a pill box:  Yes    Review of Systems:  No vomiting, diarrhea, fevers, hematuria or kidney stones.  She denies diplopia, shortness of breath or easy fatiguability.  She denies dysphagia/  Have you experienced a traumatic fall since your last visit: YES  Are these falls related to your seizures: possibly, see above.    Other Issues:    Migraines are the same.  Uses one to two imitrex per month at the most.  Mood was doing well until learned that daughter will be moving to [place]. Grand children diagnosed with PANDAS.   PHQ 2 = 0 today. Denies suicidal ideation.  Insomnia doing tolerable well. Uses clonopin 1.25, sometimes 1.5 at night. Sleeps through night pretty much.    No family history of dysphagia or ptosis.    Is patient safe to drive:  No     Exam:    /68 (BP Location: Left arm, Patient Position: Sitting, Cuff Size: Adult Regular)   Pulse 74   Temp 97.3  F (36.3  C) (Temporal)   Wt 181 lb 12.8 oz (82.5 kg)   Breastfeeding No   BMI 29.34 kg/m       Wt Readings from Last 5 Encounters:   11/16/21 181 lb 12.8 oz (82.5 kg)   07/14/21 181 lb 11.2 oz (82.4 kg)   07/06/21 180 lb 12.8 oz (82 kg)   10/28/20 171 lb 6.4 oz (77.7 kg)   02/05/20 171 lb 6.4 oz (77.7 kg)     Normal gait. Romberg negative. Bilateral ptosis. With upgaze and encouragment palpebral fissure increases in size.  Repeated upgaze and wide opening of eyes does not worsen ptosis. Smile symmetrical. Tongue midline. Jaw strength is good. Neck flexors and extensors full.  Proximal strength full in arms and legs. DTR symmetrical. No drift, pronation, or tremor. FFN is done well.    Reviewed labs from 's office. Sodium Jul 2021 = 139. CBC Sep 2021 nl including WBC and platelets. I cannot find any labs other than carbamazepine level which appears to have been obtained in error (she is treated with oxcarbazepine which has some cross-reactivity with the assay).    IMPRESSION   1) Focal seizures, intractable; focal aware, focal aware to focal impaired seizures. Some focal impaired seizures associated with falls and occasionally with trama. Independent bitemporal likely neocortical onset by scalp EEG. Improvement starting approxmiately March 2015 led to almost 1 3/4 years of  freedom from focal impaired seizures with falls. Fycompa and levetiracetam appeared to play important roles in this period of  seizure freedom but I thought that completing menopause may have been important. There was some indication that focal unimpaired seizures may have worsened with reduction of TPM. She is refractory to all AEDs other than barbiturates, felbamate, CLB off label, and cenobamate. Seizures wax and wane. Over last year number about the same but she continues with longer seizure free periods, up to two months in duration. Most recently we have been able to increase perampanel further. Acetazolamide appears to help when used PRN.  2) Migraine headaches; topiramate thought to have helped; under good control.  3) Asystole; likely primary and not related to seizures or VNS tho this has never been formally examined; has pacemaker in place.   4) VNS at end of service. Course indicates that VNS was not playing much role in seizure control as seizures continued doing well tho VNS at EOS for almost two years.  5) Depression, continues doing well after increase of escitalopram to 15 mg per day. Therapy also helpful.   6) Mild hyponatremia in past likely related to oxcarbazpine and escitalopram. Normal recently.     DISCUSSION  We again discussed remaining alternatives and they again do not think that her situation is severe enough to warrant risks associated with new AED or more aggressive neuromodulation options. We especially discussed cenobamate today which would be the next move. Provided some information in AVS.     PLAN:   1) Same OXC, levetiracetam, topiramate, and micronized progesterone. She can continue using acetazolamide up to 250 mg po every four hours during periods of seizure exacerbation. Continue with fycompa to 6 mg at bedtime.  2) Continue escitalopram at current dose, it appears associated with sustained response.  3) Other AED options as above. As she is uncomfortable with felbatol,  cenobamate would probably be best choice, perhaps replacing levetiracetam. We could also consider replacing levetiracetam with  brivaracetam or use eslicarbazpine instead of oxcarbazepine if formulary will allow. RNS should be a serious consideration if seizures with falls return and become more frequent. If RNS placed she would have three implanted devices which could also raise concern. Neither she nor her  were excited about this possibility when we last reviewed.  4) RTC 4 months.     Total time 30 minutes. Additional 5 minutes before clinic reviewing chart. Additional 7 minutes generating note following visit. Total 42 minutes today.     Noam Barboza MD

## 2021-11-16 NOTE — PROGRESS NOTES
Preventive Care:    Colorectal Cancer Screening: During our visit today, we discussed that it is recommended you receive colorectal cancer screening. Please call or make an appointment with your primary care provider to discuss this. You may also call the  [employer] scheduling line ([phone]) to set up a colonoscopy appointment.    [facility] Epilepsy Care Progress Note      Patient:  Adia Briceno  :  1955   Age:  66 year old   Today's Office Visit:  2021    Epilepsy Data:    Patient History  Primary Epileptologist/Provider: Noam Barboza M.D.  Epilepsy Syndrome: Localization-related epilepsy unspecified  Epilepsy Syndrome Status: Final  Age of Onset: 10  Other Relevant Dx/ Issues: Inpatient evaluation 1998.  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  Asystole  with subsequent pacemaker placement.  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 Seizure-related falls/injuries.  is endocrinologist.     Tests/Surgery History  Last EE2005  Last MRI: 2008  Last Neuropsych Testin2008  Last Case Management Conference: 2008  Epilepsy Surgery #1 Date: 08  Epilepsy Related Surgery #1 : Type: VNS placement    Seizure Record  Current Visit Date: 21  Previous Visit Date: 21  Months since last visit: 4.37  Seizure Type 1: Complex partial seizures unspecified  Description of Sz Type 1: Head slumps, amnesia  Seizure Type 2: Complex partial seizures with impairment of consciousness at onset  Description of Sz Type 2: Head slumps with collapse  # of Type 2 Seizure since last visit: 8  Freq. Type 2 / Month: 1.83  Seizure Type 3: Simple partial seizures unspecified  Description of Sz Type 3: strange feeling in her head, 30-60 seconds  # of Type 3 Seizure since last visit: 6  Freq. 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 Left more than right. All AEDs other than  barbiturates, felbatol, ethetoin (but serum sickness w PHT), brivaracetam  have been used. Seizures have worsened following 2007;previously would go up to 3 months without sz but after 2007 baseline is three sz per month.  mg per did not help and poorly tolerated. VNS increased to rapid cycling. Asystole detected April 2011 not thought due to epilepsy or VNS tho this was never demonstrated; pacemaker implanted. She collapses with some seizures and has continued collapsing after pacemaker was placed. Perampanel added with seizure improvement but moderate doses resulted in significant ataxia; this improve on 2 mg QHS which also helped with insomnia. With brief seizure recurrence fycompa increased to 4 mg. Seizures much better following Mar 2015 for no particular reason, VNS reached EOS Sep 2016 without significant worsening.    History of Present Illness:     She presents with seizure calendar as usual. Seizure numbers as above.  Comparison to previous frequencies in synopsis view indicate stable seizure frequency over last four years or so.  She had a period of seizure freedom mid 2015 to mid 2016 on similar AEDs; subsequent escalation of AEDs did not help much.  Two falls, not while standing, one may have been related to seizures.      Current Outpatient Medications   Medication Sig Dispense Refill     acetaZOLAMIDE (DIAMOX) 250 MG tablet Take one to three times per day during seizure clusters 30 tablet 3     Cetirizine HCl (ZYRTEC PO) daily.       Cholecalciferol (VITAMIN D) 2000 UNITS CAPS daily.       clonazePAM (KLONOPIN) 1 MG tablet Take 1.5 tablets (1.5 mg) by mouth At Bedtime For more refills,schedule an appointment at [phone] 45 tablet 2     diazepam (DIAZEPAM INTENSOL) 5 MG/ML (HIGH CONC) solution Administer 1 ml as needed into cheek pouch for seizures 30 mL 0     escitalopram (LEXAPRO) 10 MG tablet Take 1 tablet (10 mg) by mouth At Bedtime For more refills,schedule an appointment at  [phone] 30 tablet 0     escitalopram (LEXAPRO) 5 MG tablet Take 1 tablet (5 mg) by mouth daily For more refills,schedule an appointment at [phone] 30 tablet 2     esomeprazole (NEXIUM) 40 MG capsule Take by mouth every morning (before breakfast) Take 30-60 minutes before eating.       levETIRAcetam (KEPPRA) 750 MG tablet TAKE 1 TABLET BY MOUTH THREE TIMES DAILY AND TAKE 1 1/2 TABLETS EVERY NIGHT AT BEDTIME(TOTAL 3375MG PER DAY). 135 tablet 11     OXcarbazepine (TRILEPTAL) 300 MG tablet TAKE ONE AND ONE-HALF TABLET IN THE MORNING, ONE AND ONE-HALF TABLET AT NOON, 2 TABLETS IN THE EVENING AND 2 TABLETS AT BEDTIME 210 tablet 11     oxybutynin ER (DITROPAN-XL) 10 MG 24 hr tablet Take 10 mg by mouth       perampanel (FYCOMPA) 6 MG tablet Take 1 tablet (6 mg) by mouth At Bedtime 31 tablet 5     progesterone (PROMETRIUM) 100 MG capsule 100 mg daily        SUMAtriptan (IMITREX) 100 MG tablet Take one by mouth prn severe headache. No more than four per month. 9 tablet 0     topiramate (TOPAMAX) 100 MG tablet Take one half in AM and one in PM (total 150 mg per day) 48 tablet 11     topiramate (TOPAMAX) 100 MG tablet Take one half at lunch time and one tab at dinnertime 46 tablet 11        Medication Notes:    No new side effects.    AED Medication Compliance:  compliant all of the time  Using a pill box:  Yes    Review of Systems:  No vomiting, diarrhea, fevers, hematuria or kidney stones.  She denies diplopia, shortness of breath or easy fatiguability.  She denies dysphagia/  Have you experienced a traumatic fall since your last visit: YES  Are these falls related to your seizures: possibly, see above.    Other Issues:    Migraines are the same. Uses one to two imitrex per month at the most.  Mood was doing well until learned that daughter will be moving to [place]. Grand children diagnosed with PANDAS.   PHQ 2 = 0 today. Denies suicidal ideation.  Insomnia doing tolerable well. Uses clonopin 1.25, sometimes 1.5 at  night. Sleeps through night pretty much.    No family history of dysphagia or ptosis.    Is patient safe to drive:  No     Exam:    /68 (BP Location: Left arm, Patient Position: Sitting, Cuff Size: Adult Regular)   Pulse 74   Temp 97.3  F (36.3  C) (Temporal)   Wt 181 lb 12.8 oz (82.5 kg)   Breastfeeding No   BMI 29.34 kg/m       Wt Readings from Last 5 Encounters:   11/16/21 181 lb 12.8 oz (82.5 kg)   07/14/21 181 lb 11.2 oz (82.4 kg)   07/06/21 180 lb 12.8 oz (82 kg)   10/28/20 171 lb 6.4 oz (77.7 kg)   02/05/20 171 lb 6.4 oz (77.7 kg)     Normal gait. Romberg negative. Bilateral ptosis. With upgaze and encouragment palpebral fissure increases in size.  Repeated upgaze and wide opening of eyes does not worsen ptosis. Smile symmetrical. Tongue midline. Jaw strength is good. Neck flexors and extensors full.  Proximal strength full in arms and legs. DTR symmetrical. No drift, pronation, or tremor. FFN is done well.    Reviewed labs from 's office. Sodium Jul 2021 = 139. CBC Sep 2021 nl including WBC and platelets. I cannot find any labs other than carbamazepine level which appears to have been obtained in error (she is treated with oxcarbazepine which has some cross-reactivity with the assay).    IMPRESSION   1) Focal seizures, intractable; focal aware, focal aware to focal impaired seizures. Some focal impaired seizures associated with falls and occasionally with trama. Independent bitemporal likely neocortical onset by scalp EEG. Improvement starting approxmiately March 2015 led to almost 1 3/4 years of  freedom from focal impaired seizures with falls. Fycompa and levetiracetam appeared to play important roles in this period of seizure freedom but I thought that completing menopause may have been important. There was some indication that focal unimpaired seizures may have worsened with reduction of TPM. She is refractory to all AEDs other than barbiturates, felbamate, CLB off label, and  cenobamate. Seizures wax and wane. Over last year number about the same but she continues with longer seizure free periods, up to two months in duration. Most recently we have been able to increase perampanel further. Acetazolamide appears to help when used PRN.  2) Migraine headaches; topiramate thought to have helped; under good control.  3) Asystole; likely primary and not related to seizures or VNS tho this has never been formally examined; has pacemaker in place.   4) VNS at end of service. Course indicates that VNS was not playing much role in seizure control as seizures continued doing well tho VNS at EOS for almost two years.  5) Depression, continues doing well after increase of escitalopram to 15 mg per day. Therapy also helpful.   6) Mild hyponatremia in past likely related to oxcarbazpine and escitalopram. Normal recently.     DISCUSSION  We again discussed remaining alternatives and they again do not think that her situation is severe enough to warrant risks associated with new AED or more aggressive neuromodulation options. We especially discussed cenobamate today which would be the next move. Provided some information in AVS.     PLAN:   1) Same OXC, levetiracetam, topiramate, and micronized progesterone. She can continue using acetazolamide up to 250 mg po every four hours during periods of seizure exacerbation. Continue with fycompa to 6 mg at bedtime.  2) Continue escitalopram at current dose, it appears associated with sustained response.  3) Other AED options as above. As she is uncomfortable with felbatol,  cenobamate would probably be best choice, perhaps replacing levetiracetam. We could also consider replacing levetiracetam with brivaracetam or use eslicarbazpine instead of oxcarbazepine if formulary will allow. RNS should be a serious consideration if seizures with falls return and become more frequent. If RNS placed she would have three implanted devices which could also raise concern.  Neither she nor her  were excited about this possibility when we last reviewed.  4) RTC 4 months.       Total time 30 minutes. Additional 5 minutes before clinic reviewing chart. Additional 7 minutes generating note following visit. Total 42 minutes today.     Noam Barboza MD

## 2021-11-21 DIAGNOSIS — G40.211 LOCALZ-RLTED SYMPTOMATIC EPILEPSY W CMPLX PARTL SZ, INTRACT, W STATUS (H): ICD-10-CM

## 2021-11-23 RX ORDER — OXCARBAZEPINE 300 MG/1
TABLET, FILM COATED ORAL
Qty: 210 TABLET | Refills: 11 | OUTPATIENT
Start: 2021-11-23

## 2021-11-30 DIAGNOSIS — F43.21 ADJUSTMENT DISORDER WITH DEPRESSED MOOD: ICD-10-CM

## 2021-12-01 NOTE — TELEPHONE ENCOUNTER
Pt calling about rx, was given 30 tablets of Clonazepam instead of 45, and is out of escitalopram 5mg. Please call back.

## 2021-12-01 NOTE — TELEPHONE ENCOUNTER
escitalopram (LEXAPRO) 5 MG tablet  Last Written Prescription Date:  8/26/2021  Last Fill Quantity: 30,   # refills: 2  Last Office Visit : 11/16/2021  Future Office visit:  None  Provider since Pt is taking 15 Mg's daily.  Would you like the order to mention something about taking 15 Mg's a day.    There are 2 different doses in the chart.   One is 10 Mg's the other is 5 Mg's.   But neither one mentions taking together to equal 15 Mg's a day.   Please advise and send Per Providers recommendations for pt care.     11/16/2021 Visit noted  5) Depression, continues doing well after increase of escitalopram to 15 mg per day. Therapy also helpful.    Continue escitalopram at current dose, it appears associated with sustained response    MD Rachel Winslow RN  Central Triage Red Flags/Med Refills

## 2021-12-02 RX ORDER — ESCITALOPRAM OXALATE 5 MG/1
5 TABLET ORAL DAILY
Qty: 90 TABLET | Refills: 3 | Status: SHIPPED | OUTPATIENT
Start: 2021-12-02 | End: 2022-01-27

## 2022-01-20 ENCOUNTER — TELEPHONE (OUTPATIENT)
Dept: NEUROLOGY | Facility: CLINIC | Age: 67
End: 2022-01-20
Payer: MEDICARE

## 2022-01-27 DIAGNOSIS — F43.21 ADJUSTMENT DISORDER WITH DEPRESSED MOOD: ICD-10-CM

## 2022-01-27 RX ORDER — ESCITALOPRAM OXALATE 5 MG/1
5 TABLET ORAL DAILY
Qty: 30 TABLET | Refills: 2 | Status: SHIPPED | OUTPATIENT
Start: 2022-01-27 | End: 2022-12-21

## 2022-01-27 RX ORDER — ESCITALOPRAM OXALATE 10 MG/1
10 TABLET ORAL AT BEDTIME
Qty: 30 TABLET | Refills: 2 | Status: SHIPPED | OUTPATIENT
Start: 2022-01-27 | End: 2022-04-25

## 2022-04-13 ENCOUNTER — OFFICE VISIT (OUTPATIENT)
Dept: NEUROLOGY | Facility: CLINIC | Age: 67
End: 2022-04-13
Payer: MEDICARE

## 2022-04-13 VITALS
TEMPERATURE: 97.1 F | BODY MASS INDEX: 29.12 KG/M2 | DIASTOLIC BLOOD PRESSURE: 100 MMHG | HEART RATE: 73 BPM | HEIGHT: 66 IN | WEIGHT: 181.2 LBS | SYSTOLIC BLOOD PRESSURE: 174 MMHG

## 2022-04-13 DIAGNOSIS — G40.211 LOCALIZATION-RELATED SYMPTOMATIC EPILEPSY AND EPILEPTIC SYNDROMES WITH COMPLEX PARTIAL SEIZURES, INTRACTABLE, WITH STATUS EPILEPTICUS (H): ICD-10-CM

## 2022-04-13 DIAGNOSIS — G40.211 LOCALZ-RLTED SYMPTOMATIC EPILEPSY W CMPLX PARTL SZ, INTRACT, W STATUS (H): ICD-10-CM

## 2022-04-13 DIAGNOSIS — G40.219 PARTIAL SYMPTOMATIC EPILEPSY WITH COMPLEX PARTIAL SEIZURES, INTRACTABLE, WITHOUT STATUS EPILEPTICUS (H): ICD-10-CM

## 2022-04-13 LAB — SODIUM SERPL-SCNC: 137 MMOL/L (ref 133–144)

## 2022-04-13 PROCEDURE — 80339 ANTIEPILEPTICS NOS 1-3: CPT | Performed by: PSYCHIATRY & NEUROLOGY

## 2022-04-13 PROCEDURE — 80183 DRUG SCRN QUANT OXCARBAZEPIN: CPT | Performed by: PSYCHIATRY & NEUROLOGY

## 2022-04-13 PROCEDURE — 84295 ASSAY OF SERUM SODIUM: CPT | Performed by: PSYCHIATRY & NEUROLOGY

## 2022-04-13 PROCEDURE — 80201 ASSAY OF TOPIRAMATE: CPT | Performed by: PSYCHIATRY & NEUROLOGY

## 2022-04-13 PROCEDURE — G0480 DRUG TEST DEF 1-7 CLASSES: HCPCS | Mod: GZ | Performed by: PSYCHIATRY & NEUROLOGY

## 2022-04-13 PROCEDURE — 84999 UNLISTED CHEMISTRY PROCEDURE: CPT | Mod: GZ | Performed by: PSYCHIATRY & NEUROLOGY

## 2022-04-13 PROCEDURE — 80177 DRUG SCRN QUAN LEVETIRACETAM: CPT | Performed by: PSYCHIATRY & NEUROLOGY

## 2022-04-13 PROCEDURE — 84999 UNLISTED CHEMISTRY PROCEDURE: CPT | Performed by: PSYCHIATRY & NEUROLOGY

## 2022-04-13 RX ORDER — CLONAZEPAM 1 MG/1
1.5 TABLET ORAL AT BEDTIME
Qty: 45 TABLET | Refills: 5 | Status: SHIPPED | OUTPATIENT
Start: 2022-04-13 | End: 2022-10-18

## 2022-04-13 NOTE — PROGRESS NOTES
[facility] Epilepsy Care Progress Note      Patient:  Adia Briceno  :  1955   Age:  66 year old   Today's Office Visit:  2022    Epilepsy Data:    Patient History  Primary Epileptologist/Provider: Noam Barboza M.D.  Epilepsy Syndrome: Localization-related epilepsy unspecified  Epilepsy Syndrome Status: Final  Age of Onset: 10  Other Relevant Dx/ Issues: Inpatient evaluation 1998.  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  Asystole  with subsequent pacemaker placement.  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 Seizure-related falls/injuries.  is endocrinologist.     Tests/Surgery History  Last EE2005  Last MRI: 2008  Last Neuropsych Testin2008  Last Case Management Conference: 2008  Epilepsy Surgery #1 Date: 08  Epilepsy Related Surgery #1 : Type: VNS placement    Seizure Record  Current Visit Date: 22  Previous Visit Date: 21  Months since last visit: 4.86  Seizure Type 1: Complex partial seizures unspecified  Description of Sz Type 1: Head slumps, amnesia  Seizure Type 2: Complex partial seizures with impairment of consciousness at onset  Description of Sz Type 2: Head slumps with collapse  # of Type 2 Seizure since last visit: 0  Freq. Type 2 / Month: 0  Seizure Type 3: Simple partial seizures unspecified  Description of Sz Type 3: strange feeling in her head, 30-60 seconds  # of Type 3 Seizure since last visit: 30  Freq. 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 Left more than right. All AEDs other than barbiturates, felbatol, ethetoin (but serum sickness w PHT), brivaracetam  have been used. Seizures have worsened following ;previously would go up to 3 months without sz but after  baseline increased to three sz per month.  mg per did not help and poorly tolerated. VNS increased to rapid cycling. Asystole detected April  2011 not thought due to epilepsy or VNS tho this was never demonstrated; pacemaker implanted. She collapses with some seizures and has continued collapsing after pacemaker was placed. Perampanel added with seizure improvement but moderate doses resulted in significant ataxia; this improve on 2 mg QHS which also helped with insomnia. With brief seizure recurrence fycompa increased to 4 mg. Seizures much better following Mar 2015 for no particular reason, VNS reached EOS Sep 2016 without significant worsening.    History of Present Illness:   She has not had any spells of amnesia or impairment.  Reports feeling seizure prone or having minor auras. These occur in clusters and she wrote down at least 30 such spells in her seizure calendar.  Feels that acetazolamide prn is helpful. She takes up to 500 mg per day.  Feels that acetazolamide increases alertness and she has difficulty falling asleep when she requires 500 mg per day. Also causes flushing.    Current Outpatient Medications   Medication Sig Dispense Refill     acetaZOLAMIDE (DIAMOX) 250 MG tablet Take one to three times per day during seizure clusters 30 tablet 3     Cetirizine HCl (ZYRTEC PO) daily.       Cholecalciferol (VITAMIN D) 2000 UNITS CAPS daily.       clonazePAM (KLONOPIN) 1 MG tablet Take 1.5 tablets (1.5 mg) by mouth At Bedtime For more refills,schedule an appointment at [phone] 45 tablet 5     diazepam (DIAZEPAM INTENSOL) 5 MG/ML (HIGH CONC) solution Administer 1 ml as needed into cheek pouch for seizures 30 mL 0     escitalopram (LEXAPRO) 10 MG tablet Take 1 tablet (10 mg) by mouth At Bedtime For more refills,schedule an appointment at [phone] 30 tablet 2     escitalopram (LEXAPRO) 5 MG tablet Take 1 tablet (5 mg) by mouth daily 30 tablet 2     esomeprazole (NEXIUM) 40 MG DR capsule Take by mouth every morning (before breakfast) Take 30-60 minutes before eating.       levETIRAcetam (KEPPRA) 750 MG tablet TAKE 1 TABLET BY MOUTH THREE  "TIMES DAILY AND TAKE 1 1/2 TABLETS EVERY NIGHT AT BEDTIME(TOTAL 3375MG PER DAY). 135 tablet 11     OXcarbazepine (TRILEPTAL) 300 MG tablet TAKE ONE AND ONE-HALF TABLET IN THE MORNING, ONE AND ONE-HALF TABLET AT NOON, 2 TABLETS IN THE EVENING AND 2 TABLETS AT BEDTIME 210 tablet 11     oxybutynin ER (DITROPAN-XL) 10 MG 24 hr tablet Take 10 mg by mouth       perampanel (FYCOMPA) 6 MG tablet Take 1 tablet (6 mg) by mouth At Bedtime 31 tablet 5     progesterone (PROMETRIUM) 100 MG capsule 100 mg daily        SUMAtriptan (IMITREX) 100 MG tablet Take one by mouth prn severe headache. No more than four per month. 9 tablet 0     topiramate (TOPAMAX) 100 MG tablet Take one half in AM and one in PM (total 150 mg per day) 48 tablet 11        Perceived AED Side Effects:  Yes ; flushing with acetazolamide.    Medication Notes:    Taking TPM 50 at lunch at 100 mg at bedtime.    AED Medication Compliance:  compliant all of the time  Using a pill box:  Yes    Review of Systems:  No vomiting, diarrhea, fevers, hematuria or kidney stones.  Have you experienced a traumatic fall since your last visit: NO  Are these falls related to your seizures:  Not Applicable      Other Issues:    Still taking prometria. OB is prescribing these for her.  Headaches overall doing well. Uses imitrexa t most once per month, still effective.  Heart issues OK  Hoping to go to [facility] last few days of April, to be gone for four days.  Is patient safe to drive:  No    Woman Care:   Patient is:  Postmenopausal.      Exam:    BP (!) 174/100   Pulse 73   Temp 97.1  F (36.2  C) (Temporal)   Ht 5' 6\" (167.6 cm)   Wt 181 lb 3.2 oz (82.2 kg)   BMI 29.25 kg/m       Wt Readings from Last 5 Encounters:   04/13/22 181 lb 3.2 oz (82.2 kg)   11/16/21 181 lb 12.8 oz (82.5 kg)   07/14/21 181 lb 11.2 oz (82.4 kg)   07/06/21 180 lb 12.8 oz (82 kg)   10/28/20 171 lb 6.4 oz (77.7 kg)     Repeat /88. Heart exam without murmur.  Normal gait. Romberg negative. " Bilateral ptosis. With upgaze and encouragment palpebral fissure increases in size.  Repeated upgaze and wide opening of eyes does not worsen ptosis. Smile symmetrical. Tongue midline. Jaw strength is good. Neck flexors and extensors full.  Proximal strength full in arms and legs. No drift, pronation, or tremor. FFN is done well.    IMPRESSION   1) Focal seizures, intractable; focal aware, focal aware to focal impaired seizures. Some focal impaired seizures associated with falls and occasionally with trama. Independent bitemporal likely neocortical onset by scalp EEG. Improvement starting approxmiately March 2015 led to almost 1 3/4 years of  freedom from focal impaired seizures with falls. Fycompa and levetiracetam appeared to play important roles in this period of seizure freedom but I thought that completing menopause may have been important. There was some indication that focal unimpaired seizures may have worsened with reduction of TPM. She is refractory to all AEDs other than barbiturates, felbamate, CLB off label, and cenobamate. Seizures wax and wane. Over last year number about the same but she continues with longer seizure free periods, up to two months in duration. Most recently we have been able to increase perampanel further. Acetazolamide appears to help when used PRN and now more aggressive use appears to have led to a almost five months without focal impaired seizures.  2) Migraine headaches; topiramate thought to have helped; under good control.  3) Asystole; likely primary and not related to seizures or VNS tho this has never been formally examined; has pacemaker in place.   4) Depression, continues doing well after increase of escitalopram to 15 mg per day. Therapy also helpful.   5) Mild hyponatremia in past likely related to oxcarbazpine and escitalopram. Normal recently.     DISCUSSION  We again discussed remaining AED alternatives and she again does not think that her situation is severe  enough to warrant risks associated with new AED or more aggressive neuromodulation options.     PLAN:   1) Same OXC, levetiracetam, topiramate, and micronized progesterone. She can continue using acetazolamide up to 250 mg po every four hours during periods of seizure exacerbation. Continue with fycompa to 6 mg at bedtime.   2) Continue escitalopram at current dose, it appears associated with sustained response.  3) Other AED options as above. Optimizing perampanel would be reasonable next choice if she can tolerate. As she is uncomfortable with felbatol,  cenobamate would probably be best choice, perhaps replacing levetiracetam. We could also consider replacing levetiracetam with brivaracetam or use eslicarbazpine instead of oxcarbazepine if formulary will allow. RNS should be a serious consideration if seizures with falls return and become more frequent. If RNS placed she would have three implanted devices which could also raise concern. Neither she nor her  were excited about this possibility when we last reviewed.  4) RTC 4 months.  5) AED levels today to confirm compliance, rule out toxicity. Included perampanel level. Sodium to rule out toxicity.        Total time 27 minutes. Additional 5 minutes before clinic reviewing chart. Additional 7 minutes generating note following visit. Total 39 minutes today.     Noam Barboza MD

## 2022-04-13 NOTE — LETTER
2022       RE: Adia Briceno  : 1955   MRN: [id number]      Dear Colleague,    Thank you for referring your patient, Adia Briceno, to the [facility] at [facility]. Please see a copy of my visit note below.    [facility] Progress Note      Patient:  Adia Briceno  :  1955   Age:  66 year old   Today's Office Visit:  2022    Epilepsy Data:    Patient History  Primary Epileptologist/Provider: Noam Barboza M.D.  Epilepsy Syndrome: Localization-related epilepsy unspecified  Epilepsy Syndrome Status: Final  Age of Onset: 10  Other Relevant Dx/ Issues: Inpatient evaluation 1998.  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  Asystole  with subsequent pacemaker placement.  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 Seizure-related falls/injuries.  is endocrinologist.     Tests/Surgery History  Last EE2005  Last MRI: 2008  Last Neuropsych Testin2008  Last Case Management Conference: 2008  Epilepsy Surgery #1 Date: 08  Epilepsy Related Surgery #1 : Type: VNS placement    Seizure Record  Current Visit Date: 22  Previous Visit Date: 21  Months since last visit: 4.86  Seizure Type 1: Complex partial seizures unspecified  Description of Sz Type 1: Head slumps, amnesia  Seizure Type 2: Complex partial seizures with impairment of consciousness at onset  Description of Sz Type 2: Head slumps with collapse  # of Type 2 Seizure since last visit: 0  Freq. Type 2 / Month: 0  Seizure Type 3: Simple partial seizures unspecified  Description of Sz Type 3: strange feeling in her head, 30-60 seconds  # of Type 3 Seizure since last visit: 30  Freq. 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 Left more than right. All AEDs other than barbiturates, felbatol, ethetoin (but serum sickness w  PHT), brivaracetam  have been used. Seizures have worsened following 2007;previously would go up to 3 months without sz but after 2007 baseline increased to three sz per month.  mg per did not help and poorly tolerated. VNS increased to rapid cycling. Asystole detected April 2011 not thought due to epilepsy or VNS tho this was never demonstrated; pacemaker implanted. She collapses with some seizures and has continued collapsing after pacemaker was placed. Perampanel added with seizure improvement but moderate doses resulted in significant ataxia; this improve on 2 mg QHS which also helped with insomnia. With brief seizure recurrence fycompa increased to 4 mg. Seizures much better following Mar 2015 for no particular reason, VNS reached EOS Sep 2016 without significant worsening.    History of Present Illness:   She has not had any spells of amnesia or impairment.  Reports feeling seizure prone or having minor auras. These occur in clusters and she wrote down at least 30 such spells in her seizure calendar.  Feels that acetazolamide prn is helpful. She takes up to 500 mg per day.  Feels that acetazolamide increases alertness and she has difficulty falling asleep when she requires 500 mg per day. Also causes flushing.    Current Outpatient Medications   Medication Sig Dispense Refill     acetaZOLAMIDE (DIAMOX) 250 MG tablet Take one to three times per day during seizure clusters 30 tablet 3     Cetirizine HCl (ZYRTEC PO) daily.       Cholecalciferol (VITAMIN D) 2000 UNITS CAPS daily.       clonazePAM (KLONOPIN) 1 MG tablet Take 1.5 tablets (1.5 mg) by mouth At Bedtime For more refills,schedule an appointment at [phone] 45 tablet 5     diazepam (DIAZEPAM INTENSOL) 5 MG/ML (HIGH CONC) solution Administer 1 ml as needed into cheek pouch for seizures 30 mL 0     escitalopram (LEXAPRO) 10 MG tablet Take 1 tablet (10 mg) by mouth At Bedtime For more refills,schedule an appointment at [phone] 30 tablet 2      "escitalopram (LEXAPRO) 5 MG tablet Take 1 tablet (5 mg) by mouth daily 30 tablet 2     esomeprazole (NEXIUM) 40 MG DR capsule Take by mouth every morning (before breakfast) Take 30-60 minutes before eating.       levETIRAcetam (KEPPRA) 750 MG tablet TAKE 1 TABLET BY MOUTH THREE TIMES DAILY AND TAKE 1 1/2 TABLETS EVERY NIGHT AT BEDTIME(TOTAL 3375MG PER DAY). 135 tablet 11     OXcarbazepine (TRILEPTAL) 300 MG tablet TAKE ONE AND ONE-HALF TABLET IN THE MORNING, ONE AND ONE-HALF TABLET AT NOON, 2 TABLETS IN THE EVENING AND 2 TABLETS AT BEDTIME 210 tablet 11     oxybutynin ER (DITROPAN-XL) 10 MG 24 hr tablet Take 10 mg by mouth       perampanel (FYCOMPA) 6 MG tablet Take 1 tablet (6 mg) by mouth At Bedtime 31 tablet 5     progesterone (PROMETRIUM) 100 MG capsule 100 mg daily        SUMAtriptan (IMITREX) 100 MG tablet Take one by mouth prn severe headache. No more than four per month. 9 tablet 0     topiramate (TOPAMAX) 100 MG tablet Take one half in AM and one in PM (total 150 mg per day) 48 tablet 11        Perceived AED Side Effects:  Yes ; flushing with acetazolamide.    Medication Notes:    Taking TPM 50 at lunch at 100 mg at bedtime.    AED Medication Compliance:  compliant all of the time  Using a pill box:  Yes    Review of Systems:  No vomiting, diarrhea, fevers, hematuria or kidney stones.  Have you experienced a traumatic fall since your last visit: NO  Are these falls related to your seizures:  Not Applicable      Other Issues:    Still taking prometria. OB is prescribing these for her.  Headaches overall doing well. Uses imitrexa t most once per month, still effective.  Heart issues OK  Hoping to go to [employer] last few days of April, to be gone for four days.  Is patient safe to drive:  No    Woman Care:   Patient is:  Postmenopausal.      Exam:    BP (!) 174/100   Pulse 73   Temp 97.1  F (36.2  C) (Temporal)   Ht 5' 6\" (167.6 cm)   Wt 181 lb 3.2 oz (82.2 kg)   BMI 29.25 kg/m       Wt Readings from " Last 5 Encounters:   04/13/22 181 lb 3.2 oz (82.2 kg)   11/16/21 181 lb 12.8 oz (82.5 kg)   07/14/21 181 lb 11.2 oz (82.4 kg)   07/06/21 180 lb 12.8 oz (82 kg)   10/28/20 171 lb 6.4 oz (77.7 kg)     Repeat /88. Heart exam without murmur.  Normal gait. Romberg negative. Bilateral ptosis. With upgaze and encouragment palpebral fissure increases in size.  Repeated upgaze and wide opening of eyes does not worsen ptosis. Smile symmetrical. Tongue midline. Jaw strength is good. Neck flexors and extensors full.  Proximal strength full in arms and legs. No drift, pronation, or tremor. FFN is done well.    IMPRESSION   1) Focal seizures, intractable; focal aware, focal aware to focal impaired seizures. Some focal impaired seizures associated with falls and occasionally with trama. Independent bitemporal likely neocortical onset by scalp EEG. Improvement starting approxmiately March 2015 led to almost 1 3/4 years of  freedom from focal impaired seizures with falls. Fycompa and levetiracetam appeared to play important roles in this period of seizure freedom but I thought that completing menopause may have been important. There was some indication that focal unimpaired seizures may have worsened with reduction of TPM. She is refractory to all AEDs other than barbiturates, felbamate, CLB off label, and cenobamate. Seizures wax and wane. Over last year number about the same but she continues with longer seizure free periods, up to two months in duration. Most recently we have been able to increase perampanel further. Acetazolamide appears to help when used PRN and now more aggressive use appears to have led to a almost five months without focal impaired seizures.  2) Migraine headaches; topiramate thought to have helped; under good control.  3) Asystole; likely primary and not related to seizures or VNS tho this has never been formally examined; has pacemaker in place.   4) Depression, continues doing well after increase of  escitalopram to 15 mg per day. Therapy also helpful.   5) Mild hyponatremia in past likely related to oxcarbazpine and escitalopram. Normal recently.     DISCUSSION  We again discussed remaining AED alternatives and she again does not think that her situation is severe enough to warrant risks associated with new AED or more aggressive neuromodulation options.     PLAN:   1) Same OXC, levetiracetam, topiramate, and micronized progesterone. She can continue using acetazolamide up to 250 mg po every four hours during periods of seizure exacerbation. Continue with fycompa to 6 mg at bedtime.   2) Continue escitalopram at current dose, it appears associated with sustained response.  3) Other AED options as above. Optimizing perampanel would be reasonable next choice if she can tolerate. As she is uncomfortable with felbatol,  cenobamate would probably be best choice, perhaps replacing levetiracetam. We could also consider replacing levetiracetam with brivaracetam or use eslicarbazpine instead of oxcarbazepine if formulary will allow. RNS should be a serious consideration if seizures with falls return and become more frequent. If RNS placed she would have three implanted devices which could also raise concern. Neither she nor her  were excited about this possibility when we last reviewed.  4) RTC 4 months.  5) AED levels today to confirm compliance, rule out toxicity. Included perampanel level. Sodium to rule out toxicity.        Total time 27 minutes. Additional 5 minutes before clinic reviewing chart. Additional 7 minutes generating note following visit. Total 39 minutes today.     Noam Barboza MD

## 2022-04-14 LAB
Lab: NORMAL
PERFORMING LABORATORY: NORMAL
SPECIMEN STATUS: NORMAL
TEST NAME: NORMAL

## 2022-04-15 LAB
LEVETIRACETAM SERPL-MCNC: 41 UG/ML
TOPIRAMATE SERPL-MCNC: 2.9 UG/ML

## 2022-04-18 LAB — 10OH-CARBAZEPINE SERPL-MCNC: 34 UG/ML

## 2022-04-21 LAB — MISCELLANEOUS TEST 1 (ARUP): NORMAL

## 2022-04-24 DIAGNOSIS — F43.21 ADJUSTMENT DISORDER WITH DEPRESSED MOOD: ICD-10-CM

## 2022-04-25 NOTE — TELEPHONE ENCOUNTER
Medication Refill request received for   Pending Prescriptions:                       Disp   Refills    escitalopram (LEXAPRO) 10 MG tablet [Phar*30 tab*2            Sig: TAKE 1 TABLET BY MOUTH AT BEDTIME      Last Written Prescription Date:  1/27/2022  Last Fill Quantity: 30,   # refills: 2 Length of last prescription in time: 3 months  Last Office Visit : 4/13/2022  Future Office visit:  Due 8/13/2022    [place] Medication Refill Protocol requirements:    Appt within past 12 months: meets requirement      Refill request was forwarded to the provider because prescription is due for a yearly MD signature

## 2022-04-26 RX ORDER — ESCITALOPRAM OXALATE 10 MG/1
TABLET ORAL
Qty: 30 TABLET | Refills: 11 | Status: SHIPPED | OUTPATIENT
Start: 2022-04-26 | End: 2023-03-16

## 2022-04-27 ENCOUNTER — TELEPHONE (OUTPATIENT)
Dept: NEUROLOGY | Facility: CLINIC | Age: 67
End: 2022-04-27
Payer: MEDICARE

## 2022-04-27 NOTE — TELEPHONE ENCOUNTER
What is the concern that needs to be addressed by a nurse? Pt would like a call back to get her lab results from 4/13/22. Okay to leave results in vm if no one answers.     May a detailed message be left on voicemail? yes    Date of last office visit: 4/13/22    Message routed to: mincep rn pool

## 2022-04-27 NOTE — TELEPHONE ENCOUNTER
Chart reviewed.       Latest Reference Range & Units 04/13/22 13:59   Keppra (Levetiracetam) Level 10 - 40 ug/mL 41 (H) [1]   Topiramate Level 5.0 - 20.0 ug/mL 2.9 (L) [2]      Latest Reference Range & Units 04/13/22 13:59   Sodium 133 - 144 mmol/L 137          Component Ref Range & Units 2 wk ago     Oxcarb or Eslicarb Metabolite (MHD) 3 - 35 ug/mL 34    Comment: INTERPRETIVE INFORMATION: Oxcarbazepine          Miscellaneous Test SEE NOTE    Comment: Test name                    Result Flag  Units  RefIntvl   ------------------------------------------------------------   Perampanel Quantitative, Serum/Plasma                                   540       ng/mL             Serum or Plasma   Reporting Limit: 20 ng/mL          Call placed to patient, who passed the phone to her .  Results reviewed  No other questions a this time

## 2022-05-02 ENCOUNTER — TELEPHONE (OUTPATIENT)
Dept: NEUROLOGY | Facility: CLINIC | Age: 67
End: 2022-05-02
Payer: MEDICARE

## 2022-07-12 ENCOUNTER — TELEPHONE (OUTPATIENT)
Dept: CARDIOLOGY | Facility: CLINIC | Age: 67
End: 2022-07-12

## 2022-07-12 DIAGNOSIS — Z95.0 CARDIAC PACEMAKER IN SITU: ICD-10-CM

## 2022-07-12 DIAGNOSIS — Z95.0 PACEMAKER: Primary | ICD-10-CM

## 2022-07-12 DIAGNOSIS — I49.5 SINOATRIAL NODE DYSFUNCTION (H): Primary | ICD-10-CM

## 2022-07-12 NOTE — TELEPHONE ENCOUNTER
Received message from patient stating that she was calling to schedule follow-up.  Per chart review, patient is due to see an JOSEPH in 7/2022.  Patient's last device check was in 7/2021 and she is overdue for this.      Order for device check entered.     Called patient and transferred to scheduling to schedule her JOSEPH follow-up with device check.    RJ Vegas

## 2022-07-20 DIAGNOSIS — F43.21 ADJUSTMENT DISORDER WITH DEPRESSED MOOD: ICD-10-CM

## 2022-07-22 RX ORDER — ESCITALOPRAM OXALATE 5 MG/1
TABLET ORAL
Qty: 30 TABLET | Refills: 2 | OUTPATIENT
Start: 2022-07-22

## 2022-07-22 NOTE — TELEPHONE ENCOUNTER
ESCITALOPRAM 5MG TABLETS  escitalopram (LEXAPRO) 10 MG tablet 30 tablet 11 4/26/2022  No   Sig: TAKE 1 TABLET BY MOUTH AT BEDTIME   Sent to pharmacy as: Escitalopram Oxalate 10 MG Oral Tablet (LEXAPRO)   Class: E-Prescribe   Order: [id number]   E-Prescribing Status: Receipt confirmed by pharmacy (4/26/2022  3:56 PM CDT)       Printout Tracking    External Result Report     Pharmacy    [id number] - [place] - [id number] AT [facility] [place] [id number] & [facility]

## 2022-08-12 ENCOUNTER — ANCILLARY PROCEDURE (OUTPATIENT)
Dept: CARDIOLOGY | Facility: CLINIC | Age: 67
End: 2022-08-12
Attending: INTERNAL MEDICINE
Payer: MEDICARE

## 2022-08-12 DIAGNOSIS — Z95.0 CARDIAC PACEMAKER IN SITU: ICD-10-CM

## 2022-08-12 DIAGNOSIS — I49.5 SINOATRIAL NODE DYSFUNCTION (H): ICD-10-CM

## 2022-08-12 PROCEDURE — 93280 PM DEVICE PROGR EVAL DUAL: CPT | Performed by: INTERNAL MEDICINE

## 2022-08-14 LAB
MDC_IDC_LEAD_IMPLANT_DT: NORMAL
MDC_IDC_LEAD_IMPLANT_DT: NORMAL
MDC_IDC_LEAD_LOCATION: NORMAL
MDC_IDC_LEAD_LOCATION: NORMAL
MDC_IDC_LEAD_MFG: NORMAL
MDC_IDC_LEAD_MFG: NORMAL
MDC_IDC_LEAD_MODEL: NORMAL
MDC_IDC_LEAD_MODEL: NORMAL
MDC_IDC_LEAD_POLARITY_TYPE: NORMAL
MDC_IDC_LEAD_POLARITY_TYPE: NORMAL
MDC_IDC_LEAD_SERIAL: NORMAL
MDC_IDC_LEAD_SERIAL: NORMAL
MDC_IDC_MSMT_BATTERY_DTM: NORMAL
MDC_IDC_MSMT_CAP_CHARGE_TYPE: NORMAL
MDC_IDC_MSMT_LEADCHNL_RA_PACING_THRESHOLD_AMPLITUDE: 0.4 V
MDC_IDC_MSMT_LEADCHNL_RA_PACING_THRESHOLD_PULSEWIDTH: 1.5 MS
MDC_IDC_MSMT_LEADCHNL_RA_SENSING_INTR_AMPL: 1.4 MV
MDC_IDC_MSMT_LEADCHNL_RV_PACING_THRESHOLD_AMPLITUDE: 0.7 V
MDC_IDC_MSMT_LEADCHNL_RV_PACING_THRESHOLD_PULSEWIDTH: 0.5 MS
MDC_IDC_MSMT_LEADCHNL_RV_SENSING_INTR_AMPL: 1.6 MV
MDC_IDC_PG_IMPLANT_DTM: NORMAL
MDC_IDC_PG_MFG: NORMAL
MDC_IDC_PG_MODEL: NORMAL
MDC_IDC_PG_SERIAL: NORMAL
MDC_IDC_PG_TYPE: NORMAL
MDC_IDC_SESS_CLINIC_NAME: NORMAL
MDC_IDC_SESS_DTM: NORMAL
MDC_IDC_SESS_TYPE: NORMAL
MDC_IDC_SET_ZONE_TYPE: NORMAL
MDC_IDC_SET_ZONE_VENDOR_TYPE: NORMAL
MDC_IDC_STAT_BRADY_RA_PERCENT_PACED: 66 %
MDC_IDC_STAT_BRADY_RV_PERCENT_PACED: 3 %

## 2022-08-30 ENCOUNTER — TELEPHONE (OUTPATIENT)
Dept: CARDIOLOGY | Facility: CLINIC | Age: 67
End: 2022-08-30

## 2022-08-30 NOTE — TELEPHONE ENCOUNTER
Had a VM from pt wanting to schedule a TMM.  Tried to call pt back, but was unable to leave a VM.

## 2022-08-31 ENCOUNTER — TELEPHONE (OUTPATIENT)
Dept: CARDIOLOGY | Facility: CLINIC | Age: 67
End: 2022-08-31

## 2022-09-19 NOTE — PROGRESS NOTES
[facility]    I had the pleasure of seeing Adia when she came for follow up of SND.  This 67 year old sees Dr. Richmond for her history of:    1.  SND - had episodes of syncope and multiple pauses of almost 10s noted on event monitor. S/p dual-chamber [employer] pacemaker 6/2011.  Shortly thereafter noted to have RV lead micro-dislodgment, for which pacemaker settings adjusted.  Continued monitoring recommended  2.  Marked Dyslipidemia. 0 calcium score 8/2021  3.  Seizure disorder  4.  Depression  5.  Migraine headaches  6.  Pulmonary nodule - these were noted on CT calcium score 8/2021 (see below) with recommendation for 3-6-month follow-up.  Patient aware.  Results sent to PC 8/2021    I last saw Adia in 6/2018 at which time 2-year follow-up with Dr. Richmond was recommended.  She saw him 7/2021 at which time they again reviewed obtaining a calcium score to determine risk factor modification in the setting of FHx CAD.    Calcium score done 8/2021 was very reassuring with a score of 0.  She did have pulmonary nodules for which 3-6-month follow-up imaging was recommended.    Interval History:  Adia thinks that she is doing well from a cardiac perspective since she saw Dr. Richmond 7/2021.  Denies any problems with chest pain, pressure or tightness, but notes that she has been quite inactive the last few years d/t temperature extremes and pandemic restrictions.  She is planning to get back on the treadmill, as she knows that will help with her strength and in turn help her balance.    She does not check her blood pressure at home.  She has been told she has hyponatremia, also does not try to limit her sodium intake.  She does note some mild LE edema, especially in the feet.  She just recently had surgical removal of an ingrown toenail, and notes that that foot always swells.  No orthopnea, PND.  Weight has been stable.    No problems with the pacemaker site.  No palpitations, dizziness,  "lightheadedness.    She confirms that she did not see her PCP at [facility] re: pulmonary nodule noted on CT calcium score 8/2021.    VITALS:  Vitals: /66   Pulse 81   Ht 1.676 m (5' 6\")   Wt 83.3 kg (183 lb 9.6 oz)   BMI 29.63 kg/m      Diagnostic Testing:  Device interrogation 8/2022, showed 66% AP and 3%  in DDD .  Underlying was SR 70s.  No atrial or ventricular arrhythmias noted. 2.5y battery  CT calcium score 8/2021 CAC 0. RCA could not be adequately assessed d/t metallic artifact from pacing wires. Non-cardiac portion RLL nodule up to 8 cm, along with nodule in RML along minor fissure up to 5 mm.  Repeat CT 3/6 months recommended  Echocardiogram 11/2014 - LVEF 55-60%      Plan:  1.  See Dr. Richmond 7/2023 with annual device interrogation as previously ordered  2. See PCP to review pulmonary nodules    Assessment/Plan:    1. SND    S/p [employer] dual-chamber pacemaker 2011 as above    Most recent device interrogation 8/2022 with normal function.    PLAN:    Will continue routine device interrogations.  Reminded her that she has ~2.5 y battery remaining, and we will continue to monitor for battery depletion.  Reassured her that her pacemaker does not \"stop\" when she reaches [place] but there is an increase.  Before battery needs to be replaced    2. Pulmonary Nodules    Dr. Richmond's nurse had contacted Adia and her  8/11/2021 (AVS erroneously indicates this was 7/13/2021) to review abnormal CT scan and encouraged PCP follow-up.  This had also been faxed to her PCP Dr. Rodgers, was no longer practicing there.    She has not had any repeat imaging.  I reviewed Care Everywhere and did not see any either    PLAN:    I have printed out a copy of her CT calcium score/radiology results for her records. AVS instructions include reminder to see PCP for this    We will have my nurse re-fax results along with this note to PCP, who is now Dr. Toshia Bailon (Syd Pinzon)      Miroslava Wheeler PA-C, " MSPAS      No orders of the defined types were placed in this encounter.    No orders of the defined types were placed in this encounter.    There are no discontinued medications.      Encounter Diagnoses   Name Primary?     Sinoatrial node dysfunction (H) Yes     Cardiac pacemaker in situ      Mixed hyperlipidemia        CURRENT MEDICATIONS:  Current Outpatient Medications   Medication Sig Dispense Refill     acetaZOLAMIDE (DIAMOX) 250 MG tablet Take one to three times per day during seizure clusters 30 tablet 3     Cetirizine HCl (ZYRTEC PO) daily.       Cholecalciferol (VITAMIN D) 2000 UNITS CAPS daily.       clonazePAM (KLONOPIN) 1 MG tablet Take 1.5 tablets (1.5 mg) by mouth At Bedtime For more refills,schedule an appointment at [phone] 45 tablet 5     diazepam (DIAZEPAM INTENSOL) 5 MG/ML (HIGH CONC) solution Administer 1 ml as needed into cheek pouch for seizures 30 mL 0     escitalopram (LEXAPRO) 10 MG tablet TAKE 1 TABLET BY MOUTH AT BEDTIME 30 tablet 11     escitalopram (LEXAPRO) 5 MG tablet Take 1 tablet (5 mg) by mouth daily 30 tablet 2     esomeprazole (NEXIUM) 40 MG DR capsule Take by mouth every morning (before breakfast) Take 30-60 minutes before eating.       levETIRAcetam (KEPPRA) 750 MG tablet TAKE 1 TABLET BY MOUTH THREE TIMES DAILY AND TAKE 1 1/2 TABLETS EVERY NIGHT AT BEDTIME(TOTAL 3375MG PER DAY). 135 tablet 11     OXcarbazepine (TRILEPTAL) 300 MG tablet TAKE ONE AND ONE-HALF TABLET IN THE MORNING, ONE AND ONE-HALF TABLET AT NOON, 2 TABLETS IN THE EVENING AND 2 TABLETS AT BEDTIME 210 tablet 11     oxybutynin ER (DITROPAN-XL) 10 MG 24 hr tablet Take 10 mg by mouth       perampanel (FYCOMPA) 6 MG tablet Take 1 tablet (6 mg) by mouth At Bedtime 31 tablet 5     progesterone (PROMETRIUM) 100 MG capsule 100 mg daily        SUMAtriptan (IMITREX) 100 MG tablet Take one by mouth prn severe headache. No more than four per month. 9 tablet 0     topiramate (TOPAMAX) 100 MG tablet Take one half in AM  "and one in PM (total 150 mg per day) 48 tablet 11       ALLERGIES     Allergies   Allergen Reactions     Lactose          Review of Systems:  Skin:  Negative     Eyes:  Positive for glasses  ENT:  Negative    Respiratory:  Negative for shortness of breath;dyspnea on exertion;cough  Cardiovascular:  Negative for;palpitations;chest pain;dizziness;lightheadedness Positive for;exercise intolerance;edema;fatigue  Gastroenterology: Negative reflux  Genitourinary:  Positive for    Musculoskeletal:  Positive for arthritis  Neurologic:  Negative seizures  Psychiatric:  Negative    Heme/Lymph/Imm:  Positive for allergies  Endocrine:  Negative      Physical Exam:  Vitals: /66   Pulse 81   Ht 1.676 m (5' 6\")   Wt 83.3 kg (183 lb 9.6 oz)   BMI 29.63 kg/m      Constitutional:  cooperative, alert and oriented, well developed, well nourished, in no acute distress overweight      Skin:  warm and dry to the touch, no apparent skin lesions or masses noted        Head:  normocephalic, no masses or lesions        Eyes:  pupils equal and round;conjunctivae and lids unremarkable;sclera white;no xanthalasma        ENT:  not assessed this visit        Neck:  no carotid bruit;JVP normal        Chest:  normal breath sounds, clear to auscultation, normal A-P diameter, normal symmetry, normal respiratory excursion, no use of accessory muscles        Cardiac: regular rhythm;normal S1 and S2;no S3 or S4       systolic ejection murmur;RUSB;grade 1          Abdomen:           Vascular: pulses full and equal                                      Extremities and Back:  no edema;no spinal abnormalities noted;normal muscle strength and tone        Neurological:  no gross motor deficits            PAST MEDICAL HISTORY:  Past Medical History:   Diagnosis Date     Depression      Epilepsy (H)      GERD (gastroesophageal reflux disease)      Migraine      Seizure (H)     vagal nerve stimulator     Shortness of breath      Syncope and collapse     " bradyarrhythmia: dual chamber pacemaker implanted 2011       PAST SURGICAL HISTORY:  Past Surgical History:   Procedure Laterality Date     IMPLANT PACEMAKER  April 2011    Sheldahl Sci, [place] S[address],      IMPLANT STIMULATOR VAGUS NERVE  DEC  2008    VNS batter depleated (8/21/2019)       FAMILY HISTORY:  History reviewed. No pertinent family history.    SOCIAL HISTORY:  Social History     Socioeconomic History     Marital status:      Spouse name: None     Number of children: None     Years of education: None     Highest education level: None   Tobacco Use     Smoking status: Never Smoker     Smokeless tobacco: Never Used   Substance and Sexual Activity     Alcohol use: No     Alcohol/week: 0.0 standard drinks     Drug use: No     Sexual activity: Yes     Partners: Male   Other Topics Concern     Caffeine Concern Yes     Comment: 3 CUPS coffee daily      Weight Concern No     Special Diet No     Exercise Yes     Seat Belt Yes       CC  Lance Richmond MD  [address]  [place] [address]

## 2022-09-21 ENCOUNTER — TELEPHONE (OUTPATIENT)
Dept: CARDIOLOGY | Facility: CLINIC | Age: 67
End: 2022-09-21

## 2022-09-22 ENCOUNTER — OFFICE VISIT (OUTPATIENT)
Dept: CARDIOLOGY | Facility: CLINIC | Age: 67
End: 2022-09-22
Payer: MEDICARE

## 2022-09-22 VITALS
BODY MASS INDEX: 29.51 KG/M2 | SYSTOLIC BLOOD PRESSURE: 126 MMHG | WEIGHT: 183.6 LBS | HEIGHT: 66 IN | HEART RATE: 81 BPM | DIASTOLIC BLOOD PRESSURE: 66 MMHG

## 2022-09-22 DIAGNOSIS — I49.5 SINOATRIAL NODE DYSFUNCTION (H): Primary | ICD-10-CM

## 2022-09-22 DIAGNOSIS — E78.2 MIXED HYPERLIPIDEMIA: ICD-10-CM

## 2022-09-22 DIAGNOSIS — Z95.0 CARDIAC PACEMAKER IN SITU: ICD-10-CM

## 2022-09-22 PROCEDURE — 99214 OFFICE O/P EST MOD 30 MIN: CPT | Performed by: PHYSICIAN ASSISTANT

## 2022-09-22 NOTE — PATIENT INSTRUCTIONS
Adia - it was good to see you again today.    Reviewed that the device interrogation 8/2022 looked good   Reviewed that the exercise has been limited due to wide temperature fluctuations and pandemic restrictions   BP better when I rechecked    PLAN:  Get back on the treadmill!  Climate controlled and a safe place to do it! I'm hoping this builds your strength back up  Keep up with routine pacer checks  For swelling, keep feet up when sitting, limit sodium in diet as able.  Reviewed that the CT scan in 8/2021 looked great re: calcium score of 0 however there were pulmonary nodules (RLL nodule up to 8 cm, along with nodule in RML along minor fissure up to 5 mm) that required follow-up in 3-6 months. Report was faxed to Dr. Rodgers on 7/13/2021 and Dr. Basilio Richard's nurse contacted you 7/13 PM to review.  ** CONTACT DR. CONTRERAS' OFFICE ABOUT NEED FOR CT SCAN CHEST **    5.  See Dr. Richmond Summer 2023 with pacer check as planned

## 2022-09-22 NOTE — LETTER
9/22/2022    [facility]  [address] [address]    RE: Adia MASON Janak       Dear Colleague,     I had the pleasure of seeing Adia Briceno in the [facility].  [facility]    I had the pleasure of seeing Adia when she came for follow up of SND.  This 67 year old sees Dr. Richmond for her history of:    1.  SND - had episodes of syncope and multiple pauses of almost 10s noted on event monitor. S/p dual-chamber [employer] pacemaker 6/2011.  Shortly thereafter noted to have RV lead micro-dislodgment, for which pacemaker settings adjusted.  Continued monitoring recommended  2.  Marked Dyslipidemia. 0 calcium score 8/2021  3.  Seizure disorder  4.  Depression  5.  Migraine headaches  6.  Pulmonary nodule - these were noted on CT calcium score 8/2021 (see below) with recommendation for 3-6-month follow-up.  Patient aware.  Results sent to PC 8/2021    I last saw Adia in 6/2018 at which time 2-year follow-up with Dr. Richmond was recommended.  She saw him 7/2021 at which time they again reviewed obtaining a calcium score to determine risk factor modification in the setting of FHx CAD.    Calcium score done 8/2021 was very reassuring with a score of 0.  She did have pulmonary nodules for which 3-6-month follow-up imaging was recommended.    Interval History:  Adia thinks that she is doing well from a cardiac perspective since she saw Dr. Richmond 7/2021.  Denies any problems with chest pain, pressure or tightness, but notes that she has been quite inactive the last few years d/t temperature extremes and pandemic restrictions.  She is planning to get back on the treadmill, as she knows that will help with her strength and in turn help her balance.    She does not check her blood pressure at home.  She has been told she has hyponatremia, also does not try to limit her sodium intake.  She does note some mild LE edema, especially in the feet.  She just  "recently had surgical removal of an ingrown toenail, and notes that that foot always swells.  No orthopnea, PND.  Weight has been stable.    No problems with the pacemaker site.  No palpitations, dizziness, lightheadedness.    She confirms that she did not see her PCP at [facility] re: pulmonary nodule noted on CT calcium score 8/2021.    VITALS:  Vitals: /66   Pulse 81   Ht 1.676 m (5' 6\")   Wt 83.3 kg (183 lb 9.6 oz)   BMI 29.63 kg/m      Diagnostic Testing:  Device interrogation 8/2022, showed 66% AP and 3%  in DDD .  Underlying was SR 70s.  No atrial or ventricular arrhythmias noted. 2.5y battery  CT calcium score 8/2021 CAC 0. RCA could not be adequately assessed d/t metallic artifact from pacing wires. Non-cardiac portion RLL nodule up to 8 cm, along with nodule in RML along minor fissure up to 5 mm.  Repeat CT 3/6 months recommended  Echocardiogram 11/2014 - LVEF 55-60%      Plan:  1.  See Dr. Richmond 7/2023 with annual device interrogation as previously ordered  2. See PCP to review pulmonary nodules    Assessment/Plan:    1. SND    S/p [employer] dual-chamber pacemaker 2011 as above    Most recent device interrogation 8/2022 with normal function.    PLAN:    Will continue routine device interrogations.  Reminded her that she has ~2.5 y battery remaining, and we will continue to monitor for battery depletion.  Reassured her that her pacemaker does not \"stop\" when she reaches [place] but there is an increase.  Before battery needs to be replaced    2. Pulmonary Nodules    Dr. Rihcmond's nurse had contacted Adia and her  8/11/2021 (AVS erroneously indicates this was 7/13/2021) to review abnormal CT scan and encouraged PCP follow-up.  This had also been faxed to her PCP Dr. Rodgers, was no longer practicing there.    She has not had any repeat imaging.  I reviewed Care Everywhere and did not see any either    PLAN:    I have printed out a copy of her CT calcium score/radiology results for " her records. AVS instructions include reminder to see PCP for this    We will have my nurse re-fax results along with this note to PCP, who is now Dr. Toshia Bailon (Syd Pinzon)      Miroslava Wheeler PA-C, MSPAS      No orders of the defined types were placed in this encounter.    No orders of the defined types were placed in this encounter.    There are no discontinued medications.      Encounter Diagnoses   Name Primary?     Sinoatrial node dysfunction (H) Yes     Cardiac pacemaker in situ      Mixed hyperlipidemia        CURRENT MEDICATIONS:  Current Outpatient Medications   Medication Sig Dispense Refill     acetaZOLAMIDE (DIAMOX) 250 MG tablet Take one to three times per day during seizure clusters 30 tablet 3     Cetirizine HCl (ZYRTEC PO) daily.       Cholecalciferol (VITAMIN D) 2000 UNITS CAPS daily.       clonazePAM (KLONOPIN) 1 MG tablet Take 1.5 tablets (1.5 mg) by mouth At Bedtime For more refills,schedule an appointment at [phone] 45 tablet 5     diazepam (DIAZEPAM INTENSOL) 5 MG/ML (HIGH CONC) solution Administer 1 ml as needed into cheek pouch for seizures 30 mL 0     escitalopram (LEXAPRO) 10 MG tablet TAKE 1 TABLET BY MOUTH AT BEDTIME 30 tablet 11     escitalopram (LEXAPRO) 5 MG tablet Take 1 tablet (5 mg) by mouth daily 30 tablet 2     esomeprazole (NEXIUM) 40 MG DR capsule Take by mouth every morning (before breakfast) Take 30-60 minutes before eating.       levETIRAcetam (KEPPRA) 750 MG tablet TAKE 1 TABLET BY MOUTH THREE TIMES DAILY AND TAKE 1 1/2 TABLETS EVERY NIGHT AT BEDTIME(TOTAL 3375MG PER DAY). 135 tablet 11     OXcarbazepine (TRILEPTAL) 300 MG tablet TAKE ONE AND ONE-HALF TABLET IN THE MORNING, ONE AND ONE-HALF TABLET AT NOON, 2 TABLETS IN THE EVENING AND 2 TABLETS AT BEDTIME 210 tablet 11     oxybutynin ER (DITROPAN-XL) 10 MG 24 hr tablet Take 10 mg by mouth       perampanel (FYCOMPA) 6 MG tablet Take 1 tablet (6 mg) by mouth At Bedtime 31 tablet 5     progesterone (PROMETRIUM) 100  "MG capsule 100 mg daily        SUMAtriptan (IMITREX) 100 MG tablet Take one by mouth prn severe headache. No more than four per month. 9 tablet 0     topiramate (TOPAMAX) 100 MG tablet Take one half in AM and one in PM (total 150 mg per day) 48 tablet 11       ALLERGIES     Allergies   Allergen Reactions     Lactose          Review of Systems:  Skin:  Negative     Eyes:  Positive for glasses  ENT:  Negative    Respiratory:  Negative for shortness of breath;dyspnea on exertion;cough  Cardiovascular:  Negative for;palpitations;chest pain;dizziness;lightheadedness Positive for;exercise intolerance;edema;fatigue  Gastroenterology: Negative reflux  Genitourinary:  Positive for    Musculoskeletal:  Positive for arthritis  Neurologic:  Negative seizures  Psychiatric:  Negative    Heme/Lymph/Imm:  Positive for allergies  Endocrine:  Negative      Physical Exam:  Vitals: /66   Pulse 81   Ht 1.676 m (5' 6\")   Wt 83.3 kg (183 lb 9.6 oz)   BMI 29.63 kg/m      Constitutional:  cooperative, alert and oriented, well developed, well nourished, in no acute distress overweight      Skin:  warm and dry to the touch, no apparent skin lesions or masses noted        Head:  normocephalic, no masses or lesions        Eyes:  pupils equal and round;conjunctivae and lids unremarkable;sclera white;no xanthalasma        ENT:  not assessed this visit        Neck:  no carotid bruit;JVP normal        Chest:  normal breath sounds, clear to auscultation, normal A-P diameter, normal symmetry, normal respiratory excursion, no use of accessory muscles        Cardiac: regular rhythm;normal S1 and S2;no S3 or S4       systolic ejection murmur;RUSB;grade 1          Abdomen:           Vascular: pulses full and equal                                      Extremities and Back:  no edema;no spinal abnormalities noted;normal muscle strength and tone        Neurological:  no gross motor deficits            PAST MEDICAL HISTORY:  Past Medical History: "   Diagnosis Date     Depression      Epilepsy (H)      GERD (gastroesophageal reflux disease)      Migraine      Seizure (H)     vagal nerve stimulator     Shortness of breath      Syncope and collapse     bradyarrhythmia: dual chamber pacemaker implanted 2011       PAST SURGICAL HISTORY:  Past Surgical History:   Procedure Laterality Date     IMPLANT PACEMAKER  April 2011    [place] [facility] [place] [address],      IMPLANT STIMULATOR VAGUS NERVE  DEC  2008    VNS batter depleated (8/21/2019)       FAMILY HISTORY:  History reviewed. No pertinent family history.    SOCIAL HISTORY:  Social History     Socioeconomic History     Marital status:      Spouse name: None     Number of children: None     Years of education: None     Highest education level: None   Tobacco Use     Smoking status: Never Smoker     Smokeless tobacco: Never Used   Substance and Sexual Activity     Alcohol use: No     Alcohol/week: 0.0 standard drinks     Drug use: No     Sexual activity: Yes     Partners: Male   Other Topics Concern     Caffeine Concern Yes     Comment: 3 CUPS coffee daily      Weight Concern No     Special Diet No     Exercise Yes     Seat Belt Yes       CC  Lance Richmond MD  [address]  [place] [address]          Thank you for allowing me to participate in the care of your patient.      Sincerely,     Samia Wheeler PA-C     [facility]

## 2022-10-17 DIAGNOSIS — G40.211 LOCALZ-RLTED SYMPTOMATIC EPILEPSY W CMPLX PARTL SZ, INTRACT, W STATUS (H): ICD-10-CM

## 2022-10-18 RX ORDER — CLONAZEPAM 1 MG/1
1.5 TABLET ORAL AT BEDTIME
Qty: 45 TABLET | Refills: 3 | Status: SHIPPED | OUTPATIENT
Start: 2022-10-18 | End: 2023-01-23

## 2022-10-18 NOTE — TELEPHONE ENCOUNTER
clonazePAM (KLONOPIN) 1 MG tablet 45 tablet 5 4/13/2022         Last Written Prescription Date:  4-  Last Fill Quantity: 45,   # refills: 5  Last Office Visit : 4-  Future Office visit:  none    Routing refill request to provider for review/approval because:  CONTROLLED MEDICATION      Kathleen M Doege RN

## 2022-10-19 DIAGNOSIS — G40.211 LOCALIZATION-RELATED SYMPTOMATIC EPILEPSY AND EPILEPTIC SYNDROMES WITH COMPLEX PARTIAL SEIZURES, INTRACTABLE, WITH STATUS EPILEPTICUS (H): ICD-10-CM

## 2022-11-18 DIAGNOSIS — G40.211 LOCALZ-RLTED SYMPTOMATIC EPILEPSY W CMPLX PARTL SZ, INTRACT, W STATUS (H): ICD-10-CM

## 2022-11-18 DIAGNOSIS — G40.211 LOCALIZATION-RELATED SYMPTOMATIC EPILEPSY AND EPILEPTIC SYNDROMES WITH COMPLEX PARTIAL SEIZURES, INTRACTABLE, WITH STATUS EPILEPTICUS (H): ICD-10-CM

## 2022-11-23 RX ORDER — LEVETIRACETAM 750 MG/1
TABLET ORAL
Qty: 135 TABLET | Refills: 0 | Status: SHIPPED | OUTPATIENT
Start: 2022-11-23 | End: 2022-12-21

## 2022-11-23 RX ORDER — CLONAZEPAM 1 MG/1
1.5 TABLET ORAL AT BEDTIME
Qty: 45 TABLET | Refills: 3 | OUTPATIENT
Start: 2022-11-23

## 2022-11-23 RX ORDER — TOPIRAMATE 100 MG/1
TABLET, FILM COATED ORAL
Qty: 48 TABLET | Refills: 0 | Status: SHIPPED | OUTPATIENT
Start: 2022-11-23 | End: 2023-01-26

## 2022-11-28 DIAGNOSIS — G40.211 LOCALZ-RLTED SYMPTOMATIC EPILEPSY W CMPLX PARTL SZ, INTRACT, W STATUS (H): ICD-10-CM

## 2022-11-28 RX ORDER — OXCARBAZEPINE 300 MG/1
TABLET, FILM COATED ORAL
Qty: 210 TABLET | Refills: 0 | Status: SHIPPED | OUTPATIENT
Start: 2022-11-28 | End: 2022-12-21

## 2022-11-30 ENCOUNTER — ANCILLARY PROCEDURE (OUTPATIENT)
Dept: CARDIOLOGY | Facility: CLINIC | Age: 67
End: 2022-11-30
Attending: INTERNAL MEDICINE
Payer: MEDICARE

## 2022-11-30 DIAGNOSIS — Z95.0 PACEMAKER: ICD-10-CM

## 2022-11-30 PROCEDURE — 93293 PM PHONE R-STRIP DEVICE EVAL: CPT | Performed by: INTERNAL MEDICINE

## 2022-12-18 DIAGNOSIS — F43.21 ADJUSTMENT DISORDER WITH DEPRESSED MOOD: ICD-10-CM

## 2022-12-20 NOTE — TELEPHONE ENCOUNTER
escitalopram (LEXAPRO) 5 MG tablet      Last Written Prescription Date:  01-  Last Fill Quantity:30 ,   # refills: 2  Last Office Visit : 04-  Future Office visit:  12-    Routing refill request to provider for review/approval because:    SSRIs Protocol Failed 12/18/2022 05:20 AM   Protocol Details  PHQ-9 score less than 5 in past 6 months    Recent (6 mo) or future (30 days) visit within the authorizing provider's specialty     NO PHQ9 on file

## 2022-12-21 ENCOUNTER — VIRTUAL VISIT (OUTPATIENT)
Dept: NEUROLOGY | Facility: CLINIC | Age: 67
End: 2022-12-21
Payer: MEDICARE

## 2022-12-21 VITALS — HEIGHT: 66 IN | WEIGHT: 180 LBS | BODY MASS INDEX: 28.93 KG/M2

## 2022-12-21 DIAGNOSIS — G40.211 LOCALZ-RLTED SYMPTOMATIC EPILEPSY W CMPLX PARTL SZ, INTRACT, W STATUS (H): ICD-10-CM

## 2022-12-21 DIAGNOSIS — G40.211 LOCALIZATION-RELATED SYMPTOMATIC EPILEPSY AND EPILEPTIC SYNDROMES WITH COMPLEX PARTIAL SEIZURES, INTRACTABLE, WITH STATUS EPILEPTICUS (H): ICD-10-CM

## 2022-12-21 RX ORDER — LEVETIRACETAM 750 MG/1
TABLET ORAL
Qty: 135 TABLET | Refills: 11 | Status: SHIPPED | OUTPATIENT
Start: 2022-12-21 | End: 2023-12-18

## 2022-12-21 RX ORDER — OXCARBAZEPINE 300 MG/1
TABLET, FILM COATED ORAL
Qty: 210 TABLET | Refills: 11 | Status: SHIPPED | OUTPATIENT
Start: 2022-12-21 | End: 2023-12-18

## 2022-12-21 RX ORDER — LORAZEPAM 0.5 MG/1
TABLET ORAL
Qty: 10 TABLET | Refills: 5 | Status: SHIPPED | OUTPATIENT
Start: 2022-12-21 | End: 2023-06-19

## 2022-12-21 ASSESSMENT — PAIN SCALES - GENERAL: PAINLEVEL: EXTREME PAIN (8)

## 2022-12-21 ASSESSMENT — PATIENT HEALTH QUESTIONNAIRE - PHQ9: SUM OF ALL RESPONSES TO PHQ QUESTIONS 1-9: 8

## 2022-12-21 NOTE — LETTER
2022       RE: Adia Briceno  : 1955   MRN: [id number]      Dear Colleague,    Thank you for referring your patient, Adia Briceno, to the [facility] EPILEPSY [facility]ARE at [facility]. Please see a copy of my visit note below.    Adia is a 67 year old who is being evaluated via a billable video visit.      How would you like to obtain your AVS? MyChart  If the video visit is dropped, the invitation should be resent by: Text to cell phone: [phone]    ELOY Ochoa          [facility] Epilepsy Care Progress Note      Patient:  Adia Briceno  :  1955   Age:  67 year old   Today's Office Visit:  2022    Epilepsy Data:    Patient History  Primary Epileptologist/Provider: Noam Barboza M.D.  Epilepsy Syndrome: Localization-related epilepsy unspecified  Epilepsy Syndrome Status: Final  Age of Onset: 10  Other Relevant Dx/ Issues: Inpatient evaluation 1998.  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  Asystole  with subsequent pacemaker placement.  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 Seizure-related falls/injuries.  is endocrinologist.     Tests/Surgery History  Last EE2005  Last MRI: 2008  Last Neuropsych Testin2008  Last Case Management Conference: 2008  Epilepsy Surgery #1 Date: 08  Epilepsy Related Surgery #1 : Type: VNS placement    Seizure Record  Current Visit Date: 22  Previous Visit Date: 22  Months since last visit: 8.28  Seizure Type 1: Complex partial seizures unspecified  Description of Sz Type 1: Head slumps, amnesia  Seizure Type 2: Complex partial seizures with impairment of consciousness at onset  Description of Sz Type 2: Head slumps with collapse  # of Type 2 Seizure since last visit: 3  Freq. Type 2 / Month: 0.36  Seizure Type 3: Simple partial seizures unspecified  Description of Sz Type 3: strange feeling in her head, 30-60  "seconds  # of Type 3 Seizure since last visit: 16  Freq. 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 Left more than right. All AEDs other than barbiturates, felbatol, ethetoin (but serum sickness w PHT), brivaracetam  have been used. Seizures have worsened following 2007;previously would go up to 3 months without sz but after 2007 baseline increased to three sz per month.  mg per did not help and poorly tolerated. VNS increased to rapid cycling. Asystole detected April 2011 not thought due to epilepsy or VNS tho this was never demonstrated; pacemaker implanted. She collapses with some seizures and has continued collapsing after pacemaker was placed. Perampanel added with seizure improvement but moderate doses resulted in significant ataxia; this improve on 2 mg QHS which also helped with insomnia. With brief seizure recurrence fycompa increased to 4 mg. Seizures much better following Mar 2015 for no particular reason, VNS reached EOS Sep 2016 without significant worsening.    History of Present Illness:     She had been doing well when last seen but situation has worsened over the last three months for no particular reason.  Lesser spells occur intermittently. She refers to these as \"auras\" but they are probably focal impaired seizures.   reports that she is responsive but confused during \"auras\". One to two minutes. Inappropriate speech and lack of situational awareness.    Three spells of collapse, none with warning. During two observed to wander prior to collapse. She has sustained significant trauma during two of these; right periorbital hematoma and small right forehead laceration with one.  During the other a more significant laceration requiring multiple stitches.    Denies obvious seizure precipitants. Taking medications as directed. No fevers, diarrhea, vomiting. No new medications. No significant sleep deprivation, alcohol use, " street drug use. No new significant stressors. No new supplement use.    Current Outpatient Medications   Medication Sig Dispense Refill     acetaZOLAMIDE (DIAMOX) 250 MG tablet Take one to three times per day during seizure clusters 30 tablet 3     Cetirizine HCl (ZYRTEC PO) daily.       Cholecalciferol (VITAMIN D) 2000 UNITS CAPS daily.       clonazePAM (KLONOPIN) 1 MG tablet Take 1.5 tablets (1.5 mg) by mouth At Bedtime 45 tablet 3     diazepam (DIAZEPAM INTENSOL) 5 MG/ML (HIGH CONC) solution Administer 1 ml as needed into cheek pouch for seizures 30 mL 0     escitalopram (LEXAPRO) 10 MG tablet TAKE 1 TABLET BY MOUTH AT BEDTIME 30 tablet 11     escitalopram (LEXAPRO) 5 MG tablet Take 1 tablet (5 mg) by mouth daily 30 tablet 2     esomeprazole (NEXIUM) 40 MG DR capsule Take by mouth every morning (before breakfast) Take 30-60 minutes before eating.       levETIRAcetam (KEPPRA) 750 MG tablet TAKE 1 TABLET BY MOUTH THREE TIMES DAILY AND TAKE 1 1/2 TABLETS EVERY NIGHT AT BEDTIME(TOTAL 3375MG PER DAY).Please keep 10-15-22 clinic appt for refills. 135 tablet 0     OXcarbazepine (TRILEPTAL) 300 MG tablet TAKE ONE AND ONE-HALF TABLET IN THE MORNING, ONE AND ONE-HALF TABLET AT NOON, 2 TABLETS IN THE EVENING AND 2 TABLETS AT BEDTIME. Keep scheduled appointment 12/2022 for further refills. 210 tablet 0     oxybutynin ER (DITROPAN-XL) 10 MG 24 hr tablet Take 10 mg by mouth       perampanel (FYCOMPA) 6 MG tablet Take 1 tablet (6 mg) by mouth At Bedtime 31 tablet 5     progesterone (PROMETRIUM) 100 MG capsule 100 mg daily        SUMAtriptan (IMITREX) 100 MG tablet Take one by mouth prn severe headache. No more than four per month. 9 tablet 0     topiramate (TOPAMAX) 100 MG tablet TAKE ONE-HALF TABLET BY MOUTH IN THE MORNING, AND ONE TABLET IN THE AFTERNOON/EVENING(TOTAL 150 MG PER DAY) Please keep 10-15-22 clinic appt for refills. 48 tablet 0        Perceived AED Side Effects:  Yes  No longer tolerating acetazolamide. Needs to  "urinate every 15 minutes while awake, every several hours while asleep.  Has been diagnosed with bladder spasms and oxybutynin has been prescribed but symptoms continue.    Medication Notes:    Taking TPM for headaches. Still taking progesterone.  Reports that acetazolamide associated with urinary frequency.   AED Medication Compliance:  compliant all of the time  Using a pill box:  Yes    Review of Systems:  No vomiting, diarrhea, fevers, hematuria or kidney stones.    Have you experienced a traumatic fall since your last visit: YES  Are these falls related to your seizures:  YES: see above.    Other Issues:  Mood is unchanged.  Is patient safe to drive:  No    Woman Care:   Patient is:  Postmenopausal.    Exam:    Ht 5' 6\" (167.6 cm)   Wt 180 lb (81.6 kg)   BMI 29.05 kg/m       Wt Readings from Last 5 Encounters:   12/21/22 180 lb (81.6 kg)   09/22/22 183 lb 9.6 oz (83.3 kg)   04/13/22 181 lb 3.2 oz (82.2 kg)   11/16/21 181 lb 12.8 oz (82.5 kg)   07/14/21 181 lb 11.2 oz (82.4 kg)     Significant bilateral ptosis continues. With effort can open eyes widely. Pupils are equal. Conjugate. EOMI.  Smile is symmetrical. Tongue is midline. No drift, pronation, or tremor. Rapid fine movements are done well. FFN is done well.  Speech is fluent. Thought content normal.    IMPRESSION   1) Focal seizures, intractable; focal aware, focal aware to focal impaired seizures. Some focal impaired seizures associated with falls and occasionally with trama. Independent bitemporal likely neocortical onset by scalp EEG. Improvement starting approxmiately March 2015 led to almost 1 3/4 years of  freedom from focal impaired seizures with falls. Fycompa and levetiracetam appeared to play important roles in this period of seizure freedom but I thought that completing menopause may have been important. There was some indication that focal unimpaired seizures may have worsened with reduction of TPM. She is refractory to all AEDs other than " barbiturates, felbamate, CLB off label, and cenobamate. Seizures wax and wane. Over last year number about the same but she continues with longer seizure free periods, up to two months in duration. Most recently we have been able to increase perampanel further. Now with return of seizures with longer impairment and periods of collapse with more significant trauma.   2) Migraine headaches; topiramate thought to have helped; under good control.  3) Asystole; likely primary and not related to seizures or VNS tho this has never been formally examined; has pacemaker in place.   4) Depression, continues doing well on escitalopram. Therapy also helpful.   5) Mild hyponatremia in past likely related to oxcarbazpine and escitalopram. Normal recently.  6) While she believes acetazolamide helped she is no longer tolerating this medication because of other bladder problems.     DISCUSSION  We again discussed remaining AED alternatives and more aggressive neuromodulation options. She is planning a trip shortly and does not want a major change. After further discussion we agreed to following plan.     PLAN:   1) Same OXC, levetiracetam, topiramate, and micronized progesterone.   2) Stop acetazolamide. Use lorazapam after seizure clusters. 0.5 mg twice a day for two days. No more than ten pills per month. She and  thought this would be adequate.    3) Continue escitalopram but reduce to 10 mg at HS.  4) Increase perampanel to 8 mg at bedtime. This may help with insomnia. Discussed that long half life means that there may be no improvement for about a month.  5) CT scan of brain to rule out subdural hematomas.  6) Other AED options as above. Optimizing perampanel would be reasonable, optimizing topiramate would be reasonable if she can tolerate. As she is uncomfortable with felbatol,  cenobamate would probably be best choice, perhaps replacing levetiracetam. We could also consider replacing levetiracetam with brivaracetam or  use eslicarbazpine instead of oxcarbazepine if formulary will allow. RNS should be a serious consideration if seizures with falls return and become more frequent. If RNS placed she would have three implanted devices which could also raise concern. Neither she nor her  were excited about this possibility when we last reviewed.  7) AED levels at local clinic.  will arrange. Also sodium.  8) RTC 1/23/2023 as already scheduled.    Total time on video today 32 min. Additional 7 min reviewing chart prior to visit. Additional 7 min coordinating care and generating note following visit. Total time 46 min on day of visit. Obtained unique information from .       Noam Barboza MD

## 2022-12-21 NOTE — PROGRESS NOTES
Adia is a 67 year old who is being evaluated via a billable video visit.      How would you like to obtain your AVS? MyChart  If the video visit is dropped, the invitation should be resent by: Text to cell phone: [phone]    Mohini Jones ELOY          [facility]      Patient:  Adia Briceno  :  1955   Age:  67 year old   Today's Office Visit:  2022    Epilepsy Data:    Patient History  Primary Epileptologist/Provider: Noam Barboza M.D.  Epilepsy Syndrome: Localization-related epilepsy unspecified  Epilepsy Syndrome Status: Final  Age of Onset: 10  Other Relevant Dx/ Issues: Inpatient evaluation 1998.  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  Asystole  with subsequent pacemaker placement.  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 Seizure-related falls/injuries.  is endocrinologist.     Tests/Surgery History  Last EE2005  Last MRI: 2008  Last Neuropsych Testin2008  Last Case Management Conference: 2008  Epilepsy Surgery #1 Date: 08  Epilepsy Related Surgery #1 : Type: VNS placement    Seizure Record  Current Visit Date: 22  Previous Visit Date: 22  Months since last visit: 8.28  Seizure Type 1: Complex partial seizures unspecified  Description of Sz Type 1: Head slumps, amnesia  Seizure Type 2: Complex partial seizures with impairment of consciousness at onset  Description of Sz Type 2: Head slumps with collapse  # of Type 2 Seizure since last visit: 3  Freq. Type 2 / Month: 0.36  Seizure Type 3: Simple partial seizures unspecified  Description of Sz Type 3: strange feeling in her head, 30-60 seconds  # of Type 3 Seizure since last visit: 16  Freq. 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 Left more than right. All AEDs other than barbiturates, felbatol, ethetoin (but serum sickness w PHT), brivaracetam   "have been used. Seizures have worsened following 2007;previously would go up to 3 months without sz but after 2007 baseline increased to three sz per month.  mg per did not help and poorly tolerated. VNS increased to rapid cycling. Asystole detected April 2011 not thought due to epilepsy or VNS tho this was never demonstrated; pacemaker implanted. She collapses with some seizures and has continued collapsing after pacemaker was placed. Perampanel added with seizure improvement but moderate doses resulted in significant ataxia; this improve on 2 mg QHS which also helped with insomnia. With brief seizure recurrence fycompa increased to 4 mg. Seizures much better following Mar 2015 for no particular reason, VNS reached EOS Sep 2016 without significant worsening.    History of Present Illness:     She had been doing well when last seen but situation has worsened over the last three months for no particular reason.  Lesser spells occur intermittently. She refers to these as \"auras\" but they are probably focal impaired seizures.   reports that she is responsive but confused during \"auras\". One to two minutes. Inappropriate speech and lack of situational awareness.    Three spells of collapse, none with warning. During two observed to wander prior to collapse. She has sustained significant trauma during two of these; right periorbital hematoma and small right forehead laceration with one.  During the other a more significant laceration requiring multiple stitches.    Denies obvious seizure precipitants. Taking medications as directed. No fevers, diarrhea, vomiting. No new medications. No significant sleep deprivation, alcohol use, street drug use. No new significant stressors. No new supplement use.    Current Outpatient Medications   Medication Sig Dispense Refill     acetaZOLAMIDE (DIAMOX) 250 MG tablet Take one to three times per day during seizure clusters 30 tablet 3     Cetirizine HCl (ZYRTEC PO) daily. "       Cholecalciferol (VITAMIN D) 2000 UNITS CAPS daily.       clonazePAM (KLONOPIN) 1 MG tablet Take 1.5 tablets (1.5 mg) by mouth At Bedtime 45 tablet 3     diazepam (DIAZEPAM INTENSOL) 5 MG/ML (HIGH CONC) solution Administer 1 ml as needed into cheek pouch for seizures 30 mL 0     escitalopram (LEXAPRO) 10 MG tablet TAKE 1 TABLET BY MOUTH AT BEDTIME 30 tablet 11     escitalopram (LEXAPRO) 5 MG tablet Take 1 tablet (5 mg) by mouth daily 30 tablet 2     esomeprazole (NEXIUM) 40 MG DR capsule Take by mouth every morning (before breakfast) Take 30-60 minutes before eating.       levETIRAcetam (KEPPRA) 750 MG tablet TAKE 1 TABLET BY MOUTH THREE TIMES DAILY AND TAKE 1 1/2 TABLETS EVERY NIGHT AT BEDTIME(TOTAL 3375MG PER DAY).Please keep 10-15-22 clinic appt for refills. 135 tablet 0     OXcarbazepine (TRILEPTAL) 300 MG tablet TAKE ONE AND ONE-HALF TABLET IN THE MORNING, ONE AND ONE-HALF TABLET AT NOON, 2 TABLETS IN THE EVENING AND 2 TABLETS AT BEDTIME. Keep scheduled appointment 12/2022 for further refills. 210 tablet 0     oxybutynin ER (DITROPAN-XL) 10 MG 24 hr tablet Take 10 mg by mouth       perampanel (FYCOMPA) 6 MG tablet Take 1 tablet (6 mg) by mouth At Bedtime 31 tablet 5     progesterone (PROMETRIUM) 100 MG capsule 100 mg daily        SUMAtriptan (IMITREX) 100 MG tablet Take one by mouth prn severe headache. No more than four per month. 9 tablet 0     topiramate (TOPAMAX) 100 MG tablet TAKE ONE-HALF TABLET BY MOUTH IN THE MORNING, AND ONE TABLET IN THE AFTERNOON/EVENING(TOTAL 150 MG PER DAY) Please keep 10-15-22 clinic appt for refills. 48 tablet 0        Perceived AED Side Effects:  Yes  No longer tolerating acetazolamide. Needs to urinate every 15 minutes while awake, every several hours while asleep.  Has been diagnosed with bladder spasms and oxybutynin has been prescribed but symptoms continue.    Medication Notes:    Taking TPM for headaches. Still taking progesterone.  Reports that acetazolamide  "associated with urinary frequency.   AED Medication Compliance:  compliant all of the time  Using a pill box:  Yes    Review of Systems:  No vomiting, diarrhea, fevers, hematuria or kidney stones.    Have you experienced a traumatic fall since your last visit: YES  Are these falls related to your seizures:  YES: see above.    Other Issues:  Mood is unchanged.  Is patient safe to drive:  No    Woman Care:   Patient is:  Postmenopausal.    Exam:    Ht 5' 6\" (167.6 cm)   Wt 180 lb (81.6 kg)   BMI 29.05 kg/m       Wt Readings from Last 5 Encounters:   12/21/22 180 lb (81.6 kg)   09/22/22 183 lb 9.6 oz (83.3 kg)   04/13/22 181 lb 3.2 oz (82.2 kg)   11/16/21 181 lb 12.8 oz (82.5 kg)   07/14/21 181 lb 11.2 oz (82.4 kg)     Significant bilateral ptosis continues. With effort can open eyes widely. Pupils are equal. Conjugate. EOMI.  Smile is symmetrical. Tongue is midline. No drift, pronation, or tremor. Rapid fine movements are done well. FFN is done well.  Speech is fluent. Thought content normal.    IMPRESSION   1) Focal seizures, intractable; focal aware, focal aware to focal impaired seizures. Some focal impaired seizures associated with falls and occasionally with trama. Independent bitemporal likely neocortical onset by scalp EEG. Improvement starting approxmiately March 2015 led to almost 1 3/4 years of  freedom from focal impaired seizures with falls. Fycompa and levetiracetam appeared to play important roles in this period of seizure freedom but I thought that completing menopause may have been important. There was some indication that focal unimpaired seizures may have worsened with reduction of TPM. She is refractory to all AEDs other than barbiturates, felbamate, CLB off label, and cenobamate. Seizures wax and wane. Over last year number about the same but she continues with longer seizure free periods, up to two months in duration. Most recently we have been able to increase perampanel further. Now with " return of seizures with longer impairment and periods of collapse with more significant trauma.   2) Migraine headaches; topiramate thought to have helped; under good control.  3) Asystole; likely primary and not related to seizures or VNS tho this has never been formally examined; has pacemaker in place.   4) Depression, continues doing well on escitalopram. Therapy also helpful.   5) Mild hyponatremia in past likely related to oxcarbazpine and escitalopram. Normal recently.  6) While she believes acetazolamide helped she is no longer tolerating this medication because of other bladder problems.     DISCUSSION  We again discussed remaining AED alternatives and more aggressive neuromodulation options. She is planning a trip shortly and does not want a major change. After further discussion we agreed to following plan.     PLAN:   1) Same OXC, levetiracetam, topiramate, and micronized progesterone.   2) Stop acetazolamide. Use lorazapam after seizure clusters. 0.5 mg twice a day for two days. No more than ten pills per month. She and  thought this would be adequate.    3) Continue escitalopram but reduce to 10 mg at HS.  4) Increase perampanel to 8 mg at bedtime. This may help with insomnia. Discussed that long half life means that there may be no improvement for about a month.  5) CT scan of brain to rule out subdural hematomas.  6) Other AED options as above. Optimizing perampanel would be reasonable, optimizing topiramate would be reasonable if she can tolerate. As she is uncomfortable with felbatol,  cenobamate would probably be best choice, perhaps replacing levetiracetam. We could also consider replacing levetiracetam with brivaracetam or use eslicarbazpine instead of oxcarbazepine if formulary will allow. RNS should be a serious consideration if seizures with falls return and become more frequent. If RNS placed she would have three implanted devices which could also raise concern. Neither she nor her   were excited about this possibility when we last reviewed.  7) AED levels at local clinic.  will arrange. Also sodium.  8) RTC 1/23/2023 as already scheduled.    Total time on video today 32 min. Additional 7 min reviewing chart prior to visit. Additional 7 min coordinating care and generating note following visit. Total time 46 min on day of visit. Obtained unique information from .       Noam Barboza MD

## 2022-12-22 RX ORDER — ESCITALOPRAM OXALATE 5 MG/1
5 TABLET ORAL DAILY
Qty: 90 TABLET | OUTPATIENT
Start: 2022-12-22

## 2022-12-22 NOTE — TELEPHONE ENCOUNTER
Rx request was for inaccurate dose. A corrected prescription has been placed since this request from the pharmacy.

## 2022-12-26 ENCOUNTER — ANCILLARY PROCEDURE (OUTPATIENT)
Dept: CT IMAGING | Facility: CLINIC | Age: 67
End: 2022-12-26
Attending: PSYCHIATRY & NEUROLOGY
Payer: MEDICARE

## 2022-12-26 DIAGNOSIS — G40.211 LOCALZ-RLTED SYMPTOMATIC EPILEPSY W CMPLX PARTL SZ, INTRACT, W STATUS (H): ICD-10-CM

## 2022-12-26 PROCEDURE — G1010 CDSM STANSON: HCPCS

## 2023-01-17 ENCOUNTER — TELEPHONE (OUTPATIENT)
Dept: NEUROLOGY | Facility: CLINIC | Age: 68
End: 2023-01-17

## 2023-01-17 DIAGNOSIS — Z79.899 LONG-TERM CURRENT USE OF ANTICONVULSANT: ICD-10-CM

## 2023-01-17 DIAGNOSIS — G40.211 LOCALIZATION-RELATED SYMPTOMATIC EPILEPSY AND EPILEPTIC SYNDROMES WITH COMPLEX PARTIAL SEIZURES, INTRACTABLE, WITH STATUS EPILEPTICUS (H): Primary | ICD-10-CM

## 2023-01-20 ENCOUNTER — LAB (OUTPATIENT)
Dept: LAB | Facility: CLINIC | Age: 68
End: 2023-01-20
Payer: COMMERCIAL

## 2023-01-20 DIAGNOSIS — G40.211 LOCALIZATION-RELATED SYMPTOMATIC EPILEPSY AND EPILEPTIC SYNDROMES WITH COMPLEX PARTIAL SEIZURES, INTRACTABLE, WITH STATUS EPILEPTICUS (H): ICD-10-CM

## 2023-01-20 DIAGNOSIS — Z79.899 LONG-TERM CURRENT USE OF ANTICONVULSANT: ICD-10-CM

## 2023-01-20 LAB
CALCIUM SERPL-MCNC: 9 MG/DL (ref 8.8–10.2)
LEVETIRACETAM SERPL-MCNC: 55.2 ΜG/ML (ref 10–40)
Lab: NORMAL
PERFORMING LABORATORY: NORMAL
SODIUM SERPL-SCNC: 139 MMOL/L (ref 136–145)
SPECIMEN STATUS: NORMAL
TEST NAME: NORMAL

## 2023-01-20 PROCEDURE — 36415 COLL VENOUS BLD VENIPUNCTURE: CPT

## 2023-01-21 LAB — DEPRECATED CALCIDIOL+CALCIFEROL SERPL-MC: 42 UG/L (ref 20–75)

## 2023-01-22 LAB
10OH-CARBAZEPINE SERPL-MCNC: 36 UG/ML
TOPIRAMATE SERPL-MCNC: 3.9 UG/ML

## 2023-01-23 DIAGNOSIS — G40.211 LOCALZ-RLTED SYMPTOMATIC EPILEPSY W CMPLX PARTL SZ, INTRACT, W STATUS (H): ICD-10-CM

## 2023-01-23 RX ORDER — CLONAZEPAM 1 MG/1
1.5 TABLET ORAL AT BEDTIME
Qty: 45 TABLET | Refills: 3 | Status: SHIPPED | OUTPATIENT
Start: 2023-01-23 | End: 2023-03-16

## 2023-01-26 DIAGNOSIS — G40.211 LOCALIZATION-RELATED SYMPTOMATIC EPILEPSY AND EPILEPTIC SYNDROMES WITH COMPLEX PARTIAL SEIZURES, INTRACTABLE, WITH STATUS EPILEPTICUS (H): ICD-10-CM

## 2023-01-26 RX ORDER — TOPIRAMATE 100 MG/1
TABLET, FILM COATED ORAL
Qty: 48 TABLET | Refills: 11 | Status: SHIPPED | OUTPATIENT
Start: 2023-01-26 | End: 2023-07-19

## 2023-01-26 NOTE — TELEPHONE ENCOUNTER
topiramate (TOPAMAX) 100 MG tablet  Last Written Prescription Date:   11/23/2022  Last Fill Quantity: 48,   # refills: 0  Last Office Visit : 12/21/2022  Future Office visit:  2/6/2023  Routing refill request to provider for review/approval because:  Dr. Barboza,   Pt has a visit on 2/6/2023.    Confirming with the Provider it is okay to send a refill to cover the Pt until seen in clinic with you.   Please review and fill per Providers orders for Pt care.       Rachel Doe RN  Central Triage Red Flags/Med Refills

## 2023-02-03 LAB — MISCELLANEOUS TEST 1 (ARUP): NORMAL

## 2023-03-16 ENCOUNTER — VIRTUAL VISIT (OUTPATIENT)
Dept: NEUROLOGY | Facility: CLINIC | Age: 68
End: 2023-03-16
Payer: COMMERCIAL

## 2023-03-16 VITALS — BODY MASS INDEX: 28.93 KG/M2 | HEIGHT: 66 IN | WEIGHT: 180 LBS

## 2023-03-16 DIAGNOSIS — G40.211 LOCALIZATION-RELATED SYMPTOMATIC EPILEPSY AND EPILEPTIC SYNDROMES WITH COMPLEX PARTIAL SEIZURES, INTRACTABLE, WITH STATUS EPILEPTICUS (H): ICD-10-CM

## 2023-03-16 DIAGNOSIS — G40.211 LOCALZ-RLTED SYMPTOMATIC EPILEPSY W CMPLX PARTL SZ, INTRACT, W STATUS (H): ICD-10-CM

## 2023-03-16 DIAGNOSIS — F43.21 ADJUSTMENT DISORDER WITH DEPRESSED MOOD: ICD-10-CM

## 2023-03-16 RX ORDER — CLONAZEPAM 1 MG/1
1.5 TABLET ORAL AT BEDTIME
Qty: 45 TABLET | Refills: 3 | Status: ON HOLD | OUTPATIENT
Start: 2023-03-16 | End: 2023-07-05

## 2023-03-16 RX ORDER — SUMATRIPTAN 100 MG/1
TABLET, FILM COATED ORAL
Qty: 9 TABLET | Refills: 1 | Status: SHIPPED | OUTPATIENT
Start: 2023-03-16 | End: 2023-11-27

## 2023-03-16 RX ORDER — ESCITALOPRAM OXALATE 10 MG/1
10 TABLET ORAL AT BEDTIME
Qty: 30 TABLET | Refills: 11 | Status: SHIPPED | OUTPATIENT
Start: 2023-03-16 | End: 2023-07-28

## 2023-03-16 ASSESSMENT — PATIENT HEALTH QUESTIONNAIRE - PHQ9: SUM OF ALL RESPONSES TO PHQ QUESTIONS 1-9: 9

## 2023-03-16 ASSESSMENT — PAIN SCALES - GENERAL: PAINLEVEL: WORST PAIN (10)

## 2023-03-16 NOTE — NURSING NOTE
Patient is also taking-  Multivitamin, Vitamin B,     Patient scored 9 on PHQ-9, 1=several days      Is the patient currently in the state of [place]? YES    Visit mode:VIDEO    If the visit is dropped, the patient can be reconnected by: VIDEO VISIT: Text to cell phone: [phone]    Will anyone else be joining the visit? YES: How would they like to receive their invitation? Text to cell phone: [phone] [facility]        How would you like to obtain your AVS? MyChart    Are changes needed to the allergy or medication list? YES: ADD- Multivitamin, Vitamin B    Reason for visit:   Follow up    Mohini Jones, VF

## 2023-03-16 NOTE — PROGRESS NOTES
Video-Visit Details    Type of service:  Video Visit          [facility]psy [facility] Progress Note      Patient:  Adia Briceno  :  1955   Age:  67 year old   Today's Office Visit:  3/16/2023    Epilepsy Data:    Patient History  Primary Epileptologist/Provider: Noam Barboza M.D.  Epilepsy Syndrome: Localization-related epilepsy unspecified  Epilepsy Syndrome Status: Final  Age of Onset: 10  Other Relevant Dx/ Issues: Inpatient evaluation 1998.  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  Asystole  with subsequent pacemaker placement.  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 Seizure-related falls/injuries.  is endocrinologist.     Tests/Surgery History  Last EE2005  Last MRI: 2008  Last Neuropsych Testin2008  Last Case Management Conference: 2008  Epilepsy Surgery #1 Date: 08  Epilepsy Related Surgery #1 : Type: VNS placement    Seizure Record  Current Visit Date: 23  Previous Visit Date: 22  Months since last visit: 2.79  Seizure Type 1: Complex partial seizures unspecified  Description of Sz Type 1: Head slumps, amnesia  Seizure Type 2: Complex partial seizures with impairment of consciousness at onset  Description of Sz Type 2: Head slumps with collapse  Seizure Type 3: Simple partial seizures unspecified  Description of Sz Type 3: strange feeling in her head, 30-60 seconds  # of Type 3 Seizure since last visit: 3  Freq. Type 3 / Month: 1.08      History of Present Illness:     Overall, seizures have improved somewhat. She had a seizure with collapse on Dec 28th.  Unfortunately, per Dr Briceno, Ms Briceno experienced coccygeal fracture. There was also an L2 compression fracture that appears to have been acute. There was also indication of a sacral contusion.  This has resulted in significant pain; she is being seen by orthopedics, steroid injections are being planned.    She then had 3 focal seizures in  January without loss of tone. Decreased responsiveness during one, was responsive during other two.  No seizures in Feburary or March.    Acetazolamide was stopped last visit; this was not associated with increased seizures.    Current Outpatient Medications   Medication Sig Dispense Refill     Cetirizine HCl (ZYRTEC PO) daily.       Cholecalciferol (VITAMIN D) 2000 UNITS CAPS daily.       clonazePAM (KLONOPIN) 1 MG tablet Take 1.5 tablets (1.5 mg) by mouth At Bedtime 45 tablet 3     diazepam (DIAZEPAM INTENSOL) 5 MG/ML (HIGH CONC) solution Administer 1 ml as needed into cheek pouch for seizures 30 mL 0     escitalopram (LEXAPRO) 10 MG tablet TAKE 1 TABLET BY MOUTH AT BEDTIME 30 tablet 11     levETIRAcetam (KEPPRA) 750 MG tablet Take one tablet three times daily and one and half tablets at night 135 tablet 11     LORazepam (ATIVAN) 0.5 MG tablet Take one tablet twice daily for two days following seizure cluster 10 tablet 5     OXcarbazepine (TRILEPTAL) 300 MG tablet TAKE ONE AND ONE-HALF TABLET IN THE MORNING, ONE AND ONE-HALF TABLET AT NOON, 2 TABLETS IN THE EVENING AND 2 TABLETS AT BEDTIME. 210 tablet 11     oxybutynin ER (DITROPAN-XL) 10 MG 24 hr tablet Take 10 mg by mouth       perampanel (FYCOMPA) 8 MG tablet Take 1 tablet (8 mg) by mouth At Bedtime 31 tablet 5     progesterone (PROMETRIUM) 100 MG capsule 100 mg daily        SUMAtriptan (IMITREX) 100 MG tablet Take one by mouth prn severe headache. No more than four per month. 9 tablet 0     topiramate (TOPAMAX) 100 MG tablet TAKE ONE-HALF TABLET BY MOUTH IN THE MORNING, AND ONE TABLET IN THE AFTERNOON/EVENING(TOTAL 150 MG PER DAY) 48 tablet 11     acetaZOLAMIDE (DIAMOX) 250 MG tablet Take one to three times per day during seizure clusters (Patient not taking: Reported on 3/16/2023) 30 tablet 3     esomeprazole (NEXIUM) 40 MG DR capsule Take by mouth every morning (before breakfast) Take 30-60 minutes before eating. (Patient not taking: Reported on 3/16/2023)    "       Perceived AED Side Effects:  No    Medication Notes:    Reviewed AEDs and these are as above except she takes  mg at HS only.  Off diamox. Bladder got a little better intially. Then developed UTI and had sacral contusion.   Now with continuing frequency tho no incontinence.   AED Medication Compliance:  compliant all of the time  Using a pill box:  No    Review of Systems:  No vomiting, diarrhea, fevers, hematuria or kidney stones.  Have you experienced a traumatic fall since your last visit: YES  Are these falls related to your seizures:  YES: see above.    Other Issues:    Headaches are the same, one to twice per month, respond to sumatriptan.  Reports mood deteriorated after fracture. Reports mood better now. Denies suicidal ideation at this time.  Is patient safe to drive:  No    Woman Care:   Patient is:  Postmenopausal.    Exam:    Ht 5' 5.5\" (166.4 cm)   Wt 180 lb (81.6 kg)   BMI 29.50 kg/m       Wt Readings from Last 5 Encounters:   03/16/23 180 lb (81.6 kg)   12/21/22 180 lb (81.6 kg)   09/22/22 183 lb 9.6 oz (83.3 kg)   04/13/22 181 lb 3.2 oz (82.2 kg)   11/16/21 181 lb 12.8 oz (82.5 kg)     Bilateral ptosis. Pupils equal in size. EOMI. Smile symmterical. Tongue midline.  No drift, pronation, or tremor. FFN done well. Rapid fine movements done well. No asterixis.  Alert. Oriented. Fluent speech. Normal thought content. No dysarthria.     Latest Reference Range & Units 04/13/22 13:59 01/20/23 14:01   Keppra (Levetiracetam) Level 10 - 40 ug/mL 41 (H)    Keppra (Levetiracetam) Level 10.0 - 40.0  g/mL  55.2 (H)   Topiramate Level 5.0 - 20.0 ug/mL 2.9 (L) 3.9 (L)   Oxcarb or Eslicarb Metabolite (MHD) 3 - 35 ug/mL 34 36 (H)     Perampanel level (6 mg/d, steady state) = 440. Na = 139. CT scan of brain normal, personally reviewed.    IMPRESSION   1) Focal seizures, intractable; focal aware, focal aware to focal impaired seizures. Some focal impaired seizures associated with falls and occasionally " with trama. Independent bitemporal likely neocortical onset by scalp EEG. Improvement starting approxmiately March 2015 led to almost 1 3/4 years of  freedom from focal impaired seizures with falls. Fycompa and levetiracetam appeared to play important roles in this period of seizure freedom but I thought that completing menopause may have been important.  Seizures wax and wane. Starting early 2022 seizures with impairment and periods of collapse with more signficant trauma returned including compression fracture in December. We have been able to escalate perampanel without side effects on this go around and she has had six weeks without seizures, with seizure improvement starting after steady state of higher dose reached. She is refractory to all AEDs other than barbiturates, felbamate, CLB off label, and cenobamate.  2) Migraine headaches; topiramate thought to have helped; under good control.  3) Asystole; likely primary and not related to seizures or VNS tho this has never been formally examined; has pacemaker in place.   4) Depression, overall did well on escitalopram, some worsening after recent trauma but nwo improved again. Therapy also helpful.   5) Mild hyponatremia in past likely related to oxcarbazpine and escitalopram. Normal recently.  6) Not entirely clear that acetazolamide was responsible for urinary frequency but stopping it did not worsen seizures.     DISCUSSION  We again discussed remaining AED alternatives and more aggressive neuromodulation options. She is happy with current situation and does not want any changes. We also considered tapering topiramate but given recent fracture decided not to make changes.     PLAN:   1) Same OXC, levetiracetam, topiramate, and micronized progesterone. Refilled sumatriptan and clonazpam.  2) Continue off acetazolamide. Use lorazapam after seizure clusters. 0.5 mg twice a day for two days. No more than ten pills per month. She and  thought this would be  adequate.    3) Continue escitalopram at 10 mg at HS.  4) Continue perampanel to 8 mg at bedtime. Consider gradual escalation in the future. Tho levels are mid therapeutic on 6 mg/d it appears to help and she may tolerate higher doses if escalation occurs very slowly.  5) Other AED options as above. Optimizing perampanel would be reasonable, optimizing topiramate would be reasonable if she can tolerate. As she is uncomfortable with felbatol,  cenobamate would probably be best choice, perhaps replacing levetiracetam. We could also consider replacing levetiracetam with brivaracetam or use eslicarbazpine instead of oxcarbazepine if formulary will allow. RNS should be a serious consideration if seizures with falls return and become more frequent. If RNS placed she would have three implanted devices which could also raise concern. Neither she nor her  were excited about this possibility when we last reviewed.  6) RTC 3 months.     Total time on video today 25 min. Additional 11 min reviewing chart prior to visit including multiple labs and personal review of CT scan as above. Additional 7 min coordinating care and generating note following visit. Total time 42 min on day of visit. Obtained unique information from .        Noam Barboza MD

## 2023-03-16 NOTE — LETTER
3/16/2023     RE: Adia Briceno  : 1955   MRN: [id number]      Dear Colleague,    Thank you for referring your patient, Adia Briceno[facility]. Please see a copy of my visit note below.       Video-Visit Details    Type of service:  Video Visit          [facility] Epilepsy Care Progress Note      Patient:  Adia Briceno  :  1955   Age:  67 year old   Today's Office Visit:  3/16/2023    Epilepsy Data:    Patient History  Primary Epileptologist/Provider: Noam Barboza M.D.  Epilepsy Syndrome: Localization-related epilepsy unspecified  Epilepsy Syndrome Status: Final  Age of Onset: 10  Other Relevant Dx/ Issues: Inpatient evaluation 1998.  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  Asystole  with subsequent pacemaker placement.  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 Seizure-related falls/injuries.  is endocrinologist.     Tests/Surgery History  Last EE2005  Last MRI: 2008  Last Neuropsych Testin2008  Last Case Management Conference: 2008  Epilepsy Surgery #1 Date: 08  Epilepsy Related Surgery #1 : Type: VNS placement    Seizure Record  Current Visit Date: 23  Previous Visit Date: 22  Months since last visit: 2.79  Seizure Type 1: Complex partial seizures unspecified  Description of Sz Type 1: Head slumps, amnesia  Seizure Type 2: Complex partial seizures with impairment of consciousness at onset  Description of Sz Type 2: Head slumps with collapse  Seizure Type 3: Simple partial seizures unspecified  Description of Sz Type 3: strange feeling in her head, 30-60 seconds  # of Type 3 Seizure since last visit: 3  Freq. Type 3 / Month: 1.08      History of Present Illness:     Overall, seizures have improved somewhat. She had a seizure with collapse on Dec 28th.  Unfortunately, per Dr Briceno, Ms Briceno experienced coccygeal  fracture. There was also an L2 compression fracture that appears to have been acute. There was also indication of a sacral contusion.  This has resulted in significant pain; she is being seen by orthopedics, steroid injections are being planned.    She then had 3 focal seizures in January without loss of tone. Decreased responsiveness during one, was responsive during other two.  No seizures in Feburary or March.    Acetazolamide was stopped last visit; this was not associated with increased seizures.    Current Outpatient Medications   Medication Sig Dispense Refill     Cetirizine HCl (ZYRTEC PO) daily.       Cholecalciferol (VITAMIN D) 2000 UNITS CAPS daily.       clonazePAM (KLONOPIN) 1 MG tablet Take 1.5 tablets (1.5 mg) by mouth At Bedtime 45 tablet 3     diazepam (DIAZEPAM INTENSOL) 5 MG/ML (HIGH CONC) solution Administer 1 ml as needed into cheek pouch for seizures 30 mL 0     escitalopram (LEXAPRO) 10 MG tablet TAKE 1 TABLET BY MOUTH AT BEDTIME 30 tablet 11     levETIRAcetam (KEPPRA) 750 MG tablet Take one tablet three times daily and one and half tablets at night 135 tablet 11     LORazepam (ATIVAN) 0.5 MG tablet Take one tablet twice daily for two days following seizure cluster 10 tablet 5     OXcarbazepine (TRILEPTAL) 300 MG tablet TAKE ONE AND ONE-HALF TABLET IN THE MORNING, ONE AND ONE-HALF TABLET AT NOON, 2 TABLETS IN THE EVENING AND 2 TABLETS AT BEDTIME. 210 tablet 11     oxybutynin ER (DITROPAN-XL) 10 MG 24 hr tablet Take 10 mg by mouth       perampanel (FYCOMPA) 8 MG tablet Take 1 tablet (8 mg) by mouth At Bedtime 31 tablet 5     progesterone (PROMETRIUM) 100 MG capsule 100 mg daily        SUMAtriptan (IMITREX) 100 MG tablet Take one by mouth prn severe headache. No more than four per month. 9 tablet 0     topiramate (TOPAMAX) 100 MG tablet TAKE ONE-HALF TABLET BY MOUTH IN THE MORNING, AND ONE TABLET IN THE AFTERNOON/EVENING(TOTAL 150 MG PER DAY) 48 tablet 11     acetaZOLAMIDE (DIAMOX) 250 MG tablet  "Take one to three times per day during seizure clusters (Patient not taking: Reported on 3/16/2023) 30 tablet 3     esomeprazole (NEXIUM) 40 MG DR capsule Take by mouth every morning (before breakfast) Take 30-60 minutes before eating. (Patient not taking: Reported on 3/16/2023)          Perceived AED Side Effects:  No    Medication Notes:    Reviewed AEDs and these are as above except she takes  mg at HS only.  Off diamox. Bladder got a little better intially. Then developed UTI and had sacral contusion.   Now with continuing frequency tho no incontinence.   AED Medication Compliance:  compliant all of the time  Using a pill box:  No    Review of Systems:  No vomiting, diarrhea, fevers, hematuria or kidney stones.  Have you experienced a traumatic fall since your last visit: YES  Are these falls related to your seizures:  YES: see above.    Other Issues:    Headaches are the same, one to twice per month, respond to sumatriptan.  Reports mood deteriorated after fracture. Reports mood better now. Denies suicidal ideation at this time.  Is patient safe to drive:  No    Woman Care:   Patient is:  Postmenopausal.    Exam:    Ht 5' 5.5\" (166.4 cm)   Wt 180 lb (81.6 kg)   BMI 29.50 kg/m       Wt Readings from Last 5 Encounters:   03/16/23 180 lb (81.6 kg)   12/21/22 180 lb (81.6 kg)   09/22/22 183 lb 9.6 oz (83.3 kg)   04/13/22 181 lb 3.2 oz (82.2 kg)   11/16/21 181 lb 12.8 oz (82.5 kg)     Bilateral ptosis. Pupils equal in size. EOMI. Smile symmterical. Tongue midline.  No drift, pronation, or tremor. FFN done well. Rapid fine movements done well. No asterixis.  Alert. Oriented. Fluent speech. Normal thought content. No dysarthria.     Latest Reference Range & Units 04/13/22 13:59 01/20/23 14:01   Keppra (Levetiracetam) Level 10 - 40 ug/mL 41 (H)    Keppra (Levetiracetam) Level 10.0 - 40.0  g/mL  55.2 (H)   Topiramate Level 5.0 - 20.0 ug/mL 2.9 (L) 3.9 (L)   Oxcarb or Eslicarb Metabolite (MHD) 3 - 35 ug/mL 34 36 " (H)     Perampanel level (6 mg/d, steady state) = 440. Na = 139. CT scan of brain normal, personally reviewed.    IMPRESSION   1) Focal seizures, intractable; focal aware, focal aware to focal impaired seizures. Some focal impaired seizures associated with falls and occasionally with trama. Independent bitemporal likely neocortical onset by scalp EEG. Improvement starting approxmiately March 2015 led to almost 1 3/4 years of  freedom from focal impaired seizures with falls. Fycompa and levetiracetam appeared to play important roles in this period of seizure freedom but I thought that completing menopause may have been important.  Seizures wax and wane. Starting early 2022 seizures with impairment and periods of collapse with more signficant trauma returned including compression fracture in December. We have been able to escalate perampanel without side effects on this go around and she has had six weeks without seizures, with seizure improvement starting after steady state of higher dose reached. She is refractory to all AEDs other than barbiturates, felbamate, CLB off label, and cenobamate.  2) Migraine headaches; topiramate thought to have helped; under good control.  3) Asystole; likely primary and not related to seizures or VNS tho this has never been formally examined; has pacemaker in place.   4) Depression, overall did well on escitalopram, some worsening after recent trauma but nwo improved again. Therapy also helpful.   5) Mild hyponatremia in past likely related to oxcarbazpine and escitalopram. Normal recently.  6) Not entirely clear that acetazolamide was responsible for urinary frequency but stopping it did not worsen seizures.     DISCUSSION  We again discussed remaining AED alternatives and more aggressive neuromodulation options. She is happy with current situation and does not want any changes. We also considered tapering topiramate but given recent fracture decided not to make  changes.     PLAN:   1) Same OXC, levetiracetam, topiramate, and micronized progesterone. Refilled sumatriptan and clonazpam.  2) Continue off acetazolamide. Use lorazapam after seizure clusters. 0.5 mg twice a day for two days. No more than ten pills per month. She and  thought this would be adequate.    3) Continue escitalopram at 10 mg at HS.  4) Continue perampanel to 8 mg at bedtime. Consider gradual escalation in the future. Tho levels are mid therapeutic on 6 mg/d it appears to help and she may tolerate higher doses if escalation occurs very slowly.  5) Other AED options as above. Optimizing perampanel would be reasonable, optimizing topiramate would be reasonable if she can tolerate. As she is uncomfortable with felbatol,  cenobamate would probably be best choice, perhaps replacing levetiracetam. We could also consider replacing levetiracetam with brivaracetam or use eslicarbazpine instead of oxcarbazepine if formulary will allow. RNS should be a serious consideration if seizures with falls return and become more frequent. If RNS placed she would have three implanted devices which could also raise concern. Neither she nor her  were excited about this possibility when we last reviewed.  6) RTC 3 months.     Total time on video today 25 min. Additional 11 min reviewing chart prior to visit including multiple labs and personal review of CT scan as above. Additional 7 min coordinating care and generating note following visit. Total time 42 min on day of visit. Obtained unique information from .        Noam Barboza MD

## 2023-04-20 ENCOUNTER — ANCILLARY PROCEDURE (OUTPATIENT)
Dept: CARDIOLOGY | Facility: CLINIC | Age: 68
End: 2023-04-20
Attending: INTERNAL MEDICINE
Payer: COMMERCIAL

## 2023-04-20 DIAGNOSIS — Z95.0 CARDIAC PACEMAKER IN SITU: Primary | ICD-10-CM

## 2023-04-20 PROCEDURE — 93293 PM PHONE R-STRIP DEVICE EVAL: CPT | Performed by: INTERNAL MEDICINE

## 2023-05-27 ENCOUNTER — HEALTH MAINTENANCE LETTER (OUTPATIENT)
Age: 68
End: 2023-05-27

## 2023-06-07 ENCOUNTER — VIRTUAL VISIT (OUTPATIENT)
Dept: NEUROLOGY | Facility: CLINIC | Age: 68
End: 2023-06-07
Payer: COMMERCIAL

## 2023-06-07 VITALS — BODY MASS INDEX: 27.32 KG/M2 | HEIGHT: 66 IN | WEIGHT: 170 LBS

## 2023-06-07 DIAGNOSIS — G40.211 LOCALZ-RLTED SYMPTOMATIC EPILEPSY W CMPLX PARTL SZ, INTRACT, W STATUS (H): Primary | ICD-10-CM

## 2023-06-07 ASSESSMENT — PATIENT HEALTH QUESTIONNAIRE - PHQ9: SUM OF ALL RESPONSES TO PHQ QUESTIONS 1-9: 10

## 2023-06-07 ASSESSMENT — PAIN SCALES - GENERAL: PAINLEVEL: SEVERE PAIN (7)

## 2023-06-07 NOTE — PROGRESS NOTES
[facility] Epilepsy Care Progress Note      Patient:  Adia Briceno  :  1955   Age:  68 year old   Today's Office Visit:  2023    Epilepsy Data:    Patient History  Primary Epileptologist/Provider: Noam Barboza M.D.  Epilepsy Syndrome: Localization-related epilepsy unspecified  Epilepsy Syndrome Status: Final  Age of Onset: 10  Other Relevant Dx/ Issues: Inpatient evaluation 1998.  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  Asystole  with subsequent pacemaker placement.  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 Seizure-related falls/injuries.  is endocrinologist.     Tests/Surgery History  Last EE2005  Last MRI: 2008  Last Neuropsych Testin2008  Last Case Management Conference: 2008  Epilepsy Surgery #1 Date: 08  Epilepsy Related Surgery #1 : Type: VNS placement    Seizure Record  Current Visit Date: 23  Previous Visit Date: 23  Months since last visit: 2.73  Seizure Type 1: Complex partial seizures unspecified  Description of Sz Type 1: Head slumps, amnesia  # of Type 1 Seizure since last visit: 0  Freq. Type 1 / Month: 0  Seizure Type 2: Complex partial seizures with impairment of consciousness at onset  Description of Sz Type 2: Head slumps with collapse  # of Type 2 Seizure since last visit: 0  Freq. Type 2 / Month: 0  Seizure Type 3: Simple partial seizures unspecified  Description of Sz Type 3: strange feeling in her head, 30-60 seconds  # of Type 3 Seizure since last visit: 2  Freq. Type 3 / Month: 0.73      History of Present Illness:     Seizures somewhat better over the last interval. There have been no seizures with falls.   feels that increasing fycompa has helped with seizures.    Current Outpatient Medications   Medication Sig Dispense Refill     Cetirizine HCl (ZYRTEC PO) daily.       Cholecalciferol (VITAMIN D) 2000 UNITS CAPS daily.       clonazePAM (KLONOPIN) 1 MG tablet Take 1.5  tablets (1.5 mg) by mouth At Bedtime 45 tablet 3     diazepam (DIAZEPAM INTENSOL) 5 MG/ML (HIGH CONC) solution Administer 1 ml as needed into cheek pouch for seizures 30 mL 0     escitalopram (LEXAPRO) 10 MG tablet Take 1 tablet (10 mg) by mouth At Bedtime 30 tablet 11     levETIRAcetam (KEPPRA) 750 MG tablet Take one tablet three times daily and one and half tablets at night 135 tablet 11     LORazepam (ATIVAN) 0.5 MG tablet Take one tablet twice daily for two days following seizure cluster 10 tablet 5     OXcarbazepine (TRILEPTAL) 300 MG tablet TAKE ONE AND ONE-HALF TABLET IN THE MORNING, ONE AND ONE-HALF TABLET AT NOON, 2 TABLETS IN THE EVENING AND 2 TABLETS AT BEDTIME. 210 tablet 11     oxybutynin ER (DITROPAN-XL) 10 MG 24 hr tablet Take 10 mg by mouth       perampanel (FYCOMPA) 8 MG tablet Take 1 tablet (8 mg) by mouth At Bedtime 31 tablet 5     progesterone (PROMETRIUM) 100 MG capsule 100 mg daily        SUMAtriptan (IMITREX) 100 MG tablet Take one by mouth prn severe headache. No more than four per month. 9 tablet 1     topiramate (TOPAMAX) 100 MG tablet TAKE ONE-HALF TABLET BY MOUTH IN THE MORNING, AND ONE TABLET IN THE AFTERNOON/EVENING(TOTAL 150 MG PER DAY) 48 tablet 11     esomeprazole (NEXIUM) 40 MG DR capsule Take by mouth every morning (before breakfast) Take 30-60 minutes before eating. (Patient not taking: Reported on 3/16/2023)          Perceived AED Side Effects:  Yes    Medication Notes:    AEDs as above; reducing TPM to 50 mg resulted in probable seizure.   reports that perampanel 8 mg has resulted in worse gait and is getting in the way of her PT.  AED Medication Compliance:  compliant all of the time  Using a pill box:  Yes    Review of Systems:  No vomiting, diarrhea, fevers, hematuria or kidney stones.  Have you experienced a traumatic fall since your last visit: NO  Are these falls related to your seizures:  Not Applicable    Other Issues:    Mood is unchanged.  Significant pain  "continues in lower back. Leg strength is good. Denies sensory loss.  Has had previous urinary incontinence but now states that this is worse.  Is patient safe to drive:  No    Exam:    Ht 5' 6\" (167.6 cm)   Wt 170 lb (77.1 kg)   BMI 27.44 kg/m       Wt Readings from Last 5 Encounters:   06/07/23 170 lb (77.1 kg)   03/16/23 180 lb (81.6 kg)   12/21/22 180 lb (81.6 kg)   09/22/22 183 lb 9.6 oz (83.3 kg)   04/13/22 181 lb 3.2 oz (82.2 kg)     Bilateral ptosis. Palpebral fissures expand when she looks up and continue expanded with repeated upgaze.  Pupils equal in size. EOMI. Smile symmterical. Tongue midline.  No drift, pronation, or tremor. FFN done well. Rapid fine movements done well. No asterixis.  Alert. Oriented. Fluent speech. Normal thought content. No dysarthria.  She can lift up both legs, extend both legs, and dorsiflex both feet without difficulty.     IMPRESSION   1) Focal seizures, intractable; focal aware, focal aware to focal impaired seizures. Some focal impaired seizures associated with falls and occasionally with trama. Independent bitemporal likely neocortical onset by scalp EEG. Improvement starting approxmiately March 2015 led to almost 1 3/4 years of  freedom from focal impaired seizures with falls. Fycompa and levetiracetam appeared to play important roles in this period of seizure freedom but I thought that completing menopause may have been important.  Seizures wax and wane. Starting early 2022 seizures with impairment and periods of collapse with more signficant trauma returned including compression fracture in December. Since then no further seizures with falls. However side effects may keep us from escalating perampanel further and  would like us to reduce dose somewhat. She is refractory to all AEDs other than barbiturates, felbamate, CLB off label, and cenobamate. Reduction of topiramate resulted in minor seizure.  2) Migraine headaches; topiramate thought to have " helped; under good control.  3) Asystole; likely primary and not related to seizures or VNS tho this has never been formally examined; has pacemaker in place.   4) Depression, overall did well on escitalopram, some worsening after recent trauma but nwo improved again. Therapy also helpful.   5) Mild hyponatremia in past likely related to oxcarbazpine and escitalopram. Normal recently.        PLAN:   1) Same OXC, levetiracetam, topiramate, and micronized progesterone. Refilled sumatriptan and clonazpam.  2) Continue off acetazolamide. Use lorazapam after seizure clusters. 0.5 mg twice a day for two days. No more than ten pills per month. She and  thought this would be adequate.    3) Continue escitalopram at 10 mg at HS.  4) OK to reduce perampanel to 8 mg day one. 8 mg day two and 4 mg day 3 in recurring cycle. This would reduce to 7 mg/d from 8 mg per day without need for new Rx.  5) Other AED options as above. Optimizing perampanel would be great but she may not be able to tolerate. Optimizing topiramate would be reasonable if she can tolerate. As she is uncomfortable with felbatol,  cenobamate would probably be best choice, perhaps replacing levetiracetam. We could also consider replacing levetiracetam with brivaracetam or use eslicarbazpine instead of oxcarbazepine if formulary will allow. RNS should be a serious consideration if seizures with falls return and become more frequent. If RNS placed she would have three implanted devices which could also raise concern. Neither she nor her  were excited about this possibility when we last reviewed.  6) RTC 3 months.     Total time on video today 20 min. Additional 6 min reviewing chart prior to visit. Additional 6 min coordinating care and generating note following visit. Total time 32 min on day of visit. Obtained unique information from .        Noam Barboza MD

## 2023-06-07 NOTE — NURSING NOTE
Patient scored 10 on PHQ-9, #9 1=several days.       Is the patient currently in the state of [place]? YES    Visit mode:VIDEO    If the visit is dropped, the patient can be reconnected by: VIDEO VISIT: Text to cell phone: [phone]    Will anyone else be joining the visit? YES: Laurita's   How would they like to receive their invitation? Send to e-mail at: [email or web address]      How would you like to obtain your AVS? MyChart    Are changes needed to the allergy or medication list? NO    Reason for visit: RECHECK (6 months ago had a pelvic fracture due to a seizure, medication adjustments, )      Mohini Jones, VF

## 2023-06-07 NOTE — LETTER
2023       RE: Adia Briceno  : 1955   MRN: [id number]        Dear Colleague,    Thank you for referring your patient, Adia Briceno, to the [facility] EPILEPSY CARE at [facility]. Please see a copy of my visit note below.        [facility] Epilepsy Care Progress Note      Patient:  Adia Briceno  :  1955   Age:  68 year old   Today's Office Visit:  2023    Epilepsy Data:    Patient History  Primary Epileptologist/Provider: Noam Barboza M.D.  Epilepsy Syndrome: Localization-related epilepsy unspecified  Epilepsy Syndrome Status: Final  Age of Onset: 10  Other Relevant Dx/ Issues: Inpatient evaluation 1998.  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  Asystole  with subsequent pacemaker placement.  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 Seizure-related falls/injuries.  is endocrinologist.     Tests/Surgery History  Last EE2005  Last MRI: 2008  Last Neuropsych Testin2008  Last Case Management Conference: 2008  Epilepsy Surgery #1 Date: 08  Epilepsy Related Surgery #1 : Type: VNS placement    Seizure Record  Current Visit Date: 23  Previous Visit Date: 23  Months since last visit: 2.73  Seizure Type 1: Complex partial seizures unspecified  Description of Sz Type 1: Head slumps, amnesia  # of Type 1 Seizure since last visit: 0  Freq. Type 1 / Month: 0  Seizure Type 2: Complex partial seizures with impairment of consciousness at onset  Description of Sz Type 2: Head slumps with collapse  # of Type 2 Seizure since last visit: 0  Freq. Type 2 / Month: 0  Seizure Type 3: Simple partial seizures unspecified  Description of Sz Type 3: strange feeling in her head, 30-60 seconds  # of Type 3 Seizure since last visit: 2  Freq. Type 3 / Month: 0.73      History of Present Illness:     Seizures somewhat better over the last interval. There have been no  seizures with falls.   feels that increasing fycompa has helped with seizures.    Current Outpatient Medications   Medication Sig Dispense Refill    Cetirizine HCl (ZYRTEC PO) daily.      Cholecalciferol (VITAMIN D) 2000 UNITS CAPS daily.      clonazePAM (KLONOPIN) 1 MG tablet Take 1.5 tablets (1.5 mg) by mouth At Bedtime 45 tablet 3    diazepam (DIAZEPAM INTENSOL) 5 MG/ML (HIGH CONC) solution Administer 1 ml as needed into cheek pouch for seizures 30 mL 0    escitalopram (LEXAPRO) 10 MG tablet Take 1 tablet (10 mg) by mouth At Bedtime 30 tablet 11    levETIRAcetam (KEPPRA) 750 MG tablet Take one tablet three times daily and one and half tablets at night 135 tablet 11    LORazepam (ATIVAN) 0.5 MG tablet Take one tablet twice daily for two days following seizure cluster 10 tablet 5    OXcarbazepine (TRILEPTAL) 300 MG tablet TAKE ONE AND ONE-HALF TABLET IN THE MORNING, ONE AND ONE-HALF TABLET AT NOON, 2 TABLETS IN THE EVENING AND 2 TABLETS AT BEDTIME. 210 tablet 11    oxybutynin ER (DITROPAN-XL) 10 MG 24 hr tablet Take 10 mg by mouth      perampanel (FYCOMPA) 8 MG tablet Take 1 tablet (8 mg) by mouth At Bedtime 31 tablet 5    progesterone (PROMETRIUM) 100 MG capsule 100 mg daily       SUMAtriptan (IMITREX) 100 MG tablet Take one by mouth prn severe headache. No more than four per month. 9 tablet 1    topiramate (TOPAMAX) 100 MG tablet TAKE ONE-HALF TABLET BY MOUTH IN THE MORNING, AND ONE TABLET IN THE AFTERNOON/EVENING(TOTAL 150 MG PER DAY) 48 tablet 11    esomeprazole (NEXIUM) 40 MG DR capsule Take by mouth every morning (before breakfast) Take 30-60 minutes before eating. (Patient not taking: Reported on 3/16/2023)          Perceived AED Side Effects:  Yes    Medication Notes:    AEDs as above; reducing TPM to 50 mg resulted in probable seizure.   reports that perampanel 8 mg has resulted in worse gait and is getting in the way of her PT.  AED Medication Compliance:  compliant all of the time  Using a  "pill box:  Yes    Review of Systems:  No vomiting, diarrhea, fevers, hematuria or kidney stones.  Have you experienced a traumatic fall since your last visit: NO  Are these falls related to your seizures:  Not Applicable    Other Issues:    Mood is unchanged.  Significant pain continues in lower back. Leg strength is good. Denies sensory loss.  Has had previous urinary incontinence but now states that this is worse.  Is patient safe to drive:  No    Exam:    Ht 5' 6\" (167.6 cm)   Wt 170 lb (77.1 kg)   BMI 27.44 kg/m       Wt Readings from Last 5 Encounters:   06/07/23 170 lb (77.1 kg)   03/16/23 180 lb (81.6 kg)   12/21/22 180 lb (81.6 kg)   09/22/22 183 lb 9.6 oz (83.3 kg)   04/13/22 181 lb 3.2 oz (82.2 kg)     Bilateral ptosis. Palpebral fissures expand when she looks up and continue expanded with repeated upgaze.  Pupils equal in size. EOMI. Smile symmterical. Tongue midline.  No drift, pronation, or tremor. FFN done well. Rapid fine movements done well. No asterixis.  Alert. Oriented. Fluent speech. Normal thought content. No dysarthria.  She can lift up both legs, extend both legs, and dorsiflex both feet without difficulty.     IMPRESSION   1) Focal seizures, intractable; focal aware, focal aware to focal impaired seizures. Some focal impaired seizures associated with falls and occasionally with trama. Independent bitemporal likely neocortical onset by scalp EEG. Improvement starting approxmiately March 2015 led to almost 1 3/4 years of  freedom from focal impaired seizures with falls. Fycompa and levetiracetam appeared to play important roles in this period of seizure freedom but I thought that completing menopause may have been important.  Seizures wax and wane. Starting early 2022 seizures with impairment and periods of collapse with more signficant trauma returned including compression fracture in December. Since then no further seizures with falls. However side effects may keep us from escalating " perampanel further and  would like us to reduce dose somewhat. She is refractory to all AEDs other than barbiturates, felbamate, CLB off label, and cenobamate. Reduction of topiramate resulted in minor seizure.  2) Migraine headaches; topiramate thought to have helped; under good control.  3) Asystole; likely primary and not related to seizures or VNS tho this has never been formally examined; has pacemaker in place.   4) Depression, overall did well on escitalopram, some worsening after recent trauma but nwo improved again. Therapy also helpful.   5) Mild hyponatremia in past likely related to oxcarbazpine and escitalopram. Normal recently.        PLAN:   1) Same OXC, levetiracetam, topiramate, and micronized progesterone. Refilled sumatriptan and clonazpam.  2) Continue off acetazolamide. Use lorazapam after seizure clusters. 0.5 mg twice a day for two days. No more than ten pills per month. She and  thought this would be adequate.    3) Continue escitalopram at 10 mg at HS.  4) OK to reduce perampanel to 8 mg day one. 8 mg day two and 4 mg day 3 in recurring cycle. This would reduce to 7 mg/d from 8 mg per day without need for new Rx.  5) Other AED options as above. Optimizing perampanel would be great but she may not be able to tolerate. Optimizing topiramate would be reasonable if she can tolerate. As she is uncomfortable with felbatol,  cenobamate would probably be best choice, perhaps replacing levetiracetam. We could also consider replacing levetiracetam with brivaracetam or use eslicarbazpine instead of oxcarbazepine if formulary will allow. RNS should be a serious consideration if seizures with falls return and become more frequent. If RNS placed she would have three implanted devices which could also raise concern. Neither she nor her  were excited about this possibility when we last reviewed.  6) RTC 3 months.     Total time on video today 20 min. Additional 6 min reviewing chart  prior to visit. Additional 6 min coordinating care and generating note following visit. Total time 32 min on day of visit. Obtained unique information from .            Again, thank you for allowing me to participate in the care of your patient.      Sincerely,    Noam Barboza MD

## 2023-06-15 DIAGNOSIS — G40.211 LOCALZ-RLTED SYMPTOMATIC EPILEPSY W CMPLX PARTL SZ, INTRACT, W STATUS (H): ICD-10-CM

## 2023-06-15 DIAGNOSIS — F43.21 ADJUSTMENT DISORDER WITH DEPRESSED MOOD: ICD-10-CM

## 2023-06-16 RX ORDER — ESCITALOPRAM OXALATE 10 MG/1
TABLET ORAL
Qty: 30 TABLET | Refills: 11 | OUTPATIENT
Start: 2023-06-16

## 2023-06-16 NOTE — TELEPHONE ENCOUNTER
LORAZEPAM 0.5MG TABLETS  Last Written Prescription Date:  12-21-22  Last Fill Quantity: 10,   # refills: 5  Last Office Visit : 6-7-23  Future Office visit:  none    Routing refill request to provider for review/approval because:  Drug not on the [facility], [facility] or [facility] refill protocol or controlled substance    DUPLICATE: REFILL DENIED  escitalopram (LEXAPRO) 10 MG tablet  Last rx:3-16-23 for 30 t w/11 RF

## 2023-06-19 RX ORDER — LORAZEPAM 0.5 MG/1
TABLET ORAL
Qty: 10 TABLET | Refills: 0 | Status: ON HOLD | OUTPATIENT
Start: 2023-06-19 | End: 2023-07-05

## 2023-06-28 ENCOUNTER — HOSPITAL ENCOUNTER (INPATIENT)
Facility: CLINIC | Age: 68
LOS: 7 days | Discharge: NURSING, REHAB OR LONG-TERM CARE | DRG: 871 | End: 2023-07-05
Attending: EMERGENCY MEDICINE | Admitting: INTERNAL MEDICINE
Payer: COMMERCIAL

## 2023-06-28 DIAGNOSIS — G40.211 LOCALZ-RLTED SYMPTOMATIC EPILEPSY W CMPLX PARTL SZ, INTRACT, W STATUS (H): ICD-10-CM

## 2023-06-28 DIAGNOSIS — N30.01 ACUTE CYSTITIS WITH HEMATURIA: ICD-10-CM

## 2023-06-28 DIAGNOSIS — R12 HEART BURN: ICD-10-CM

## 2023-06-28 DIAGNOSIS — R53.1 WEAKNESS: ICD-10-CM

## 2023-06-28 DIAGNOSIS — G89.29 CHRONIC BILATERAL LOW BACK PAIN WITH BILATERAL SCIATICA: ICD-10-CM

## 2023-06-28 DIAGNOSIS — M54.42 CHRONIC BILATERAL LOW BACK PAIN WITH BILATERAL SCIATICA: ICD-10-CM

## 2023-06-28 DIAGNOSIS — D50.9 IRON DEFICIENCY ANEMIA, UNSPECIFIED IRON DEFICIENCY ANEMIA TYPE: Primary | ICD-10-CM

## 2023-06-28 DIAGNOSIS — R19.7 DIARRHEA, UNSPECIFIED TYPE: ICD-10-CM

## 2023-06-28 DIAGNOSIS — M54.41 CHRONIC BILATERAL LOW BACK PAIN WITH BILATERAL SCIATICA: ICD-10-CM

## 2023-06-28 DIAGNOSIS — R41.0 CONFUSION: ICD-10-CM

## 2023-06-28 DIAGNOSIS — R05.2 SUBACUTE COUGH: ICD-10-CM

## 2023-06-28 LAB
ALBUMIN UR-MCNC: 100 MG/DL
ANION GAP SERPL CALCULATED.3IONS-SCNC: 15 MMOL/L (ref 7–15)
APPEARANCE UR: ABNORMAL
BASOPHILS # BLD AUTO: 0.1 10E3/UL (ref 0–0.2)
BASOPHILS NFR BLD AUTO: 0 %
BILIRUB UR QL STRIP: NEGATIVE
BUN SERPL-MCNC: 18.4 MG/DL (ref 8–23)
CALCIUM SERPL-MCNC: 8.2 MG/DL (ref 8.8–10.2)
CHLORIDE SERPL-SCNC: 105 MMOL/L (ref 98–107)
COLOR UR AUTO: ABNORMAL
CREAT SERPL-MCNC: 0.88 MG/DL (ref 0.51–0.95)
DEPRECATED HCO3 PLAS-SCNC: 18 MMOL/L (ref 22–29)
EOSINOPHIL # BLD AUTO: 0 10E3/UL (ref 0–0.7)
EOSINOPHIL NFR BLD AUTO: 0 %
ERYTHROCYTE [DISTWIDTH] IN BLOOD BY AUTOMATED COUNT: 13.2 % (ref 10–15)
GFR SERPL CREATININE-BSD FRML MDRD: 70 ML/MIN/1.73M2
GLUCOSE SERPL-MCNC: 127 MG/DL (ref 70–99)
GLUCOSE UR STRIP-MCNC: NEGATIVE MG/DL
HCT VFR BLD AUTO: 35.8 % (ref 35–47)
HGB BLD-MCNC: 11.7 G/DL (ref 11.7–15.7)
HGB UR QL STRIP: ABNORMAL
HOLD SPECIMEN: NORMAL
IMM GRANULOCYTES # BLD: 0.1 10E3/UL
IMM GRANULOCYTES NFR BLD: 1 %
KETONES UR STRIP-MCNC: 40 MG/DL
LACTATE SERPL-SCNC: 0.7 MMOL/L (ref 0.7–2)
LEUKOCYTE ESTERASE UR QL STRIP: ABNORMAL
LIPASE SERPL-CCNC: 12 U/L (ref 13–60)
LYMPHOCYTES # BLD AUTO: 0.8 10E3/UL (ref 0.8–5.3)
LYMPHOCYTES NFR BLD AUTO: 6 %
MCH RBC QN AUTO: 30.8 PG (ref 26.5–33)
MCHC RBC AUTO-ENTMCNC: 32.7 G/DL (ref 31.5–36.5)
MCV RBC AUTO: 94 FL (ref 78–100)
MONOCYTES # BLD AUTO: 1.6 10E3/UL (ref 0–1.3)
MONOCYTES NFR BLD AUTO: 12 %
NEUTROPHILS # BLD AUTO: 11.2 10E3/UL (ref 1.6–8.3)
NEUTROPHILS NFR BLD AUTO: 81 %
NITRATE UR QL: NEGATIVE
NRBC # BLD AUTO: 0 10E3/UL
NRBC BLD AUTO-RTO: 0 /100
PH UR STRIP: 7 [PH] (ref 5–7)
PLATELET # BLD AUTO: 263 10E3/UL (ref 150–450)
POTASSIUM SERPL-SCNC: 3.9 MMOL/L (ref 3.4–5.3)
RBC # BLD AUTO: 3.8 10E6/UL (ref 3.8–5.2)
RBC URINE: 36 /HPF
SODIUM SERPL-SCNC: 138 MMOL/L (ref 136–145)
SP GR UR STRIP: 1.02 (ref 1–1.03)
SQUAMOUS EPITHELIAL: 1 /HPF
UROBILINOGEN UR STRIP-MCNC: NORMAL MG/DL
WBC # BLD AUTO: 13.8 10E3/UL (ref 4–11)
WBC CLUMPS #/AREA URNS HPF: PRESENT /HPF
WBC URINE: >182 /HPF

## 2023-06-28 PROCEDURE — 81001 URINALYSIS AUTO W/SCOPE: CPT | Performed by: EMERGENCY MEDICINE

## 2023-06-28 PROCEDURE — 36415 COLL VENOUS BLD VENIPUNCTURE: CPT | Performed by: INTERNAL MEDICINE

## 2023-06-28 PROCEDURE — 250N000013 HC RX MED GY IP 250 OP 250 PS 637: Performed by: INTERNAL MEDICINE

## 2023-06-28 PROCEDURE — 85025 COMPLETE CBC W/AUTO DIFF WBC: CPT | Performed by: EMERGENCY MEDICINE

## 2023-06-28 PROCEDURE — 36415 COLL VENOUS BLD VENIPUNCTURE: CPT | Performed by: EMERGENCY MEDICINE

## 2023-06-28 PROCEDURE — 250N000011 HC RX IP 250 OP 636: Mod: JZ | Performed by: EMERGENCY MEDICINE

## 2023-06-28 PROCEDURE — 250N000011 HC RX IP 250 OP 636: Performed by: INTERNAL MEDICINE

## 2023-06-28 PROCEDURE — 87086 URINE CULTURE/COLONY COUNT: CPT | Performed by: EMERGENCY MEDICINE

## 2023-06-28 PROCEDURE — 83605 ASSAY OF LACTIC ACID: CPT | Performed by: INTERNAL MEDICINE

## 2023-06-28 PROCEDURE — 99222 1ST HOSP IP/OBS MODERATE 55: CPT | Mod: AI | Performed by: INTERNAL MEDICINE

## 2023-06-28 PROCEDURE — 120N000001 HC R&B MED SURG/OB

## 2023-06-28 PROCEDURE — 250N000013 HC RX MED GY IP 250 OP 250 PS 637: Performed by: EMERGENCY MEDICINE

## 2023-06-28 PROCEDURE — 80048 BASIC METABOLIC PNL TOTAL CA: CPT | Performed by: EMERGENCY MEDICINE

## 2023-06-28 PROCEDURE — 99285 EMERGENCY DEPT VISIT HI MDM: CPT | Mod: 25

## 2023-06-28 PROCEDURE — 83690 ASSAY OF LIPASE: CPT | Performed by: EMERGENCY MEDICINE

## 2023-06-28 PROCEDURE — 99207 PR APP CREDIT; MD BILLING SHARED VISIT: CPT | Performed by: INTERNAL MEDICINE

## 2023-06-28 PROCEDURE — 87040 BLOOD CULTURE FOR BACTERIA: CPT | Performed by: INTERNAL MEDICINE

## 2023-06-28 PROCEDURE — 258N000003 HC RX IP 258 OP 636: Performed by: EMERGENCY MEDICINE

## 2023-06-28 RX ORDER — ACETAMINOPHEN 325 MG/1
650 TABLET ORAL EVERY 6 HOURS PRN
Status: DISCONTINUED | OUTPATIENT
Start: 2023-06-28 | End: 2023-07-05 | Stop reason: HOSPADM

## 2023-06-28 RX ORDER — TRAMADOL HYDROCHLORIDE 50 MG/1
50 TABLET ORAL
Status: ON HOLD | COMMUNITY
End: 2023-07-05

## 2023-06-28 RX ORDER — NALOXONE HYDROCHLORIDE 0.4 MG/ML
0.4 INJECTION, SOLUTION INTRAMUSCULAR; INTRAVENOUS; SUBCUTANEOUS
Status: DISCONTINUED | OUTPATIENT
Start: 2023-06-28 | End: 2023-07-05 | Stop reason: HOSPADM

## 2023-06-28 RX ORDER — CODEINE PHOSPHATE AND GUAIFENESIN 10; 100 MG/5ML; MG/5ML
5-10 SOLUTION ORAL DAILY PRN
Status: DISCONTINUED | OUTPATIENT
Start: 2023-06-28 | End: 2023-07-05 | Stop reason: HOSPADM

## 2023-06-28 RX ORDER — PANTOPRAZOLE SODIUM 40 MG/1
40 TABLET, DELAYED RELEASE ORAL
Status: DISCONTINUED | OUTPATIENT
Start: 2023-06-28 | End: 2023-06-28

## 2023-06-28 RX ORDER — SULFAMETHOXAZOLE/TRIMETHOPRIM 800-160 MG
1 TABLET ORAL DAILY
Status: ON HOLD | COMMUNITY
End: 2023-07-05

## 2023-06-28 RX ORDER — TRAMADOL HYDROCHLORIDE 50 MG/1
50 TABLET ORAL DAILY PRN
Status: DISCONTINUED | OUTPATIENT
Start: 2023-06-28 | End: 2023-06-29

## 2023-06-28 RX ORDER — ONDANSETRON 2 MG/ML
4 INJECTION INTRAMUSCULAR; INTRAVENOUS EVERY 30 MIN PRN
Status: DISCONTINUED | OUTPATIENT
Start: 2023-06-28 | End: 2023-06-28

## 2023-06-28 RX ORDER — ESCITALOPRAM OXALATE 10 MG/1
10 TABLET ORAL AT BEDTIME
Status: DISCONTINUED | OUTPATIENT
Start: 2023-06-28 | End: 2023-07-05 | Stop reason: HOSPADM

## 2023-06-28 RX ORDER — SODIUM CHLORIDE AND POTASSIUM CHLORIDE 150; 900 MG/100ML; MG/100ML
INJECTION, SOLUTION INTRAVENOUS CONTINUOUS
Status: DISCONTINUED | OUTPATIENT
Start: 2023-06-28 | End: 2023-06-30

## 2023-06-28 RX ORDER — LEVETIRACETAM 750 MG/1
750 TABLET ORAL AT BEDTIME
Status: DISCONTINUED | OUTPATIENT
Start: 2023-06-28 | End: 2023-06-28

## 2023-06-28 RX ORDER — ACETAMINOPHEN 650 MG/1
650 SUPPOSITORY RECTAL EVERY 6 HOURS PRN
Status: DISCONTINUED | OUTPATIENT
Start: 2023-06-28 | End: 2023-07-05 | Stop reason: HOSPADM

## 2023-06-28 RX ORDER — LIDOCAINE 40 MG/G
CREAM TOPICAL
Status: DISCONTINUED | OUTPATIENT
Start: 2023-06-28 | End: 2023-07-05 | Stop reason: HOSPADM

## 2023-06-28 RX ORDER — SUMATRIPTAN 50 MG/1
100 TABLET, FILM COATED ORAL DAILY PRN
Status: DISCONTINUED | OUTPATIENT
Start: 2023-06-28 | End: 2023-07-05 | Stop reason: HOSPADM

## 2023-06-28 RX ORDER — LEVETIRACETAM 750 MG/1
1125 TABLET ORAL AT BEDTIME
COMMUNITY
End: 2023-06-28

## 2023-06-28 RX ORDER — ONDANSETRON 2 MG/ML
4 INJECTION INTRAMUSCULAR; INTRAVENOUS EVERY 6 HOURS PRN
Status: DISCONTINUED | OUTPATIENT
Start: 2023-06-28 | End: 2023-07-05 | Stop reason: HOSPADM

## 2023-06-28 RX ORDER — NALOXONE HYDROCHLORIDE 0.4 MG/ML
0.2 INJECTION, SOLUTION INTRAMUSCULAR; INTRAVENOUS; SUBCUTANEOUS
Status: DISCONTINUED | OUTPATIENT
Start: 2023-06-28 | End: 2023-07-05 | Stop reason: HOSPADM

## 2023-06-28 RX ORDER — ENOXAPARIN SODIUM 100 MG/ML
40 INJECTION SUBCUTANEOUS EVERY 24 HOURS
Status: DISCONTINUED | OUTPATIENT
Start: 2023-06-28 | End: 2023-07-05 | Stop reason: HOSPADM

## 2023-06-28 RX ORDER — SODIUM CHLORIDE 9 MG/ML
INJECTION, SOLUTION INTRAVENOUS ONCE
Status: COMPLETED | OUTPATIENT
Start: 2023-06-28 | End: 2023-06-28

## 2023-06-28 RX ORDER — DICLOFENAC SODIUM 75 MG/1
75 TABLET, DELAYED RELEASE ORAL 2 TIMES DAILY
Status: ON HOLD | COMMUNITY
End: 2023-07-05

## 2023-06-28 RX ORDER — LEVETIRACETAM 750 MG/1
750 TABLET ORAL 3 TIMES DAILY
Status: DISCONTINUED | OUTPATIENT
Start: 2023-06-28 | End: 2023-07-05 | Stop reason: HOSPADM

## 2023-06-28 RX ORDER — SODIUM CHLORIDE 9 MG/ML
INJECTION, SOLUTION INTRAVENOUS CONTINUOUS
Status: CANCELLED | OUTPATIENT
Start: 2023-06-28 | End: 2023-06-28

## 2023-06-28 RX ORDER — LORAZEPAM 2 MG/ML
2 INJECTION INTRAMUSCULAR
Status: DISCONTINUED | OUTPATIENT
Start: 2023-06-28 | End: 2023-07-05 | Stop reason: HOSPADM

## 2023-06-28 RX ORDER — CODEINE PHOSPHATE AND GUAIFENESIN 10; 100 MG/5ML; MG/5ML
5-10 SOLUTION ORAL EVERY OTHER DAY
Status: ON HOLD | COMMUNITY
End: 2023-07-05

## 2023-06-28 RX ORDER — ALENDRONATE SODIUM 70 MG/1
70 TABLET ORAL
COMMUNITY

## 2023-06-28 RX ORDER — ACETAMINOPHEN 325 MG/1
650 TABLET ORAL ONCE
Status: COMPLETED | OUTPATIENT
Start: 2023-06-28 | End: 2023-06-28

## 2023-06-28 RX ORDER — DICLOFENAC SODIUM 25 MG/1
75 TABLET, DELAYED RELEASE ORAL 2 TIMES DAILY
Status: DISCONTINUED | OUTPATIENT
Start: 2023-06-28 | End: 2023-07-02

## 2023-06-28 RX ORDER — ONDANSETRON 4 MG/1
4 TABLET, ORALLY DISINTEGRATING ORAL EVERY 6 HOURS PRN
Status: DISCONTINUED | OUTPATIENT
Start: 2023-06-28 | End: 2023-07-05 | Stop reason: HOSPADM

## 2023-06-28 RX ORDER — OXCARBAZEPINE 300 MG/1
600 TABLET, FILM COATED ORAL 2 TIMES DAILY
Status: DISCONTINUED | OUTPATIENT
Start: 2023-06-28 | End: 2023-07-05 | Stop reason: HOSPADM

## 2023-06-28 RX ORDER — CEFTRIAXONE 1 G/1
1 INJECTION, POWDER, FOR SOLUTION INTRAMUSCULAR; INTRAVENOUS EVERY 24 HOURS
Status: DISCONTINUED | OUTPATIENT
Start: 2023-06-29 | End: 2023-06-29

## 2023-06-28 RX ORDER — CETIRIZINE HYDROCHLORIDE 10 MG/1
10 TABLET ORAL DAILY PRN
COMMUNITY

## 2023-06-28 RX ORDER — CEFTRIAXONE 1 G/1
1 INJECTION, POWDER, FOR SOLUTION INTRAMUSCULAR; INTRAVENOUS ONCE
Status: COMPLETED | OUTPATIENT
Start: 2023-06-28 | End: 2023-06-28

## 2023-06-28 RX ORDER — TOPIRAMATE 100 MG/1
100 TABLET, FILM COATED ORAL AT BEDTIME
Status: DISCONTINUED | OUTPATIENT
Start: 2023-06-28 | End: 2023-07-05 | Stop reason: HOSPADM

## 2023-06-28 RX ADMIN — OXCARBAZEPINE 450 MG: 300 TABLET, FILM COATED ORAL at 12:49

## 2023-06-28 RX ADMIN — ACETAMINOPHEN 650 MG: 325 TABLET ORAL at 03:26

## 2023-06-28 RX ADMIN — SODIUM CHLORIDE: 9 INJECTION, SOLUTION INTRAVENOUS at 06:09

## 2023-06-28 RX ADMIN — CLONAZEPAM 1.5 MG: 1 TABLET ORAL at 21:43

## 2023-06-28 RX ADMIN — TRAMADOL HYDROCHLORIDE 50 MG: 50 TABLET, COATED ORAL at 19:54

## 2023-06-28 RX ADMIN — ENOXAPARIN SODIUM 40 MG: 40 INJECTION SUBCUTANEOUS at 19:52

## 2023-06-28 RX ADMIN — LEVETIRACETAM 1125 MG: 250 TABLET, FILM COATED ORAL at 21:41

## 2023-06-28 RX ADMIN — ACETAMINOPHEN 650 MG: 325 TABLET, FILM COATED ORAL at 10:18

## 2023-06-28 RX ADMIN — ACETAMINOPHEN 650 MG: 325 TABLET, FILM COATED ORAL at 16:06

## 2023-06-28 RX ADMIN — PERAMPANEL 8 MG: 2 TABLET ORAL at 22:56

## 2023-06-28 RX ADMIN — POTASSIUM CHLORIDE AND SODIUM CHLORIDE: 900; 150 INJECTION, SOLUTION INTRAVENOUS at 10:34

## 2023-06-28 RX ADMIN — POTASSIUM CHLORIDE AND SODIUM CHLORIDE: 900; 150 INJECTION, SOLUTION INTRAVENOUS at 23:18

## 2023-06-28 RX ADMIN — CEFTRIAXONE SODIUM 1 G: 1 INJECTION, POWDER, FOR SOLUTION INTRAMUSCULAR; INTRAVENOUS at 06:09

## 2023-06-28 RX ADMIN — LEVETIRACETAM 750 MG: 750 TABLET, FILM COATED ORAL at 16:07

## 2023-06-28 RX ADMIN — TRAMADOL HYDROCHLORIDE 50 MG: 50 TABLET, COATED ORAL at 12:49

## 2023-06-28 RX ADMIN — OXCARBAZEPINE 600 MG: 300 TABLET, FILM COATED ORAL at 21:43

## 2023-06-28 RX ADMIN — ESCITALOPRAM OXALATE 10 MG: 10 TABLET ORAL at 21:42

## 2023-06-28 RX ADMIN — LEVETIRACETAM 750 MG: 750 TABLET, FILM COATED ORAL at 11:58

## 2023-06-28 RX ADMIN — OXCARBAZEPINE 600 MG: 300 TABLET, FILM COATED ORAL at 19:53

## 2023-06-28 RX ADMIN — DICLOFENAC SODIUM 75 MG: 25 TABLET, DELAYED RELEASE ORAL at 21:43

## 2023-06-28 RX ADMIN — TOPIRAMATE 100 MG: 100 TABLET, FILM COATED ORAL at 21:43

## 2023-06-28 ASSESSMENT — ACTIVITIES OF DAILY LIVING (ADL)
ADLS_ACUITY_SCORE: 37
ADLS_ACUITY_SCORE: 37
WEAR_GLASSES_OR_BLIND: YES
ADLS_ACUITY_SCORE: 37
DRESS: 0-->INDEPENDENT
TRANSFERRING: 1-->ASSISTANCE (EQUIPMENT/PERSON) NEEDED (NOT DEVELOPMENTALLY APPROPRIATE)
WALKING_OR_CLIMBING_STAIRS: AMBULATION DIFFICULTY, REQUIRES EQUIPMENT;AMBULATION DIFFICULTY, ASSISTANCE 1 PERSON
NUMBER_OF_TIMES_PATIENT_HAS_FALLEN_WITHIN_LAST_SIX_MONTHS: 1
EQUIPMENT_CURRENTLY_USED_AT_HOME: WALKER, STANDARD;WHEELCHAIR, MANUAL
TOILETING: 1-->ASSISTANCE (EQUIPMENT/PERSON) NEEDED
DOING_ERRANDS_INDEPENDENTLY_DIFFICULTY: YES
TOILETING_ISSUES: YES
WALKING_OR_CLIMBING_STAIRS_DIFFICULTY: YES
TOILETING_ASSISTANCE: TOILETING DIFFICULTY, REQUIRES EQUIPMENT
HEARING_DIFFICULTY_OR_DEAF: NO
BATHING: 1-->ASSISTANCE NEEDED
CONCENTRATING,_REMEMBERING_OR_MAKING_DECISIONS_DIFFICULTY: NO
TOILETING: 1-->ASSISTANCE (EQUIPMENT/PERSON) NEEDED (NOT DEVELOPMENTALLY APPROPRIATE)
ADLS_ACUITY_SCORE: 34
DIFFICULTY_EATING/SWALLOWING: NO
ADLS_ACUITY_SCORE: 37
FALL_HISTORY_WITHIN_LAST_SIX_MONTHS: YES
DRESSING/BATHING: BATHING DIFFICULTY, ASSISTANCE 1 PERSON
ADLS_ACUITY_SCORE: 44
TRANSFERRING: 1-->ASSISTANCE (EQUIPMENT/PERSON) NEEDED
ADLS_ACUITY_SCORE: 37
DRESSING/BATHING_DIFFICULTY: YES
ADLS_ACUITY_SCORE: 37
DRESS: 0-->INDEPENDENT
ADLS_ACUITY_SCORE: 37
CHANGE_IN_FUNCTIONAL_STATUS_SINCE_ONSET_OF_CURRENT_ILLNESS/INJURY: YES
ADLS_ACUITY_SCORE: 37

## 2023-06-28 NOTE — PROGRESS NOTES
[facility]    Hospitalist Progress Note    Date of Service (when I saw the patient): 06/28/2023  Admit date: 6/28/2023    Interval History   Full details of events over last 24 hours outlined below.   Patient admitted by my partner.   She presents with fever and confusion with a leukocytosis.  She has been having ongoing urinary symptoms.  She states there is been no recent change in this.  But today had a fever.  There are no other localizing signs of infection.  UA as it has been before showed greater than 182 white blood cells.  She has had recurrent pyuria noted in the outside clinic but I only see one culture has been done and that was in May and was negative.  She has been on recurrent antibiotics most recently with Bactrim.  Currently she is alert and oriented to situation answering questions appropriately but daughter has noted some confusion continues.  She has mild seizures about 3 times a month.  Unclear if she may have had a recent mild seizure that contributed to the confusion at presentation.  Her last severe seizure was in December.  She states at that time she suffered a pelvic fracture and has since had urinary symptoms with these recurrent UTIs but as above has not had a positive urine culture.     Assessment & Plan   This is a 68-year-old female with medical history of depression epilepsy reflux disease seizure disorder migraines and syncope with history of permanent pacemaker placement presents to [facility] the emergency department via ambulance for fever confusion and urinary symptoms.  In the ER had a fever of 101.1, tachycardic but stable bp and oxygenation.  Electrolytes are significant for sodium of 138 potassium 3.9 normal BUN and creatinine.  White count is elevated at 13,800 with a left shift.  Urinalysis was cloudy light brown with moderate blood large leukocyte Estrace and greater than 182 white cells and positive for WBC clumps.     Sepsis with fever,  leukocytosis and tachycardia   Possible UTI, presenting with confusion, fever and chronic urinary symptoms   Possible recurrent UTIs with dysuria, frequency and pyuria Per patient receiving monthly Macrobid and recently on Bactrim prophylactically.  However, I cannot find clear documentation of this.  I do see that she was on Bactrim daily per med list.  But no information and clinic visits found through our system and care everywhere. Recurrent pyuria with only UCx in last year on 5/2023 being negative   *UA: Greater than 182 white blood cells, WBC 13.8 (neutrophilic) hemodynamically stable at admission with stable creatinine.     Started on IV ceftriaxone in the ED    Follow UCx    No BCx pending. Called down to ED and one set of blood cultures drawn prior to abx, but not sent to lab > ordered as add on and discussed with RN.     NS + 20 KCl @ 75 ml/h while having fevers. No hemodynamic instability, Cr stable.     Acetaminophen for fever, Zofran for nausea and vomiting    Holding PTA oxybutynin, would hold on resumption until evaluation of sx post treatment of UTI.     Patient had urology appt on 06/28/23, which she has missed. Family requests Urology consult during this admission, Consult placed, given she has been on recurring antibiotics for pyuria with urinary sx and now with sepsis. However, need to confirm this is a UTI, UCx pending.      Follow PVR bladder scans and straight cath for > 300.     If UCx negative. Will have to investigate further for source of sepsis.     Acute encephalopathy secondary to infection, resolving  Patient is alert answering questions appropriately but family does note some mild confusion.  Frequent re-orientation  Maintain normal day/night, sleep wake cycles  Minimize sedating/altering medications as able  Treating separate conditions as detailed above/below     Epilepsy    Reviewed and ordered PTA Keppra, oxcarbazepine, perampanel    Ativan ordered PRN seizure and seizure  "precautions      Depression    Continue ALEXANDRA escitalopram and clonazepam at bedtime     Migraine    Continue topamax as needed sumatriptan     GERD    Continue PPI    Chronic pain    Continue PTA diclofenac twice daily    Clinically Significant Risk Factors Present on Admission                       # Overweight: Estimated body mass index is 27.44 kg/m  as calculated from the following:    Height as of this encounter: 1.676 m (5' 6\").    Weight as of 6/7/23: 77.1 kg (170 lb).              Diet: Orders Placed This Encounter      Combination Diet Regular Diet Adult     IVF: IVF as above.   Mills Catheter: Not present     DVT Prophylaxis: enoxaparin   Code Status: No Order     Disposition: Expected discharge TBD  Communication: Discussed with daughter, patient on 06/28/23    Lilly Valdes MD     Medical Decision Making       over 35  MINUTES SPENT BY ME on the date of service doing chart review, history, exam, documentation & further activities per the note.      Hospitalist Service  [facility]  Securely message with the Vocera Web Console (learn more here)  Text page via [employer] Paging/Directory      -Data reviewed today: I reviewed all new labs and imaging results over the last 24 hours. I personally reviewed no images or EKG's today.    Physical Exam   Temp: 100.4  F (38  C) Temp src: Oral BP: 128/87 Pulse: 96   Resp: 16 SpO2: 95 % O2 Device: None (Room air)    There were no vitals filed for this visit.  Vital Signs with Ranges  Temp:  [100.4  F (38  C)-101.1  F (38.4  C)] 100.4  F (38  C)  Pulse:  [] 96  Resp:  [16] 16  BP: (128-146)/(71-87) 128/87  SpO2:  [94 %-95 %] 95 %  No intake/output data recorded.    Today's Exam  Constitutional:  NAD,   Neuropsyche:  alert and oriented to situation, answers questions appropriately.   Respiratory:  Breathing comfortably.  Skin/Integumen: No acute rash or sign of bleeding.     Medications   All medications reviewed on 06/28/23        [START ON " 6/29/2023] cefTRIAXone  1 g Intravenous Q24H     clonazePAM  1.5 mg Oral At Bedtime     diclofenac  75 mg Oral BID     enoxaparin ANTICOAGULANT  40 mg Subcutaneous Q24H     escitalopram  10 mg Oral At Bedtime     levETIRAcetam  1,125 mg Oral At Bedtime     levETIRAcetam  750 mg Oral TID     OXcarbazepine  450 mg Oral BID     OXcarbazepine  600 mg Oral BID     perampanel  8 mg Oral At Bedtime     sodium chloride (PF)  3 mL Intracatheter Q8H     topiramate  100 mg Oral At Bedtime         PRN Meds: ondansetron    Data   Recent Labs   Lab 06/28/23  0320   WBC 13.8*   HGB 11.7   MCV 94         POTASSIUM 3.9   CHLORIDE 105   CO2 18*   BUN 18.4   CR 0.88   ANIONGAP 15   TATIANA 8.2*   *   LIPASE 12*       No results found for this or any previous visit (from the past 24 hour(s)).

## 2023-06-28 NOTE — ED TRIAGE NOTES
Pt BIBA from home. This morning Pt experienced generalized weakness, fevers, nausea, and vomiting that continued to get worse throughout the day. Pt reports chronic lower back pain and has a Hx of recurrent UTIs. Pt Dx with UTI earlier this week and is currently on antibiotics.       Triage Assessment     Row Name 06/28/23 0312       Triage Assessment (Adult)    Airway WDL WDL       Respiratory WDL    Respiratory WDL WDL       Cardiac WDL    Cardiac WDL WDL       Cognitive/Neuro/Behavioral WDL    Cognitive/Neuro/Behavioral WDL WDL

## 2023-06-28 NOTE — PROGRESS NOTES
RECEIVING UNIT ED HANDOFF REVIEW    ED Nurse Handoff Report was reviewed by: Damaris Trujillo RN on June 28, 2023 at 4:35 PM

## 2023-06-28 NOTE — ED PROVIDER NOTES
History     Chief Complaint:  Nausea & Vomiting       HPI   Adia Briceno is a 68 year old female who presents with fevers, nausea vomiting, and dysuria.  Patient had apparently been doing fairly well yesterday.  Overnight she woke up with vomiting, some dysuria, and fevers.  She has a history of recurrent urinary tract infections, approximately once per month for the last few months.  Her primary care doctor had recently started her on Septra prophylactically.  She has had some confusion tonight and the history is augmented by the patient's .      Independent Historian:    discusses recurrent UTIs    Review of External Notes:  note 5/15/23 discussing abnormal UA    Allergies:  Lactose     Medications:    Cetirizine HCl (ZYRTEC PO)  Cholecalciferol (VITAMIN D) 2000 UNITS CAPS  clonazePAM (KLONOPIN) 1 MG tablet  diazepam (DIAZEPAM INTENSOL) 5 MG/ML (HIGH CONC) solution  escitalopram (LEXAPRO) 10 MG tablet  esomeprazole (NEXIUM) 40 MG DR capsule  levETIRAcetam (KEPPRA) 750 MG tablet  LORazepam (ATIVAN) 0.5 MG tablet  OXcarbazepine (TRILEPTAL) 300 MG tablet  oxybutynin ER (DITROPAN-XL) 10 MG 24 hr tablet  perampanel (FYCOMPA) 8 MG tablet  progesterone (PROMETRIUM) 100 MG capsule  SUMAtriptan (IMITREX) 100 MG tablet  topiramate (TOPAMAX) 100 MG tablet        Past Medical History:    Past Medical History:   Diagnosis Date     Depression      Epilepsy (H)      GERD (gastroesophageal reflux disease)      Migraine      Seizure (H)      Shortness of breath      Syncope and collapse        Past Surgical History:    Past Surgical History:   Procedure Laterality Date     IMPLANT PACEMAKER  April 2011    [facility] [place] [address],      IMPLANT STIMULATOR VAGUS NERVE  DEC  2008    VNS batter depleated (8/21/2019)        Family History:    family history is not on file.    Social History:   reports that she has never smoked. She has never used smokeless tobacco. She reports that she does not drink alcohol and  "does not use drugs.  PCP: [facility], [facility]     Physical Exam     Patient Vitals for the past 24 hrs:   BP Temp Temp src Pulse Resp SpO2 Height   06/28/23 0437 -- 100.4  F (38  C) Oral -- -- -- --   06/28/23 0309 (!) 146/76 (!) 101.1  F (38.4  C) Oral 108 16 94 % 1.676 m (5' 6\")        Physical Exam  General: Appears well-developed and well-nourished.   Head: No signs of trauma.   Mouth/Throat: Oropharynx is clear and moist.   CV: Mild tachycardia and regular rhythm.    Resp: Effort normal and breath sounds normal. No respiratory distress.   GI: Soft. There is no tenderness.  No rebound or guarding.  Normal bowel sounds.  No CVA tenderness.  MSK: Normal range of motion.   Neuro: The patient is alert and oriented, but poor overall historian.  Strength in upper/lower extremities normal and symmetrical. Sensation normal. Speech normal.  Skin: Skin is warm and dry. No rash noted.   Psych: normal mood and affect. behavior is normal.       Emergency Department Course     Laboratory:  Labs Ordered and Resulted from Time of ED Arrival to Time of ED Departure   BASIC METABOLIC PANEL - Abnormal       Result Value    Sodium 138      Potassium 3.9      Chloride 105      Carbon Dioxide (CO2) 18 (*)     Anion Gap 15      Urea Nitrogen 18.4      Creatinine 0.88      Calcium 8.2 (*)     Glucose 127 (*)     GFR Estimate 70     LIPASE - Abnormal    Lipase 12 (*)    CBC WITH PLATELETS AND DIFFERENTIAL - Abnormal    WBC Count 13.8 (*)     RBC Count 3.80      Hemoglobin 11.7      Hematocrit 35.8      MCV 94      MCH 30.8      MCHC 32.7      RDW 13.2      Platelet Count 263      % Neutrophils 81      % Lymphocytes 6      % Monocytes 12      % Eosinophils 0      % Basophils 0      % Immature Granulocytes 1      NRBCs per 100 WBC 0      Absolute Neutrophils 11.2 (*)     Absolute Lymphocytes 0.8      Absolute Monocytes 1.6 (*)     Absolute Eosinophils 0.0      Absolute Basophils 0.1      Absolute Immature Granulocytes 0.1      " Absolute NRBCs 0.0     ROUTINE UA WITH MICROSCOPIC REFLEX TO CULTURE - Abnormal    Color Urine Light Brown (*)     Appearance Urine Cloudy (*)     Glucose Urine Negative      Bilirubin Urine Negative      Ketones Urine 40 (*)     Specific Gravity Urine 1.017      Blood Urine Moderate (*)     pH Urine 7.0      Protein Albumin Urine 100 (*)     Urobilinogen Urine Normal      Nitrite Urine Negative      Leukocyte Esterase Urine Large (*)     WBC Clumps Urine Present (*)     RBC Urine 36 (*)     WBC Urine >182 (*)     Squamous Epithelials Urine 1     URINE CULTURE          Emergency Department Course & Assessments:    Interventions:  Medications   cefTRIAXone (ROCEPHIN) 1 g vial to attach to  mL bag for ADULTS or NS 50 mL bag for PEDS (has no administration in time range)   sodium chloride 0.9% infusion (has no administration in time range)   ondansetron (ZOFRAN) injection 4 mg (has no administration in time range)   acetaminophen (TYLENOL) tablet 650 mg (650 mg Oral $Given 6/28/23 1339)          Consultations/Discussion of Management or Tests:  Dr. Owens with hospitalist service    Social Determinants of Health affecting care:   None    Disposition:  The patient was admitted to the hospital under the care of Dr. Owens.     Impression & Plan    Medical Decision Making:  Adia Briceno is a 68 year old female who presents due to fevers, weakness, dysuria, and generally not feeling well.  Symptoms started this evening and worsened overnight.  Patient has had some confusion as well.  Patient's  reports the patient has been having recurrent urinary tract infections, approximately once per month.  On my evaluation the patient was alert and conversant, but but did seem to have some confusion with regards to the details of the history.  Her exam was otherwise reassuring although she was noted to be febrile on arrival.  Blood work and UA were obtained.  UA did show findings consistent with a urinary tract  infection and the patient did have a mild elevation of her white blood cell count.  She was given IV fluids and antibiotics.  Given the confusion and weakness, patient was admitted to the hospitalist service.      Diagnosis:    ICD-10-CM    1. Acute cystitis with hematuria  N30.01       2. Confusion  R41.0       3. Weakness  R53.1                   Elfego Castro MD  06/28/23 0554

## 2023-06-28 NOTE — ED NOTES
Bed: ED08  Expected date:   Expected time:   Means of arrival:   Comments:  HEMS 416.  67yo F.  Nausea, vomiting.  Fever.  Recurring UTI.

## 2023-06-28 NOTE — H&P
"[facility]    History and Physical - Hospitalist Service       Date of Admission:  6/28/2023    Assessment & Plan   This is a 68-year-old female with medical history of depression epilepsy reflux disease seizure disorder migraines and syncope with history of permanent pacemaker placement presents to [facility] the emergency department via ambulance for fever confusion and urinary symptoms.  In the ER had a fever of 101.1, tachycardic but stable bp and oxygenation.  Electrolytes are significant for sodium of 138 potassium 3.9 normal BUN and creatinine.  White count is elevated at 13,800 with a left shift.  Urinalysis was cloudy light brown with moderate blood large leukocyte Estrace and greater than 182 white cells and positive for WBC clumps.    1. UTI with fever and confusion  Possible sepsis  -- IV ceftriaxone  -- IVF  -- blood cultures and urine cultures  -- tylenol for fever  -- zofran for n/v  -- consider outpatient urological work up for recurrent UTI   -- consider prophylactic antibiotics on a rotating basis  -- hold oxybutynin and consider discontinuing in setting of recurrent UTI    2. Epilepsy  -- resume keppra and trileptal once doses are verified  -- add ativan prn    3. Depression  -- continue lexapro once verified  -- hold clonazepam until mentation more clear    4. Migraine  -- await topomax verification    5.GERD  -- continue PPI    Diet: regular  DVT Prophylaxis: Pneumatic Compression Devices  Mills Catheter: Not present  Lines: None     Cardiac Monitoring: None  Code Status:   Full    Clinically Significant Risk Factors Present on Admission                       # Overweight: Estimated body mass index is 27.44 kg/m  as calculated from the following:    Height as of this encounter: 1.676 m (5' 6\").    Weight as of 6/7/23: 77.1 kg (170 lb).            Disposition Plan   -- anticipate home on 6/30 or 7/1       Obi Owens MD  Hospitalist Service  [facility] " [facility]  Securely message with [employer] (more info)  Text page via [facility] Paging/Directory     ______________________________________________________________________    Chief Complaint   Nausea and Vomiting with confusion and dysuria    History is obtained from the patient and her     History of Present Illness    This is a 68-year-old female with medical history of depression epilepsy reflux disease seizure disorder migraines and syncope with history of permanent pacemaker placement presents to [facility] emergency department via ambulance for fever confusion and urinary symptoms.  The patient has been having recurrent UTI symptoms for the past year.  Her UTIs have been monthly and she has been receiving Macrobid and more recently Bactrim prophylactically.  Over the last day the patient was more confused and having urinary symptoms.  The patient is accompanied by her  and was brought in by ambulance to [facility].  The patient was seen by Dr. Elfego Castro.     In the ER had a fever of 101.1, tachycardic but stable bp and oxygenation.  Electrolytes are significant for sodium of 138 potassium 3.9 normal BUN and creatinine.  White count is elevated at 13,800 with a left shift.  Urinalysis was cloudy light brown with moderate blood large leukocyte Estrace and greater than 182 white cells and positive for WBC clumps.  Urine culture has been obtained.  The patient was given IV Zofran and IV ceftriaxone and is being admitted for urinary tract infection.      Past Medical History    Past Medical History:   Diagnosis Date     Depression      Epilepsy (H)      GERD (gastroesophageal reflux disease)      Migraine      Seizure (H)     vagal nerve stimulator     Shortness of breath      Syncope and collapse     bradyarrhythmia: dual chamber pacemaker implanted 2011       Past Surgical History   Past Surgical History:   Procedure Laterality Date     IMPLANT PACEMAKER  April 2011     [place] [facility], [place] [address],      IMPLANT STIMULATOR VAGUS NERVE  DEC  2008    VNS batter depleated (2019)       Prior to Admission Medications   Prior to Admission Medications   Prescriptions Last Dose Informant Patient Reported? Taking?   Cetirizine HCl (ZYRTEC PO)   Yes No   Sig: daily.   Cholecalciferol (VITAMIN D) 2000 UNITS CAPS   Yes No   Sig: daily.   LORazepam (ATIVAN) 0.5 MG tablet   No No   Sig: TAKE 1 TABLET BY MOUTH TWICE DAILY FOR 2 DAYS FOLLOWING SEIZURE CLUSTER   OXcarbazepine (TRILEPTAL) 300 MG tablet   No No   Sig: TAKE ONE AND ONE-HALF TABLET IN THE MORNING, ONE AND ONE-HALF TABLET AT NOON, 2 TABLETS IN THE EVENING AND 2 TABLETS AT BEDTIME.   SUMAtriptan (IMITREX) 100 MG tablet   No No   Sig: Take one by mouth prn severe headache. No more than four per month.   clonazePAM (KLONOPIN) 1 MG tablet   No No   Sig: Take 1.5 tablets (1.5 mg) by mouth At Bedtime   diazepam (DIAZEPAM INTENSOL) 5 MG/ML (HIGH CONC) solution   No No   Sig: Administer 1 ml as needed into cheek pouch for seizures   escitalopram (LEXAPRO) 10 MG tablet   No No   Sig: Take 1 tablet (10 mg) by mouth At Bedtime   esomeprazole (NEXIUM) 40 MG DR capsule   Yes No   Sig: Take by mouth every morning (before breakfast) Take 30-60 minutes before eating.   Patient not taking: Reported on 3/16/2023   levETIRAcetam (KEPPRA) 750 MG tablet   No No   Sig: Take one tablet three times daily and one and half tablets at night   oxybutynin ER (DITROPAN-XL) 10 MG 24 hr tablet   Yes No   Sig: Take 10 mg by mouth   perampanel (FYCOMPA) 8 MG tablet   No No   Sig: Take 1 tablet (8 mg) by mouth At Bedtime   progesterone (PROMETRIUM) 100 MG capsule   Yes No   Si mg daily    topiramate (TOPAMAX) 100 MG tablet   No No   Sig: TAKE ONE-HALF TABLET BY MOUTH IN THE MORNING, AND ONE TABLET IN THE AFTERNOON/EVENING(TOTAL 150 MG PER DAY)      Facility-Administered Medications: None        Physical Exam   Vital Signs: Temp: 100.4  F (38  C) Temp src: Oral  BP: (!) 146/76 Pulse: 108   Resp: 16 SpO2: 94 % O2 Device: None (Room air)    Weight: 0 lbs 0 oz    General Appearance: NAD, mildly confused  Respiratory: CTA  Cardiovascular: tachycardic and regular  GI: soft +BS  Skin: warm and dry  Other: Non focal    Medical Decision Making     60 MINUTES SPENT BY ME on the date of service doing chart review, history, exam, documentation & further activities per the note.      Data     Results for orders placed or performed during the hospital encounter of 06/28/23 (from the past 24 hour(s))   CBC with platelets + differential    Narrative    The following orders were created for panel order CBC with platelets + differential.  Procedure                               Abnormality         Status                     ---------                               -----------         ------                     CBC with platelets and d...[[id number]]  Abnormal            Final result                 Please view results for these tests on the individual orders.   Basic metabolic panel   Result Value Ref Range    Sodium 138 136 - 145 mmol/L    Potassium 3.9 3.4 - 5.3 mmol/L    Chloride 105 98 - 107 mmol/L    Carbon Dioxide (CO2) 18 (L) 22 - 29 mmol/L    Anion Gap 15 7 - 15 mmol/L    Urea Nitrogen 18.4 8.0 - 23.0 mg/dL    Creatinine 0.88 0.51 - 0.95 mg/dL    Calcium 8.2 (L) 8.8 - 10.2 mg/dL    Glucose 127 (H) 70 - 99 mg/dL    GFR Estimate 70 >60 mL/min/1.73m2   Lipase   Result Value Ref Range    Lipase 12 (L) 13 - 60 U/L   Bath Draw    Narrative    The following orders were created for panel order Bath Draw.  Procedure                               Abnormality         Status                     ---------                               -----------         ------                     Extra Blood Culture Bottle[[id number]]                       Final result               Extra Blue Top Tube[[id number]]                              Final result               Extra Red Top Tube[[id number]]                                Final result                 Please view results for these tests on the individual orders.   CBC with platelets and differential   Result Value Ref Range    WBC Count 13.8 (H) 4.0 - 11.0 10e3/uL    RBC Count 3.80 3.80 - 5.20 10e6/uL    Hemoglobin 11.7 11.7 - 15.7 g/dL    Hematocrit 35.8 35.0 - 47.0 %    MCV 94 78 - 100 fL    MCH 30.8 26.5 - 33.0 pg    MCHC 32.7 31.5 - 36.5 g/dL    RDW 13.2 10.0 - 15.0 %    Platelet Count 263 150 - 450 10e3/uL    % Neutrophils 81 %    % Lymphocytes 6 %    % Monocytes 12 %    % Eosinophils 0 %    % Basophils 0 %    % Immature Granulocytes 1 %    NRBCs per 100 WBC 0 <1 /100    Absolute Neutrophils 11.2 (H) 1.6 - 8.3 10e3/uL    Absolute Lymphocytes 0.8 0.8 - 5.3 10e3/uL    Absolute Monocytes 1.6 (H) 0.0 - 1.3 10e3/uL    Absolute Eosinophils 0.0 0.0 - 0.7 10e3/uL    Absolute Basophils 0.1 0.0 - 0.2 10e3/uL    Absolute Immature Granulocytes 0.1 <=0.4 10e3/uL    Absolute NRBCs 0.0 10e3/uL   Extra Blood Culture Bottle   Result Value Ref Range    Hold Specimen JIC    Extra Blue Top Tube   Result Value Ref Range    Hold Specimen JIC    Extra Red Top Tube   Result Value Ref Range    Hold Specimen JIC    UA with Microscopic reflex to Culture    Specimen: Urine, Midstream   Result Value Ref Range    Color Urine Light Brown (A) Colorless, Straw, Light Yellow, Yellow    Appearance Urine Cloudy (A) Clear    Glucose Urine Negative Negative mg/dL    Bilirubin Urine Negative Negative    Ketones Urine 40 (A) Negative mg/dL    Specific Gravity Urine 1.017 1.003 - 1.035    Blood Urine Moderate (A) Negative    pH Urine 7.0 5.0 - 7.0    Protein Albumin Urine 100 (A) Negative mg/dL    Urobilinogen Urine Normal Normal, 2.0 mg/dL    Nitrite Urine Negative Negative    Leukocyte Esterase Urine Large (A) Negative    WBC Clumps Urine Present (A) None Seen /HPF    RBC Urine 36 (H) <=2 /HPF    WBC Urine >182 (H) <=5 /HPF    Squamous Epithelials Urine 1 <=1 /HPF    Narrative    Urine Culture  ordered based on laboratory criteria

## 2023-06-28 NOTE — PHARMACY-ADMISSION MEDICATION HISTORY
Pharmacy Intern Admission Medication History    Admission medication history is complete. The information provided in this note is only as accurate as the sources available at the time of the update.    Medication reconciliation/reorder completed by provider prior to medication history? No    Information Source(s): Patient and CareEverywhere/SureScripts via in-person    Pertinent Information:   --Pt completed keflex, cipro, and macrobid courses. Has completed 5 days of recent bactrim course, currently on day 6  --Takes keppra AM dose after breakfast  --Pt taking topiramate 100mg QHS  --Pregabalin soln discont. after 2 days due to causing confusion    Changes made to PTA medication list:    Added: alendronate, diclofenac, bactrim, robitussin AC, tramadol    Deleted: diazepam, nexium, oxybutynin    Changed: lorazepam, sumatriptan, topiramate    Medication Affordability:  Not including over the counter (OTC) medications, was there a time in the past 3 months when you did not take your medications as prescribed because of cost?: No    Allergies reviewed with patient and updates made in EHR: yes    Medication History Completed By: MARK CUELLAR 6/28/2023 10:18 AM    Prior to Admission medications    Medication Sig Last Dose Taking? Auth Provider Long Term End Date   alendronate (FOSAMAX) 70 MG tablet Take 70 mg by mouth every 7 days Sunday 6/25/2023 Yes Unknown, Entered By History Yes    cetirizine (ZYRTEC) 10 MG tablet Take 10 mg by mouth daily as needed Summer allergies 6/27/2023 at pm Yes Unknown, Entered By History     Cholecalciferol (VITAMIN D) 2000 UNITS CAPS Take 1 capsule by mouth daily 6/27/2023 at pm Yes Reported, Patient     clonazePAM (KLONOPIN) 1 MG tablet Take 1.5 tablets (1.5 mg) by mouth At Bedtime 6/27/2023 at pm Yes Noam Barboza MD Yes    diclofenac (VOLTAREN) 75 MG EC tablet Take 75 mg by mouth 2 times daily With food 6/27/2023 at pm Yes Unknown, Entered By History Yes    escitalopram  (LEXAPRO) 10 MG tablet Take 1 tablet (10 mg) by mouth At Bedtime 6/27/2023 at pm Yes Noam Barboza MD Yes    guaiFENesin-codeine (ROBITUSSIN AC) 100-10 MG/5ML solution Take 5-10 mLs by mouth every other day PRN for cough Past Week at prn Yes Unknown, Entered By History     levETIRAcetam (KEPPRA) 750 MG tablet Take one tablet three times daily and one and half tablets at night 6/27/2023 at pm Yes Noam Barboza MD Yes    LORazepam (ATIVAN) 0.5 MG tablet TAKE 1 TABLET BY MOUTH TWICE DAILY FOR 2 DAYS FOLLOWING SEIZURE CLUSTER Past Month at prn Yes Noam Barboza MD     OXcarbazepine (TRILEPTAL) 300 MG tablet TAKE ONE AND ONE-HALF TABLET IN THE MORNING, ONE AND ONE-HALF TABLET AT NOON, 2 TABLETS IN THE EVENING AND 2 TABLETS AT BEDTIME. 6/27/2023 at pm Yes Noam Barboza MD Yes    perampanel (FYCOMPA) 8 MG tablet Take 1 tablet (8 mg) by mouth At Bedtime 6/27/2023 at pm Yes Noam Barboza MD No    progesterone (PROMETRIUM) 100 MG capsule Take 100 mg by mouth daily 6/27/2023 at pm Yes Reported, Patient     sulfamethoxazole-trimethoprim (BACTRIM DS) 800-160 MG tablet Take 1 tablet by mouth once 6/27/2023 Yes Unknown, Entered By History     SUMAtriptan (IMITREX) 100 MG tablet Take one by mouth prn severe headache. No more than four per month. Past Month at prn Yes Noam Barboza MD     topiramate (TOPAMAX) 100 MG tablet TAKE ONE-HALF TABLET BY MOUTH IN THE MORNING, AND ONE TABLET IN THE AFTERNOON/EVENING(TOTAL 150 MG PER DAY)  Patient taking differently: Take 100 mg by mouth At Bedtime TAKE ONE-HALF TABLET BY MOUTH IN THE MORNING, AND ONE TABLET IN THE AFTERNOON/EVENING(TOTAL 150 MG PER DAY) 6/27/2023 at pm Yes Noam Barboza MD Yes    traMADol (ULTRAM) 50 MG tablet Take 50 mg by mouth twice a week PRN for pain  at prn Yes Unknown, Entered By History

## 2023-06-28 NOTE — ED NOTES
[facility]  ED Nurse Handoff Report    ED Chief complaint: Nausea & Vomiting      ED Diagnosis:   Final diagnoses:   Acute cystitis with hematuria   Confusion   Weakness       Code Status: Admitting MD to discuss     Allergies:   Allergies   Allergen Reactions    Lactose        Patient Story: Pt brought in by from home. Pt lives with . This morning Pt experienced generalized weakness, fevers, nausea, and vomiting that continued to get worse throughout the day. Pt reports chronic lower back pain and has a Hx of recurrent UTIs. Pt Dx with UTI earlier this week and is currently on antibiotics.       Focused Assessment:  Pt is febrile, tachycardic, and has a complaint of nausea.     Labs Ordered and Resulted from Time of ED Arrival to Time of ED Departure   BASIC METABOLIC PANEL - Abnormal       Result Value    Sodium 138      Potassium 3.9      Chloride 105      Carbon Dioxide (CO2) 18 (*)     Anion Gap 15      Urea Nitrogen 18.4      Creatinine 0.88      Calcium 8.2 (*)     Glucose 127 (*)     GFR Estimate 70     LIPASE - Abnormal    Lipase 12 (*)    CBC WITH PLATELETS AND DIFFERENTIAL - Abnormal    WBC Count 13.8 (*)     RBC Count 3.80      Hemoglobin 11.7      Hematocrit 35.8      MCV 94      MCH 30.8      MCHC 32.7      RDW 13.2      Platelet Count 263      % Neutrophils 81      % Lymphocytes 6      % Monocytes 12      % Eosinophils 0      % Basophils 0      % Immature Granulocytes 1      NRBCs per 100 WBC 0      Absolute Neutrophils 11.2 (*)     Absolute Lymphocytes 0.8      Absolute Monocytes 1.6 (*)     Absolute Eosinophils 0.0      Absolute Basophils 0.1      Absolute Immature Granulocytes 0.1      Absolute NRBCs 0.0     ROUTINE UA WITH MICROSCOPIC REFLEX TO CULTURE - Abnormal    Color Urine Light Brown (*)     Appearance Urine Cloudy (*)     Glucose Urine Negative      Bilirubin Urine Negative      Ketones Urine 40 (*)     Specific Gravity Urine 1.017      Blood Urine Moderate (*)      pH Urine 7.0      Protein Albumin Urine 100 (*)     Urobilinogen Urine Normal      Nitrite Urine Negative      Leukocyte Esterase Urine Large (*)     WBC Clumps Urine Present (*)     RBC Urine 36 (*)     WBC Urine >182 (*)     Squamous Epithelials Urine 1     LACTIC ACID WHOLE BLOOD - Normal    Lactic Acid 0.7     URINE CULTURE   BLOOD CULTURE           Treatments and/or interventions provided: NS infusion, antibiotics (see MAR), Tylenol  Patient's response to treatments and/or interventions: Pt temp 100.4    To be done/followed up on inpatient unit:  Per admitting     Does this patient have any cognitive concerns?:  AxO 4    Activity level - Baseline/Home:  Walker  Activity Level - Current:   Wheelchair    Patient's Preferred language: English   Needed?: No    Isolation: None  Infection: Not Applicable  Patient tested for COVID 19 prior to admission: NO  Bariatric?: No    Vital Signs:   Vitals:    06/28/23 1035 06/28/23 1050 06/28/23 1120 06/28/23 1201   BP: (!) 153/112  (!) 156/73 130/71   Pulse: 101  99 92   Resp:   (!) 44 14   Temp:       TempSrc:       SpO2: 97% 97% 97% 95%   Height:           Cardiac Rhythm:     Was the PSS-3 completed:   Yes  What interventions are required if any?               Family Comments:  at bedside  OBS brochure/video discussed/provided to patient/family: N/A              Name of person given brochure if not patient: n/a              Relationship to patient: n/a    For the majority of the shift this patient's behavior was Green.   Behavioral interventions performed were Care and rounding.    ED NURSE PHONE NUMBER: *[phone]

## 2023-06-29 ENCOUNTER — APPOINTMENT (OUTPATIENT)
Dept: CT IMAGING | Facility: CLINIC | Age: 68
DRG: 871 | End: 2023-06-29
Attending: INTERNAL MEDICINE
Payer: COMMERCIAL

## 2023-06-29 ENCOUNTER — APPOINTMENT (OUTPATIENT)
Dept: OCCUPATIONAL THERAPY | Facility: CLINIC | Age: 68
DRG: 871 | End: 2023-06-29
Attending: INTERNAL MEDICINE
Payer: COMMERCIAL

## 2023-06-29 LAB
ANION GAP SERPL CALCULATED.3IONS-SCNC: 12 MMOL/L (ref 7–15)
BUN SERPL-MCNC: 19.3 MG/DL (ref 8–23)
CALCIUM SERPL-MCNC: 8.2 MG/DL (ref 8.8–10.2)
CHLORIDE SERPL-SCNC: 106 MMOL/L (ref 98–107)
CREAT SERPL-MCNC: 1.11 MG/DL (ref 0.51–0.95)
DEPRECATED HCO3 PLAS-SCNC: 20 MMOL/L (ref 22–29)
ERYTHROCYTE [DISTWIDTH] IN BLOOD BY AUTOMATED COUNT: 13.5 % (ref 10–15)
GFR SERPL CREATININE-BSD FRML MDRD: 53 ML/MIN/1.73M2
GLUCOSE SERPL-MCNC: 108 MG/DL (ref 70–99)
HCT VFR BLD AUTO: 32.9 % (ref 35–47)
HGB BLD-MCNC: 10.3 G/DL (ref 11.7–15.7)
HOLD SPECIMEN: NORMAL
LACTATE SERPL-SCNC: 0.7 MMOL/L (ref 0.7–2)
MCH RBC QN AUTO: 30 PG (ref 26.5–33)
MCHC RBC AUTO-ENTMCNC: 31.3 G/DL (ref 31.5–36.5)
MCV RBC AUTO: 96 FL (ref 78–100)
PLATELET # BLD AUTO: 242 10E3/UL (ref 150–450)
POTASSIUM SERPL-SCNC: 4.2 MMOL/L (ref 3.4–5.3)
RBC # BLD AUTO: 3.43 10E6/UL (ref 3.8–5.2)
SODIUM SERPL-SCNC: 138 MMOL/L (ref 136–145)
WBC # BLD AUTO: 14.1 10E3/UL (ref 4–11)

## 2023-06-29 PROCEDURE — 36415 COLL VENOUS BLD VENIPUNCTURE: CPT | Performed by: INTERNAL MEDICINE

## 2023-06-29 PROCEDURE — 250N000013 HC RX MED GY IP 250 OP 250 PS 637: Performed by: INTERNAL MEDICINE

## 2023-06-29 PROCEDURE — 97535 SELF CARE MNGMENT TRAINING: CPT | Mod: GO

## 2023-06-29 PROCEDURE — 87040 BLOOD CULTURE FOR BACTERIA: CPT | Performed by: INTERNAL MEDICINE

## 2023-06-29 PROCEDURE — 80048 BASIC METABOLIC PNL TOTAL CA: CPT | Performed by: INTERNAL MEDICINE

## 2023-06-29 PROCEDURE — 97165 OT EVAL LOW COMPLEX 30 MIN: CPT | Mod: GO

## 2023-06-29 PROCEDURE — 71250 CT THORAX DX C-: CPT

## 2023-06-29 PROCEDURE — 99233 SBSQ HOSP IP/OBS HIGH 50: CPT | Performed by: INTERNAL MEDICINE

## 2023-06-29 PROCEDURE — 120N000001 HC R&B MED SURG/OB

## 2023-06-29 PROCEDURE — 85027 COMPLETE CBC AUTOMATED: CPT | Performed by: INTERNAL MEDICINE

## 2023-06-29 PROCEDURE — 250N000011 HC RX IP 250 OP 636: Mod: JZ | Performed by: INTERNAL MEDICINE

## 2023-06-29 PROCEDURE — 250N000011 HC RX IP 250 OP 636: Performed by: INTERNAL MEDICINE

## 2023-06-29 PROCEDURE — 999N000128 HC STATISTIC PERIPHERAL IV START W/O US GUIDANCE

## 2023-06-29 PROCEDURE — 83605 ASSAY OF LACTIC ACID: CPT | Performed by: INTERNAL MEDICINE

## 2023-06-29 RX ORDER — PIPERACILLIN SODIUM, TAZOBACTAM SODIUM 3; .375 G/15ML; G/15ML
3.38 INJECTION, POWDER, LYOPHILIZED, FOR SOLUTION INTRAVENOUS EVERY 6 HOURS
Status: DISCONTINUED | OUTPATIENT
Start: 2023-06-29 | End: 2023-06-29

## 2023-06-29 RX ORDER — PIPERACILLIN SODIUM, TAZOBACTAM SODIUM 4; .5 G/20ML; G/20ML
4.5 INJECTION, POWDER, LYOPHILIZED, FOR SOLUTION INTRAVENOUS EVERY 6 HOURS
Status: DISCONTINUED | OUTPATIENT
Start: 2023-06-29 | End: 2023-06-29

## 2023-06-29 RX ORDER — PIPERACILLIN SODIUM, TAZOBACTAM SODIUM 3; .375 G/15ML; G/15ML
3.38 INJECTION, POWDER, LYOPHILIZED, FOR SOLUTION INTRAVENOUS EVERY 6 HOURS
Status: DISCONTINUED | OUTPATIENT
Start: 2023-06-29 | End: 2023-07-02

## 2023-06-29 RX ORDER — TRAMADOL HYDROCHLORIDE 50 MG/1
50 TABLET ORAL EVERY 8 HOURS PRN
Status: DISCONTINUED | OUTPATIENT
Start: 2023-06-29 | End: 2023-07-05 | Stop reason: HOSPADM

## 2023-06-29 RX ADMIN — TOPIRAMATE 100 MG: 100 TABLET, FILM COATED ORAL at 22:35

## 2023-06-29 RX ADMIN — DICLOFENAC SODIUM 75 MG: 25 TABLET, DELAYED RELEASE ORAL at 09:19

## 2023-06-29 RX ADMIN — LEVETIRACETAM 1125 MG: 250 TABLET, FILM COATED ORAL at 22:34

## 2023-06-29 RX ADMIN — POTASSIUM CHLORIDE AND SODIUM CHLORIDE: 900; 150 INJECTION, SOLUTION INTRAVENOUS at 19:17

## 2023-06-29 RX ADMIN — ACETAMINOPHEN 650 MG: 325 TABLET, FILM COATED ORAL at 01:27

## 2023-06-29 RX ADMIN — OXCARBAZEPINE 600 MG: 300 TABLET, FILM COATED ORAL at 18:29

## 2023-06-29 RX ADMIN — ACETAMINOPHEN 650 MG: 325 TABLET, FILM COATED ORAL at 20:20

## 2023-06-29 RX ADMIN — OXCARBAZEPINE 450 MG: 300 TABLET, FILM COATED ORAL at 09:20

## 2023-06-29 RX ADMIN — ACETAMINOPHEN 650 MG: 325 TABLET, FILM COATED ORAL at 15:44

## 2023-06-29 RX ADMIN — LEVETIRACETAM 750 MG: 750 TABLET, FILM COATED ORAL at 16:30

## 2023-06-29 RX ADMIN — OXCARBAZEPINE 450 MG: 300 TABLET, FILM COATED ORAL at 12:21

## 2023-06-29 RX ADMIN — OXCARBAZEPINE 600 MG: 300 TABLET, FILM COATED ORAL at 22:35

## 2023-06-29 RX ADMIN — PIPERACILLIN AND TAZOBACTAM 3.38 G: 3; .375 INJECTION, POWDER, FOR SOLUTION INTRAVENOUS at 14:32

## 2023-06-29 RX ADMIN — OXYCODONE HYDROCHLORIDE 2.5 MG: 5 TABLET ORAL at 12:21

## 2023-06-29 RX ADMIN — ESCITALOPRAM OXALATE 10 MG: 10 TABLET ORAL at 22:35

## 2023-06-29 RX ADMIN — LEVETIRACETAM 750 MG: 750 TABLET, FILM COATED ORAL at 12:22

## 2023-06-29 RX ADMIN — ENOXAPARIN SODIUM 40 MG: 40 INJECTION SUBCUTANEOUS at 20:17

## 2023-06-29 RX ADMIN — LEVETIRACETAM 750 MG: 750 TABLET, FILM COATED ORAL at 09:19

## 2023-06-29 RX ADMIN — PIPERACILLIN AND TAZOBACTAM 3.38 G: 3; .375 INJECTION, POWDER, FOR SOLUTION INTRAVENOUS at 20:17

## 2023-06-29 RX ADMIN — CLONAZEPAM 1.5 MG: 1 TABLET ORAL at 22:34

## 2023-06-29 RX ADMIN — TRAMADOL HYDROCHLORIDE 50 MG: 50 TABLET, COATED ORAL at 18:29

## 2023-06-29 RX ADMIN — PERAMPANEL 8 MG: 2 TABLET ORAL at 22:33

## 2023-06-29 ASSESSMENT — ACTIVITIES OF DAILY LIVING (ADL)
ADLS_ACUITY_SCORE: 40
PREVIOUS_RESPONSIBILITIES: HOUSEKEEPING;LAUNDRY;MEDICATION MANAGEMENT;FINANCES
ADLS_ACUITY_SCORE: 40

## 2023-06-29 NOTE — PLAN OF CARE
6/28/23 [id number]   A/Ox4-confused. Pt is Ax1 GB/W and is incontinent of bladder. Pt just arrived to floor at 1845. Writer was able to acclimate pt to room, change gown and socks.

## 2023-06-29 NOTE — CONSULTS
[facility] Inpatient Consultation Note    Adia Briceno MRN# [id number]   Age: 68 year old YOB: 1955     Date of Admission:  6/28/2023    Reason for consult: Recurrent UTIs       Requesting physician: Dr Valdes                   History of Present Illness:     69 yo F with h/o seizure disorder, permanent pacemaker, pelvic fracture, and recently with recurrent UTI symptoms admitted with fever and urinary frequency and incontinene. UA suspicious for UTI, culture pending, started on Rocephin. Seen with  at bedside.     She does report several recent courses of antibiotics for UTI though unclear if ever culture-proven infection present. I do see one UCx on [facility]where from May which was negative. Patient's  does not think there has been any positive urine cultures but reports she has been treated with abx for UTI every month since February, and symptoms (urgency, malodorous urine) typically improve with antibiotic course.     No recent abdominal imaging. Pt reports irritative voiding sx ever since a pelvic fracture about 6 months ago, including progressive urinary incontinence. Has been on oxybutynin PTA; this was discontinued recently. Denies any h/o hematuria or kidney stones.     She is incontinent of urine and utilizing Purewick here. Bladder scans here 110 ml, 253 ml. Urine clear yellow.     Was scheduled to see Dr Lopez in clinic yesterday but missed appointment.      WBC 14.1  Cr 1.11  Tmax 101.1, mildly tachycardic           Past Medical History:     Past Medical History:   Diagnosis Date     Depression      Epilepsy (H)      GERD (gastroesophageal reflux disease)      Migraine      Seizure (H)     vagal nerve stimulator     Shortness of breath      Syncope and collapse     bradyarrhythmia: dual chamber pacemaker implanted 2011             Past Surgical History:     Past Surgical History:   Procedure Laterality Date     IMPLANT PACEMAKER  April 2011    [facility],  [id number],      IMPLANT STIMULATOR VAGUS NERVE  DEC  2008    VNS batter depleated (8/21/2019)             Social History:     Social History     Socioeconomic History     Marital status:      Spouse name: Not on file     Number of children: Not on file     Years of education: Not on file     Highest education level: Not on file   Occupational History     Not on file   Tobacco Use     Smoking status: Never     Smokeless tobacco: Never   Substance and Sexual Activity     Alcohol use: No     Alcohol/week: 0.0 standard drinks of alcohol     Drug use: No     Sexual activity: Yes     Partners: Male   Other Topics Concern     Parent/sibling w/ CABG, MI or angioplasty before 65F 55M? Not Asked      Service Not Asked     Blood Transfusions Not Asked     Caffeine Concern Yes     Comment: 3 CUPS coffee daily      Occupational Exposure Not Asked     Hobby Hazards Not Asked     Sleep Concern Not Asked     Stress Concern Not Asked     Weight Concern No     Special Diet No     Back Care Not Asked     Exercise Yes     Bike Helmet Not Asked     Seat Belt Yes     Self-Exams Not Asked   Social History Narrative     Not on file     Social Determinants of Health     Financial Resource Strain: Not on file   Food Insecurity: Not on file   Transportation Needs: Not on file   Physical Activity: Not on file   Stress: Not on file   Social Connections: Not on file   Intimate Partner Violence: Not on file   Housing Stability: Not on file             Family History:   No family history on file.          Immunizations:     Immunization History   Administered Date(s) Administered     COVID-19 Bivalent 12+ (Pfizer) 09/29/2022     COVID-19 MONOVALENT 12+ (Pfizer) 03/10/2021, 04/02/2021, 10/03/2021     COVID-19 Monovalent 18+ (Moderna) 06/06/2022     Flu 65+ Years 11/06/2019     Influenza (High Dose) 3 valent vaccine 11/02/2019     Influenza Vaccine 65+ (Fluzone HD) 09/25/2020, 11/02/2020     Influenza Vaccine >6 months  "(Alfuria,Fluzone) 10/03/2017, 09/09/2018     Pneumo Conj 13-V (2010&after) 10/03/2018             Allergies:     Allergies   Allergen Reactions     Lactose              Medications:     Current Facility-Administered Medications   Medication     0.9% sodium chloride + KCl 20 mEq/L infusion     acetaminophen (TYLENOL) tablet 650 mg    Or     acetaminophen (TYLENOL) Suppository 650 mg     cefTRIAXone (ROCEPHIN) 1 g vial to attach to  mL bag for ADULTS or NS 50 mL bag for PEDS     clonazePAM (klonoPIN) tablet 1.5 mg     diclofenac (VOLTAREN) EC tablet 75 mg     enoxaparin ANTICOAGULANT (LOVENOX) injection 40 mg     escitalopram (LEXAPRO) tablet 10 mg     guaiFENesin-codeine (ROBITUSSIN AC) 100-10 MG/5ML solution 5-10 mL     levETIRAcetam (KEPPRA) half-tab 1,125 mg     levETIRAcetam (KEPPRA) tablet 750 mg     lidocaine (LMX4) cream     lidocaine 1 % 0.1-1 mL     LORazepam (ATIVAN) injection 2 mg     melatonin tablet 1 mg     naloxone (NARCAN) injection 0.2 mg    Or     naloxone (NARCAN) injection 0.4 mg    Or     naloxone (NARCAN) injection 0.2 mg    Or     naloxone (NARCAN) injection 0.4 mg     ondansetron (ZOFRAN ODT) ODT tab 4 mg    Or     ondansetron (ZOFRAN) injection 4 mg     OXcarbazepine (TRILEPTAL) tablet 450 mg     OXcarbazepine (TRILEPTAL) tablet 600 mg     perampanel (FYCOMPA) tablet 8 mg     sodium chloride (PF) 0.9% PF flush 3 mL     sodium chloride (PF) 0.9% PF flush 3 mL     SUMAtriptan (IMITREX) tablet 100 mg     topiramate (TOPAMAX) tablet 100 mg     traMADol (ULTRAM) tablet 50 mg             Review of Systems:   Comprehensive review of systems from the Admission note dated 6/28/23 at [facility] was reviewed with no changes except per HPI.     Examination:  BP (!) 154/82   Pulse 109   Temp 99.4  F (37.4  C) (Oral)   Resp 22   Ht 1.676 m (5' 6\")   SpO2 92%   BMI 27.44 kg/m    General: Alert and oriented, no distress  HEENT: Face symmetric, mucous membranes moist and " pink  Eyes: No scleral icterus  Respiratory: Breathing unlabored, no audible wheezing  Cardiac: Extremities warm and well perfused  Abdomen soft, non tender, non distended  Back: No CVA or flank tenderness  : purewick draining yellow urine  Extremities: No evidence of deformities or trauma  Neuro:Grossly non focal  Pysch: Normal mood and affect.   Skin: No evident rashes or lesions            Data:     Lab Results   Component Value Date    WBC 14.1 06/29/2023    WBC 3.0 06/08/2011     Lab Results   Component Value Date    RBC 3.43 06/29/2023    RBC 4.40 06/08/2011     Lab Results   Component Value Date    HGB 10.3 06/29/2023    HGB 13.5 06/08/2011     Lab Results   Component Value Date    HCT 32.9 06/29/2023    HCT 38.5 06/08/2011     Lab Results   Component Value Date     06/29/2023     06/08/2011     Creatinine   Date Value Ref Range Status   06/29/2023 1.11 (H) 0.51 - 0.95 mg/dL Final   10/03/2018 0.74 0.57 - 1.11 mg/dL Final   ]  Lab Results   Component Value Date    BUN 19.3 06/29/2023    BUN 14 10/03/2018       Impression:  67 yo F with recurrent urinary frequency and incontinence, admitted with possible UTI and fever.     Plan:  - Follow cultures, treat for infection as indicated per IM  - Agree with holding oxybutynin given some degree of incomplete emptying  - Will need to reschedule appointment with urology. Will have our office call to coordinate.   - In absence of culture-proven recurrent infections, would not yet recommend daily antibiotic prophylaxis. Encouraged to obtain culture when having symptoms.   - Agree with obtaining CT of abd/pelv -- ordered by primary team.     Amy Valentino PA-C  [facility]   Pager: [phone]  Office: [phone]

## 2023-06-29 NOTE — PLAN OF CARE
[id number]  Seizure precautions. A/Ox4, int confusion, change in speech. HTN, tachycardic, tachypneic, afebrile, tylenol given, MD notified. Pacemaker. R PIV, @ 75mL NaCl + Kcl. Purewick in place with adequate output. Lactic: 0.7. Reg Diet. A/1GBW. Discharge pending overall improvement.

## 2023-06-29 NOTE — PLAN OF CARE
Urology    Spoke to patient's family member at bedside    Patient had been due to see Dr. Lopez as outpatient today    - Please change urology consult to [facility]    Bud Garcia MD   [facility]  [phone] clinic phone

## 2023-06-29 NOTE — PROGRESS NOTES
[facility]    Hospitalist Progress Note    Date of Service (when I saw the patient): 06/29/2023  Admit date: 6/28/2023    Interval History   Full details of events over last 24 hours outlined below.   On 06/29/23. ontinued fever, rising WBC.  She has chronic low back pain from previous lumbar and pelvic fractures that has not changed.  She notes no increased pain here.  She also notes pain in the right thigh which is chronic after this back injury.  No pain localized to the joints.  She has a pacemaker but no other joint replacements.  She denies any other localizing signs of infection. She had an attempt at an epidural over a month ago.     Assessment & Plan   This is a 68-year-old female with medical history of depression epilepsy reflux disease seizure disorder migraines and syncope with history of permanent pacemaker placement presents to [facility] the emergency department via ambulance for fever confusion and urinary symptoms.  In the ER had a fever of 101.1, tachycardic but stable bp and oxygenation.  Electrolytes are significant for sodium of 138 potassium 3.9 normal BUN and creatinine.  White count is elevated at 13,800 with a left shift.  Urinalysis was cloudy light brown with moderate blood large leukocyte Estrace and greater than 182 white cells and positive for WBC clumps.     Sepsis with fever, leukocytosis and tachycardia   Possible UTI, presenting with confusion, fever and chronic urinary symptoms   Possible recurrent UTIs with dysuria, frequency and pyuria Per patient receiving monthly Macrobid and recently on Bactrim prophylactically.  However, I cannot find clear documentation of this.  I do see that she was on Bactrim daily per med list.  But no information and clinic visits found through our system and care everywhere. Recurrent pyuria with only UCx in last year on 5/2023 being negative   *UA: Greater than 182 white blood cells, WBC 13.8 (neutrophilic)  hemodynamically stable at admission with stable creatinine.     On IV ceftriaxone in the ED    Bcx and UCx pending.     On 06/29/23. ontinued fever, rising WBC.  She has chronic low back pain from previous lumbar and pelvic fractures in January 2023 that have not changed.  She also notes pain in the right thigh which is chronic after this back injury.  No pain localized to the joints.  She has a pacemaker but no other joint replacements.  She denies any other localizing signs of infection. She had an attempt at an epidural over a month ago.     CT scan of the chest, abdomen and pelvis without contrast to evaluate for ureteral blockage or other source of infection.     Switch abx to zosyn as we await cultures, given recent recurrent antibiotics as an outpatient    Continue NS + 20 KCl @ 75 ml/h while having fevers. No hemodynamic instability, BP up.     Acetaminophen for fever, Zofran for nausea and vomiting    Holding PTA oxybutynin, would hold on resumption until evaluation of sx post treatment of UTI.     Appreciate Urology consult.     Follow PVR bladder scans and straight cath for > 300. (On 06/29/23: 110 ml and 253 ml overnight)     If UCx negative. Will have to investigate further for source of sepsis.    Addendum: Discussed CT results with patient and :   IMPRESSION:   1.  Moderate right and mild-moderate left hydronephrosis and stranding   without ureteral stones or obvious etiology. Pyelonephritis could have   this appearance. Clinical correlation needed.   2.  Compression deformity and erosive change of L2, consider lumbar   spine MRI to evaluate for osteomyelitis.     Above finding support pyelonephritis, less suspicious for osteo as lower back pain since injury in January is chronic and not prominent symptom for her.   Called radiology and they will attempt to push images from prior CT done through [facility] to our system.     Oxycodone is causing some sedation so will decrease to once daily for severe  "pain only,preferrably at night and use tramadol q 8 hours PRN.     NATALIA -creatinine 1.11 up from 0.88.  She has been hemodynamically stable with adequate urine output.  Therefore doubt prerenal.    Continue IV fluids for now    CT without contrast to evaluate for ureteral obstruction \    Strict urine output measurements    Follow-up BMP ordered    Avoid nephrotoxins.  Held PTA diclofenac    Acute encephalopathy secondary to infection, resolving  Patient is alert answering questions appropriately but on 06/29/23, confused by reason for hospitalization and sometimes goes off on unrelated tangent.   Frequent re-orientation  Maintain normal day/night, sleep wake cycles  Minimize sedating/altering medications as able  Treating separate conditions as detailed above/below     Epilepsy    Reviewed and ordered PTA Keppra, oxcarbazepine, perampanel    Ativan ordered PRN seizure and seizure precautions     She has mild seizures about 3 times a month.  Following these she is to take Ativan twice daily for 2 days.  Family has not noted any seizures yet during this admission.      Depression    Continue ALEXANDRA escitalopram and clonazepam at bedtime     Migraine    Continue topamax as needed sumatriptan     GERD    Continue PPI    Chronic pain from recent compression fracture    Hold PTA diclofenac twice daily    Clinically Significant Risk Factors                         # Overweight: Estimated body mass index is 29.61 kg/m  as calculated from the following:    Height as of this encounter: 1.676 m (5' 6\").    Weight as of this encounter: 83.2 kg (183 lb 6.8 oz)., PRESENT ON ADMISSION            Diet: Orders Placed This Encounter      Combination Diet Regular Diet Adult     IVF: IVF as above.   Mills Catheter: Not present     DVT Prophylaxis: enoxaparin   Code Status: Full Code     Disposition: Expected discharge TBD  Communication: Discussed with with  who is a doctor and patient on 6/29  Lilly Valdes MD     Hospitalist " [facility]  Securely message with the [employer] Web Console (learn more here)  Text page via [employer] Paging/Directory      -Data reviewed today: I reviewed all new labs and imaging results over the last 24 hours. I personally reviewed no images or EKG's today.    Physical Exam   Temp: 98.3  F (36.8  C) Temp src: Oral BP: (!) 150/75 Pulse: 85   Resp: 22 SpO2: 97 % O2 Device: None (Room air)    Vitals:    06/29/23 1129   Weight: 83.2 kg (183 lb 6.8 oz)     Vital Signs with Ranges  Temp:  [98  F (36.7  C)-102.9  F (39.4  C)] 98.3  F (36.8  C)  Pulse:  [] 85  Resp:  [6-26] 22  BP: (146-171)/(75-95) 150/75  SpO2:  [92 %-98 %] 97 %  I/O last 3 completed shifts:  In: 243 [P.O.:240; I.V.:3]  Out: 450 [Urine:450]    Today's Exam  Constitutional:NAD,   Neuropsyche:  alert and oriented, answers questions appropriately, but confused by why she is in the hospital. Speech normal, face symmetric and moving all 4 extremities without gross focal neurological deficit.   Respiratory: Breathing comfortably, good air exchange, no wheezes, no crackles.   Cardiovascular:  Regular rate and rhythm, no edema.  GI:  soft, NT/ND, BS normal  Skin/Integumen:  No acute rash or sign of bleeding.   MSK: rolled       Medications   All medications reviewed on 06/28/23     0.9% sodium chloride + KCl 20 mEq/L 75 mL/hr at 06/29/23 0255        clonazePAM  1.5 mg Oral At Bedtime     [Held by provider] diclofenac  75 mg Oral BID     enoxaparin ANTICOAGULANT  40 mg Subcutaneous Q24H     escitalopram  10 mg Oral At Bedtime     levETIRAcetam  1,125 mg Oral At Bedtime     levETIRAcetam  750 mg Oral TID     OXcarbazepine  450 mg Oral BID     OXcarbazepine  600 mg Oral BID     perampanel  8 mg Oral At Bedtime     piperacillin-tazobactam  3.375 g Intravenous Q6H     sodium chloride (PF)  3 mL Intracatheter Q8H     topiramate  100 mg Oral At Bedtime         clonazePAM  1.5 mg Oral At Bedtime     [Held by provider] diclofenac  75  mg Oral BID     enoxaparin ANTICOAGULANT  40 mg Subcutaneous Q24H     escitalopram  10 mg Oral At Bedtime     levETIRAcetam  1,125 mg Oral At Bedtime     levETIRAcetam  750 mg Oral TID     OXcarbazepine  450 mg Oral BID     OXcarbazepine  600 mg Oral BID     perampanel  8 mg Oral At Bedtime     piperacillin-tazobactam  3.375 g Intravenous Q6H     sodium chloride (PF)  3 mL Intracatheter Q8H     topiramate  100 mg Oral At Bedtime     PRN Meds: acetaminophen **OR** acetaminophen, guaiFENesin-codeine, lidocaine 4%, lidocaine (buffered or not buffered), LORazepam, melatonin, menthol (Topical Analgesic) 2.5%, naloxone **OR** naloxone **OR** naloxone **OR** naloxone, ondansetron **OR** ondansetron, oxyCODONE, sodium chloride (PF), SUMAtriptan    Data   Recent Labs   Lab 06/29/23  0822 06/28/23  0320   WBC 14.1* 13.8*   HGB 10.3* 11.7   MCV 96 94    263    138   POTASSIUM 4.2 3.9   CHLORIDE 106 105   CO2 20* 18*   BUN 19.3 18.4   CR 1.11* 0.88   ANIONGAP 12 15   TATIANA 8.2* 8.2*   * 127*   LIPASE  --  12*       Recent Results (from the past 24 hour(s))   CT Chest Abdomen Pelvis w/o Contrast    Narrative    CT CHEST/ABDOMEN/PELVIS WITHOUT CONTRAST 6/29/2023 11:18 AM    CLINICAL HISTORY: Evaluate for nephrolithiasis, obstruction,  infection. Fevers, urinary tract infection.    TECHNIQUE: CT scan of the chest, abdomen, and pelvis was performed  without IV contrast. Multiplanar reformats were obtained. Dose  reduction techniques were used.   CONTRAST: None    COMPARISON: August 10, 2021    FINDINGS:   LUNGS AND PLEURA: 7 mm nodule in the posterolateral right lower lobe  is not significantly changed since the comparison study. No definite  new nodules. Small fissural nodule on the right image 116, not  significantly changed as well. Scattered atelectasis and/or fibrosis.  No infiltrates or effusions.    MEDIASTINUM/AXILLAE: No adenopathy demonstrated in the absence of  contrast. No aneurysm.    CORONARY ARTERY  CALCIFICATIONS: None.    HEPATOBILIARY: No significant mass or bile duct dilatation. No  calcified gallstones.     PANCREAS: No significant mass, duct dilatation, or inflammatory  change.    SPLEEN: Normal size.    ADRENAL GLANDS: No significant nodules.    KIDNEYS/BLADDER: Moderate right and mild-moderate left hydronephrosis  and stranding. This appears to extend to the bladder. No definite  ureteral stones. At least four intrarenal stones on the left measuring  up to 6 mm noted. No definite intrarenal stones on the right.    BOWEL: No obstruction or inflammatory change.    PELVIC ORGANS: No pelvic masses.    ADDITIONAL FINDINGS: No ascites. No intra-abdominal fluid collections.    MUSCULOSKELETAL: Compression deformity and erosive changes of L2,  osteomyelitis not excluded. Compression deformities of L1 and T12 as  well.      Impression    IMPRESSION:  1.  Moderate right and mild-moderate left hydronephrosis and stranding  without ureteral stones or obvious etiology. Pyelonephritis could have  this appearance. Clinical correlation needed.  2.  Compression deformity and erosive change of L2, consider lumbar  spine MRI to evaluate for osteomyelitis.

## 2023-06-29 NOTE — PROVIDER NOTIFICATION
MD Notification    Notified Person: MD    Notified Person Name:   Alex Piper    Notification Date/Time: June 29, 2023 1:40 AM   Notification Interaction: Vocera    Purpose of Notification:   FYI, pt on seizure precautions. Recurrent fever  (100.9) d/t UTI, tylenol given 0130. Change in mental status & confused speech (reported on admit as well). Increased tremors BUE, facial grimacing @ 130. Lactic flagged, 0.7. Tremors, facial grimacing have improved after tylenol. Let me know for any changes at this time?       Orders Received:    Comments:

## 2023-06-29 NOTE — PROGRESS NOTES
06/29/23 0830   Appointment Info   Signing Clinician's Name / Credentials (OT) Rbo Hanley OTR/L   Living Environment   People in Home spouse   Current Living Arrangements house   Home Accessibility stairs to enter home;stairs within home   Number of Stairs, Main Entrance 2   Stair Railings, Main Entrance railings safe and in good condition  (In garage)   Number of Stairs, Within Home, Primary other (see comments)  (Full flight upstairs and to basement. Rarely use basement)   Stair Railings, Within Home, Primary railings safe and in good condition   Transportation Anticipated family or friend will provide   Living Environment Comments Pt lives in 2 level home w/ spouse. Bed on main floor. Bathroom upstairs w/ walk in shower and grab bars and shower chair. Standard height toilet.   Self-Care   Usual Activity Tolerance moderate   Current Activity Tolerance fair   Equipment Currently Used at Home walker, standard;wheelchair, manual   Fall history within last six months yes   Number of times patient has fallen within last six months 1   Activity/Exercise/Self-Care Comment Pt reports being IND w/ all ADL's at baseline prior to admission.   Instrumental Activities of Daily Living (IADL)   Previous Responsibilities housekeeping;laundry;medication management;finances   IADL Comments Pt's spouse completes the cooking but pt able to complete all other IADL's IND. Pt does not drive.   General Information   Onset of Illness/Injury or Date of Surgery 06/28/23   Referring Physician Obi Owens MD   Patient/Family Therapy Goal Statement (OT) Return to home.   Additional Occupational Profile Info/Pertinent History of Current Problem This is a 68-year-old female with medical history of depression epilepsy reflux disease seizure disorder migraines and syncope with history of permanent pacemaker placement presents to [facility] the emergency department via ambulance for fever confusion and urinary symptoms.  In the ER  had a fever of 101.1, tachycardic but stable bp and oxygenation.  Electrolytes are significant for sodium of 138 potassium 3.9 normal BUN and creatinine.  White count is elevated at 13,800 with a left shift.  Urinalysis was cloudy light brown with moderate blood large leukocyte Estrace and greater than 182 white cells and positive for WBC clumps   Existing Precautions/Restrictions fall   Cognitive Status Examination   Orientation Status person;place  (Not oriented to day)   Cognitive Status Comments Intermittent confusion noted throughout therapy session   Visual Perception   Visual Impairment/Limitations corrective lenses full-time   Sensory   Sensory Quick Adds sensation intact   Pain Assessment   Patient Currently in Pain Yes, see Vital Sign flowsheet  (8/10 R hip)   Range of Motion Comprehensive   General Range of Motion no range of motion deficits identified   Strength Comprehensive (MMT)   Comment, General Manual Muscle Testing (MMT) Assessment Generalized weakness bilaterally   Bed Mobility   Bed Mobility supine-sit;sit-supine   Supine-Sit Row[place] (Bed Mobility) minimum assist (75% patient effort)   Sit-Supine [place] (Bed Mobility) minimum assist (75% patient effort)   Assistive Device (Bed Mobility) bed rails   Transfers   Transfers sit-stand transfer;toilet transfer   Sit-Stand Transfer   Sit-Stand Rowan (Transfers) minimum assist (75% patient effort)   Assistive Device (Sit-Stand Transfers) walker, front-wheeled   Toilet Transfer   Type (Toilet Transfer) stand-sit;sit-stand   Rowan Level (Toilet Transfer) minimum assist (75% patient effort)   Assistive Device (Toilet Transfer) walker, front-wheeled;grab bars/safety frame   Activities of Daily Living   BADL Assessment/Intervention lower body dressing;grooming;toileting   Lower Body Dressing Assessment/Training   Position (Lower Body Dressing) edge of bed sitting   [place]an Level (Lower Body Dressing) contact guard assist    Toileting   Position (Toileting) supported standing   [place] Level (Toileting) minimum assist (75% patient effort)   Clinical Impression   Criteria for Skilled Therapeutic Interventions Met (OT) Yes, treatment indicated   OT Diagnosis Decreased ADL independence and tolerance   Influenced by the following impairments Decreased ADL independence   OT Problem List-Impairments impacting ADL problems related to;activity tolerance impaired;strength   Assessment of Occupational Performance 3-5 Performance Deficits   Identified Performance Deficits LB dressing, G/h, toileting, toilet transfer   Planned Therapy Interventions (OT) ADL retraining;IADL retraining;home program guidelines;progressive activity/exercise;risk factor education   Clinical Decision Making Complexity (OT) low complexity   Risk & Benefits of therapy have been explained evaluation/treatment results reviewed;care plan/treatment goals reviewed;risks/benefits reviewed;current/potential barriers reviewed;patient   OT Total Evaluation Time   OT Eval, Low Complexity Minutes (73032) 10   OT Goals   Therapy Frequency (OT) 5 times/wk   OT Predicted Duration/Target Date for Goal Attainment 07/06/23   OT Goals Hygiene/Grooming;Lower Body Dressing;Toilet Transfer/Toileting   OT: Hygiene/Grooming supervision/stand-by assist;while standing   OT: Lower Body Dressing Supervision/stand-by assist;including set-up/clothing retrieval   OT: Toilet Transfer/Toileting Supervision/stand-by assist;toilet transfer;cleaning and garment management   Self-Care/Home Management   Self-Care/Home Mgmt/ADL, Compensatory, Meal Prep Minutes (19755) 25   Symptoms Noted During/After Treatment (Meal Preparation/Planning Training) fatigue;increased pain   Treatment Detail/Skilled Intervention Pt greeted supine in bed w/ spouse present in room. Agreeable for OT evaluation. Min A sup > sit EOB w/ head of bed elevated and use of bed rail; VC/TC's used for sequencing throughout mobility. Pt  reporting slight increase in pain in R hip w/ mobility. Pt able to scoot self towards edge of bed w/ SBA for safety. CGA/Min A to maintain seated balance edge of bed as pt tends to lean posteriorly; frequent cues for hand placement to help maintain balance. CGA to doff/don B socks using cross leg method. Min A sit > stand edge of bed w/ use of FWW; cues for safe hand placement w/ transfer. Pt ambulated into bathroom w/ Min A and additional assist for line management. 1 Vc for walker management. Upon entering bathroom, Min A to complete toilet transfer w/ use of grab bar on L side. Pt able to doff/don briefs w/ CGA. Pt completed fredy care while seated w/ SBA but required additional Min A for thoroughness once standing. Pt reporting increased pain in R hip and was not able to tolerate standing sinkside for g/h. Pt ambulated back to bed and required assist w/ walker management. Min A sit > sup w/ bed flat. Max A x 2 to boost pt up towards head of bed. Pt remained supine in bed at end of therapy session w/ all needs met and spouse/RN present in room.   OT Discharge Planning   OT Plan G/h seated/standing as able, progress mobility, monitor functional cognition   OT Discharge Recommendation (DC Rec) Transitional Care Facility   OT Rationale for DC Rec Pt currently functioning below baseline d/t decreased activity tolerance. Pt reports living at home w/ spouse and being IND w/ I/ADL's at baseline prior to admission. Per pt's spouse, pt is well below baseline mobility wise. Pt would benefit from continued therapy at TCU to help return to PLOF prior to returning home.   OT Brief overview of current status Min A bed mobility/transfers w/ FWW, CGA LB dressing, Min A toileting   Total Session Time   Timed Code Treatment Minutes 25   Total Session Time (sum of timed and untimed services) 35

## 2023-06-29 NOTE — PLAN OF CARE
Date&time:06/28/23 5777-9674  Summary:  Received from ED with fever, confusion, nausea and vomiting  Primary Diagnosis: Depression, epilepsy reflex disease, seizure disorders, syncope, UTI   Orientation: A&OX4  Aggression Stop Light: Green  Mobility: AXI GB/Walker  Pain Management: Voltaren   Diet: Regular  Bowel/Bladder: Incontinent, purewick in place  Abnormal Lab/Assessments: WBC-13.8, UA  Drain/Device/Wound: R PIV-SL, awaiting K drip to start  Consults: Urology, PT, OT  D/C Day/Goals/Place: TBD    Pt has pain at R leg, managed with scheduled meds and ice. Pt has permanent pacemaker. On seizure precaution. CBC/ BMP AM draw.

## 2023-06-30 ENCOUNTER — APPOINTMENT (OUTPATIENT)
Dept: PHYSICAL THERAPY | Facility: CLINIC | Age: 68
DRG: 871 | End: 2023-06-30
Attending: INTERNAL MEDICINE
Payer: COMMERCIAL

## 2023-06-30 ENCOUNTER — APPOINTMENT (OUTPATIENT)
Dept: OCCUPATIONAL THERAPY | Facility: CLINIC | Age: 68
DRG: 871 | End: 2023-06-30
Payer: COMMERCIAL

## 2023-06-30 LAB
ANION GAP SERPL CALCULATED.3IONS-SCNC: 14 MMOL/L (ref 7–15)
BUN SERPL-MCNC: 20.9 MG/DL (ref 8–23)
CALCIUM SERPL-MCNC: 8.2 MG/DL (ref 8.8–10.2)
CHLORIDE SERPL-SCNC: 106 MMOL/L (ref 98–107)
CREAT SERPL-MCNC: 0.89 MG/DL (ref 0.51–0.95)
DEPRECATED HCO3 PLAS-SCNC: 17 MMOL/L (ref 22–29)
ERYTHROCYTE [DISTWIDTH] IN BLOOD BY AUTOMATED COUNT: 13.6 % (ref 10–15)
GFR SERPL CREATININE-BSD FRML MDRD: 69 ML/MIN/1.73M2
GLUCOSE SERPL-MCNC: 98 MG/DL (ref 70–99)
HCT VFR BLD AUTO: 36.1 % (ref 35–47)
HGB BLD-MCNC: 11.7 G/DL (ref 11.7–15.7)
MCH RBC QN AUTO: 30.6 PG (ref 26.5–33)
MCHC RBC AUTO-ENTMCNC: 32.4 G/DL (ref 31.5–36.5)
MCV RBC AUTO: 95 FL (ref 78–100)
PLATELET # BLD AUTO: 218 10E3/UL (ref 150–450)
POTASSIUM SERPL-SCNC: 3.8 MMOL/L (ref 3.4–5.3)
RBC # BLD AUTO: 3.82 10E6/UL (ref 3.8–5.2)
SODIUM SERPL-SCNC: 137 MMOL/L (ref 136–145)
WBC # BLD AUTO: 13.4 10E3/UL (ref 4–11)

## 2023-06-30 PROCEDURE — 250N000011 HC RX IP 250 OP 636: Performed by: INTERNAL MEDICINE

## 2023-06-30 PROCEDURE — 250N000013 HC RX MED GY IP 250 OP 250 PS 637: Performed by: INTERNAL MEDICINE

## 2023-06-30 PROCEDURE — 99233 SBSQ HOSP IP/OBS HIGH 50: CPT | Performed by: INTERNAL MEDICINE

## 2023-06-30 PROCEDURE — 97162 PT EVAL MOD COMPLEX 30 MIN: CPT | Mod: GP | Performed by: PHYSICAL THERAPIST

## 2023-06-30 PROCEDURE — 97110 THERAPEUTIC EXERCISES: CPT | Mod: GP | Performed by: PHYSICAL THERAPIST

## 2023-06-30 PROCEDURE — 36415 COLL VENOUS BLD VENIPUNCTURE: CPT | Performed by: INTERNAL MEDICINE

## 2023-06-30 PROCEDURE — 250N000011 HC RX IP 250 OP 636: Mod: JZ | Performed by: INTERNAL MEDICINE

## 2023-06-30 PROCEDURE — 80048 BASIC METABOLIC PNL TOTAL CA: CPT | Performed by: INTERNAL MEDICINE

## 2023-06-30 PROCEDURE — 97530 THERAPEUTIC ACTIVITIES: CPT | Mod: GP | Performed by: PHYSICAL THERAPIST

## 2023-06-30 PROCEDURE — 120N000001 HC R&B MED SURG/OB

## 2023-06-30 PROCEDURE — 85014 HEMATOCRIT: CPT | Performed by: INTERNAL MEDICINE

## 2023-06-30 PROCEDURE — 97535 SELF CARE MNGMENT TRAINING: CPT | Mod: GO

## 2023-06-30 RX ADMIN — LEVETIRACETAM 1125 MG: 250 TABLET, FILM COATED ORAL at 22:06

## 2023-06-30 RX ADMIN — OXCARBAZEPINE 450 MG: 300 TABLET, FILM COATED ORAL at 12:31

## 2023-06-30 RX ADMIN — ESCITALOPRAM OXALATE 10 MG: 10 TABLET ORAL at 22:06

## 2023-06-30 RX ADMIN — PERAMPANEL 8 MG: 2 TABLET ORAL at 22:07

## 2023-06-30 RX ADMIN — OXCARBAZEPINE 450 MG: 300 TABLET, FILM COATED ORAL at 08:57

## 2023-06-30 RX ADMIN — POTASSIUM CHLORIDE AND SODIUM CHLORIDE: 900; 150 INJECTION, SOLUTION INTRAVENOUS at 09:44

## 2023-06-30 RX ADMIN — PIPERACILLIN AND TAZOBACTAM 3.38 G: 3; .375 INJECTION, POWDER, FOR SOLUTION INTRAVENOUS at 08:57

## 2023-06-30 RX ADMIN — LEVETIRACETAM 750 MG: 750 TABLET, FILM COATED ORAL at 08:58

## 2023-06-30 RX ADMIN — OXCARBAZEPINE 600 MG: 300 TABLET, FILM COATED ORAL at 17:45

## 2023-06-30 RX ADMIN — PIPERACILLIN AND TAZOBACTAM 3.38 G: 3; .375 INJECTION, POWDER, FOR SOLUTION INTRAVENOUS at 02:39

## 2023-06-30 RX ADMIN — ACETAMINOPHEN 650 MG: 325 TABLET, FILM COATED ORAL at 15:16

## 2023-06-30 RX ADMIN — LEVETIRACETAM 750 MG: 750 TABLET, FILM COATED ORAL at 15:16

## 2023-06-30 RX ADMIN — PIPERACILLIN AND TAZOBACTAM 3.38 G: 3; .375 INJECTION, POWDER, FOR SOLUTION INTRAVENOUS at 14:39

## 2023-06-30 RX ADMIN — TOPIRAMATE 100 MG: 100 TABLET, FILM COATED ORAL at 22:07

## 2023-06-30 RX ADMIN — ACETAMINOPHEN 650 MG: 325 TABLET, FILM COATED ORAL at 09:04

## 2023-06-30 RX ADMIN — LEVETIRACETAM 750 MG: 750 TABLET, FILM COATED ORAL at 12:32

## 2023-06-30 RX ADMIN — PIPERACILLIN AND TAZOBACTAM 3.38 G: 3; .375 INJECTION, POWDER, FOR SOLUTION INTRAVENOUS at 20:43

## 2023-06-30 RX ADMIN — TRAMADOL HYDROCHLORIDE 50 MG: 50 TABLET, COATED ORAL at 19:51

## 2023-06-30 RX ADMIN — CLONAZEPAM 1.5 MG: 1 TABLET ORAL at 22:07

## 2023-06-30 RX ADMIN — ENOXAPARIN SODIUM 40 MG: 40 INJECTION SUBCUTANEOUS at 20:44

## 2023-06-30 RX ADMIN — OXCARBAZEPINE 600 MG: 300 TABLET, FILM COATED ORAL at 22:06

## 2023-06-30 ASSESSMENT — ACTIVITIES OF DAILY LIVING (ADL)
ADLS_ACUITY_SCORE: 40
DEPENDENT_IADLS:: COOKING;CLEANING
ADLS_ACUITY_SCORE: 44
ADLS_ACUITY_SCORE: 40
ADLS_ACUITY_SCORE: 40
ADLS_ACUITY_SCORE: 44
ADLS_ACUITY_SCORE: 44
ADLS_ACUITY_SCORE: 50
ADLS_ACUITY_SCORE: 44
ADLS_ACUITY_SCORE: 40
ADLS_ACUITY_SCORE: 40

## 2023-06-30 NOTE — CONSULTS
Care Management Initial Consult    General Information  Assessment completed with: Family, Children, Spouse or significant other,  (SW Intial Consult)  Type of CM/SW Visit: Initial Assessment    Primary Care Provider verified and updated as needed: Yes   Readmission within the last 30 days: no previous admission in last 30 days      Reason for Consult: discharge planning  Advance Care Planning: Advance Care Planning Reviewed: other (see comments) (None)          Communication Assessment  Patient's communication style: spoken language (English or Bilingual)    Hearing Difficulty or Deaf: no   Wear Glasses or Blind: yes    Cognitive  Cognitive/Neuro/Behavioral: WDL  Level of Consciousness: lethargic, alert  Arousal Level: opens eyes spontaneously  Orientation: oriented x 4  Mood/Behavior: calm, cooperative  Best Language: 0 - No aphasia  Speech: clear, spontaneous, logical    Living Environment:   People in home: spouse  [place]  Current living Arrangements: house      Able to return to prior arrangements: other (see comments)  Living Arrangement Comments: Will be able to go back to prior living arrangements after TCU    Family/Social Support:  Care provided by: spouse/significant other  Provides care for: no one, unable/limited ability to care for self  Marital Status:     [place]       Description of Support System: Supportive    Support Assessment: Adequate family and caregiver support    Current Resources:   Patient receiving home care services:       Community Resources:    Equipment currently used at home: grab bar, tub/shower, shower chair, walker, standard  Supplies currently used at home:      Employment/Financial:  Employment Status: retired        Financial Concerns: No concerns identified   Referral to Financial Worker: No       Does the patient's insurance plan have a 3 day qualifying hospital stay waiver?  Yes   Will the waiver be used for post-acute placement? Yes    Lifestyle & Psychosocial  Needs:  Social Determinants of Health     Tobacco Use: Low Risk  (6/7/2023)    Patient History      Smoking Tobacco Use: Never      Smokeless Tobacco Use: Never      Passive Exposure: Not on file   Alcohol Use: Not on file   Financial Resource Strain: Not on file   Food Insecurity: Not on file   Transportation Needs: Not on file   Physical Activity: Not on file   Stress: Not on file   Social Connections: Not on file   Intimate Partner Violence: Not on file   Depression: Not at risk (6/7/2023)    PHQ-2      PHQ-2 Score: 2   Housing Stability: Not on file       Functional Status:  Prior to admission patient needed assistance:   Dependent ADLs:: Ambulation-walker, Wheelchair-independent  Dependent IADLs:: Cooking, Cleaning  Assesssment of Functional Status: Not at baseline with ADL Functioning    Mental Health Status:          Chemical Dependency Status:                Values/Beliefs:  Spiritual, Cultural Beliefs, Scientology Practices, Values that affect care:                 Additional Information:  Writer reviewed patient's chart. 67 yo F with h/o seizure disorder, permanent pacemaker, pelvic fracture, and recently with recurrent UTI symptoms admitted with fever and urinary frequency and incontinene. UA suspicious for UTI, culture pending, started on Rocephin. Seen with  at bedside.      She does report several recent courses of antibiotics for UTI though unclear if ever culture-proven infection present. I do see one UCx on CaeEverywhere from May which was negative. Patient's  does not think there has been any positive urine cultures but reports she has been treated with abx for UTI every month since February, and symptoms (urgency, malodorous urine) typically improve with antibiotic course.      No recent abdominal imaging. Pt reports irritative voiding sx ever since a pelvic fracture about 6 months ago, including progressive urinary incontinence. Has been on oxybutynin PTA; this was discontinued  "recently. Denies any h/o hematuria or kidney stones.     Writer attempted to meet with patient at bedside, however patient was with other disciplines so writer met with family. Writer spoke with patient's daughter and patient's spouse. TCU choices were asked for and the patient's  wanted TCU choices that were closer to the [facility]. Patient's  decided on [facility] in [facility] for TCU's Referrals were sent by writer. Patient's  plans to transport the patient with wheelchair and SUV if needed.     Patient lives at home with her . She has two stairs to get up into the home and 16 stairs to get up onto the second level of the home. Prior to admission Patient's  said the patient could \"walk for miles\" and is significantly below baseline.  said that now he often does the cleaning and cooking into the home. There is grab bars in the shower and a shower chair and the patient has ambulated using a wheelchair and a standard walker in the home. Patient appears to have a supportive family that will be able to care for her through her recovery process.     Writer informed family SW would be following along with discharge. Transport may change as patient gets closer to discharge.    Thomas Huang [facility]  Care Management     "

## 2023-06-30 NOTE — PLAN OF CARE
Goal Outcome Evaluation:  Shift: 0700-1930 6/29/2023    Orientation: Aox4; drowsy/lethargic intermittent confusion especially r/t pain meds.     Pain: Patient has been reported pain between 6/10. Received  PO PRN Oxycodone, Tylenol, and Tramadol.     Vitals/Tele: VSS RA; Slightly Tachy; Has Pacemaker.     IV Access/drains: L PIV replaced the R PIV, infusing NS w/20 mEq K+    Diet: Regular    Mobility: Assist of 2 w/GBW    GI/: Purewick in place (changed x2); mostly continent of bowel, she is aware of when she needs to go and alerts nurse and or CNA to help get to the toilet.     Wound/Skin: Slightly edematous; bruising on right ankle, buttocks and back.     Consults: Urology and CT today.    Discharge Plan: TBD      See Flow sheets for assessmen

## 2023-06-30 NOTE — PROVIDER NOTIFICATION
MD Notification    Notified Person: MD    Notified Person Name: Bud Alonzo MD    Notification Date/Time: 06/30/2023 1633    Notification Interaction: Paged O/C    Purpose of Notification: Room 8813, Patient was seen by Urology today, Mills cath was order and  place. Initial urine was clear yellow, but now urine is Pink with sediment/small blood clot. MD was notified. Thanks Santa Knox called back# [phone]    Orders Received: Not Yet but Provided did replied back.

## 2023-06-30 NOTE — PROGRESS NOTES
06/30/23 1602   Appointment Info   Signing Clinician's Name / Credentials (PT) Bhavani Goncalves, GERDA   Living Environment   People in Home spouse   Current Living Arrangements house   Home Accessibility stairs to enter home;stairs within home   Number of Stairs, Main Entrance 2   Stair Railings, Main Entrance railings safe and in good condition   Number of Stairs, Within Home, Primary other (see comments);greater than 10 stairs   Stair Railings, Within Home, Primary railings safe and in good condition   Transportation Anticipated family or friend will provide   Living Environment Comments Patients bedroom is up 12 steps on second floor. She has been sleeping on the sofa on main floor for the past month as she has been getting weaker. She was independent without AD and could walk 2-3 blocks. After fall 5 months ago she started walking with a walker due to pain. Could use walker well for 4 month but getting worse and for past month has had to use a w/c at least half the time.  is retired. Patient is home alone briefly when  runs errands. Up until 2 weeks ago could use the walker to get to the bathroom by herself but sometimes needed help. They do have a transfer belt at home.  assists with showering.   Self-Care   Usual Activity Tolerance fair   Current Activity Tolerance poor   Regular Exercise No   Equipment Currently Used at Home grab bar, tub/shower;walker, rolling;shower chair   Fall history within last six months yes   Number of times patient has fallen within last six months 2  (Fell 6 months on her tailbone and about 2 months ago fell and twisted her ankle. Per  that really slowed her down.)   General Information   Onset of Illness/Injury or Date of Surgery 06/28/23   Referring Physician Obi Owens MD   Patient/Family Therapy Goals Statement (PT)  and pateint agreeable to TCU   Pertinent History of Current Problem (include personal factors and/or comorbidities that  impact the POC) Per chart, This is a 68-year-old female with medical history of depression epilepsy reflux disease seizure disorder migraines and syncope with history of permanent pacemaker placement presents to [facility] the emergency department via ambulance for fever confusion and urinary symptoms.  In the ER had a fever of 101.1, tachycardic but stable bp and oxygenation.  Electrolytes are significant for sodium of 138 potassium 3.9 normal BUN and creatinine.  White count is elevated at 13,800 with a left shift.  Urinalysis was cloudy light brown with moderate blood large leukocyte Estrace and greater than 182 white cells and positive for WBC clumps.     Sepsis with fever, leukocytosis and tachycardia   Suspect pyleophritis, presenting with confusion, fever and chronic urinary symptoms   Possible Recurrent UTIs with dysuria, frequency and pyuria   Bilateral hydronephrosis and stranding on CT   Existing Precautions/Restrictions fall   General Observations Patient keeps eyes barely open. Can't open any more when asked.  present and very supportive.   Cognition   Affect/Mental Status (Cognition) other (see comments)   Orientation Status (Cognition) oriented to;person;place;time   Follows Commands (Cognition) physical/tactile prompts required;repetition of directions required;verbal cues/prompting required   Pain Assessment   Patient Currently in Pain Yes, see Vital Sign flowsheet  (Reports left hip/thigh pain. Did not rate.)   Posture    Posture Comments Stays very flexed at hips and knees in standing on SS.   Range of Motion (ROM)   Range of Motion ROM is WFL   Strength (Manual Muscle Testing)   Strength Comments Bilateeral DF 5/5, quads 3-/5 as noted when performing SAQ's, bilateral hip abd/add 2-/5(difficulty following directions for this), can do SLR on right with extension lag but not on the left.   Bed Mobility   Comment, (Bed Mobility) Sit to sup with mod A of 2.   Transfers   Comment,  (Transfers) A of 2 with SS   Gait/Stairs (Locomotion)   Comment, (Gait/Stairs) Unable due to impaired posture in standing/weakness.   Balance   Balance Comments Can maintain sitting balance briefly but suddenly loses balance at times. Inconsistent.   Clinical Impression   Criteria for Skilled Therapeutic Intervention Yes, treatment indicated   PT Diagnosis (PT) Impaired functional independence   Influenced by the following impairments Impaired strength, impiaired cognition, impaired posture   Functional limitations due to impairments Needs assist with bed mobility, transfers and currently unable to amb.   Clinical Presentation (PT Evaluation Complexity) Evolving/Changing   Clinical Presentation Rationale Harmony fluctuates throughout session.   Clinical Decision Making (Complexity) moderate complexity   Planned Therapy Interventions (PT) balance training;bed mobility training;gait training;strengthening;transfer training   Anticipated Equipment Needs at Discharge (PT)   (To be determined by TCU)   Risk & Benefits of therapy have been explained evaluation/treatment results reviewed;care plan/treatment goals reviewed;risks/benefits reviewed;current/potential barriers reviewed;participants voiced agreement with care plan;participants included;patient;spouse/significant other   PT Total Evaluation Time   PT Eval, Moderate Complexity Minutes (95860) 16   Physical Therapy Goals   PT Frequency 6x/week   PT Predicted Duration/Target Date for Goal Attainment 07/08/23   PT Goals Bed Mobility;Transfers;Gait;Stairs;PT Goal 1   PT: Bed Mobility Minimal assist;Rolling;Supine to/from sit   PT: Transfers Minimal assist;Sit to/from stand;Bed to/from chair;Assistive device   PT: Gait Minimal assist;Rolling walker;100 feet   PT: Stairs Moderate assist;2 stairs   PT: Goal 1 Pateint will increased bilateral LE strength to at least 3/5 thorughout to allow for improved functional mobility.   Interventions   Interventions Quick Adds  Therapeutic Activity;Therapeutic Procedure   Therapeutic Procedure/Exercise   Ther. Procedure: strength, endurance, ROM, flexibillity Minutes (75040) 15   Symptoms Noted During/After Treatment increased pain;fatigue   Treatment Detail/Skilled Intervention In supine, pateint performed 10 reps heel slides with initial cues for technique, bilateral hip abd/add with assist and SAQ's. Very slow with repeated cues needed for all ex.   Therapeutic Activity   Therapeutic Activities: dynamic activities to improve functional performance Minutes (88225) 8   Symptoms Noted During/After Treatment Fatigue   Treatment Detail/Skilled Intervention Eval complete and treatment initiated. Patient educated on improving posture on SS. Diffuculty extending hips but eventually able to do briefly. Once sitting on EOB, educated patient on technique for maintaining balance. Tends to lose balance abruptly at times but was able to maintain balance briefly without assist. Educated step by step on technique for sit to sup and mod A of 2 provided. Left in right sidelying with  present and needs close.   PT Discharge Planning   PT Plan Progress standing with SS/walker if tolerating, bilateral LE ex, sitting balance   PT Discharge Recommendation (DC Rec) Transitional Care Facility   PT Rationale for DC Rec Per , patients baseline has fluctuated since fall 5 months ago but currently she is well below baseline and requiring assist of 2 for mobility. She would benefit from continued skilled PT at TCU prior to discharging to home.   PT Brief overview of current status A of 2 with SS

## 2023-06-30 NOTE — PROVIDER NOTIFICATION
"Wtiter notified MD via [employer] message, for Pt urine is pink to the color and sediment/ small pieces of blood clot. urine was clear/yellow at the beginning.     MD replied \"as long as she's not obstructing, there's nothing urgent to do. I would let Urology know\".      MD Replied \"OK. No further action just follow your an output and make sure you can flush geiger\".     "

## 2023-06-30 NOTE — PROGRESS NOTES
[facility]    Hospitalist Progress Note    Date of Service (when I saw the patient): 06/30/2023  Admit date: 6/28/2023    Interval History   Full details of events over last 24 hours outlined below.   She has been afebrile, VSS stable overnight. Cr normalized.   Overall she feels better. Only symptoms is that she is passing gas and having some loose stools after laxative. Feels some pressure down below.No pain with urination or upper back pain or abdomina pain.   No N/V. Good UOP overnight.     Assessment & Plan   This is a 68-year-old female with medical history of depression epilepsy reflux disease seizure disorder migraines and syncope with history of permanent pacemaker placement presents to [facility] the emergency department via ambulance for fever confusion and urinary symptoms.  In the ER had a fever of 101.1, tachycardic but stable bp and oxygenation.  Electrolytes are significant for sodium of 138 potassium 3.9 normal BUN and creatinine.  White count is elevated at 13,800 with a left shift.  Urinalysis was cloudy light brown with moderate blood large leukocyte Estrace and greater than 182 white cells and positive for WBC clumps.     Sepsis with fever, leukocytosis and tachycardia   Suspect pyleophritis, presenting with confusion, fever and chronic urinary symptoms   Possible Recurrent UTIs with dysuria, frequency and pyuria   Bilateral hydronephrosis and stranding on CT  * Per patient receiving monthly Macrobid and recently on Bactrim prophylactically.  However, I cannot find clear documentation of this.  I do see that she was on Bactrim daily per med list.  But no information and clinic visits found through our system and care everywhere. Recurrent pyuria with only UCx in last year on 5/2023 being negative   *UA: Greater than 182 white blood cells, WBC 13.8 (neutrophilic) hemodynamically stable at admission with stable creatinine.     Placed on IV ceftriaxone in the ED    On  06/29/23. ontinued fever, rising WBC.  She has chronic low back and R thigh pain from L2 fracture in January. No change.  No pain localized to the joints. S/p PM, no joint replacement or other prostehsis.  She denies any other localizing signs of infection. She had an attempt at an epidural of her spine over a month ago.      CT C/A/P 6/29/23 without contrast:   1.  Moderate right and mild-moderate left hydronephrosis and stranding  without ureteral stones or obvious etiology. Pyelonephritis could have  this appearance. Clinical correlation needed.  2.  Compression deformity and erosive change of L2, consider lumbar  spine MRI to evaluate for osteomyelitis.    Switched abx to zosyn on 6/29/23 as we await cultures, given recent recurrent antibiotics as an outpatient    On 06/30/23: Afebrile, feeling overall better. Awaiting WBC (blood clotted, need to redraw).     Blood and UCx still NGTD    Called radiology and pushed films from outside facility, and compared lumbar spine findings. No change from Feb. Making osteo unlikely.     Stop IVF on 06/30/23  No hemodynamic instability, BP up.     Appreciate Urology consult and input regarding further evaluation of hydronephrosis without evidence of obstruction.     Follow PVR bladder scans and straight cath for > 300. (On 06/29/23: 110 ml and 253 ml overnight)     . Addendum: UCx growing aerococcus so no ID consult     Holding PTA oxybutynin, would hold on resumption until evaluation of sx post treatment of UTI.     NATALIA, RESOLVED - Cr 1.11 up from 0.88.      Cr normalized on 06/30/23     Holding PTA diclofenac    Acute encephalopathy secondary to infection, RESOLVING    On 06/30/23., patient alert and clear about why she is in the hospital today. Remains tangential.     Frequent re-orientation    Maintain normal day/night, sleep wake cycles    Minimize sedating/altering medications as able    Treating separate conditions as detailed above/below     Epilepsy    Continue PTA  "Keppra, oxcarbazepine, perampanel    Ativan ordered PRN seizure and seizure precautions     She has mild seizures about 3 times a month.  Following these she is to take Ativan twice daily for 2 days.  Family has not noted any seizures yet during this admission.      Depression    Continue ALEXANDRA escitalopram and clonazepam at bedtime     Migraine    Continue topamax as needed sumatriptan     GERD    Continue PPI    Chronic pain from recent compression fracture    Hold PTA diclofenac twice daily    Clinically Significant Risk Factors                         # Overweight: Estimated body mass index is 29.53 kg/m  as calculated from the following:    Height as of this encounter: 1.676 m (5' 6\").    Weight as of this encounter: 83 kg (182 lb 15.7 oz)., PRESENT ON ADMISSION            Diet: Orders Placed This Encounter      Combination Diet Regular Diet Adult     IVF: IVF as above.   Mills Catheter: Not present     DVT Prophylaxis: enoxaparin   Code Status: Full Code     Disposition: Expected discharge TBD, once clinical improvement. Hopefully by 6/30.   Communication: Discussed with with raditiologist, , Michele, who is a retired endocrinologist and her daughter at bedside, as well as bedside RN.   Lilly Valdes MD     Hospitalist Service  [facility]  Securely message with the Vocera Web Console (learn more here)  Text page via [employer] Paging/Directory      -Data reviewed today: I reviewed all new labs and imaging results over the last 24 hours. I personally reviewed the CT  image(s) showing with radiologist.    Physical Exam   Temp: 98.5  F (36.9  C) Temp src: Axillary BP: 110/70 Pulse: 83   Resp: 18 SpO2: 99 % O2 Device: None (Room air)    Vitals:    06/29/23 1129 06/30/23 0500   Weight: 83.2 kg (183 lb 6.8 oz) 83 kg (182 lb 15.7 oz)     Vital Signs with Ranges  Temp:  [98  F (36.7  C)-99.8  F (37.7  C)] 98.5  F (36.9  C)  Pulse:  [83-85] 83  Resp:  [18-22] 18  BP: (110-150)/(70-83) " 110/70  SpO2:  [97 %-99 %] 99 %  I/O last 3 completed shifts:  In: 1540 [P.O.:1540]  Out: 1550 [Urine:1550]    Today's Exam  Constitutional:NAD,   Neuropsyche:  alert and oriented, answers questions appropriately, but confused by why she is in the hospital. Speech normal, face symmetric and moving all 4 extremities without gross focal neurological deficit.   Respiratory: Breathing comfortably, good air exchange, no wheezes, no crackles.   Cardiovascular:  Regular rate and rhythm, no edema.  GI:  soft, NT/ND, BS normal  Skin/Integumen:  No acute rash or sign of bleeding.   MSK: rolled on side on 6/29/23. No tenderness with percussion over lumbar spine.       Medications   All medications reviewed on 06/30/23     0.9% sodium chloride + KCl 20 mEq/L 75 mL/hr at 06/30/23 0944        clonazePAM  1.5 mg Oral At Bedtime     [Held by provider] diclofenac  75 mg Oral BID     enoxaparin ANTICOAGULANT  40 mg Subcutaneous Q24H     escitalopram  10 mg Oral At Bedtime     levETIRAcetam  1,125 mg Oral At Bedtime     levETIRAcetam  750 mg Oral TID     OXcarbazepine  450 mg Oral BID     OXcarbazepine  600 mg Oral BID     perampanel  8 mg Oral At Bedtime     piperacillin-tazobactam  3.375 g Intravenous Q6H     sodium chloride (PF)  3 mL Intracatheter Q8H     topiramate  100 mg Oral At Bedtime     PRN Meds: acetaminophen **OR** acetaminophen, guaiFENesin-codeine, lidocaine 4%, lidocaine (buffered or not buffered), LORazepam, melatonin, menthol (Topical Analgesic) 2.5%, naloxone **OR** naloxone **OR** naloxone **OR** naloxone, ondansetron **OR** ondansetron, oxyCODONE, sodium chloride (PF), SUMAtriptan, traMADol    Data   Recent Labs   Lab 06/30/23  0837 06/29/23  0822 06/28/23  0320   WBC  --  14.1* 13.8*   HGB  --  10.3* 11.7   MCV  --  96 94   PLT  --  242 263    138 138   POTASSIUM 3.8 4.2 3.9   CHLORIDE 106 106 105   CO2 17* 20* 18*   BUN 20.9 19.3 18.4   CR 0.89 1.11* 0.88   ANIONGAP 14 12 15   TATIANA 8.2* 8.2* 8.2*   GLC 98  108* 127*   LIPASE  --   --  12*       No results found for this or any previous visit (from the past 24 hour(s)).

## 2023-06-30 NOTE — PROGRESS NOTES
UROLOGY BRIEF NOTE    Cultures pending yet.     CT consistent with pyelonephritis -- left non-obstructing renal stones noted -- also with significant right sided hydroureteronephrosis to level of bladder. Of note, renal function is normal and no flank pain on exam yesterday and continues to deny pain.     Clinically she is improving, now afebrile. Utilizing purewick with good UOP. Most recent bladder scan for 253 ml.     A/P:  69 yo F with likely pyelonephritis and right hydroureteronephrosis.     - Given new finding of hydronephrosis, recommend geiger placement for urinary decompression and re-image with renal ultrasound next week to re-assess hydronephrosis.   - I spoke with patient's  via phone this afternoon; agreeable to this plan  - If pt continues with recurrent infections and hydronephrosis not improved with geiger, may need to consider stent placement in the future though not anticipated this admission.     Case discussed with Dr Lopez.     Amy Valentino PA-C (Jackie)  [facility]  Office: [phone]

## 2023-06-30 NOTE — PROGRESS NOTES
Shift Summary [id number]    Admitting Diagnosis: Confusion   Weakness.  Acute cystitis with hematuria     Patient A&Ox2 disoriented to place and time at times. VSS, on permanent pacemaker. CMS intact ex of generalize weakness on the BLE. Pain mange with PRN Tramadol, Tylenol. Pt on seizure precaution, with control medications. Mills in place for retention. PIV SL w/ intermittent abx. Up with x2 Caprice steady. SW, Urology Consulted. Mills in place with pink/MD jerrod notified, Continue to monitor urine output. Discharge plan in progress. Will continue to monitor.

## 2023-06-30 NOTE — PLAN OF CARE
[id number]  Seizure precautions maintained. A/Ox4, lethargy & int confusion. VSS on RA, HTN. Pacemaker. Low grade temp, PRN tylenol given @ 2020. L PIV, NaCl + Kcl @ 75mL. Bladder scan: 256mL.External cath in place, adequate output. Skin intact. No LBM during shift. A/2GBW - very weak. Discharge pending overall improvement.

## 2023-07-01 LAB
ANION GAP SERPL CALCULATED.3IONS-SCNC: 12 MMOL/L (ref 7–15)
BUN SERPL-MCNC: 14.3 MG/DL (ref 8–23)
CALCIUM SERPL-MCNC: 8.6 MG/DL (ref 8.8–10.2)
CHLORIDE SERPL-SCNC: 106 MMOL/L (ref 98–107)
CREAT SERPL-MCNC: 0.7 MG/DL (ref 0.51–0.95)
CREAT SERPL-MCNC: 0.7 MG/DL (ref 0.51–0.95)
DEPRECATED HCO3 PLAS-SCNC: 21 MMOL/L (ref 22–29)
ERYTHROCYTE [DISTWIDTH] IN BLOOD BY AUTOMATED COUNT: 13.4 % (ref 10–15)
GFR SERPL CREATININE-BSD FRML MDRD: >90 ML/MIN/1.73M2
GFR SERPL CREATININE-BSD FRML MDRD: >90 ML/MIN/1.73M2
GLUCOSE SERPL-MCNC: 92 MG/DL (ref 70–99)
HCT VFR BLD AUTO: 33.3 % (ref 35–47)
HGB BLD-MCNC: 10.7 G/DL (ref 11.7–15.7)
LACTATE SERPL-SCNC: 0.6 MMOL/L (ref 0.7–2)
LACTATE SERPL-SCNC: 1.3 MMOL/L (ref 0.7–2)
MCH RBC QN AUTO: 30.1 PG (ref 26.5–33)
MCHC RBC AUTO-ENTMCNC: 32.1 G/DL (ref 31.5–36.5)
MCV RBC AUTO: 94 FL (ref 78–100)
PLATELET # BLD AUTO: 266 10E3/UL (ref 150–450)
PLATELET # BLD AUTO: 266 10E3/UL (ref 150–450)
POTASSIUM SERPL-SCNC: 3.5 MMOL/L (ref 3.4–5.3)
RBC # BLD AUTO: 3.56 10E6/UL (ref 3.8–5.2)
SODIUM SERPL-SCNC: 139 MMOL/L (ref 136–145)
WBC # BLD AUTO: 12.2 10E3/UL (ref 4–11)

## 2023-07-01 PROCEDURE — 80048 BASIC METABOLIC PNL TOTAL CA: CPT | Performed by: INTERNAL MEDICINE

## 2023-07-01 PROCEDURE — 250N000011 HC RX IP 250 OP 636: Mod: JZ | Performed by: INTERNAL MEDICINE

## 2023-07-01 PROCEDURE — 250N000013 HC RX MED GY IP 250 OP 250 PS 637: Performed by: INTERNAL MEDICINE

## 2023-07-01 PROCEDURE — 85027 COMPLETE CBC AUTOMATED: CPT | Performed by: INTERNAL MEDICINE

## 2023-07-01 PROCEDURE — 99232 SBSQ HOSP IP/OBS MODERATE 35: CPT | Performed by: INTERNAL MEDICINE

## 2023-07-01 PROCEDURE — 120N000001 HC R&B MED SURG/OB

## 2023-07-01 PROCEDURE — 36415 COLL VENOUS BLD VENIPUNCTURE: CPT | Performed by: INTERNAL MEDICINE

## 2023-07-01 PROCEDURE — 83605 ASSAY OF LACTIC ACID: CPT | Performed by: INTERNAL MEDICINE

## 2023-07-01 RX ORDER — POLYETHYLENE GLYCOL 3350 17 G/17G
17 POWDER, FOR SOLUTION ORAL 2 TIMES DAILY PRN
Status: DISCONTINUED | OUTPATIENT
Start: 2023-07-01 | End: 2023-07-05 | Stop reason: HOSPADM

## 2023-07-01 RX ORDER — BISACODYL 5 MG
5 TABLET, DELAYED RELEASE (ENTERIC COATED) ORAL ONCE
Status: COMPLETED | OUTPATIENT
Start: 2023-07-01 | End: 2023-07-01

## 2023-07-01 RX ORDER — SENNOSIDES 8.6 MG
1-2 TABLET ORAL 2 TIMES DAILY PRN
Status: DISCONTINUED | OUTPATIENT
Start: 2023-07-01 | End: 2023-07-05 | Stop reason: HOSPADM

## 2023-07-01 RX ADMIN — OXCARBAZEPINE 600 MG: 300 TABLET, FILM COATED ORAL at 22:29

## 2023-07-01 RX ADMIN — Medication: at 21:38

## 2023-07-01 RX ADMIN — TOPIRAMATE 100 MG: 100 TABLET, FILM COATED ORAL at 22:29

## 2023-07-01 RX ADMIN — ESCITALOPRAM OXALATE 10 MG: 10 TABLET ORAL at 22:29

## 2023-07-01 RX ADMIN — DICLOFENAC SODIUM 4 G: 10 GEL TOPICAL at 12:24

## 2023-07-01 RX ADMIN — DICLOFENAC SODIUM 4 G: 10 GEL TOPICAL at 09:52

## 2023-07-01 RX ADMIN — DICLOFENAC SODIUM 4 G: 10 GEL TOPICAL at 22:37

## 2023-07-01 RX ADMIN — CLONAZEPAM 1.5 MG: 1 TABLET ORAL at 22:29

## 2023-07-01 RX ADMIN — PERAMPANEL 8 MG: 2 TABLET ORAL at 22:29

## 2023-07-01 RX ADMIN — LEVETIRACETAM 1125 MG: 250 TABLET, FILM COATED ORAL at 22:29

## 2023-07-01 RX ADMIN — ACETAMINOPHEN 650 MG: 325 TABLET, FILM COATED ORAL at 00:07

## 2023-07-01 RX ADMIN — SUMATRIPTAN SUCCINATE 100 MG: 50 TABLET ORAL at 07:03

## 2023-07-01 RX ADMIN — PIPERACILLIN AND TAZOBACTAM 3.38 G: 3; .375 INJECTION, POWDER, FOR SOLUTION INTRAVENOUS at 16:30

## 2023-07-01 RX ADMIN — Medication: at 12:22

## 2023-07-01 RX ADMIN — PIPERACILLIN AND TAZOBACTAM 3.38 G: 3; .375 INJECTION, POWDER, FOR SOLUTION INTRAVENOUS at 02:33

## 2023-07-01 RX ADMIN — ENOXAPARIN SODIUM 40 MG: 40 INJECTION SUBCUTANEOUS at 20:51

## 2023-07-01 RX ADMIN — LEVETIRACETAM 750 MG: 750 TABLET, FILM COATED ORAL at 12:22

## 2023-07-01 RX ADMIN — LEVETIRACETAM 750 MG: 750 TABLET, FILM COATED ORAL at 16:30

## 2023-07-01 RX ADMIN — OXCARBAZEPINE 450 MG: 300 TABLET, FILM COATED ORAL at 09:12

## 2023-07-01 RX ADMIN — LEVETIRACETAM 750 MG: 750 TABLET, FILM COATED ORAL at 09:12

## 2023-07-01 RX ADMIN — PIPERACILLIN AND TAZOBACTAM 3.38 G: 3; .375 INJECTION, POWDER, FOR SOLUTION INTRAVENOUS at 20:51

## 2023-07-01 RX ADMIN — OXCARBAZEPINE 450 MG: 300 TABLET, FILM COATED ORAL at 12:22

## 2023-07-01 RX ADMIN — PIPERACILLIN AND TAZOBACTAM 3.38 G: 3; .375 INJECTION, POWDER, FOR SOLUTION INTRAVENOUS at 09:12

## 2023-07-01 RX ADMIN — OXCARBAZEPINE 600 MG: 300 TABLET, FILM COATED ORAL at 18:35

## 2023-07-01 RX ADMIN — DICLOFENAC SODIUM 4 G: 10 GEL TOPICAL at 18:32

## 2023-07-01 ASSESSMENT — ACTIVITIES OF DAILY LIVING (ADL)
ADLS_ACUITY_SCORE: 41
ADLS_ACUITY_SCORE: 41
ADLS_ACUITY_SCORE: 40
ADLS_ACUITY_SCORE: 44
ADLS_ACUITY_SCORE: 43
ADLS_ACUITY_SCORE: 44
ADLS_ACUITY_SCORE: 43
ADLS_ACUITY_SCORE: 41
ADLS_ACUITY_SCORE: 41

## 2023-07-01 NOTE — PROVIDER NOTIFICATION
MD Notification    Notified Person: MD    Notified Person Name: Piper    Notification Date/Time: 07/01/23 12:13 AM    Notification Interaction: [employer] Messaging    Purpose of Notification: FYI - pt temp 101.8. Tylenol given.     Orders Received:    Comments:

## 2023-07-01 NOTE — PLAN OF CARE
A&Ox4, forgetful at times. VSS on RA, ex HTN. T-max = 99.5. L PIV SL with intermittent abx. Lactic 1.3. Bilateral upper thigh pain managed with scheduled voltaren and bengay, no other PRN medications needed. Up to BSC with caio steady, having frequent loose stools. Mills output became lighter pink over the course of shift. Regular diet, up to chair for dinner. Urology and SW following, PT/OT consulted. Discharge pending clinical improvement.

## 2023-07-01 NOTE — PROVIDER NOTIFICATION
Notified of fever overnight; last fever ~48 hr ago    Aerococcus in urine, urology following, on zosyn.    Note blood cultures obtained on admission and 6/29 NGTD.    Monitoring for now. If clinically worsening, page again; might repeat blood cultures and update urology (concern for pyelo w/ obstruction currently, might need intervention if decompensating)    NPO status now in case intervention is needed.    Discussed w/ nurse    Alex Piper MD

## 2023-07-01 NOTE — PROGRESS NOTES
[facility]    Hospitalist Progress Note    Date of Service (when I saw the patient): 07/01/2023  Admit date: 6/28/2023    Interval History   Full details of events over last 24 hours outlined below.   Noting tailbone pain which she attributes to laying in bed all the time. She had PT yesterday at bedside but has not been out of bed.   Fever overnight, afebrile now, otherwise feeling better. Tolerating diet but no BM in the last 24 hours and feels constipated.       Assessment & Plan   This is a 68-year-old female with medical history of depression epilepsy reflux disease seizure disorder migraines and syncope with history of permanent pacemaker placement presents to [facility] emergency department via ambulance for fever confusion and urinary symptoms.  In the ER had a fever of 101.1, tachycardic but stable bp and oxygenation.  Electrolytes are significant for sodium of 138 potassium 3.9 normal BUN and creatinine.  White count is elevated at 13,800 with a left shift.  Urinalysis was cloudy light brown with moderate blood large leukocyte Estrace and greater than 182 white cells and positive for WBC clumps.     Sepsis with fever, leukocytosis and tachycardia   Suspect pyleophritis, presenting with confusion, fever and chronic urinary symptoms   Possible Recurrent UTIs with dysuria, frequency and pyuria   Bilateral hydronephrosis and stranding on CT  S/p geiger catheter for potential obstruction on 6/30/23   Mild hematuria noted 6/30/23   * Per patient receiving monthly Macrobid and recently on Bactrim prophylactically.  However, I cannot find clear documentation of this.  I do see that she was on Bactrim daily per med list.  But no information and clinic visits found through our system and care everywhere. Recurrent pyuria with only UCx in last year on 5/2023 being negative   *UA: Greater than 182 white blood cells, WBC 13.8 (neutrophilic) hemodynamically stable at admission with  stable creatinine.     Initially on IV ceftriaxone in the ED    On 06/29/23. ontinued fever, rising WBC.  She has chronic low back and R thigh pain from L2 fracture in January. No change.  No pain localized to the joints. S/p PM, no joint replacement or other prostehsis.  She denies any other localizing signs of infection. She had an attempt at an epidural of her spine over a month ago.      CT C/A/P 6/29/23 without contrast:   1.  Moderate right and mild-moderate left hydronephrosis and stranding  without ureteral stones or obvious etiology. Pyelonephritis could have  this appearance. Clinical correlation needed.  2.  Compression deformity and erosive change of L2, consider lumbar  spine MRI to evaluate for osteomyelitis.   * Obtained films from February through [facility], and reviewed with radiologist. Similar appearance of L2, making osteo unlikely. Kidneys partially viewed and no evidence of hydro.     Switched abx to zosyn on 6/29/23, given recent recurrent antibiotics as an outpatient    UCx growing aerococcus, awaiting susceptibilities. BCx NGTD.    On  07/01/23  Febrile overnight, afebrile this AM, feeling overall better. WBC going down.     Appreciate Urology consult and input regarding further evaluation of hydronephrosis without evidence of obstruction.     Follow PVR bladder scans and straight cath for > 300. (On 06/29/23: 110 ml and 253 ml overnight) > Mills catheter placed per Urology's recommendation in case obstruction leading to hydro < 200 drained with placed.     Mild hematuria noted p catheter placement. Good flow through catheter, hgb stable    Holding PTA oxybutynin, would hold on resumption until evaluation of sx post treatment of UTI.     Recent Labs   Lab 07/01/23  0656 06/30/23  1437 06/29/23  0822 06/28/23  0320   WBC 12.2* 13.4* 14.1* 13.8*   HGB 10.7* 11.7 10.3* 11.7     266 218 242 263         NATALIA, RESOLVED - Cr 1.11 up from 0.88.      Cr normalized on 06/30/23     Holding PTA  "diclofenac    Recent Labs   Lab 07/01/23  0656 06/30/23  0837 06/29/23  0822 06/28/23  0320   CR 0.70  0.70 0.89 1.11* 0.88       Constipation Noted on 07/01/23     Give bisocodyl on 07/01/23     Miralax BID and senna BID PRN.     Deconditioned state, and tailbone pain from lying in bed.     Working with PT    Discussed with nursing to get out of bed on 07/01/23. Mobilize as above.     Acute encephalopathy secondary to infection, RESOLVED    On 07/01/23, alert and oriented and answering questions appropriately.      Frequent re-orientation    Maintain normal day/night, sleep wake cycles    Minimize sedating/altering medications as able    Treating separate conditions as detailed above/below     Epilepsy    Continue PTA Keppra, oxcarbazepine, perampanel    Ativan ordered PRN seizure and seizure precautions     She has mild seizures about 3 times a month.  Following these she is to take Ativan twice daily for 2 days.  Family has not noted any seizures yet during this admission.      Depression    Continue ALEXANDRA escitalopram and clonazepam at bedtime     Migraine    Continue topamax as needed sumatriptan    HA evening of 6/30/23, aborted with sumatriptan.      GERD    Continue PPI    Radiculopathy from recent L2 compression fracture    Hold PTA diclofenac twice daily    Start menthol and diclofenac gel QID on 07/01/23     Tramadol PRN q 8 hours. Oxycodone for severe pain.     Has tried both gabapentin and lyrica in the past with bad side effects        Clinically Significant Risk Factors                         # Obesity: Estimated body mass index is 30.25 kg/m  as calculated from the following:    Height as of this encounter: 1.676 m (5' 6\").    Weight as of this encounter: 85 kg (187 lb 6.3 oz)., PRESENT ON ADMISSION            Diet: Orders Placed This Encounter      NPO per Anesthesia Guidelines for Procedure/Surgery Except for: Meds     IVF: None  Mills Catheter: PRESENT, indication:   concern for retention, see " above.      DVT Prophylaxis: enoxaparin   Code Status: Full Code     Disposition: Expected discharge TBD, once clinical improvement. Hopefully by 6/30.   Communication: Discussed with with raditiologist, , Michele, who is a retired endocrinologist and bedside RN on 07/01/23.    Lilly Valdes MD     Hospitalist Service  [facility]  Securely message with the Voc[employer] Web Console (learn more here)  Text page via [employer] Paging/Directory      -Data reviewed today: I reviewed all new labs and imaging results over the last 24 hours. I personally reviewed the CT  image(s) showing with radiologist.    Physical Exam   Temp: 98  F (36.7  C) Temp src: Oral BP: 132/65 Pulse: 90   Resp: 20 SpO2: 95 % O2 Device: None (Room air)    Vitals:    06/29/23 1129 06/30/23 0500 07/01/23 0618   Weight: 83.2 kg (183 lb 6.8 oz) 83 kg (182 lb 15.7 oz) 85 kg (187 lb 6.3 oz)     Vital Signs with Ranges  Temp:  [98  F (36.7  C)-101.8  F (38.8  C)] 98  F (36.7  C)  Pulse:  [83-92] 90  Resp:  [18-20] 20  BP: (110-162)/(65-84) 132/65  SpO2:  [95 %-99 %] 95 %  I/O last 3 completed shifts:  In: 600 [P.O.:600]  Out: 3550 [Urine:3550]    Today's Exam  Constitutional:NAD,   Neuropsyche:  alert and oriented, answers questions appropriately.   Respiratory: Breathing comfortably, good air exchange, no wheezes, no crackles.   Cardiovascular:  Regular rate and rhythm, no edema.  GI:  soft, NT/ND, BS normal  Skin/Integumen:  No acute rash or sign of bleeding.   MSK: rolled on side on 6/29/23. No tenderness with percussion over lumbar spine.       Medications   All medications reviewed on 07/01/23       bisacodyl  5 mg Oral Once     clonazePAM  1.5 mg Oral At Bedtime     diclofenac  4 g Topical 4x Daily     [Held by provider] diclofenac  75 mg Oral BID     enoxaparin ANTICOAGULANT  40 mg Subcutaneous Q24H     escitalopram  10 mg Oral At Bedtime     levETIRAcetam  1,125 mg Oral At Bedtime     levETIRAcetam  750 mg Oral TID      menthol (Topical Analgesic) 2.5%   Topical Q6H     OXcarbazepine  450 mg Oral BID     OXcarbazepine  600 mg Oral BID     perampanel  8 mg Oral At Bedtime     piperacillin-tazobactam  3.375 g Intravenous Q6H     sodium chloride (PF)  3 mL Intracatheter Q8H     topiramate  100 mg Oral At Bedtime     PRN Meds: acetaminophen **OR** acetaminophen, guaiFENesin-codeine, lidocaine 4%, lidocaine (buffered or not buffered), LORazepam, melatonin, menthol (Topical Analgesic) 2.5%, naloxone **OR** naloxone **OR** naloxone **OR** naloxone, ondansetron **OR** ondansetron, oxyCODONE, sodium chloride (PF), SUMAtriptan, traMADol    Data   Recent Labs   Lab 07/01/23  0656 06/30/23  1437 06/30/23  0837 06/29/23  0822 06/28/23  0320   WBC 12.2* 13.4*  --  14.1* 13.8*   HGB 10.7* 11.7  --  10.3* 11.7   MCV 94 95  --  96 94     266 218  --  242 263     --  137 138 138   POTASSIUM 3.5  --  3.8 4.2 3.9   CHLORIDE 106  --  106 106 105   CO2 21*  --  17* 20* 18*   BUN 14.3  --  20.9 19.3 18.4   CR 0.70  0.70  --  0.89 1.11* 0.88   ANIONGAP 12  --  14 12 15   TATIANA 8.6*  --  8.2* 8.2* 8.2*   GLC 92  --  98 108* 127*   LIPASE  --   --   --   --  12*       No results found for this or any previous visit (from the past 24 hour(s)).

## 2023-07-01 NOTE — PLAN OF CARE
1900 - 3989    A&Ox4, forgetful at times. VSS on RA ex HTN. T-max 101.8, PRN tylenol x1 given - effective. C/o 8/10 back pain - PRN tramadol x1 given with relief. C/o migraine - PRN imitrex x1 given. Denies nausea. NPO ex meds. Up A2 w/caio steady. Mills in place with pink-tinged UOP. No BM overnight. Uses bedside commode. L PIV SL with intermittent abx. Lactic fired - 0.6. Urology and SW following. Discharge pending clinical improvement.

## 2023-07-01 NOTE — PROGRESS NOTES
UROLOGY BRIEF NOTE    Chart reviewed. Geiger placed yesterday; large UOP charted since placement (3.2 L). Per nursing notes, outputs initially yellow then some pink hematuria noted.     WBC downtrending, repeat lactate 0.6, Cr remains WNL.   Did have temp of 101.8 last night, afebrile today.   UCx growing aerococcus -- on Zosyn.     Plan:  - Monitor hematuria, hand irrigate geiger if any concern for obstruction (not draining, suprapubic discomfort)  - If she were to clinically deteriorate consider renal decompression, though appears stable today and do not anticipate this.   - If remaining inpatient, recommend renal ultrasound on Monday to re-assess hydronephrosis. If stable for discharge prior to this, can obtain renal US on outpatient basis some time next week.   - Continue geiger. Abx per IM.  - Recommend cystoscopy at outpatient visit with Dr Slim Reyes (Melina) FARHEEN Valentino  [facility]  Office: [phone]

## 2023-07-02 LAB
BACTERIA UR CULT: ABNORMAL
CREAT SERPL-MCNC: 0.58 MG/DL (ref 0.51–0.95)
ERYTHROCYTE [DISTWIDTH] IN BLOOD BY AUTOMATED COUNT: 13.2 % (ref 10–15)
GFR SERPL CREATININE-BSD FRML MDRD: >90 ML/MIN/1.73M2
HCT VFR BLD AUTO: 33.2 % (ref 35–47)
HGB BLD-MCNC: 11 G/DL (ref 11.7–15.7)
MCH RBC QN AUTO: 30.5 PG (ref 26.5–33)
MCHC RBC AUTO-ENTMCNC: 33.1 G/DL (ref 31.5–36.5)
MCV RBC AUTO: 92 FL (ref 78–100)
PLATELET # BLD AUTO: 303 10E3/UL (ref 150–450)
POTASSIUM SERPL-SCNC: 3.4 MMOL/L (ref 3.4–5.3)
RBC # BLD AUTO: 3.61 10E6/UL (ref 3.8–5.2)
WBC # BLD AUTO: 11.4 10E3/UL (ref 4–11)

## 2023-07-02 PROCEDURE — 36415 COLL VENOUS BLD VENIPUNCTURE: CPT | Performed by: INTERNAL MEDICINE

## 2023-07-02 PROCEDURE — 99232 SBSQ HOSP IP/OBS MODERATE 35: CPT | Performed by: INTERNAL MEDICINE

## 2023-07-02 PROCEDURE — 250N000011 HC RX IP 250 OP 636: Mod: JZ | Performed by: INTERNAL MEDICINE

## 2023-07-02 PROCEDURE — 84132 ASSAY OF SERUM POTASSIUM: CPT | Performed by: INTERNAL MEDICINE

## 2023-07-02 PROCEDURE — 250N000013 HC RX MED GY IP 250 OP 250 PS 637: Performed by: INTERNAL MEDICINE

## 2023-07-02 PROCEDURE — 82565 ASSAY OF CREATININE: CPT | Performed by: INTERNAL MEDICINE

## 2023-07-02 PROCEDURE — 85018 HEMOGLOBIN: CPT | Performed by: INTERNAL MEDICINE

## 2023-07-02 PROCEDURE — 120N000001 HC R&B MED SURG/OB

## 2023-07-02 RX ORDER — CEFTRIAXONE 1 G/1
1 INJECTION, POWDER, FOR SOLUTION INTRAMUSCULAR; INTRAVENOUS ONCE
Status: COMPLETED | OUTPATIENT
Start: 2023-07-02 | End: 2023-07-02

## 2023-07-02 RX ORDER — CEFPODOXIME PROXETIL 200 MG/1
200 TABLET, FILM COATED ORAL EVERY 12 HOURS SCHEDULED
Status: DISCONTINUED | OUTPATIENT
Start: 2023-07-03 | End: 2023-07-05 | Stop reason: HOSPADM

## 2023-07-02 RX ADMIN — LEVETIRACETAM 1125 MG: 250 TABLET, FILM COATED ORAL at 22:21

## 2023-07-02 RX ADMIN — DICLOFENAC SODIUM 4 G: 10 GEL TOPICAL at 08:36

## 2023-07-02 RX ADMIN — ESCITALOPRAM OXALATE 10 MG: 10 TABLET ORAL at 22:21

## 2023-07-02 RX ADMIN — ACETAMINOPHEN 650 MG: 325 TABLET, FILM COATED ORAL at 08:43

## 2023-07-02 RX ADMIN — SUMATRIPTAN SUCCINATE 100 MG: 50 TABLET ORAL at 09:03

## 2023-07-02 RX ADMIN — PERAMPANEL 8 MG: 2 TABLET ORAL at 22:21

## 2023-07-02 RX ADMIN — Medication: at 17:07

## 2023-07-02 RX ADMIN — CEFTRIAXONE SODIUM 1 G: 1 INJECTION, POWDER, FOR SOLUTION INTRAMUSCULAR; INTRAVENOUS at 17:00

## 2023-07-02 RX ADMIN — DICLOFENAC SODIUM 4 G: 10 GEL TOPICAL at 17:07

## 2023-07-02 RX ADMIN — OXCARBAZEPINE 450 MG: 300 TABLET, FILM COATED ORAL at 12:06

## 2023-07-02 RX ADMIN — Medication: at 08:36

## 2023-07-02 RX ADMIN — DICLOFENAC SODIUM 4 G: 10 GEL TOPICAL at 12:06

## 2023-07-02 RX ADMIN — TOPIRAMATE 100 MG: 100 TABLET, FILM COATED ORAL at 22:21

## 2023-07-02 RX ADMIN — OXCARBAZEPINE 450 MG: 300 TABLET, FILM COATED ORAL at 08:33

## 2023-07-02 RX ADMIN — OXCARBAZEPINE 600 MG: 300 TABLET, FILM COATED ORAL at 22:21

## 2023-07-02 RX ADMIN — LEVETIRACETAM 750 MG: 750 TABLET, FILM COATED ORAL at 17:01

## 2023-07-02 RX ADMIN — PIPERACILLIN AND TAZOBACTAM 3.38 G: 3; .375 INJECTION, POWDER, FOR SOLUTION INTRAVENOUS at 08:33

## 2023-07-02 RX ADMIN — CLONAZEPAM 1.5 MG: 1 TABLET ORAL at 22:21

## 2023-07-02 RX ADMIN — LEVETIRACETAM 750 MG: 750 TABLET, FILM COATED ORAL at 08:33

## 2023-07-02 RX ADMIN — ENOXAPARIN SODIUM 40 MG: 40 INJECTION SUBCUTANEOUS at 21:01

## 2023-07-02 RX ADMIN — OXCARBAZEPINE 600 MG: 300 TABLET, FILM COATED ORAL at 17:01

## 2023-07-02 RX ADMIN — PIPERACILLIN AND TAZOBACTAM 3.38 G: 3; .375 INJECTION, POWDER, FOR SOLUTION INTRAVENOUS at 02:46

## 2023-07-02 RX ADMIN — LEVETIRACETAM 750 MG: 750 TABLET, FILM COATED ORAL at 12:06

## 2023-07-02 RX ADMIN — Medication: at 21:08

## 2023-07-02 RX ADMIN — DICLOFENAC SODIUM 4 G: 10 GEL TOPICAL at 22:28

## 2023-07-02 ASSESSMENT — ACTIVITIES OF DAILY LIVING (ADL)
ADLS_ACUITY_SCORE: 39
ADLS_ACUITY_SCORE: 39
ADLS_ACUITY_SCORE: 47
ADLS_ACUITY_SCORE: 43
ADLS_ACUITY_SCORE: 43
ADLS_ACUITY_SCORE: 47
ADLS_ACUITY_SCORE: 39
ADLS_ACUITY_SCORE: 39
ADLS_ACUITY_SCORE: 43
ADLS_ACUITY_SCORE: 47
ADLS_ACUITY_SCORE: 43
ADLS_ACUITY_SCORE: 43

## 2023-07-02 NOTE — PLAN OF CARE
1900 - 4930    A&Ox4, forgetful at times. VSS on RA ex HTN. Denies pain, N/V. Regular diet. Up A2 w/caio steady. T/R q 2 hrs. L PIV SL with intermittent abx. Incontinent of 1 BM overnight, uses BSC. Mills in place with pink UOP. Urology, PT, and SW following. Discharge pending improvement.

## 2023-07-02 NOTE — PROVIDER NOTIFICATION
MD Notification    Notified Person: MD    Notified Person Name:  Lucio    Notification Date/Time: 7/2/23 1048    Notification Interaction: [employer]    Purpose of Notification: Just an FYI, pt is having frequent soft stools. Denies abd pain. If appropriate, please consider imodium. Thank you.    Orders Received:    Comments:

## 2023-07-02 NOTE — PLAN OF CARE
A&Ox4. VSS on RA, except HTN. Afebrile. L PIV SL with intermittent abx, will change to PO antibiotics 7/3. Pain managed with tylenol and scheduled voltaren/bengay gels. PRN imitrex given x1 with relief of symptoms. Mills patent with continued pink output. Improved mobility/strength with caio steady today. Up to bedside commode, MD notified of frequent soft stool, no testing ordered. In chair for meals. PT/OT consulted, urology following. Discharge pending improvement, likely in 1-2 days.

## 2023-07-03 ENCOUNTER — APPOINTMENT (OUTPATIENT)
Dept: ULTRASOUND IMAGING | Facility: CLINIC | Age: 68
DRG: 871 | End: 2023-07-03
Attending: INTERNAL MEDICINE
Payer: COMMERCIAL

## 2023-07-03 ENCOUNTER — APPOINTMENT (OUTPATIENT)
Dept: PHYSICAL THERAPY | Facility: CLINIC | Age: 68
DRG: 871 | End: 2023-07-03
Payer: COMMERCIAL

## 2023-07-03 LAB
ANION GAP SERPL CALCULATED.3IONS-SCNC: 13 MMOL/L (ref 7–15)
BACTERIA BLD CULT: NO GROWTH
BUN SERPL-MCNC: 12.5 MG/DL (ref 8–23)
CALCIUM SERPL-MCNC: 8.3 MG/DL (ref 8.8–10.2)
CHLORIDE SERPL-SCNC: 106 MMOL/L (ref 98–107)
CREAT SERPL-MCNC: 0.54 MG/DL (ref 0.51–0.95)
DEPRECATED HCO3 PLAS-SCNC: 19 MMOL/L (ref 22–29)
ERYTHROCYTE [DISTWIDTH] IN BLOOD BY AUTOMATED COUNT: 13.5 % (ref 10–15)
FERRITIN SERPL-MCNC: 945 NG/ML (ref 11–328)
GFR SERPL CREATININE-BSD FRML MDRD: >90 ML/MIN/1.73M2
GLUCOSE SERPL-MCNC: 115 MG/DL (ref 70–99)
HBA1C MFR BLD: 5.5 %
HCT VFR BLD AUTO: 31.8 % (ref 35–47)
HGB BLD-MCNC: 10.5 G/DL (ref 11.7–15.7)
IRON BINDING CAPACITY (ROCHE): 227 UG/DL (ref 240–430)
IRON SATN MFR SERPL: 16 % (ref 15–46)
IRON SERPL-MCNC: 36 UG/DL (ref 37–145)
MAGNESIUM SERPL-MCNC: 2.2 MG/DL (ref 1.7–2.3)
MCH RBC QN AUTO: 30.7 PG (ref 26.5–33)
MCHC RBC AUTO-ENTMCNC: 33 G/DL (ref 31.5–36.5)
MCV RBC AUTO: 93 FL (ref 78–100)
PLATELET # BLD AUTO: 342 10E3/UL (ref 150–450)
POTASSIUM SERPL-SCNC: 3.1 MMOL/L (ref 3.4–5.3)
POTASSIUM SERPL-SCNC: 3.3 MMOL/L (ref 3.4–5.3)
RBC # BLD AUTO: 3.42 10E6/UL (ref 3.8–5.2)
SODIUM SERPL-SCNC: 138 MMOL/L (ref 136–145)
WBC # BLD AUTO: 11 10E3/UL (ref 4–11)

## 2023-07-03 PROCEDURE — 250N000013 HC RX MED GY IP 250 OP 250 PS 637: Performed by: INTERNAL MEDICINE

## 2023-07-03 PROCEDURE — 36415 COLL VENOUS BLD VENIPUNCTURE: CPT | Performed by: INTERNAL MEDICINE

## 2023-07-03 PROCEDURE — 84132 ASSAY OF SERUM POTASSIUM: CPT | Performed by: INTERNAL MEDICINE

## 2023-07-03 PROCEDURE — 83735 ASSAY OF MAGNESIUM: CPT | Performed by: INTERNAL MEDICINE

## 2023-07-03 PROCEDURE — 250N000011 HC RX IP 250 OP 636: Mod: JZ | Performed by: INTERNAL MEDICINE

## 2023-07-03 PROCEDURE — 97116 GAIT TRAINING THERAPY: CPT | Mod: GP

## 2023-07-03 PROCEDURE — 99233 SBSQ HOSP IP/OBS HIGH 50: CPT | Performed by: INTERNAL MEDICINE

## 2023-07-03 PROCEDURE — 97530 THERAPEUTIC ACTIVITIES: CPT | Mod: GP

## 2023-07-03 PROCEDURE — 82310 ASSAY OF CALCIUM: CPT | Performed by: INTERNAL MEDICINE

## 2023-07-03 PROCEDURE — 83036 HEMOGLOBIN GLYCOSYLATED A1C: CPT | Performed by: INTERNAL MEDICINE

## 2023-07-03 PROCEDURE — 83550 IRON BINDING TEST: CPT | Performed by: INTERNAL MEDICINE

## 2023-07-03 PROCEDURE — 120N000001 HC R&B MED SURG/OB

## 2023-07-03 PROCEDURE — 85027 COMPLETE CBC AUTOMATED: CPT | Performed by: INTERNAL MEDICINE

## 2023-07-03 PROCEDURE — 76770 US EXAM ABDO BACK WALL COMP: CPT

## 2023-07-03 PROCEDURE — 82728 ASSAY OF FERRITIN: CPT | Performed by: INTERNAL MEDICINE

## 2023-07-03 RX ORDER — POTASSIUM CHLORIDE 1500 MG/1
40 TABLET, EXTENDED RELEASE ORAL ONCE
Status: COMPLETED | OUTPATIENT
Start: 2023-07-03 | End: 2023-07-03

## 2023-07-03 RX ORDER — CALCIUM CARBONATE 500 MG/1
500 TABLET, CHEWABLE ORAL 3 TIMES DAILY PRN
Status: DISCONTINUED | OUTPATIENT
Start: 2023-07-03 | End: 2023-07-05 | Stop reason: HOSPADM

## 2023-07-03 RX ADMIN — DICLOFENAC SODIUM 4 G: 10 GEL TOPICAL at 21:58

## 2023-07-03 RX ADMIN — DICLOFENAC SODIUM 4 G: 10 GEL TOPICAL at 13:57

## 2023-07-03 RX ADMIN — LEVETIRACETAM 750 MG: 750 TABLET, FILM COATED ORAL at 15:12

## 2023-07-03 RX ADMIN — Medication: at 20:36

## 2023-07-03 RX ADMIN — CEFPODOXIME PROXETIL 200 MG: 200 TABLET, FILM COATED ORAL at 19:55

## 2023-07-03 RX ADMIN — CALCIUM CARBONATE (ANTACID) CHEW TAB 500 MG 500 MG: 500 CHEW TAB at 14:49

## 2023-07-03 RX ADMIN — POTASSIUM CHLORIDE 40 MEQ: 1500 TABLET, EXTENDED RELEASE ORAL at 21:58

## 2023-07-03 RX ADMIN — TOPIRAMATE 100 MG: 100 TABLET, FILM COATED ORAL at 21:58

## 2023-07-03 RX ADMIN — DICLOFENAC SODIUM 4 G: 10 GEL TOPICAL at 18:08

## 2023-07-03 RX ADMIN — CLONAZEPAM 1.5 MG: 1 TABLET ORAL at 21:58

## 2023-07-03 RX ADMIN — Medication: at 14:49

## 2023-07-03 RX ADMIN — ACETAMINOPHEN 650 MG: 325 TABLET, FILM COATED ORAL at 19:55

## 2023-07-03 RX ADMIN — OXCARBAZEPINE 450 MG: 300 TABLET, FILM COATED ORAL at 12:42

## 2023-07-03 RX ADMIN — PERAMPANEL 8 MG: 2 TABLET ORAL at 21:57

## 2023-07-03 RX ADMIN — ESCITALOPRAM OXALATE 10 MG: 10 TABLET ORAL at 21:58

## 2023-07-03 RX ADMIN — ACETAMINOPHEN 650 MG: 325 TABLET, FILM COATED ORAL at 12:43

## 2023-07-03 RX ADMIN — LEVETIRACETAM 750 MG: 750 TABLET, FILM COATED ORAL at 12:43

## 2023-07-03 RX ADMIN — CEFPODOXIME PROXETIL 200 MG: 200 TABLET, FILM COATED ORAL at 08:55

## 2023-07-03 RX ADMIN — LEVETIRACETAM 750 MG: 750 TABLET, FILM COATED ORAL at 08:55

## 2023-07-03 RX ADMIN — ENOXAPARIN SODIUM 40 MG: 40 INJECTION SUBCUTANEOUS at 19:56

## 2023-07-03 RX ADMIN — OXCARBAZEPINE 600 MG: 300 TABLET, FILM COATED ORAL at 18:08

## 2023-07-03 RX ADMIN — LEVETIRACETAM 1125 MG: 250 TABLET, FILM COATED ORAL at 21:57

## 2023-07-03 RX ADMIN — POTASSIUM CHLORIDE 40 MEQ: 1500 TABLET, EXTENDED RELEASE ORAL at 15:12

## 2023-07-03 RX ADMIN — OXCARBAZEPINE 450 MG: 300 TABLET, FILM COATED ORAL at 08:55

## 2023-07-03 RX ADMIN — OXCARBAZEPINE 600 MG: 300 TABLET, FILM COATED ORAL at 21:57

## 2023-07-03 ASSESSMENT — ACTIVITIES OF DAILY LIVING (ADL)
ADLS_ACUITY_SCORE: 43
ADLS_ACUITY_SCORE: 49
ADLS_ACUITY_SCORE: 45
ADLS_ACUITY_SCORE: 47
ADLS_ACUITY_SCORE: 45
ADLS_ACUITY_SCORE: 44
ADLS_ACUITY_SCORE: 49
ADLS_ACUITY_SCORE: 47
ADLS_ACUITY_SCORE: 49
ADLS_ACUITY_SCORE: 47
ADLS_ACUITY_SCORE: 43
ADLS_ACUITY_SCORE: 43

## 2023-07-03 NOTE — PLAN OF CARE
1900 - 0730    A&Ox4, forgetful at times. VSS on RA ex HTN. C/o R thigh pain - managed with scheduled voltaren and bengay cream. Denies nausea. L PIV SL. Incontinent at times, no BM overnight, uses bedside commode. Mills in place with pink output. Up A2 w/caio steady. T/R q 2 hrs. Regular diet. Renal US done overnight - see results. Urology, SW, and PT following. Possible discharge Tuesday or Wednesday pending improvement.

## 2023-07-03 NOTE — PROGRESS NOTES
"UROLOGY PROGRESS NOTE    HPI/SUBJECTIVE:   Doing well. Feeling a bit better than yesterday. Geiger draining. US obtained last evening. Showed significant right hydronephrosis, likely slightly improved compared to CT and mild left pelvocaliectasis, significantly improved.     UC 6/28/23: >100,000 CFU/mL Aerococcus urinae     WBC: 11/4 --> 11.0   Creat: 0.58 --> 0.54    ROS:     OBJECTIVE:          BP (!) 141/77 (BP Location: Left arm)   Pulse 73   Temp 98.3  F (36.8  C) (Oral)   Resp 18   Ht 1.676 m (5' 6\")   Wt 83.7 kg (184 lb 8.4 oz)   SpO2 96%   BMI 29.78 kg/m      Intake/Output Summary (Last 24 hours) at 7/3/2023 1622  Last data filed at 7/3/2023 1500  Gross per 24 hour   Intake --   Output 1700 ml   Net -1700 ml            General appearance shows no deformaties and good grooming.  EYES: no icterus  HEAD, EARS, NOSE, MOUTH, AND THROAT: atraumatic, normocephalic  NECK: symmetric  CHEST WALL: symmetric  CARDIAC: skin well perfused  RESPIRATORY: breathing unlabored  ABDOMEN: soft, non tender, non distended, no rebound or guarding  : Geiger draining maury urine       ASSESSMENT:   69 yo female with pyelonephritis, hydronephrosis, potential incomplete bladder emptying     PLAN:    -Discussed with Dr. Lopez  -Continue geiger catheter at discharge. Will perform TOV in office  -Follow up with Dr. Mckeon in 3-4 weeks, will send messaging to scheduling to help her reschedule this appointment  -Abx per IM,   -Trends labs     Pt discussed with Dr. Lpoez.     Gilson Arteaga PA-C  [facility] ([facility])  [phone]  [employer] ([email or web address])   Text Page (7:30am to 4:30pm)    "

## 2023-07-03 NOTE — PROGRESS NOTES
[facility]    Hospitalist Progress Note    Date of Service (when I saw the patient): 07/02/2023  Admit date: 6/28/2023    Interval History   Full details of events over last 24 hours outlined below.   Dong much better.  Chronic low back pain and thigh pain much better controlled.  Not using tramadol or oxycodone.  Continues with catheter in place with cherry red urine.  Good flow, stable hemoglobin  Multiple loose bowel movements yesterday and today after being constipated for several days.  She therefore has not started MiraLAX or any bowel regimen.  Loose stools are more formed this afternoon.   Denies any shortness of breath, abdominal pain, nausea or vomiting.      Assessment & Plan   This is a 68-year-old female with medical history of depression epilepsy reflux disease seizure disorder migraines and syncope with history of permanent pacemaker placement presents to [facility] the emergency department via ambulance for fever confusion and urinary symptoms.  In the ER had a fever of 101.1, tachycardic but stable bp and oxygenation.  Electrolytes are significant for sodium of 138 potassium 3.9 normal BUN and creatinine.  White count is elevated at 13,800 with a left shift.  Urinalysis was cloudy light brown with moderate blood large leukocyte Estrace and greater than 182 white cells and positive for WBC clumps.     Sepsis with fever, leukocytosis and tachycardia, RESOLVED  Pyleophritis, presenting with confusion, fever and chronic urinary symptoms   Recurrent UTIs with dysuria, frequency and pyuria   Bilateral hydronephrosis and stranding on CT  S/p geiger catheter for potential obstruction on 6/30/23   Mild hematuria noted 6/30/23   * Per patient receiving monthly Macrobid and recently on Bactrim prophylactically.  However, I cannot find clear documentation of this.  I do see that she was on Bactrim daily per med list.  But no information and clinic visits found through our system and  care everywhere. Recurrent pyuria with only UCx in last year on 5/2023 being negative   *UA: Greater than 182 white blood cells, WBC 13.8 (neutrophilic) hemodynamically stable at admission with stable creatinine.     Initially on IV ceftriaxone in the ED    On 06/29/23. ontinued fever, rising WBC,     CT C/A/P 6/29/23 without contrast:   1.  Moderate right and mild-moderate left hydronephrosis and stranding  without ureteral stones or obvious etiology. Pyelonephritis could have  this appearance. Clinical correlation needed.  2.  Compression deformity and erosive change of L2, consider lumbar  spine MRI to evaluate for osteomyelitis.   * Obtained films from February through [facility], and reviewed with radiologist. Similar appearance of L2, making osteo unlikely. Kidneys partially viewed and no evidence of hydro. (She has chronic low back and R thigh pain from L2 fracture in January, unchanged, so not c/w osteo clinically)    Switched abx to zosyn on 6/29/23, given recent recurrent antibiotics as an outpatient    UCx growing aerococcus, pan sensitive. BCx NGTD.    On 07/02/23, afebrile for over 24 hours.     Stop zosyn. Give one dose of IV ceftriaxone > cefpodoxime 200 mg BID for 5 days starting tomorrow 7/3/23 .    Appreciate Urology consult    Followed PVR bladder scans and straight cath for > 300. (On 06/29/23: 110 ml and 253 ml overnight) > Geiger catheter placed per Urology's recommendation in case obstruction leading to hydro < 200 drained when placed.     Mild hematuria noted p catheter placement. Good flow through catheter, hgb stable     Hand irrigate PRN    Renal ultrasound ordered 07/02/23 per Urology's recommendation    Geiger remains in place. Defer to urology whether to discharge with geiger given minimal evidence of retention.     Holding PTA oxybutynin, would hold on resumption until evaluation of sx post treatment of UTI.     Recent Labs   Lab 07/02/23  0756 07/01/23  0656 06/30/23  1437 06/29/23  0822  "06/28/23  0320   WBC 11.4* 12.2* 13.4* 14.1* 13.8*   HGB 11.0* 10.7* 11.7 10.3* 11.7    266  266 218 242 263     NATALIA, RESOLVED - Cr 1.11 up from 0.88.      Cr normalized on 06/30/23     Holding PTA diclofenac    Recent Labs   Lab 07/02/23  0756 07/01/23  0656 06/30/23  0837 06/29/23  0822 06/28/23  0320   CR 0.58 0.70  0.70 0.89 1.11* 0.88       Constipation Noted on 07/01/23  > loose stools    Suspect loose stools or from resolution of constipation.  Hold on any loperamide and monitor    Deconditioned state, and tailbone pain from lying in bed.     Working with PT > TCU recommended    Acute encephalopathy secondary to infection, RESOLVED    07/02/23, alert and oriented and answering questions appropriately.      Frequent re-orientation    Maintain normal day/night, sleep wake cycles    Minimize sedating/altering medications as able    Treating separate conditions as detailed above/below     Epilepsy    Continue PTA Keppra, oxcarbazepine, perampanel    Ativan ordered PRN seizure and seizure precautions     She has mild seizures about 3 times a month.  Following these she is to take Ativan twice daily for 2 days.  Family has not noted any seizures yet during this admission.      Depression    Continue ALEXANDRA escitalopram and clonazepam at bedtime     Migraine    Continue topamax as needed sumatriptan    HA evening of 6/30/23, aborted with sumatriptan.     Radiculopathy from recent L2 compression fracture    Hold PTA diclofenac twice daily due to NATALIA     Started menthol and diclofenac gel QID on 07/01/23     Tramadol PRN q 8 hours PRN ordered and Oxycodone for severe pain. - Has not required tramadol or oxycodone since 6/30.     Has tried both gabapentin and lyrica in the past with bad side effects    Clinically Significant Risk Factors                         # Overweight: Estimated body mass index is 29.78 kg/m  as calculated from the following:    Height as of this encounter: 1.676 m (5' 6\").    Weight as of " this encounter: 83.7 kg (184 lb 8.4 oz).             Diet: Orders Placed This Encounter      Regular Diet Adult     IVF: None  Mills Catheter: PRESENT, indication:   concern for retention, see above.      DVT Prophylaxis: enoxaparin   Code Status: Full Code     Disposition: Expected discharge on 7/4 to TCU if bed available.   Communication: Discussed with daughter, patient, Michele, who is a retired endocrinologist and bedside RN on 07/02/23    Lilly Valdes MD     Hospitalist Service  [facility]  Securely message with the Vocera Web Console (learn more here)  Text page via [employer] Paging/Directory      -Data reviewed today: I reviewed all new labs and imaging results over the last 24 hours. I personally reviewed no images or EKG's today.    Physical Exam   Temp: 98.4  F (36.9  C) Temp src: Oral BP: 118/85 Pulse: 90   Resp: 18 SpO2: 98 % O2 Device: None (Room air)    Vitals:    06/30/23 0500 07/01/23 0618 07/02/23 0500   Weight: 83 kg (182 lb 15.7 oz) 85 kg (187 lb 6.3 oz) 83.7 kg (184 lb 8.4 oz)     Vital Signs with Ranges  Temp:  [98.4  F (36.9  C)-99.6  F (37.6  C)] 98.4  F (36.9  C)  Pulse:  [85-90] 90  Resp:  [18] 18  BP: (118-171)/(85-89) 118/85  SpO2:  [95 %-98 %] 98 %  I/O last 3 completed shifts:  In: -   Out: 1950 [Urine:1950]    Today's Exam  Constitutional:NAD,   Neuropsyche:  alert and oriented, answers questions appropriately.   Respiratory: Breathing comfortably, good air exchange, no wheezes, no crackles.   Cardiovascular:  Regular rate and rhythm, no edema.  GI:  soft, NT/ND, BS normal, catheter in place with cherry red urine.   Skin/Integumen:  No acute rash or sign of bleeding.       Medications   All medications reviewed on 07/02/23       [START ON 7/3/2023] cefpodoxime  200 mg Oral Q12H [facility] (08/20)     clonazePAM  1.5 mg Oral At Bedtime     diclofenac  4 g Topical 4x Daily     enoxaparin ANTICOAGULANT  40 mg Subcutaneous Q24H     escitalopram  10 mg Oral At Bedtime      levETIRAcetam  1,125 mg Oral At Bedtime     levETIRAcetam  750 mg Oral TID     menthol (Topical Analgesic) 2.5%   Topical Q6H     OXcarbazepine  450 mg Oral BID     OXcarbazepine  600 mg Oral BID     perampanel  8 mg Oral At Bedtime     sodium chloride (PF)  3 mL Intracatheter Q8H     topiramate  100 mg Oral At Bedtime     PRN Meds: acetaminophen **OR** acetaminophen, guaiFENesin-codeine, lidocaine 4%, lidocaine (buffered or not buffered), LORazepam, melatonin, naloxone **OR** naloxone **OR** naloxone **OR** naloxone, ondansetron **OR** ondansetron, oxyCODONE, polyethylene glycol, sennosides, sodium chloride (PF), SUMAtriptan, traMADol    Data   Recent Labs   Lab 07/02/23  0756 07/01/23  0656 06/30/23  1437 06/30/23  0837 06/29/23  0822 06/28/23  0320   WBC 11.4* 12.2* 13.4*  --  14.1* 13.8*   HGB 11.0* 10.7* 11.7  --  10.3* 11.7   MCV 92 94 95  --  96 94    266  266 218  --  242 263   NA  --  139  --  137 138 138   POTASSIUM 3.4 3.5  --  3.8 4.2 3.9   CHLORIDE  --  106  --  106 106 105   CO2  --  21*  --  17* 20* 18*   BUN  --  14.3  --  20.9 19.3 18.4   CR 0.58 0.70  0.70  --  0.89 1.11* 0.88   ANIONGAP  --  12  --  14 12 15   TATIANA  --  8.6*  --  8.2* 8.2* 8.2*   GLC  --  92  --  98 108* 127*   LIPASE  --   --   --   --   --  12*       No results found for this or any previous visit (from the past 24 hour(s)).

## 2023-07-03 NOTE — PROGRESS NOTES
[facility]    Hospitalist Progress Note    Date of Service (when I saw the patient): 07/03/2023     Assessment & Plan  Adia Briceno is a pleasant 68-year-old woman with history of depression, epilepsy, reflux disease, seizure disorder, migraines and syncope with prior PPM - who presented to the emergency department for fever confusion and urinary symptoms.  In the ER had a fever of 101.1, was tachycardic but stable bp and oxygenation.    Current problems include:     Sepsis with fever, leukocytosis and tachycardia; resolved.  Pyleophritis, presenting with confusion, fever and chronic urinary symptoms   Recurrent UTIs with dysuria, frequency and pyuria   Bilateral hydronephrosis and stranding on CT  S/p geiger catheter for potential obstruction on 6/30/23   Mild hematuria noted 6/30/23   * Per patient receiving monthly Macrobid and recently on Bactrim prophylactically.  However, I cannot find clear documentation of this.  I do see that she was on Bactrim daily per med list.  But no information and clinic visits found through our system and care everywhere. Recurrent pyuria with only UCx in last year on 5/2023 being negative   *UA: Greater than 182 white blood cells, WBC 13.8 (neutrophilic) hemodynamically stable at admission with stable creatinine.     Initially on IV ceftriaxone in the ED    On 06/29/23. ontinued fever, rising WBC,     CT C/A/P 6/29/23 without contrast:   1.  Moderate right and mild-moderate left hydronephrosis and stranding  without ureteral stones or obvious etiology. Pyelonephritis could have  this appearance. Clinical correlation needed.  2.  Compression deformity and erosive change of L2, consider lumbar  spine MRI to evaluate for osteomyelitis.   * Obtained films from February through [facility], and reviewed with radiologist. Similar appearance of L2, making osteo unlikely. Kidneys partially viewed and no evidence of hydro. (She has chronic low back and R thigh pain from  L2 fracture in January, unchanged, so not c/w osteo clinically)    Switched abx to zosyn on 6/29/23, given recent recurrent antibiotics as an outpatient    UCx growing aerococcus, pan sensitive. BCx NGTD.    On 07/02/23, afebrile for over 24 hours.     Stopped zosyn    - earlier was given one dose of IV ceftriaxone    -> 7/3: begin cefpodoxime 200 mg BID for 5 days    Appreciate Urology consult    Followed PVR bladder scans and straight cath for > 300. (On 06/29/23: 110 ml and 253 ml overnight) > Geiger catheter placed per Urology's recommendation in case obstruction leading to hydro < 200 drained when placed.     Mild hematuria noted p catheter placement. Good flow through catheter, hgb stable   ? Hand irrigate PRN    Geiger remains in place. Defer to urology whether to discharge with geiger given minimal evidence of retention.     Holding PTA oxybutynin, would hold on resumption until evaluation of sx post treatment of UTI.     ->  Appreciate Urology follow up today:  - continue geiger catheter at discharge.  - perform TOV at Urology follow up with Dr. Mckeon in 3-4 weeks   Amy Valentino PA-C (Jackie)   [facility]   Office: [phone]     NATALIA, RESOLVED - Cr 1.11 up from 0.88.      Cr normalized on 06/30/23     Holding PTA diclofenac      Anemia, normocytic  -> check iron studies; repeat Hgb 7/4    Hypokalemia  - add replacement orders  - check Mg     Constipation Noted on 07/01/23  > loose stools    Suspect loose stools or from resolution of constipation.  Hold on any loperamide and monitor    GERD  -> add PRN TUMS    Hyperglycemia, mild, intermittent  -> check A1C     Physical deconditioning  Tailbone pain from lying in bed.   BMI 29.78 (upper limit of overweight)    Working with PT -> TCU recommended  -> needs RD eval. soon to help with gradual weight loss     Acute encephalopathy secondary to infection; resolved.    07/02/23, alert and oriented and answering questions appropriately.      Frequent  re-orientation    Maintain normal day/night, sleep wake cycles    Minimize sedating/altering medications as able    Treating separate conditions as detailed above/below     Epilepsy    Continue PTA Keppra, oxcarbazepine, perampanel    Ativan ordered PRN seizure and seizure precautions     She has mild seizures about 3 times a month.  Following these she is to take Ativan twice daily for 2 days.  Family has not noted any seizures yet during this admission.      Depression    Continue ALEXANDRA escitalopram and clonazepam at bedtime     Migraine    Continue topamax as needed sumatriptan    HA evening of 6/30/23, aborted with sumatriptan.      Radiculopathy from recent L2 compression fracture    Hold PTA diclofenac twice daily due to NATALIA     Started menthol and diclofenac gel QID on 07/01/23     Tramadol PRN q 8 hours PRN ordered and Oxycodone for severe pain. - Has not required tramadol or oxycodone since 6/30.     Has tried both gabapentin and lyrica in the past with bad side effects          Diet: regular Diet Adult     IVF: None  Mills Catheter: PRESENT, indication:   concern for retention, see above.      DVT Prophylaxis: enoxaparin   Code Status: Full Code      Disposition: Expected discharge on 7/4 to TCU if bed available.        VICKY Camarena MD, [facility] Hospitalist  Text Page (7am - 6pm)    Interval History   Chronic low back pain and thigh pain better controlled.  Continues with catheter in place with cherry red urine.  Good flow, stable hemoglobin  Multiple loose bowel movements yesterday and today after being constipated for several days.  Denies any shortness of breath, abdominal pain, nausea or vomiting.    Data reviewed today: I reviewed all new labs and imaging results over the last 24 hours.     Physical Exam   Temp: 98.5  F (36.9  C) Temp src: Oral BP: 124/73 Pulse: 82   Resp: 18 SpO2: 95 % O2 Device: None (Room air)    Vitals:    06/30/23 0500 07/01/23 0618 07/02/23 0500   Weight: 83 kg  (182 lb 15.7 oz) 85 kg (187 lb 6.3 oz) 83.7 kg (184 lb 8.4 oz)     Vital Signs with Ranges  Temp:  [98.4  F (36.9  C)-99.7  F (37.6  C)] 98.5  F (36.9  C)  Pulse:  [82-90] 82  Resp:  [16-18] 18  BP: (118-150)/(73-85) 124/73  SpO2:  [95 %-98 %] 95 %  I/O last 3 completed shifts:  In: -   Out: 1900 [Urine:1900]    Constitutional: awake, no apparent distress; lying in bed  HEENT: sclerae clear; MM's moist  Respiratory: good a/e bilaterally, no wheezing or rhonchi  Cardiovascular: regular rate and rhythm, S1, S2 noted; no m/r/g  GI: abdomen flat, + bowel sounds; soft, non-tender, non-distended  Skin/Integumen: no rashes, no cyanosis, no jaundice  Musculoskeletal: no edema  Neurologic: follows directions well; no focal deficits      Medications       cefpodoxime  200 mg Oral Q12H [facility] (08/20)     clonazePAM  1.5 mg Oral At Bedtime     diclofenac  4 g Topical 4x Daily     enoxaparin ANTICOAGULANT  40 mg Subcutaneous Q24H     escitalopram  10 mg Oral At Bedtime     levETIRAcetam  1,125 mg Oral At Bedtime     levETIRAcetam  750 mg Oral TID     menthol (Topical Analgesic) 2.5%   Topical Q6H     OXcarbazepine  450 mg Oral BID     OXcarbazepine  600 mg Oral BID     perampanel  8 mg Oral At Bedtime     sodium chloride (PF)  3 mL Intracatheter Q8H     topiramate  100 mg Oral At Bedtime       Data   Recent Labs   Lab 07/03/23  0734 07/02/23  0756 07/01/23  0656 06/30/23  1437 06/30/23  0837 06/29/23  0822 06/28/23  0320   WBC 11.0 11.4* 12.2*   < >  --    < > 13.8*   HGB 10.5* 11.0* 10.7*   < >  --    < > 11.7   MCV 93 92 94   < >  --    < > 94    303 266  266   < >  --    < > 263     --  139  --  137   < > 138   POTASSIUM 3.1* 3.4 3.5  --  3.8   < > 3.9   CHLORIDE 106  --  106  --  106   < > 105   CO2 19*  --  21*  --  17*   < > 18*   BUN 12.5  --  14.3  --  20.9   < > 18.4   CR 0.54 0.58 0.70  0.70  --  0.89   < > 0.88   ANIONGAP 13  --  12  --  14   < > 15   TATIANA 8.3*  --  8.6*  --  8.2*   < > 8.2*   *  --   92  --  98   < > 127*   LIPASE  --   --   --   --   --   --  12*    < > = values in this interval not displayed.       Recent Results (from the past 24 hour(s))   US Renal Complete Non-Vascular    Narrative    EXAM: US RENAL COMPLETE NON-VASCULAR  LOCATION: [facility]  DATE: 7/3/2023    INDICATION: Follow-up hydronephrosis  COMPARISON: CT dated 06/29/2023  TECHNIQUE: Routine Bilateral Renal and Bladder Ultrasound.    FINDINGS:    RIGHT KIDNEY: 13.2 x 8.0 x 6.9 cm. Significant right hydronephrosis, likely slightly improved compared to CT.     LEFT KIDNEY: 12.3 x 5.5 x 5.4 cm. There are scattered lower pole renal calculi measuring 6 to 9 mm at ultrasound. Mild left pelvocaliectasis, improved in the interval.     BLADDER: Now decompressed containing a Mills catheter.      Impression    IMPRESSION:  1.  Moderate right hydronephrosis persists, slightly increased in the interval.  2.  Mild left pelvocaliectasis, significantly improved.

## 2023-07-03 NOTE — PLAN OF CARE
Goal Outcome Evaluation:  Shift: 7/3/2023 9310-2100    Orientation: Aox4; no intermittent confusion was observed today.     Vitals/Tele: VSS RA;    Pain: Pain was managed with PRN PO Tylenol, Voltaren and Bengay    IV Access/drains: L PIV SL    Diet: Regular     Mobility: Assist of 2; Used to Caprice Steady to great effect today.     GI/: Mills in place; One BM today, was able to get to the toilet with assistance.     Wound/Skin: Scattered bruising     Consults: PT/OT still pending for the day.     Discharge Plan: TBD    Additional info: Patient was OOB for four hours and sitting in the chair. Intermittent confusion was not observed today, and patient was able to answer all questions with no issue this shift.       See Flow sheets for assessment

## 2023-07-03 NOTE — PROGRESS NOTES
Care Management Follow Up    Length of Stay (days): 5    Expected Discharge Date: 07/05/2023     Concerns to be Addressed: all concerns addressed in this encounter     Patient plan of care discussed at interdisciplinary rounds: Yes    Anticipated Discharge Disposition: Transitional Care     Anticipated Discharge Services: None  Anticipated Discharge DME: None    Patient/family educated on Medicare website which has current facility and service quality ratings: yes  Education Provided on the Discharge Plan: No  Patient/Family in Agreement with the Plan: yes    Referrals Placed by CM/SW:    Private pay costs discussed: Not applicable    Additional Information:  Patient has been accepted by [facility]. Patient will need [employer] auth. Patient not been seen by PT or OT since 6/30 and [employer] will need more recent notes within the last 48hrs. SW placed call to PT and they stayed they would see her this afternoon. SW to f/u and get auth started after info in inputted.     SW will continue to follow.       BRENT Rasmussen

## 2023-07-03 NOTE — PROVIDER NOTIFICATION
MD Notification    Notified Person: MD    Notified Person Name: Dr. Blair Camarena     Notification Date/Time: 7/3/2023 1300    Notification Interaction: Vocera    Purpose of Notification: Lab Value: K+ 3.1    Orders Received: Potassium Protocol     Comments:

## 2023-07-04 LAB
BACTERIA BLD CULT: NO GROWTH
BACTERIA BLD CULT: NO GROWTH
C DIFF TOX B STL QL: NEGATIVE
CK SERPL-CCNC: 26 U/L (ref 26–192)
CREAT SERPL-MCNC: 0.6 MG/DL (ref 0.51–0.95)
GFR SERPL CREATININE-BSD FRML MDRD: >90 ML/MIN/1.73M2
PLATELET # BLD AUTO: 385 10E3/UL (ref 150–450)
POTASSIUM SERPL-SCNC: 4 MMOL/L (ref 3.4–5.3)

## 2023-07-04 PROCEDURE — 82565 ASSAY OF CREATININE: CPT | Performed by: INTERNAL MEDICINE

## 2023-07-04 PROCEDURE — 85049 AUTOMATED PLATELET COUNT: CPT | Performed by: INTERNAL MEDICINE

## 2023-07-04 PROCEDURE — 82550 ASSAY OF CK (CPK): CPT | Performed by: INTERNAL MEDICINE

## 2023-07-04 PROCEDURE — 120N000001 HC R&B MED SURG/OB

## 2023-07-04 PROCEDURE — 87493 C DIFF AMPLIFIED PROBE: CPT | Performed by: INTERNAL MEDICINE

## 2023-07-04 PROCEDURE — 250N000013 HC RX MED GY IP 250 OP 250 PS 637: Performed by: INTERNAL MEDICINE

## 2023-07-04 PROCEDURE — 250N000011 HC RX IP 250 OP 636: Mod: JZ | Performed by: INTERNAL MEDICINE

## 2023-07-04 PROCEDURE — 99233 SBSQ HOSP IP/OBS HIGH 50: CPT | Performed by: INTERNAL MEDICINE

## 2023-07-04 PROCEDURE — 84132 ASSAY OF SERUM POTASSIUM: CPT | Performed by: INTERNAL MEDICINE

## 2023-07-04 PROCEDURE — 36415 COLL VENOUS BLD VENIPUNCTURE: CPT | Performed by: INTERNAL MEDICINE

## 2023-07-04 RX ORDER — PSYLLIUM SEED (WITH DEXTROSE)
2 POWDER (GRAM) ORAL DAILY
Status: DISCONTINUED | OUTPATIENT
Start: 2023-07-04 | End: 2023-07-04

## 2023-07-04 RX ORDER — FERROUS SULFATE 325(65) MG
325 TABLET ORAL 2 TIMES DAILY WITH MEALS
Status: DISCONTINUED | OUTPATIENT
Start: 2023-07-04 | End: 2023-07-05 | Stop reason: HOSPADM

## 2023-07-04 RX ADMIN — LEVETIRACETAM 750 MG: 750 TABLET, FILM COATED ORAL at 11:17

## 2023-07-04 RX ADMIN — Medication: at 09:28

## 2023-07-04 RX ADMIN — ACETAMINOPHEN 650 MG: 325 TABLET, FILM COATED ORAL at 09:26

## 2023-07-04 RX ADMIN — OXCARBAZEPINE 450 MG: 300 TABLET, FILM COATED ORAL at 09:26

## 2023-07-04 RX ADMIN — FERROUS SULFATE TAB 325 MG (65 MG ELEMENTAL FE) 325 MG: 325 (65 FE) TAB at 13:41

## 2023-07-04 RX ADMIN — LEVETIRACETAM 750 MG: 750 TABLET, FILM COATED ORAL at 09:26

## 2023-07-04 RX ADMIN — OXCARBAZEPINE 450 MG: 300 TABLET, FILM COATED ORAL at 11:18

## 2023-07-04 RX ADMIN — DICLOFENAC SODIUM 4 G: 10 GEL TOPICAL at 13:41

## 2023-07-04 RX ADMIN — TOPIRAMATE 100 MG: 100 TABLET, FILM COATED ORAL at 21:13

## 2023-07-04 RX ADMIN — OXCARBAZEPINE 600 MG: 300 TABLET, FILM COATED ORAL at 21:13

## 2023-07-04 RX ADMIN — ENOXAPARIN SODIUM 40 MG: 40 INJECTION SUBCUTANEOUS at 21:12

## 2023-07-04 RX ADMIN — FERROUS SULFATE TAB 325 MG (65 MG ELEMENTAL FE) 325 MG: 325 (65 FE) TAB at 17:46

## 2023-07-04 RX ADMIN — CLONAZEPAM 1.5 MG: 1 TABLET ORAL at 21:12

## 2023-07-04 RX ADMIN — DICLOFENAC SODIUM 4 G: 10 GEL TOPICAL at 09:28

## 2023-07-04 RX ADMIN — PSYLLIUM HUSK 1 PACKET: 3.4 POWDER ORAL at 14:44

## 2023-07-04 RX ADMIN — PERAMPANEL 8 MG: 2 TABLET ORAL at 21:12

## 2023-07-04 RX ADMIN — OXCARBAZEPINE 600 MG: 300 TABLET, FILM COATED ORAL at 17:46

## 2023-07-04 RX ADMIN — LEVETIRACETAM 1125 MG: 250 TABLET, FILM COATED ORAL at 21:13

## 2023-07-04 RX ADMIN — Medication: at 15:32

## 2023-07-04 RX ADMIN — CEFPODOXIME PROXETIL 200 MG: 200 TABLET, FILM COATED ORAL at 21:12

## 2023-07-04 RX ADMIN — CEFPODOXIME PROXETIL 200 MG: 200 TABLET, FILM COATED ORAL at 09:26

## 2023-07-04 RX ADMIN — ESCITALOPRAM OXALATE 10 MG: 10 TABLET ORAL at 21:14

## 2023-07-04 ASSESSMENT — ACTIVITIES OF DAILY LIVING (ADL)
ADLS_ACUITY_SCORE: 40
ADLS_ACUITY_SCORE: 45
ADLS_ACUITY_SCORE: 44
ADLS_ACUITY_SCORE: 44
ADLS_ACUITY_SCORE: 45
ADLS_ACUITY_SCORE: 45
ADLS_ACUITY_SCORE: 44
ADLS_ACUITY_SCORE: 45
ADLS_ACUITY_SCORE: 45
ADLS_ACUITY_SCORE: 40

## 2023-07-04 NOTE — PLAN OF CARE
07/04/23 [id number]  A/Ox4. VSS on RA. Pt is Ax2 with funmilayo steady and is continent of bowels-Mills in place. BM-x1 this shift-loose. Pain in upper thighs-PRN Tylenol given, along with heat packs, and scheduled topical's-Bengay and Voltaren. On reg diet with good appetite. LPIV-SL. Up in chair most of this shift. Maintained seizure precautions.  and daughters up to visit this shift. Discharge to [place] with Mills tomorrow.

## 2023-07-04 NOTE — PLAN OF CARE
Goal Outcome Evaluation:    ([id number])    Pt A&O x4. VSS in RA, ex HTN. Denies pain this shift. CMS intact. Encouraged to shift weight while in bed. Mills in place. K WDL, recheck tomorrow. Discharge pending.

## 2023-07-04 NOTE — PLAN OF CARE
Goal Outcome Evaluation:    A/Ox4, VSS on RA ex HTN - can be forgetful. Generalized hip pain managed with scheduled voltaren/bengay gel, and tylenol. L PIV SL. Regular diet. Up A2 w/ funmi, worked with PT this afternoon. T/R Q2H. Mills in place, urine pink-tinged. One BM this afternoon. Scattered bruising. K 3.3 - second replacement given this evening. Recheck scheduled for 0230. Possible discharge to [place] [facility] in next few days.

## 2023-07-04 NOTE — PROGRESS NOTES
Care Management Follow Up    Length of Stay (days): 6    Expected Discharge Date: 07/05/2023     Concerns to be Addressed: all concerns addressed in this encounter     Patient plan of care discussed at interdisciplinary rounds: Yes    Anticipated Discharge Disposition: Transitional Care     Anticipated Discharge Services: None  Anticipated Discharge DME: None    Patient/family educated on Medicare website which has current facility and service quality ratings: yes  Education Provided on the Discharge Plan: No  Patient/Family in Agreement with the Plan: yes    Referrals Placed by CM/SW:    Private pay costs discussed: Not applicable    Additional Information:  Writer was notified [facility] can take patient today if auth was received. Writer called [employer] and got auth for today. Request ID [id number]. Auth # [id number] 7/4-7/10    Writer paged MD to see if patient is ready. Waiting for a response.     Addendum: Writer talked with MD who states patient will not discharge today and likely discharge tomorrow if stable.     BRENT [place]

## 2023-07-04 NOTE — PROGRESS NOTES
[facility]    Hospitalist Progress Note    Date of Service (when I saw the patient): 07/04/2023     Assessment & Plan  Adia Briceno is a pleasant 68-year-old woman with history of depression, epilepsy, reflux disease, seizure disorder, migraines and syncope with prior PPM - who presented to the emergency department for fever, confusion and urinary symptoms.  In the ER had a fever of 101.1, was tachycardic but had stable bp and oxygenation.    Current problems include:     Sepsis with fever, leukocytosis and tachycardia; resolved.  Pyleophritis, presented with confusion, fever and chronic urinary symptoms   Recurrent UTIs with dysuria, frequency and pyuria   Bilateral hydronephrosis and stranding on CT  S/p geiger catheter for potential obstruction on 6/30/23   Mild hematuria noted 6/30/23; now intermittent   Per prior notes: was on monthly Macrobid and recently on Bactrim prophylactically.  However, I cannot find clear documentation of this.  I do see that she was on Bactrim daily per med list.  But no information and clinic visits found through our system and care everywhere. Recurrent pyuria with only UCx in last year on 5/2023 being negative   * UA: Greater than 182 white blood cells, WBC 13.8 (neutrophilic)    Initially on IV ceftriaxone in the ED    On 06/29/23. ontinued fever, rising WBC,     CT C/A/P 6/29/23 without contrast:   1.  Moderate right and mild-moderate left hydronephrosis and stranding  without ureteral stones or obvious etiology. Pyelonephritis could have  this appearance. Clinical correlation needed.  2.  Compression deformity and erosive change of L2, consider lumbar  spine MRI to evaluate for osteomyelitis.   * Obtained films from February through [facility], and reviewed with radiologist. Similar appearance of L2, making osteo unlikely. Kidneys partially viewed and no evidence of hydro. (She has chronic low back and R thigh pain from L2 fracture in January, unchanged, so  not c/w osteo clinically)    Switched abx to zosyn on 6/29/23, given recent recurrent antibiotics as an outpatient    UCx growing aerococcus, pan sensitive. BCx NGTD.    On 07/02/23, afebrile for over 24 hours.     Stopped zosyn    - earlier was given one dose of IV ceftriaxone    -> 7/3: began cefpodoxime 200 mg BID (w/ prior plans to continue) for 5 days    Appreciate Urology consult    Followed PVR bladder scans and straight cath for > 300. (On 06/29/23: 110 ml and 253 ml overnight) > Geiger catheter placed per Urology's recommendation in case obstruction leading to hydro < 200 drained when placed.     Mild hematuria noted after catheter placement. Good flow through catheter, hgb stable   ? Hand irrigate PRN  - Holding PTA oxybutynin, would hold on resumption until evaluation of sx post treatment of UTI.     -> appreciate Urology follow up 7/3-4:  - continue geiger catheter at discharge.  - perform TOV at Urology follow up with Dr. Mckeon in 3-4 weeks   Amy Valentino PA-C (Jackie)   [facility]   Office: [phone]     NATALIA, RESOLVED - Cr 1.11 up from 0.88.      Cr normalized on 06/30/23     Holding PTA diclofenac      Anemia, normocytic  -> 7/3 iron studies: low normal  - repeat Hgb 7/5    Hypokalemia; corrected  - continue replacement orders     Constipation Noted on 07/01/23; now having loose BM's  - add psyllium ('s request)  - check for C. Diff.  - hold all bowel meds    GERD  -> added PRN TUMS    Hyperglycemia, mild, intermittent  - A1C = 5.5%  - monitor     Physical deconditioning  Tailbone pain from lying in bed.   BMI 29.78 (upper limit of overweight)    Working with PT -> TCU recommended  -> needs RD eval. soon to help with gradual weight loss     Acute encephalopathy secondary to infection; resolved.    07/02/23, alert and oriented and answering questions appropriately.      Maintain normal day/night, sleep wake cycles    Minimize sedating/altering medications as  able     Epilepsy    continue PTA Keppra, oxcarbazepine, perampanel    Ativan ordered PRN seizure and seizure precautions   - has mild seizures about 3 times a month.  Following these she is to take Ativan twice daily for 2 days.  Family has not noted any seizures yet during this admission.      Depression    continue PTA escitalopram and clonazepam at bedtime     Migraine    continue topamax and as needed sumatriptan    HA evening of 6/30/23, aborted with sumatriptan.      Radiculopathy from recent L2 compression fracture  Chronic pain bilateral upper legs  - holding PTA diclofenac twice daily due to ANTALIA  - continue menthol and diclofenac gel QID on 07/01/23     Tramadol PRN q 8 hours PRN ordered and Oxycodone for severe pain. - Has not required tramadol or oxycodone since 6/30.     Has tried both gabapentin and lyrica in the past with bad side effects  - Pain team consult 7/5 a.m., if able          Diet: regular Diet Adult     IVF: None  Mills Catheter: PRESENT, indication:   concern for retention, see above.      DVT Prophylaxis: enoxaparin   Code Status: Full Code      Disposition: Expected discharge on 7/5; postponed today (7/4) given mild diarrhea, and patient's/'s interest in having Pain consult, and limited info. today about room/staffing at [facility] on the holiday.        VICKY Camarena MD, [facility] Center[facility]  Text Page (7am - 6pm)    Interval History   Reviewed care w/ RN and w/ Adia and her  in detail.    Chronic low back pain/bilateral thigh pain somewhat better controlled with current meds, but is limiting progress with therapies.  Multiple loose bowel movements yesterday and today after being constipated for several days.  No shortness of breath, abdominal pain, nausea or vomiting.  Appetite good.    Data reviewed today: I reviewed all new labs and imaging results over the last 24 hours.     Physical Exam   Temp: 98.3  F (36.8  C) Temp src: Oral BP: (!) 144/80 Pulse: 84    Resp: 18 SpO2: 97 % O2 Device: None (Room air)    Vitals:    07/01/23 0618 07/02/23 0500 07/04/23 0617   Weight: 85 kg (187 lb 6.3 oz) 83.7 kg (184 lb 8.4 oz) 83.6 kg (184 lb 4.9 oz)     Vital Signs with Ranges  Temp:  [98.3  F (36.8  C)-98.7  F (37.1  C)] 98.3  F (36.8  C)  Pulse:  [76-87] 84  Resp:  [16-18] 18  BP: (124-150)/(67-92) 144/80  SpO2:  [95 %-97 %] 97 %  I/O last 3 completed shifts:  In: 450 [P.O.:450]  Out: 1550 [Urine:1550]    Constitutional: awake, no apparent distress; reclining in chair  HEENT: sclerae clear; MM's moist  Respiratory: good a/e bilaterally, no wheezing or rhonchi  Cardiovascular: regular rate and rhythm, S1, S2 noted; no m/r/g  GI: abdomen mod. obese, + bowel sounds; soft, non-tender, non-distended  Skin/Integumen: no rashes, no cyanosis, no jaundice  Musculoskeletal: no edema  Neurologic: follows directions well; no focal deficits      Medications       cefpodoxime  200 mg Oral Q12H [facility] (08[facility])     clonazePAM  1.5 mg Oral At Bedtime     diclofenac  4 g Topical 4x Daily     enoxaparin ANTICOAGULANT  40 mg Subcutaneous Q24H     escitalopram  10 mg Oral At Bedtime     ferrous sulfate  325 mg Oral BID w/meals     levETIRAcetam  1,125 mg Oral At Bedtime     levETIRAcetam  750 mg Oral TID     menthol (Topical Analgesic) 2.5%   Topical Q6H     OXcarbazepine  450 mg Oral BID     OXcarbazepine  600 mg Oral BID     perampanel  8 mg Oral At Bedtime     psyllium  1 packet Oral Daily     sodium chloride (PF)  3 mL Intracatheter Q8H     topiramate  100 mg Oral At Bedtime       Data   Recent Labs   Lab 07/04/23  0227 07/03/23  2024 07/03/23  0734 07/02/23  0756 07/01/23  0656 06/30/23  1437 06/30/23  0837 06/29/23  0822 06/28/23  0320   WBC  --   --  11.0 11.4* 12.2*   < >  --    < > 13.8*   HGB  --   --  10.5* 11.0* 10.7*   < >  --    < > 11.7   MCV  --   --  93 92 94   < >  --    < > 94     --  342 303 266  266   < >  --    < > 263   NA  --   --  138  --  139  --  137   < > 138    POTASSIUM 4.0 3.3* 3.1* 3.4 3.5  --  3.8   < > 3.9   CHLORIDE  --   --  106  --  106  --  106   < > 105   CO2  --   --  19*  --  21*  --  17*   < > 18*   BUN  --   --  12.5  --  14.3  --  20.9   < > 18.4   CR 0.60  --  0.54 0.58 0.70  0.70  --  0.89   < > 0.88   ANIONGAP  --   --  13  --  12  --  14   < > 15   TATIANA  --   --  8.3*  --  8.6*  --  8.2*   < > 8.2*   GLC  --   --  115*  --  92  --  98   < > 127*   LIPASE  --   --   --   --   --   --   --   --  12*    < > = values in this interval not displayed.

## 2023-07-05 ENCOUNTER — HOSPITAL ENCOUNTER (EMERGENCY)
Facility: CLINIC | Age: 68
Discharge: LEFT BEFORE DISCHARGE | End: 2023-07-05
Admitting: EMERGENCY MEDICINE
Payer: COMMERCIAL

## 2023-07-05 ENCOUNTER — LAB REQUISITION (OUTPATIENT)
Dept: LAB | Facility: CLINIC | Age: 68
End: 2023-07-05

## 2023-07-05 VITALS
TEMPERATURE: 98.1 F | DIASTOLIC BLOOD PRESSURE: 83 MMHG | RESPIRATION RATE: 18 BRPM | SYSTOLIC BLOOD PRESSURE: 147 MMHG | HEART RATE: 83 BPM | OXYGEN SATURATION: 97 %

## 2023-07-05 VITALS
SYSTOLIC BLOOD PRESSURE: 185 MMHG | TEMPERATURE: 97.3 F | HEART RATE: 105 BPM | DIASTOLIC BLOOD PRESSURE: 76 MMHG | RESPIRATION RATE: 24 BRPM | OXYGEN SATURATION: 99 %

## 2023-07-05 VITALS
DIASTOLIC BLOOD PRESSURE: 78 MMHG | RESPIRATION RATE: 18 BRPM | HEART RATE: 85 BPM | BODY MASS INDEX: 29.94 KG/M2 | SYSTOLIC BLOOD PRESSURE: 138 MMHG | WEIGHT: 186.29 LBS | TEMPERATURE: 98.6 F | OXYGEN SATURATION: 95 % | HEIGHT: 66 IN

## 2023-07-05 DIAGNOSIS — Z11.1 ENCOUNTER FOR SCREENING FOR RESPIRATORY TUBERCULOSIS: ICD-10-CM

## 2023-07-05 LAB — POTASSIUM SERPL-SCNC: 3.8 MMOL/L (ref 3.4–5.3)

## 2023-07-05 PROCEDURE — 99281 EMR DPT VST MAYX REQ PHY/QHP: CPT

## 2023-07-05 PROCEDURE — 250N000013 HC RX MED GY IP 250 OP 250 PS 637: Performed by: INTERNAL MEDICINE

## 2023-07-05 PROCEDURE — 99239 HOSP IP/OBS DSCHRG MGMT >30: CPT | Performed by: HOSPITALIST

## 2023-07-05 PROCEDURE — 36415 COLL VENOUS BLD VENIPUNCTURE: CPT | Performed by: INTERNAL MEDICINE

## 2023-07-05 PROCEDURE — 84132 ASSAY OF SERUM POTASSIUM: CPT | Performed by: INTERNAL MEDICINE

## 2023-07-05 RX ORDER — CALCIUM CARBONATE 500 MG/1
1 TABLET, CHEWABLE ORAL 3 TIMES DAILY PRN
COMMUNITY
Start: 2023-07-05

## 2023-07-05 RX ORDER — CLONAZEPAM 1 MG/1
1.5 TABLET ORAL AT BEDTIME
Qty: 45 TABLET | Refills: 0 | Status: SHIPPED | OUTPATIENT
Start: 2023-07-05 | End: 2023-07-28

## 2023-07-05 RX ORDER — ACETAMINOPHEN 325 MG/1
650 TABLET ORAL EVERY 6 HOURS PRN
COMMUNITY
Start: 2023-07-05

## 2023-07-05 RX ORDER — FERROUS SULFATE 325(65) MG
325 TABLET ORAL 2 TIMES DAILY WITH MEALS
DISCHARGE
Start: 2023-07-05

## 2023-07-05 RX ORDER — CODEINE PHOSPHATE AND GUAIFENESIN 10; 100 MG/5ML; MG/5ML
5 SOLUTION ORAL DAILY PRN
Qty: 180 ML | Refills: 0 | Status: SHIPPED | OUTPATIENT
Start: 2023-07-05

## 2023-07-05 RX ORDER — TRAMADOL HYDROCHLORIDE 50 MG/1
25 TABLET ORAL 2 TIMES DAILY PRN
Qty: 15 TABLET | Refills: 0 | Status: SHIPPED | OUTPATIENT
Start: 2023-07-05

## 2023-07-05 RX ORDER — CEFPODOXIME PROXETIL 200 MG/1
200 TABLET, FILM COATED ORAL EVERY 12 HOURS
Qty: 14 TABLET | Refills: 0 | DISCHARGE
Start: 2023-07-05 | End: 2023-07-12

## 2023-07-05 RX ORDER — LORAZEPAM 0.5 MG/1
TABLET ORAL
Qty: 4 TABLET | Refills: 0 | Status: SHIPPED | OUTPATIENT
Start: 2023-07-05 | End: 2023-07-10

## 2023-07-05 RX ORDER — SACCHAROMYCES BOULARDII 250 MG
250 CAPSULE ORAL 2 TIMES DAILY
DISCHARGE
Start: 2023-07-05 | End: 2023-07-12

## 2023-07-05 RX ADMIN — OXCARBAZEPINE 450 MG: 300 TABLET, FILM COATED ORAL at 09:10

## 2023-07-05 RX ADMIN — Medication: at 03:35

## 2023-07-05 RX ADMIN — DICLOFENAC SODIUM 4 G: 10 GEL TOPICAL at 09:12

## 2023-07-05 RX ADMIN — FERROUS SULFATE TAB 325 MG (65 MG ELEMENTAL FE) 325 MG: 325 (65 FE) TAB at 09:10

## 2023-07-05 RX ADMIN — LEVETIRACETAM 750 MG: 750 TABLET, FILM COATED ORAL at 09:09

## 2023-07-05 RX ADMIN — PSYLLIUM HUSK 1 PACKET: 3.4 POWDER ORAL at 09:11

## 2023-07-05 RX ADMIN — CEFPODOXIME PROXETIL 200 MG: 200 TABLET, FILM COATED ORAL at 09:09

## 2023-07-05 ASSESSMENT — ACTIVITIES OF DAILY LIVING (ADL)
ADLS_ACUITY_SCORE: 40

## 2023-07-05 NOTE — PLAN OF CARE
Goal Outcome Evaluation:    [id number])    Pt A&O x4, forgetful at times. A1-2 sera steady. VSS on RA. Mills patent. Loose stool. C-diff negative. Denies pain this shift. Seizure precaution maintained. Possible discharge to TCU tomorrow.

## 2023-07-05 NOTE — PROGRESS NOTES
Care Management Discharge Note    Discharge Date: 07/05/2023       Discharge Disposition: Skilled Nursing Facility, Transitional Care    Discharge Services: None    Discharge DME: None    Discharge Transportation: family or friend will provide    Private pay costs discussed: Not applicable    Does the patient's insurance plan have a 3 day qualifying hospital stay waiver?  No    PAS Confirmation Code: [id number]  Patient/family educated on Medicare website which has current facility and service quality ratings: yes    Education Provided on the Discharge Plan: No  Persons Notified of Discharge Plans: patient, family, facility   Patient/Family in Agreement with the Plan: yes    Handoff Referral Completed: No    Additional Information:  [facility] Spouse will provide transport through the [facility] at 1100 AM. Sandi at [facility] updated. Orders faxed, PAS completed.    PAS-RR    D: Per DHS regulation, HOMER completed and submitted PAS-RR to [place] [employer] Direct Connect via the Senior LinkAge Line.  PAS-RR confirmation # is : [id number]    P: Further questions may be directed to Senior LinkAge Line at #[phone], option #4 for PAS-RR staff.    Anita Baez, GUDELIA, LGSW   Social Work   [facility]

## 2023-07-05 NOTE — PROGRESS NOTES
Pt. Stable for discharge. AVS reviewed with patient and son and sent with patient. Pt. Discharging to [facility].  Packet and prescriptions sent with patient.

## 2023-07-05 NOTE — PLAN OF CARE
Orientation: Aox4 but forgetful  Aggression Stop Light: green  Mobility: a1-2 w/ caio steady  Pain Management: denies pain this shift  Diet: regular  Bowel/Bladder: geiger for retention-will discharge with this, no BM this shift  Drain/Device/Wound: L PIV SL  Consults: pain, PT/OT  D/C Day/Goals/Place: [facility] 7/5

## 2023-07-05 NOTE — ED TRIAGE NOTES
Patient arrived via EMS from [facility], where she is being treated for a UTI, with complaints of generalized weakness.

## 2023-07-05 NOTE — DISCHARGE SUMMARY
"[facility]  Hospitalist Discharge Summary      Date of Admission:  6/28/2023  Date of Discharge:  7/5/2023  Discharging Provider: Solomon Bee MD  Discharge Service: Hospitalist Service    Discharge Diagnoses     Please refer to the hospital course below    Clinically Significant Risk Factors     # Obesity: Estimated body mass index is 30.07 kg/m  as calculated from the following:    Height as of this encounter: 1.676 m (5' 6\").    Weight as of this encounter: 84.5 kg (186 lb 4.6 oz).       Follow-ups Needed After Discharge   Follow-up Appointments     Follow Up ([facility])      Please call [facility] to reschedule your appointment with either   Dr Lopez or Gilson Arteaga PA-C for workup for your urinary symptoms   and infections.     [facility]  [address] [place] [address]  You may call [phone] with any questions or concerns.   Central Appointment #: [phone]        Follow Up ([facility])      Follow up with Dr. Lopez in 3-4 weeks for TOV and evaluation for your   UTI.     Messaging has been sent to our schedulers to help set this up. If you do   not hear from our schedulers in 2-3 days, please call to schedule and   appointment. Call  [phone]    [facility]  [address] [place] [address] [phone]        Follow Up and recommended labs and tests      Follow up with residential physician.  The following labs/tests are   recommended: CBC and BMP in a week.              Unresulted Labs Ordered in the Past 30 Days of this Admission     No orders found from 5/29/2023 to 6/29/2023.      These results will be followed up by none    Discharge Disposition   Discharged to [facility]  Condition at discharge: Stable    Hospital Course     Adia Briceno is a pleasant 68-year-old woman with history of depression, epilepsy, reflux disease, seizure disorder, migraines and syncope with prior PPM - who " presented to the emergency department for fever, confusion and urinary symptoms.  In the ER had a fever of 101.1, was tachycardic but had stable bp and oxygenation.     Current problems include:     Sepsis with fever, leukocytosis and tachycardia; resolved.  Pyleophritis, presented with confusion, fever and chronic urinary symptoms   Recurrent UTIs with dysuria, frequency and pyuria   Bilateral hydronephrosis and stranding on CT  S/p geiger catheter for potential obstruction on 6/30/23   Mild hematuria noted 6/30/23; now intermittent   Per prior notes: was on monthly Macrobid and recently on Bactrim prophylactically.  However, I cannot find clear documentation of this.  I do see that she was on Bactrim daily per med list.  But no information and clinic visits found through our system and care everywhere. Recurrent pyuria with only UCx in last year on 5/2023 being negative   * UA: Greater than 182 white blood cells, WBC 13.8 (neutrophilic)    IV ceftriaxone in the ED    On 06/29/23. ontinued fever, rising WBC    CT C/A/P 6/29/23 without contrast:   1.  Moderate right and mild-moderate left hydronephrosis and stranding without ureteral stones or obvious etiology. Pyelonephritis could have this appearance. Clinical correlation needed.  2.  Compression deformity and erosive change of L2, consider lumbar spine MRI to evaluate for osteomyelitis.   * Obtained films from February through [facility], and reviewed with radiologist. Similar appearance of L2, making osteo unlikely. Kidneys partially viewed and no evidence of hydro. (She has chronic low back and R thigh pain from L2 fracture in January, unchanged, so not c/w osteo clinically)    Switched abx to zosyn on 6/29/23, given recent recurrent antibiotics as an outpatient    UCx grew aerococcus, pan sensitive. BCx NGTD. Patient became afebrile, Abx changed to PO cefpodoxime 200 mg BID. Patient had recurrent urinary retention and needed geiger catheter and given catheter,  continued 7 more days at discharge.    Mild hematuria noted after catheter placement. Good flow through catheter, hgb stable     Hand irrigate PRN     Urology consulted this stay, recommended continue geiger catheter at discharge. Plan is to perform TOV at Urology follow up with Dr. Mckeon in 3-4 weeks      NATALIA, RESOLVED - Cr 1.11 up from 0.88.      Cr normalized on 06/30/23     PTA diclofenac discontinued      Anemia, normocytic  -> 7/3 iron studies: low normal, recommend follow up     Hypokalemia; corrected      Constipation Noted on 07/01/23; now having loose BM's  - add psyllium ('s request)   - c diff negative     GERD  -> added PRN TUMS     Hyperglycemia, mild, intermittent  - A1C = 5.5%     Physical deconditioning  Tailbone pain from lying in bed.   BMI 29.78 (upper limit of overweight)    Working with PT -> discharged to [facility]CU   -> needs RD eval. soon to help with gradual weight loss     Acute encephalopathy secondary to infection; resolved.    07/02/23, alert and oriented and answering questions appropriately.      Maintain normal day/night, sleep wake cycles    Minimize sedating/altering medications as able     Epilepsy    continue PTA Keppra, oxcarbazepine, perampanel    Ativan ordered PRN seizure and seizure precautions   - has mild seizures about 3 times a month.  Following these she is to take Ativan twice daily for 2 days.  Family has not noted any seizures yet during this admission.      Depression    continue PTA escitalopram and clonazepam at bedtime     Migraine    continue topamax and as needed sumatriptan    HA evening of 6/30/23, aborted with sumatriptan.      Radiculopathy from recent L2 compression fracture  Chronic pain bilateral upper legs  - PTA diclofenac twice daily held due to NATALIA  - continue menthol and diclofenac gel QID on 07/01/23     Tramadol PRN q 8 hours PRN ordered and Oxycodone for severe pain. - Has not required tramadol or oxycodone since 6/30.     Has tried both  gabapentin and lyrica in the past with bad side effects  - pain managed with tylenol now    Consultations This Hospital Stay   PHYSICAL THERAPY ADULT IP CONSULT  OCCUPATIONAL THERAPY ADULT IP CONSULT  PHARMACY IP CONSULT  UROLOGY IP CONSULT  UROLOGY IP CONSULT  UROLOGY IP CONSULT  PHARMACY IP CONSULT  VASCULAR ACCESS ADULT IP CONSULT  VASCULAR ACCESS ADULT IP CONSULT  CARE MANAGEMENT / SOCIAL WORK IP CONSULT  PAIN MANAGEMENT ADULT IP CONSULT  PHYSICAL THERAPY ADULT IP CONSULT  OCCUPATIONAL THERAPY ADULT IP CONSULT    Code Status   Full Code    Time Spent on this Encounter   I, Solomon Bee MD, personally saw the patient today and spent greater than 30 minutes discharging this patient.       Solomon Bee MD  [place] [id number] [facility]  [address]  [facility] [address]  Phone: [phone]  ______________________________________________________________________    Physical Exam   Vital Signs: Temp: 98.6  F (37  C) Temp src: Oral BP: 138/78 Pulse: 85   Resp: 18 SpO2: 95 % O2 Device: None (Room air)    Weight: 186 lbs 4.62 oz    Constitutional: awake, no apparent distress; reclining in chair  HEENT: sclerae clear; MM's moist  Respiratory: good a/e bilaterally, no wheezing or rhonchi  Cardiovascular: regular rate and rhythm, S1, S2 noted; no m/r/g  GI: abdomen mod. obese, + bowel sounds; soft, non-tender, non-distended  Skin/Integumen: no rashes, no cyanosis, no jaundice  Musculoskeletal: no edema  Neurologic: follows directions well; no focal deficits       Primary Care Physician   Physician No Ref-Primary    Discharge Orders      Follow Up ([facility])    Please call [facility] to reschedule your appointment with either Dr Lopez or Gilson Arteaga PA-C for workup for your urinary symptoms and infections.     [facility]  [address] [place] [address]  You may call [phone] with any questions or concerns.   Central Appointment #: [phone]     Follow Up ([facility])    Follow up with Dr. Lopez in 3-4 weeks for TOV and evaluation for your UTI.     Messaging has been sent to our schedulers to help set this up. If you do not hear from our schedulers in 2-3 days, please call to schedule and appointment. Call  [phone]    [facility]  [address] [place] [address]     [phone]     General info for SNF    Length of Stay Estimate: Short Term Care: Estimated # of Days <30  Condition at Discharge: Improving  Level of care:skilled   Rehabilitation Potential: Good  Admission H&P remains valid and up-to-date: Yes  Recent Chemotherapy: N/A  Use Nursing Home Standing Orders: Yes     Mantoux instructions    Give two-step Mantoux (PPD) Per Facility Policy Yes     Follow Up and recommended labs and tests    Follow up with care home physician.  The following labs/tests are recommended: CBC and BMP in a week.     Reason for your hospital stay    UTI/pyelonephritis and sepsis     Daily weights    Call Provider for weight gain of more than 2 pounds per day or 5 pounds per week.     Mills catheter    To straight gravity drainage. Change catheter every 2 weeks and PRN for leaking or decreased urine output with signs of bladder distention. DO NOT change catheter without a specific Provider order IF diagnosis of benign prostatic hypertrophy (BPH), neurogenic bladder, or other urological conditions     Activity - Up with assistive device     Activity - Up with nursing assistance     Full Code     Physical Therapy Adult Consult    Evaluate and treat as clinically indicated.    Reason:  sepsis, deconditioning, back pain and weakness     Occupational Therapy Adult Consult    Evaluate and treat as clinically indicated.    Reason:  sepsis, deconditioning, back pain and weakness     Contact Isolation     Fall precautions     Diet    Follow this diet upon discharge: Orders Placed This Encounter      Regular Diet Adult       Significant  Results and Procedures   Most Recent 3 CBC's:Recent Labs   Lab Test 07/04/23 0227 07/03/23 0734 07/02/23 0756 07/01/23  0656   WBC  --  11.0 11.4* 12.2*   HGB  --  10.5* 11.0* 10.7*   MCV  --  93 92 94    342 303 266  266     Most Recent 3 BMP's:Recent Labs   Lab Test 07/05/23  0711 07/04/23 0227 07/03/23 2024 07/03/23 0734 07/02/23  0756 07/01/23  0656 06/30/23  0837   NA  --   --   --  138  --  139 137   POTASSIUM 3.8 4.0 3.3* 3.1* 3.4 3.5 3.8   CHLORIDE  --   --   --  106  --  106 106   CO2  --   --   --  19*  --  21* 17*   BUN  --   --   --  12.5  --  14.3 20.9   CR  --  0.60  --  0.54 0.58 0.70  0.70 0.89   ANIONGAP  --   --   --  13  --  12 14   TATIANA  --   --   --  8.3*  --  8.6* 8.2*   GLC  --   --   --  115*  --  92 98     Most Recent 2 LFT's:No lab results found.  7-Day Micro Results     Collected Updated Procedure Result Status      07/04/2023 1741 07/04/2023 2206 C. difficile Toxin B PCR with reflex to C. difficile Antigen and Toxins A/B EIA [[id number]]    Stool from Per Rectum    Final result Component Value   C Difficile Toxin B by PCR Negative   A negative result does not exclude actual disease due to C. difficile and may be due to improper collection, handling and storage of the specimen or the number of organisms in the specimen is below the detection limit of the assay.            06/29/2023 1136 07/04/2023 1547 Blood Culture Wrist, Left [[id number]]    Blood from Wrist, Left    Final result Component Value   Culture No Growth               06/29/2023 1129 07/04/2023 1547 Blood Culture Arm, Left [[id number]]   Blood from Arm, Left    Final result Component Value   Culture No Growth                   Most Recent TSH and T4:Recent Labs   Lab Test 02/07/18  1416   TSH 1.85     Most Recent 6 glucoses:Recent Labs   Lab Test 07/03/23  0734 07/01/23  0656 06/30/23  0837 06/29/23  0822 06/28/23  0320 10/03/18  0000   * 92 98 108* 127* 86     Most Recent Urinalysis:Recent Labs    Lab Test 06/28/23  0406   COLOR Light Brown*   APPEARANCE Cloudy*   URINEGLC Negative   URINEBILI Negative   URINEKETONE 40*   SG 1.017   UBLD Moderate*   URINEPH 7.0   PROTEIN 100*   NITRITE Negative   LEUKEST Large*   RBCU 36*   WBCU >182*     Most Recent Anemia Panel:Recent Labs   Lab Test 07/04/23  0227 07/03/23  0734   WBC  --  11.0   HGB  --  10.5*   HCT  --  31.8*   MCV  --  93    342   IRON  --  36*   IRONSAT  --  16   FEB  --  227*   ROX  --  945*   ,   Results for orders placed or performed during the hospital encounter of 06/28/23   CT Chest Abdomen Pelvis w/o Contrast    Addendum: 6/30/2023    BESSY MORGAN  ACCESSION #[id number]    ADDENDUM:     Original report dictated by Dr. Edwards. Addendum dictated by Dr. Hallman.    Outside CT of the lumbar spine dated 2/27/2023 is now available for  review. The L2 compression fracture has not appreciably changed in the  interim.     Osteomyelitis therefore unlikely.    INOCENCIO HALLMAN M.D. Date of Addendum: 6/30/2023    INOCENCIO HALLMAN MD         SYSTEM ID:  [id number]      Narrative    CT CHEST/ABDOMEN/PELVIS WITHOUT CONTRAST 6/29/2023 11:18 AM    CLINICAL HISTORY: Evaluate for nephrolithiasis, obstruction,  infection. Fevers, urinary tract infection.    TECHNIQUE: CT scan of the chest, abdomen, and pelvis was performed  without IV contrast. Multiplanar reformats were obtained. Dose  reduction techniques were used.   CONTRAST: None    COMPARISON: August 10, 2021    FINDINGS:   LUNGS AND PLEURA: 7 mm nodule in the posterolateral right lower lobe  is not significantly changed since the comparison study. No definite  new nodules. Small fissural nodule on the right image 116, not  significantly changed as well. Scattered atelectasis and/or fibrosis.  No infiltrates or effusions.    MEDIASTINUM/AXILLAE: No adenopathy demonstrated in the absence of  contrast. No aneurysm.    CORONARY ARTERY CALCIFICATIONS: None.    HEPATOBILIARY: No significant mass or bile  duct dilatation. No  calcified gallstones.     PANCREAS: No significant mass, duct dilatation, or inflammatory  change.    SPLEEN: Normal size.    ADRENAL GLANDS: No significant nodules.    KIDNEYS/BLADDER: Moderate right and mild-moderate left hydronephrosis  and stranding. This appears to extend to the bladder. No definite  ureteral stones. At least four intrarenal stones on the left measuring  up to 6 mm noted. No definite intrarenal stones on the right.    BOWEL: No obstruction or inflammatory change.    PELVIC ORGANS: No pelvic masses.    ADDITIONAL FINDINGS: No ascites. No intra-abdominal fluid collections.    MUSCULOSKELETAL: Compression deformity and erosive changes of L2,  osteomyelitis not excluded. Compression deformities of L1 and T12 as  well.      Impression    IMPRESSION:  1.  Moderate right and mild-moderate left hydronephrosis and stranding  without ureteral stones or obvious etiology. Pyelonephritis could have  this appearance. Clinical correlation needed.  2.  Compression deformity and erosive change of L2, consider lumbar  spine MRI to evaluate for osteomyelitis.    AMI HARTMAN MD         SYSTEM ID:  [id number]   US Renal Complete Non-Vascular    Narrative    EXAM: US RENAL COMPLETE NON-VASCULAR  LOCATION: [facility]  DATE: 7/3/2023    INDICATION: Follow-up hydronephrosis  COMPARISON: CT dated 06/29/2023  TECHNIQUE: Routine Bilateral Renal and Bladder Ultrasound.    FINDINGS:    RIGHT KIDNEY: 13.2 x 8.0 x 6.9 cm. Significant right hydronephrosis, likely slightly improved compared to CT.     LEFT KIDNEY: 12.3 x 5.5 x 5.4 cm. There are scattered lower pole renal calculi measuring 6 to 9 mm at ultrasound. Mild left pelvocaliectasis, improved in the interval.     BLADDER: Now decompressed containing a Mills catheter.      Impression    IMPRESSION:  1.  Moderate right hydronephrosis persists, slightly increased in the interval.  2.  Mild left pelvocaliectasis, significantly  improved.       Discharge Medications   Current Discharge Medication List      START taking these medications    Details   acetaminophen (TYLENOL) 325 MG tablet Take 2 tablets (650 mg) by mouth every 6 hours as needed for mild pain or other (and adjunct with moderate or severe pain or per patient request)    Associated Diagnoses: Chronic bilateral low back pain with bilateral sciatica      calcium carbonate (TUMS) 500 MG chewable tablet Take 1 tablet (500 mg) by mouth 3 times daily as needed for heartburn    Associated Diagnoses: Heart burn      cefpodoxime (VANTIN) 200 MG tablet Take 1 tablet (200 mg) by mouth every 12 hours for 7 days  Qty: 14 tablet, Refills: 0    Associated Diagnoses: Acute cystitis with hematuria      diclofenac (VOLTAREN) 1 % topical gel Apply 4 g topically 4 times daily    Associated Diagnoses: Chronic bilateral low back pain with bilateral sciatica      ferrous sulfate (FEROSUL) 325 (65 Fe) MG tablet Take 1 tablet (325 mg) by mouth 2 times daily (with meals)    Associated Diagnoses: Iron deficiency anemia, unspecified iron deficiency anemia type      menthol, Topical Analgesic, 2.5% (BENGAY VANISHING SCENT) 2.5 % GEL topical gel Apply topically every 6 hours    Associated Diagnoses: Chronic bilateral low back pain with bilateral sciatica      psyllium (METAMUCIL/KONSYL) Packet Take 1 packet by mouth daily    Associated Diagnoses: Diarrhea, unspecified type         CONTINUE these medications which have CHANGED    Details   clonazePAM (KLONOPIN) 1 MG tablet Take 1.5 tablets (1.5 mg) by mouth At Bedtime  Qty: 45 tablet, Refills: 0    Associated Diagnoses: Localz-rlted symptomatic epilepsy w cmplx partl sz, intract, w status (H)      guaiFENesin-codeine (ROBITUSSIN AC) 100-10 MG/5ML solution Take 5 mLs by mouth daily as needed for cough PRN for cough  Qty: 180 mL, Refills: 0    Associated Diagnoses: Subacute cough      LORazepam (ATIVAN) 0.5 MG tablet TAKE 1 TABLET BY MOUTH TWICE DAILY FOR 2  DAYS FOLLOWING SEIZURE CLUSTER  Qty: 4 tablet, Refills: 0    Associated Diagnoses: Localz-rlted symptomatic epilepsy w cmplx partl sz, intract, w status (H)      traMADol (ULTRAM) 50 MG tablet Take 0.5 tablets (25 mg) by mouth 2 times daily as needed for severe pain PRN for pain  Qty: 15 tablet, Refills: 0    Associated Diagnoses: Chronic bilateral low back pain with bilateral sciatica         CONTINUE these medications which have NOT CHANGED    Details   alendronate (FOSAMAX) 70 MG tablet Take 70 mg by mouth every 7 days Sunday      cetirizine (ZYRTEC) 10 MG tablet Take 10 mg by mouth daily as needed Summer allergies      Cholecalciferol (VITAMIN D) 2000 UNITS CAPS Take 1 capsule by mouth daily      escitalopram (LEXAPRO) 10 MG tablet Take 1 tablet (10 mg) by mouth At Bedtime  Qty: 30 tablet, Refills: 11    Associated Diagnoses: Adjustment disorder with depressed mood      levETIRAcetam (KEPPRA) 750 MG tablet Take one tablet three times daily and one and half tablets at night  Qty: 135 tablet, Refills: 11    Associated Diagnoses: Localization-related symptomatic epilepsy and epileptic syndromes with complex partial seizures, intractable, with status epilepticus (H)      OXcarbazepine (TRILEPTAL) 300 MG tablet TAKE ONE AND ONE-HALF TABLET IN THE MORNING, ONE AND ONE-HALF TABLET AT NOON, 2 TABLETS IN THE EVENING AND 2 TABLETS AT BEDTIME.  Qty: 210 tablet, Refills: 11    Associated Diagnoses: Localz-rlted symptomatic epilepsy w cmplx partl sz, intract, w status (H)      perampanel (FYCOMPA) 8 MG tablet Take 1 tablet (8 mg) by mouth At Bedtime  Qty: 31 tablet, Refills: 5    Associated Diagnoses: Localz-rlted symptomatic epilepsy w cmplx partl sz, intract, w status (H)      progesterone (PROMETRIUM) 100 MG capsule Take 100 mg by mouth daily      SUMAtriptan (IMITREX) 100 MG tablet Take one by mouth prn severe headache. No more than four per month.  Qty: 9 tablet, Refills: 1    Associated Diagnoses: Localization-related  symptomatic epilepsy and epileptic syndromes with complex partial seizures, intractable, with status epilepticus (H)      topiramate (TOPAMAX) 100 MG tablet TAKE ONE-HALF TABLET BY MOUTH IN THE MORNING, AND ONE TABLET IN THE AFTERNOON/EVENING(TOTAL 150 MG PER DAY)  Qty: 48 tablet, Refills: 11    Associated Diagnoses: Localization-related symptomatic epilepsy and epileptic syndromes with complex partial seizures, intractable, with status epilepticus (H)         STOP taking these medications       diclofenac (VOLTAREN) 75 MG EC tablet Comments:   Reason for Stopping:         sulfamethoxazole-trimethoprim (BACTRIM DS) 800-160 MG tablet Comments:   Reason for Stopping:             Allergies   Allergies   Allergen Reactions     Lactose

## 2023-07-05 NOTE — PLAN OF CARE
Physical Therapy Discharge Summary    Reason for therapy discharge:    Discharged to transitional care facility.    Progress towards therapy goal(s). [facility] for goal details.  Goals not met.  Barriers to achieving goals:   discharge from facility.    Therapy recommendation(s):    Continued therapy is recommended.  Rationale/Recommendations:  Per most recent treating therapist note: Per , patients baseline has fluctuated since fall 5 months ago but currently she is well below baseline and requiring assist of 2 for mobility. She would benefit from continued skilled PT at TCU prior to discharging to home..

## 2023-07-05 NOTE — ED TRIAGE NOTES
Patient is unsure why she was brought to the ED, she is unable to give a complete history, and refused blood draw while in triage.

## 2023-07-06 ENCOUNTER — TRANSITIONAL CARE UNIT VISIT (OUTPATIENT)
Dept: GERIATRICS | Facility: CLINIC | Age: 68
End: 2023-07-06
Payer: COMMERCIAL

## 2023-07-06 DIAGNOSIS — D64.9 NORMOCYTIC ANEMIA: ICD-10-CM

## 2023-07-06 DIAGNOSIS — N39.0 SEPSIS DUE TO URINARY TRACT INFECTION (H): Primary | ICD-10-CM

## 2023-07-06 DIAGNOSIS — F32.A DEPRESSION, UNSPECIFIED DEPRESSION TYPE: ICD-10-CM

## 2023-07-06 DIAGNOSIS — N17.9 AKI (ACUTE KIDNEY INJURY) (H): ICD-10-CM

## 2023-07-06 DIAGNOSIS — K21.9 GASTROESOPHAGEAL REFLUX DISEASE WITHOUT ESOPHAGITIS: ICD-10-CM

## 2023-07-06 DIAGNOSIS — R56.9 SEIZURE (H): ICD-10-CM

## 2023-07-06 DIAGNOSIS — R53.81 PHYSICAL DECONDITIONING: ICD-10-CM

## 2023-07-06 DIAGNOSIS — M54.40 CHRONIC LOW BACK PAIN WITH SCIATICA, SCIATICA LATERALITY UNSPECIFIED, UNSPECIFIED BACK PAIN LATERALITY: ICD-10-CM

## 2023-07-06 DIAGNOSIS — Z71.89 ADVANCED DIRECTIVES, COUNSELING/DISCUSSION: ICD-10-CM

## 2023-07-06 DIAGNOSIS — G43.901 MIGRAINE WITH STATUS MIGRAINOSUS, NOT INTRACTABLE, UNSPECIFIED MIGRAINE TYPE: ICD-10-CM

## 2023-07-06 DIAGNOSIS — E87.6 HYPOKALEMIA: ICD-10-CM

## 2023-07-06 DIAGNOSIS — G89.29 CHRONIC LOW BACK PAIN WITH SCIATICA, SCIATICA LATERALITY UNSPECIFIED, UNSPECIFIED BACK PAIN LATERALITY: ICD-10-CM

## 2023-07-06 DIAGNOSIS — N13.30 HYDRONEPHROSIS, UNSPECIFIED HYDRONEPHROSIS TYPE: ICD-10-CM

## 2023-07-06 DIAGNOSIS — I49.5 SINOATRIAL NODE DYSFUNCTION (H): ICD-10-CM

## 2023-07-06 DIAGNOSIS — A41.9 SEPSIS DUE TO URINARY TRACT INFECTION (H): Primary | ICD-10-CM

## 2023-07-06 DIAGNOSIS — Z95.0 CARDIAC PACEMAKER IN SITU: Chronic | ICD-10-CM

## 2023-07-06 PROCEDURE — P9604 ONE-WAY ALLOW PRORATED TRIP: HCPCS | Performed by: NURSE PRACTITIONER

## 2023-07-06 PROCEDURE — 36415 COLL VENOUS BLD VENIPUNCTURE: CPT | Performed by: NURSE PRACTITIONER

## 2023-07-06 PROCEDURE — 99309 SBSQ NF CARE MODERATE MDM 30: CPT | Performed by: NURSE PRACTITIONER

## 2023-07-06 PROCEDURE — 86481 TB AG RESPONSE T-CELL SUSP: CPT | Performed by: NURSE PRACTITIONER

## 2023-07-06 NOTE — PLAN OF CARE
Occupational Therapy Discharge Summary    Reason for therapy discharge:    Discharged to transitional care facility.    Progress towards therapy goal(s). See goals on Care Plan in [facility] electronic health record for goal details.  Goals partially met.  Barriers to achieving goals:   discharge from facility.    Therapy recommendation(s):    Pt currently functioning below baseline d/t decreased activity tolerance. Pt reports living at home w/ spouse and being IND w/ I/ADL's at baseline prior to admission. Per pt's spouse, pt is well below baseline mobility wise. Pt would benefit from continued therapy at TCU to help return to PLOF prior to returning home.

## 2023-07-06 NOTE — PROGRESS NOTES
[facility] GERIATRICS    PRIMARY CARE PROVIDER AND CLINIC:  Physician No Ref-Primary, No address on file  Chief Complaint   Patient presents with    Hospital F/U      [place] Medical Record Number:  [id number]  Place of Service where encounter took place:  [facility] (TCU) [[id number]]    Adia Briceno  is a 68 year old  (1955), admitted to the above facility from  [facility]. Hospital stay 6/28/23 through 7/5/23.  HPI:    68-year-old woman PMH depression, epilepsy, GERD, seizure disorder, migraines and syncope with prior PPM hospitalized with fever, confusion and urinary symptoms. treated for sepsis due to pyelonephritis, recurrent UTIs, bilateral hydronephrosis, urinary obstruction and hematuria. Initially on IV ceftriaxone, then zosyn, then cefpodoxime. Mills in place with urology f/u 3-4 weeks for TOV. NATALIA with cr 1.11, improved. Anemia with Hgb 10.5, normocytic. Hypokalemia replaced. Constipation/diarrhea added fiber. GERD on PRN TUMS. Encephalopathy due to infection resolved. Epilepsy on PTA Keppra, oxcarbazepine, perampanel and added ativan PRN seizure. After each seizure episode she is to take ativan twice daily for 2 days. Depression on escitalopram and clonazepam. Migraines on Topamax and as needed sumatriptan. Back pain with recent L2 compression fx and chronic leg pain: on menthol and Voltaren gels, PRN tramadol and oxycodone. To TCU for rehab. Immediately on arrival became unresponsive with suspected seizure, was sent to ED, evaluated and returned with no change in orders.      Patient seen for initial TCU visit. Reports Full Code desired. No headaches, dizziness, chest pain, dyspnea. Mills in place. Occasional constipation, added fiber per /patient request. Since admission BP range 142-156/76-86 and sats 94% room air.     CODE STATUS/ADVANCE DIRECTIVES DISCUSSION:  No CPR- Do NOT Intubate    ALLERGIES:   Allergies   Allergen Reactions    Lactose       PAST  MEDICAL HISTORY:   Past Medical History:   Diagnosis Date    Depression     Epilepsy (H)     GERD (gastroesophageal reflux disease)     Migraine     Seizure (H)     vagal nerve stimulator    Shortness of breath     Syncope and collapse     bradyarrhythmia: dual chamber pacemaker implanted 2011      PAST SURGICAL HISTORY:   has a past surgical history that includes Implant pacemaker (April 2011) and Implant stimulator vagus nerve (DEC  2008).  FAMILY HISTORY: family history is not on file.  SOCIAL HISTORY:   reports that she has never smoked. She has never used smokeless tobacco. She reports that she does not drink alcohol and does not use drugs.  Patient's living condition: lives with spouse    Post Discharge Medication Reconciliation Status:   MED REC REQUIRED  Post Medication Reconciliation Status:  Discharge medications reconciled, continue medications without change         Current Outpatient Medications   Medication Sig    acetaminophen (TYLENOL) 325 MG tablet Take 2 tablets (650 mg) by mouth every 6 hours as needed for mild pain or other (and adjunct with moderate or severe pain or per patient request)    alendronate (FOSAMAX) 70 MG tablet Take 70 mg by mouth every 7 days Sunday    calcium carbonate (TUMS) 500 MG chewable tablet Take 1 tablet (500 mg) by mouth 3 times daily as needed for heartburn    cefpodoxime (VANTIN) 200 MG tablet Take 1 tablet (200 mg) by mouth every 12 hours for 7 days    cetirizine (ZYRTEC) 10 MG tablet Take 10 mg by mouth daily as needed Summer allergies    Cholecalciferol (VITAMIN D) 2000 UNITS CAPS Take 1 capsule by mouth daily    clonazePAM (KLONOPIN) 1 MG tablet Take 1.5 tablets (1.5 mg) by mouth At Bedtime    diclofenac (VOLTAREN) 1 % topical gel Apply 4 g topically 4 times daily    escitalopram (LEXAPRO) 10 MG tablet Take 1 tablet (10 mg) by mouth At Bedtime    ferrous sulfate (FEROSUL) 325 (65 Fe) MG tablet Take 1 tablet (325 mg) by mouth 2 times daily (with meals)     guaiFENesin-codeine (ROBITUSSIN AC) 100-10 MG/5ML solution Take 5 mLs by mouth daily as needed for cough PRN for cough    levETIRAcetam (KEPPRA) 750 MG tablet Take one tablet three times daily and one and half tablets at night (Patient taking differently: Take 750 mg three times daily and then take 1125 mg at bedtime)    LORazepam (ATIVAN) 0.5 MG tablet TAKE 1 TABLET BY MOUTH TWICE DAILY FOR 2 DAYS FOLLOWING SEIZURE CLUSTER    menthol, Topical Analgesic, 2.5% (BENGAY VANISHING SCENT) 2.5 % GEL topical gel Apply topically every 6 hours    OXcarbazepine (TRILEPTAL) 300 MG tablet TAKE ONE AND ONE-HALF TABLET IN THE MORNING, ONE AND ONE-HALF TABLET AT NOON, 2 TABLETS IN THE EVENING AND 2 TABLETS AT BEDTIME.    perampanel (FYCOMPA) 8 MG tablet Take 1 tablet (8 mg) by mouth At Bedtime    progesterone (PROMETRIUM) 100 MG capsule Take 100 mg by mouth daily    psyllium (METAMUCIL/KONSYL) Packet Take 1 packet by mouth daily    saccharomyces boulardii (FLORASTOR) 250 MG capsule Take 1 capsule (250 mg) by mouth 2 times daily for 7 days    SUMAtriptan (IMITREX) 100 MG tablet Take one by mouth prn severe headache. No more than four per month.    topiramate (TOPAMAX) 100 MG tablet TAKE ONE-HALF TABLET BY MOUTH IN THE MORNING, AND ONE TABLET IN THE AFTERNOON/EVENING(TOTAL 150 MG PER DAY) (Patient taking differently: Take 100 mg by mouth At Bedtime)    traMADol (ULTRAM) 50 MG tablet Take 0.5 tablets (25 mg) by mouth 2 times daily as needed for severe pain PRN for pain     No current facility-administered medications for this visit.       ROS:  10 point ROS of systems including Constitutional, Eyes, Respiratory, Cardiovascular, Gastroenterology, Genitourinary, Integumentary, Musculoskeletal, Psychiatric were all negative except for pertinent positives noted in my HPI.    Vitals:  BP (!) 147/83   Pulse 83   Temp 98.1  F (36.7  C)   Resp 18   SpO2 97%   Exam:  GENERAL APPEARANCE:  Alert, in no distress, pleasant, cooperative,  oriented x 4  EYES:  EOM, lids, pupils and irises normal, sclera clear and conjunctiva normal, no discharge or mattering on lids or lashes noted  ENT:  Mouth normal, moist mucous membranes, nose normal without drainage or crusting, external ears without lesions, hearing acuity intact  NECK: supple, symmetrical, trachea midline  RESP:  respiratory effort normal, no chest wall tenderness, no respiratory distress, Lung sounds clear, patient is on room air  CV:  Auscultation of heart done, rate and rhythm controlled and regular, no murmur, no rub or gallop. Edema none bilateral lower extremities  ABDOMEN:  normal bowel sounds, soft, nontender, no palpable masses. : Mills in place  M/S:   Gait and station walks with assist , no tenderness or swelling of the joints; able to move all extremities, digits normal  SKIN:  Inspection and palpation of skin and subcutaneous tissue: skin on extremities warm, dry and intact without rashes  NEURO: cranial nerves 2-12 grossly intact, no facial asymmetry, no speech deficits and able to follow directions, moves all extremities symmetrically  PSYCH:  insight and judgement and memory appear intact, affect and mood normal     Lab/Diagnostic data:  Most Recent 3 CBC's:  Recent Labs   Lab Test 07/04/23 0227 07/03/23  0734 07/02/23  0756 07/01/23  0656   WBC  --  11.0 11.4* 12.2*   HGB  --  10.5* 11.0* 10.7*   MCV  --  93 92 94    342 303 266  266     Most Recent 3 BMP's:  Recent Labs   Lab Test 07/05/23  0711 07/04/23 0227 07/03/23 2024 07/03/23  0734 07/02/23  0756 07/01/23  0656 06/30/23  0837   NA  --   --   --  138  --  139 137   POTASSIUM 3.8 4.0 3.3* 3.1* 3.4 3.5 3.8   CHLORIDE  --   --   --  106  --  106 106   CO2  --   --   --  19*  --  21* 17*   BUN  --   --   --  12.5  --  14.3 20.9   CR  --  0.60  --  0.54 0.58 0.70  0.70 0.89   ANIONGAP  --   --   --  13  --  12 14   TATIANA  --   --   --  8.3*  --  8.6* 8.2*   GLC  --   --   --  115*  --  92 98     Most Recent TSH and  T4:  Recent Labs   Lab Test 02/07/18  1416   TSH 1.85     Most Recent Hemoglobin A1c:  Recent Labs   Lab Test 07/03/23  0734   A1C 5.5       ASSESSMENT/PLAN:  Sepsis due to urinary tract infection (H)  Hydronephrosis, unspecified hydronephrosis type  Acute, improved. Complete course of Vantin 200 mg BID x 7 days, check CBC on 7/10. Monitor for resolution of symptoms. TOV per urology orders.     Seizure (H)  Chronic, recent episode of seizure. Continue Keppra 750 mg TID and 1125 mg HS, ativan 0.5 mg BID x 2 days for each seizure episode, trileptal 450 mg in AM and at noon and 600 mg HS, fycompa 8 mg HS, Topamax 150 mg in AM and 100 mg in PM, f/u neurology per home routine.     Depression, unspecified depression type  anxiety  Chronic and stable. Continue klonopin 1.5 mg HS, Lexapro 10 mg HS, monitor symptoms.     Gastroesophageal reflux disease without esophagitis  Currently denies symptoms. Monitor.     Migraine with status migrainosus, not intractable, unspecified migraine type  Chronic. Continue Imitrex 100 mg PRN headache up to 4 x month, monitor.     Sinoatrial node dysfunction (H)  Cardiac pacemaker in situ  By history. Currently not on any rate controlled meds. Monitor vs, cards f/u per home routine.     Hypokalemia  NATALIA (acute kidney injury) (H)  Acute, resolved/replaced. Check BMP check 7/10    Normocytic anemia  Chronic. Check CBC on 7/10, continue ferrous sulfate 325 mg BID    Chronic low back pain with sciatica, sciatica laterality unspecified, unspecified back pain laterality  Physical deconditioning  Acute on chronic. Continue tylenol 650 mg every 6 hrs PRN, fosamax 70 mg weekly, vit D 2000 units daily, Voltaren 4 g QID, tramadol 25 mg BID PRN severe pain . Therapies as ordered and f/u progress next visit.     Advanced directives, counseling/discussion  Asks to be Full Code    Orders:  1. Full Code  2. CBC and BMP on 7/10 diagnosis anemia, hypokalemia    Electronically signed by:  Kaleigh Gill,  APRN CNP

## 2023-07-07 ENCOUNTER — DOCUMENTATION ONLY (OUTPATIENT)
Dept: OTHER | Facility: CLINIC | Age: 68
End: 2023-07-07
Payer: COMMERCIAL

## 2023-07-08 DIAGNOSIS — G40.219 PARTIAL SYMPTOMATIC EPILEPSY WITH COMPLEX PARTIAL SEIZURES, INTRACTABLE, WITHOUT STATUS EPILEPTICUS (H): Primary | ICD-10-CM

## 2023-07-08 LAB
GAMMA INTERFERON BACKGROUND BLD IA-ACNC: 0.06 IU/ML
M TB IFN-G BLD-IMP: ABNORMAL
M TB IFN-G CD4+ BCKGRND COR BLD-ACNC: 0.18 IU/ML
MITOGEN IGNF BCKGRD COR BLD-ACNC: -0.01 IU/ML
MITOGEN IGNF BCKGRD COR BLD-ACNC: -0.02 IU/ML
QUANTIFERON MITOGEN: 0.24 IU/ML
QUANTIFERON NIL TUBE: 0.06 IU/ML
QUANTIFERON TB1 TUBE: 0.04 IU/ML
QUANTIFERON TB2 TUBE: 0.05

## 2023-07-08 RX ORDER — LORAZEPAM 0.5 MG/1
0.5 TABLET ORAL 2 TIMES DAILY
Qty: 4 TABLET | Refills: 0 | Status: SHIPPED | OUTPATIENT
Start: 2023-07-08 | End: 2023-07-10

## 2023-07-08 NOTE — TELEPHONE ENCOUNTER
[place] [facility] TELEPHONE ENCOUNTER    Chief Complaint   Patient presents with     Seizures       Adia Briceno is a 68 year old  (1955),Nurse called today to report: family who was visiting at this time reported to Registered Nurse that patient had a small brief seizure. Not witnessed. Vitals stable. Per recent ED visit post seizure-history of needing ativan BID x 2 days post seizures. Staff are requesting for this order again. Family decline to send patient out for seizure needs at this time    ASSESSMENT/PLAN      May restart lorazepam 0.5mg BID x 2 days    Follow up with rounding provider Monday for further needs and discussion    Electronically signed by:   RAMON Biggs CNP

## 2023-07-09 ENCOUNTER — LAB REQUISITION (OUTPATIENT)
Dept: LAB | Facility: CLINIC | Age: 68
End: 2023-07-09

## 2023-07-09 DIAGNOSIS — D64.9 ANEMIA, UNSPECIFIED: ICD-10-CM

## 2023-07-09 DIAGNOSIS — E87.6 HYPOKALEMIA: ICD-10-CM

## 2023-07-10 DIAGNOSIS — G40.211 LOCALZ-RLTED SYMPTOMATIC EPILEPSY W CMPLX PARTL SZ, INTRACT, W STATUS (H): ICD-10-CM

## 2023-07-10 LAB
ANION GAP SERPL CALCULATED.3IONS-SCNC: 12 MMOL/L (ref 7–15)
BUN SERPL-MCNC: 13.6 MG/DL (ref 8–23)
CALCIUM SERPL-MCNC: 8.4 MG/DL (ref 8.8–10.2)
CHLORIDE SERPL-SCNC: 107 MMOL/L (ref 98–107)
CREAT SERPL-MCNC: 0.68 MG/DL (ref 0.51–0.95)
DEPRECATED HCO3 PLAS-SCNC: 21 MMOL/L (ref 22–29)
ERYTHROCYTE [DISTWIDTH] IN BLOOD BY AUTOMATED COUNT: 14.5 % (ref 10–15)
GFR SERPL CREATININE-BSD FRML MDRD: >90 ML/MIN/1.73M2
GLUCOSE SERPL-MCNC: 92 MG/DL (ref 70–99)
HCT VFR BLD AUTO: 34.8 % (ref 35–47)
HGB BLD-MCNC: 11 G/DL (ref 11.7–15.7)
MCH RBC QN AUTO: 30.4 PG (ref 26.5–33)
MCHC RBC AUTO-ENTMCNC: 31.6 G/DL (ref 31.5–36.5)
MCV RBC AUTO: 96 FL (ref 78–100)
PLATELET # BLD AUTO: 432 10E3/UL (ref 150–450)
POTASSIUM SERPL-SCNC: 3.7 MMOL/L (ref 3.4–5.3)
RBC # BLD AUTO: 3.62 10E6/UL (ref 3.8–5.2)
SODIUM SERPL-SCNC: 140 MMOL/L (ref 136–145)
WBC # BLD AUTO: 5.9 10E3/UL (ref 4–11)

## 2023-07-10 PROCEDURE — 85027 COMPLETE CBC AUTOMATED: CPT | Performed by: NURSE PRACTITIONER

## 2023-07-10 PROCEDURE — P9604 ONE-WAY ALLOW PRORATED TRIP: HCPCS | Performed by: NURSE PRACTITIONER

## 2023-07-10 PROCEDURE — 36415 COLL VENOUS BLD VENIPUNCTURE: CPT | Performed by: NURSE PRACTITIONER

## 2023-07-10 PROCEDURE — 80048 BASIC METABOLIC PNL TOTAL CA: CPT | Performed by: NURSE PRACTITIONER

## 2023-07-10 RX ORDER — LORAZEPAM 0.5 MG/1
TABLET ORAL
Qty: 20 TABLET | Refills: 0 | Status: SHIPPED | OUTPATIENT
Start: 2023-07-10 | End: 2024-04-08

## 2023-07-12 VITALS
RESPIRATION RATE: 18 BRPM | WEIGHT: 173.2 LBS | HEART RATE: 84 BPM | HEIGHT: 66 IN | DIASTOLIC BLOOD PRESSURE: 84 MMHG | OXYGEN SATURATION: 95 % | BODY MASS INDEX: 27.83 KG/M2 | SYSTOLIC BLOOD PRESSURE: 139 MMHG | TEMPERATURE: 98.2 F

## 2023-07-13 ENCOUNTER — TRANSITIONAL CARE UNIT VISIT (OUTPATIENT)
Dept: GERIATRICS | Facility: CLINIC | Age: 68
End: 2023-07-13
Payer: COMMERCIAL

## 2023-07-13 DIAGNOSIS — Z95.0 CARDIAC PACEMAKER IN SITU: ICD-10-CM

## 2023-07-13 DIAGNOSIS — F32.A DEPRESSION, UNSPECIFIED DEPRESSION TYPE: ICD-10-CM

## 2023-07-13 DIAGNOSIS — F41.9 ANXIETY: ICD-10-CM

## 2023-07-13 DIAGNOSIS — R53.81 PHYSICAL DECONDITIONING: ICD-10-CM

## 2023-07-13 DIAGNOSIS — D64.9 NORMOCYTIC ANEMIA: ICD-10-CM

## 2023-07-13 DIAGNOSIS — G89.29 CHRONIC LOW BACK PAIN WITH SCIATICA, SCIATICA LATERALITY UNSPECIFIED, UNSPECIFIED BACK PAIN LATERALITY: ICD-10-CM

## 2023-07-13 DIAGNOSIS — M54.40 CHRONIC LOW BACK PAIN WITH SCIATICA, SCIATICA LATERALITY UNSPECIFIED, UNSPECIFIED BACK PAIN LATERALITY: ICD-10-CM

## 2023-07-13 DIAGNOSIS — R56.9 SEIZURE (H): ICD-10-CM

## 2023-07-13 DIAGNOSIS — N17.9 AKI (ACUTE KIDNEY INJURY) (H): ICD-10-CM

## 2023-07-13 DIAGNOSIS — I49.5 SINOATRIAL NODE DYSFUNCTION (H): ICD-10-CM

## 2023-07-13 DIAGNOSIS — N39.0 SEPSIS DUE TO URINARY TRACT INFECTION (H): Primary | ICD-10-CM

## 2023-07-13 DIAGNOSIS — E87.6 HYPOKALEMIA: ICD-10-CM

## 2023-07-13 DIAGNOSIS — K21.9 GASTROESOPHAGEAL REFLUX DISEASE WITHOUT ESOPHAGITIS: ICD-10-CM

## 2023-07-13 DIAGNOSIS — N13.30 HYDRONEPHROSIS, UNSPECIFIED HYDRONEPHROSIS TYPE: ICD-10-CM

## 2023-07-13 DIAGNOSIS — A41.9 SEPSIS DUE TO URINARY TRACT INFECTION (H): Primary | ICD-10-CM

## 2023-07-13 DIAGNOSIS — G43.901 MIGRAINE WITH STATUS MIGRAINOSUS, NOT INTRACTABLE, UNSPECIFIED MIGRAINE TYPE: ICD-10-CM

## 2023-07-13 PROCEDURE — 99309 SBSQ NF CARE MODERATE MDM 30: CPT | Performed by: NURSE PRACTITIONER

## 2023-07-13 NOTE — PROGRESS NOTES
"[facility] GERIATRICS    Chief Complaint   Patient presents with    RECHECK     HPI:  Adia Briceno is a 68 year old  (1955), who is being seen today for an episodic care visit at: [facility] (TCU) [[id number]].     Per recent TCU provider progress notes:   68-year-old woman PMH depression, epilepsy, GERD, seizure disorder, migraines and syncope with prior PPM hospitalized with fever, confusion and urinary symptoms. treated for sepsis due to pyelonephritis, recurrent UTIs, bilateral hydronephrosis, urinary obstruction and hematuria. Initially on IV ceftriaxone, then zosyn, then cefpodoxime. Mills in place with urology f/u 3-4 weeks for TOV. NATALAI with cr 1.11, improved. Anemia with Hgb 10.5, normocytic. Hypokalemia replaced. Constipation/diarrhea added fiber. GERD on PRN TUMS. Encephalopathy due to infection resolved. Epilepsy on PTA Keppra, oxcarbazepine, perampanel and added ativan PRN seizure. After each seizure episode she is to take ativan twice daily for 2 days. Depression on escitalopram and clonazepam. Migraines on Topamax and as needed sumatriptan. Back pain with recent L2 compression fx and chronic leg pain: on menthol and Voltaren gels, PRN tramadol and oxycodone. To TCU for rehab. Immediately on arrival became unresponsive with suspected seizure, was sent to ED, evaluated and returned with no change in orders.     Today's concern is: episodic f/u mobility, neuro statis, vs, labs. Reports doing OK. No headaches, dizziness, chest pain, dyspnea. Bowels regular. Mills in place. Wt down 5 lbs since admission. BP range 113-143/67-81 and sats 95% room air. Up in room with FWW and SLUMS 17/30.     Allergies, and PMH/PSH reviewed in EPIC today.  REVIEW OF SYSTEMS:  4 point ROS including Respiratory, CV, GI and , other than that noted in the HPI,  is negative    Objective:   /84   Pulse 84   Temp 98.2  F (36.8  C)   Resp 18   Ht 1.676 m (5' 6\")   Wt 78.6 kg (173 lb 3.2 oz)   SpO2 95%   " BMI 27.96 kg/m    GENERAL APPEARANCE:  Alert, in no distress, cooperative  ENT:  Mouth normal, moist mucous membranes, normal hearing acuity  EYES:  Conjunctiva and lids normal  RESP:  no respiratory distress, on room air, LSC  CV: HRR, no edema  : geiger in place with clear yellow urine  NEURO:   No facial asymmetry, speech clear  PSYCH:  oriented X 3, affect and mood flat    Most Recent 3 CBC's:  Recent Labs   Lab Test 07/10/23  0710 07/04/23  0227 07/03/23  0734 07/02/23  0756   WBC 5.9  --  11.0 11.4*   HGB 11.0*  --  10.5* 11.0*   MCV 96  --  93 92    385 342 303     Most Recent 3 BMP's:  Recent Labs   Lab Test 07/10/23  0710 07/05/23  0711 07/04/23  0227 07/03/23  2024 07/03/23  0734 07/02/23  0756 07/01/23  0656     --   --   --  138  --  139   POTASSIUM 3.7 3.8 4.0   < > 3.1*   < > 3.5   CHLORIDE 107  --   --   --  106  --  106   CO2 21*  --   --   --  19*  --  21*   BUN 13.6  --   --   --  12.5  --  14.3   CR 0.68  --  0.60  --  0.54   < > 0.70  0.70   ANIONGAP 12  --   --   --  13  --  12   TATAINA 8.4*  --   --   --  8.3*  --  8.6*   GLC 92  --   --   --  115*  --  92    < > = values in this interval not displayed.       Assessment/Plan:  Sepsis due to urinary tract infection (H)  Hydronephrosis, unspecified hydronephrosis type  Acute, improved. Completed course of Vantin 200 mg BID x 7 days, check CBC on 7/10 with WBC 5.9. TOV per urology orders. Monitor for urinary symptoms.     Seizure (H)  Chronic, recent episode of seizure. Continue Keppra 750 mg TID and 1125 mg HS, ativan 0.5 mg BID x 2 days for each seizure episode, trileptal 450 mg in AM and at noon and 600 mg HS, fycompa 8 mg HS, Topamax 150 mg in AM and 100 mg in PM, f/u neurology per home routine.     Depression, unspecified depression type  Anxiety  Chronic and stable. Continue klonopin 1.5 mg HS, Lexapro 10 mg HS, monitor symptoms. ACP referral if desired.     Gastroesophageal reflux disease without esophagitis  Currently denies  symptoms. Monitor.     Migraine with status migrainosus, not intractable, unspecified migraine type  Chronic. Continue Imitrex 100 mg PRN headache up to 4 x month, monitor.     Sinoatrial node dysfunction (H)  Cardiac pacemaker in situ  By history. Currently not on any rate control meds. Monitor vs, cards f/u per home routine.     Hypokalemia  NATALIA (acute kidney injury) (H)  Acute, resolved/replaced. Check BMP check 7/10 with K 3.7 and Cr 0.68    Normocytic anemia  Chronic. Check CBC on 7/10 with Hgb 11, continue ferrous sulfate 325 mg BID.    Chronic low back pain with sciatica, sciatica laterality unspecified, unspecified back pain laterality  Physical deconditioning  Acute on chronic. Continue tylenol 650 mg every 6 hrs PRN, fosamax 70 mg weekly, vit D 2000 units daily, Voltaren 4 g QID, tramadol 25 mg BID PRN severe pain. Therapies as ordered and f/u progress next visit.     MED REC REQUIRED  Post Medication Reconciliation Status:  Discharge medications reconciled, continue medications without change    Orders:  No new orders    Electronically signed by: RAMON Chandler CNP

## 2023-07-17 VITALS
RESPIRATION RATE: 18 BRPM | BODY MASS INDEX: 28 KG/M2 | TEMPERATURE: 98.3 F | HEIGHT: 66 IN | HEART RATE: 83 BPM | DIASTOLIC BLOOD PRESSURE: 84 MMHG | SYSTOLIC BLOOD PRESSURE: 137 MMHG | WEIGHT: 174.2 LBS | OXYGEN SATURATION: 94 %

## 2023-07-18 ENCOUNTER — TELEPHONE (OUTPATIENT)
Dept: NEUROLOGY | Facility: CLINIC | Age: 68
End: 2023-07-18

## 2023-07-18 ENCOUNTER — TRANSITIONAL CARE UNIT VISIT (OUTPATIENT)
Dept: GERIATRICS | Facility: CLINIC | Age: 68
End: 2023-07-18
Payer: COMMERCIAL

## 2023-07-18 DIAGNOSIS — K21.9 GASTROESOPHAGEAL REFLUX DISEASE WITHOUT ESOPHAGITIS: ICD-10-CM

## 2023-07-18 DIAGNOSIS — N17.9 AKI (ACUTE KIDNEY INJURY) (H): ICD-10-CM

## 2023-07-18 DIAGNOSIS — D64.9 NORMOCYTIC ANEMIA: ICD-10-CM

## 2023-07-18 DIAGNOSIS — F32.A DEPRESSION, UNSPECIFIED DEPRESSION TYPE: ICD-10-CM

## 2023-07-18 DIAGNOSIS — A41.9 SEPSIS DUE TO URINARY TRACT INFECTION (H): Primary | ICD-10-CM

## 2023-07-18 DIAGNOSIS — F41.9 ANXIETY: ICD-10-CM

## 2023-07-18 DIAGNOSIS — I49.5 SINOATRIAL NODE DYSFUNCTION (H): ICD-10-CM

## 2023-07-18 DIAGNOSIS — Z95.0 CARDIAC PACEMAKER IN SITU: ICD-10-CM

## 2023-07-18 DIAGNOSIS — R56.9 SEIZURE (H): ICD-10-CM

## 2023-07-18 DIAGNOSIS — G40.211 LOCALIZATION-RELATED SYMPTOMATIC EPILEPSY AND EPILEPTIC SYNDROMES WITH COMPLEX PARTIAL SEIZURES, INTRACTABLE, WITH STATUS EPILEPTICUS (H): ICD-10-CM

## 2023-07-18 DIAGNOSIS — R53.81 PHYSICAL DECONDITIONING: ICD-10-CM

## 2023-07-18 DIAGNOSIS — E87.6 HYPOKALEMIA: ICD-10-CM

## 2023-07-18 DIAGNOSIS — M54.40 CHRONIC LOW BACK PAIN WITH SCIATICA, SCIATICA LATERALITY UNSPECIFIED, UNSPECIFIED BACK PAIN LATERALITY: ICD-10-CM

## 2023-07-18 DIAGNOSIS — N13.30 HYDRONEPHROSIS, UNSPECIFIED HYDRONEPHROSIS TYPE: ICD-10-CM

## 2023-07-18 DIAGNOSIS — G89.29 CHRONIC LOW BACK PAIN WITH SCIATICA, SCIATICA LATERALITY UNSPECIFIED, UNSPECIFIED BACK PAIN LATERALITY: ICD-10-CM

## 2023-07-18 DIAGNOSIS — N39.0 SEPSIS DUE TO URINARY TRACT INFECTION (H): Primary | ICD-10-CM

## 2023-07-18 DIAGNOSIS — G43.901 MIGRAINE WITH STATUS MIGRAINOSUS, NOT INTRACTABLE, UNSPECIFIED MIGRAINE TYPE: ICD-10-CM

## 2023-07-18 PROCEDURE — 99309 SBSQ NF CARE MODERATE MDM 30: CPT | Performed by: NURSE PRACTITIONER

## 2023-07-18 NOTE — PROGRESS NOTES
[facility] GERIATRICS    Chief Complaint   Patient presents with    RECHECK     HPI:  Adia Briceno is a 68 year old  (1955), who is being seen today for an episodic care visit at: [facility] (TCU) [[id number]].     Per recent TCU provider progress notes:   68-year-old woman PMH depression, epilepsy, GERD, seizure disorder, migraines and syncope with prior PPM hospitalized with fever, confusion and urinary symptoms. treated for sepsis due to pyelonephritis, recurrent UTIs, bilateral hydronephrosis, urinary obstruction and hematuria. Initially on IV ceftriaxone, then zosyn, then cefpodoxime. Mills in place with urology f/u 3-4 weeks for TOV. NATALIA with cr 1.11, improved. Anemia with Hgb 10.5, normocytic. Hypokalemia replaced. Constipation/diarrhea added fiber. GERD on PRN TUMS. Encephalopathy due to infection resolved. Epilepsy on PTA Keppra, oxcarbazepine, perampanel and added ativan PRN seizure. After each seizure episode she is to take ativan twice daily for 2 days. Depression on escitalopram and clonazepam. Migraines on Topamax and as needed sumatriptan. Back pain with recent L2 compression fx and chronic leg pain: on menthol and Voltaren gels, PRN tramadol and oxycodone. To TCU for rehab. Immediately on arrival became unresponsive with suspected seizure, was sent to ED, evaluated and returned with no change in orders.     Today's concern is: episodic f/u mobility, neuro statis, vs, bowels. Reports stools regular, asks to change psyllium to daily PRN. No headaches, dizziness, chest pain, dyspnea. Mills in place. Wt down 4 lbs since admission. BP range 115-162/ and sats 97% room air. Up in room with FWW, working on going up and down stairs and SLUMS 17/30. States Topamax dose was to be reduced and refused to take AM dose, will clarify with ordering provider.    Allergies, and PMH/PSH reviewed in [facility] today.  REVIEW OF SYSTEMS:  4 point ROS including Respiratory, CV, GI and , other than that  "noted in the HPI,  is negative    Objective:   /84   Pulse 83   Temp 98.3  F (36.8  C)   Resp 18   Ht 1.676 m (5' 6\")   Wt 79 kg (174 lb 3.2 oz)   SpO2 94%   BMI 28.12 kg/m    GENERAL APPEARANCE:  Alert, in no distress, cooperative  ENT:  Mouth normal, moist mucous membranes, normal hearing acuity  EYES:  Conjunctiva and lids normal  RESP:  no respiratory distress, on room air and LSC  CV: HRR, no edema  : geiger in place with clear yellow urine  NEURO:   No facial asymmetry, speech clear  PSYCH:  oriented X 3, affect and mood flat    Most Recent 3 CBC's:  Recent Labs   Lab Test 07/10/23  0710 07/04/23  0227 07/03/23  0734 07/02/23  0756   WBC 5.9  --  11.0 11.4*   HGB 11.0*  --  10.5* 11.0*   MCV 96  --  93 92    385 342 303     Most Recent 3 BMP's:  Recent Labs   Lab Test 07/10/23  0710 07/05/23  0711 07/04/23 0227 07/03/23 2024 07/03/23  0734 07/02/23  0756 07/01/23  0656     --   --   --  138  --  139   POTASSIUM 3.7 3.8 4.0   < > 3.1*   < > 3.5   CHLORIDE 107  --   --   --  106  --  106   CO2 21*  --   --   --  19*  --  21*   BUN 13.6  --   --   --  12.5  --  14.3   CR 0.68  --  0.60  --  0.54   < > 0.70  0.70   ANIONGAP 12  --   --   --  13  --  12   TATIANA 8.4*  --   --   --  8.3*  --  8.6*   GLC 92  --   --   --  115*  --  92    < > = values in this interval not displayed.       Assessment/Plan:  Sepsis due to urinary tract infection (H)  Hydronephrosis, unspecified hydronephrosis type  Acute, improved. Completed course of Vantin 200 mg BID x 7 days, checked CBC on 7/10 with WBC 5.9. TOV per urology orders. Monitor for urinary symptoms.     Seizure (H)  Chronic, recent episode of seizure. Continue Keppra 750 mg TID and 1125 mg HS, ativan 0.5 mg BID x 2 days for each seizure episode, trileptal 450 mg in AM and at noon and 600 mg HS, fycompa 8 mg HS, f/u neurology per home routine. Call Dr Barboza to clarify Topamax order (patient and  state she should not be taking her AM " dose).     Depression, unspecified depression type  Anxiety  Chronic and stable. Continue klonopin 1.5 mg HS, Lexapro 10 mg HS, monitor symptoms. ACP referral if desired.     Gastroesophageal reflux disease without esophagitis  Currently denies symptoms. Monitor.     Migraine with status migrainosus, not intractable, unspecified migraine type  Chronic. Continue Imitrex 100 mg PRN headache up to 4 x month, monitor.     Sinoatrial node dysfunction (H)  Cardiac pacemaker in situ  By history. Currently not on any rate control meds. Monitor vs, cards f/u per home routine.     Hypokalemia  NATALIA (acute kidney injury) (H)  Acute, resolved/replaced. Check BMP check 7/10 with K 3.7 and Cr 0.68    Normocytic anemia  Chronic. Checked CBC on 7/10 with Hgb 11, continue ferrous sulfate 325 mg BID.    Chronic low back pain with sciatica, sciatica laterality unspecified, unspecified back pain laterality  Physical deconditioning  Acute on chronic. Continue tylenol 650 mg every 6 hrs PRN, fosamax 70 mg weekly, vit D 2000 units daily, Voltaren 4 g QID, tramadol 25 mg BID PRN severe pain. Therapies as ordered and f/u progress next visit.     MED REC REQUIRED  Post Medication Reconciliation Status:  Discharge medications reconciled and changed, see notes/orders    Orders:  Change psyllium to daily PRN diarrhea  Call Dr Barboza to clarify Topamax order    Electronically signed by: RAMON Chandler CNP

## 2023-07-18 NOTE — TELEPHONE ENCOUNTER
[facility] Call Center    Phone Message    May a detailed message be left on voicemail: yes     Reason for Call: Other: Teresa from [place] on [facility] called and is requesting a call from the care team as she needs to clarify orders for the patients medication    topiramate (TOPAMAX) 100 MG tablet. Please call Teresa and advise at [phone].  Action Taken: Message routed to:  [facility] ([facility]): Neurology    Travel Screening: Not Applicable

## 2023-07-19 ENCOUNTER — ANCILLARY PROCEDURE (OUTPATIENT)
Dept: CARDIOLOGY | Facility: CLINIC | Age: 68
End: 2023-07-19
Attending: INTERNAL MEDICINE
Payer: COMMERCIAL

## 2023-07-19 VITALS
HEIGHT: 66 IN | DIASTOLIC BLOOD PRESSURE: 86 MMHG | WEIGHT: 173 LBS | BODY MASS INDEX: 27.8 KG/M2 | SYSTOLIC BLOOD PRESSURE: 133 MMHG | HEART RATE: 86 BPM

## 2023-07-19 DIAGNOSIS — Z82.49 FAMILY HISTORY OF ISCHEMIC HEART DISEASE: ICD-10-CM

## 2023-07-19 DIAGNOSIS — Z95.0 CARDIAC PACEMAKER IN SITU: Chronic | ICD-10-CM

## 2023-07-19 DIAGNOSIS — I49.5 SINOATRIAL NODE DYSFUNCTION (H): Primary | Chronic | ICD-10-CM

## 2023-07-19 DIAGNOSIS — Z95.0 CARDIAC PACEMAKER IN SITU: ICD-10-CM

## 2023-07-19 DIAGNOSIS — E78.00 HYPERCHOLESTEREMIA: ICD-10-CM

## 2023-07-19 PROCEDURE — 93280 PM DEVICE PROGR EVAL DUAL: CPT | Performed by: INTERNAL MEDICINE

## 2023-07-19 PROCEDURE — 99213 OFFICE O/P EST LOW 20 MIN: CPT | Mod: 25 | Performed by: INTERNAL MEDICINE

## 2023-07-19 RX ORDER — TOPIRAMATE 100 MG/1
100 TABLET, FILM COATED ORAL AT BEDTIME
COMMUNITY
Start: 2023-07-19 | End: 2024-02-07

## 2023-07-19 NOTE — TELEPHONE ENCOUNTER
"Facility calling to clarify topiramate dosing. They were under impression it was ; however patient reporting it is 100 hs and she has been refusing her AM dose. Visit from 3/16/2023 \"she takes  mg at HS only\" and at last visit she did report taking 100 mg hs as well. Will update in chart.     Becka Alberts PA-C  "

## 2023-07-19 NOTE — LETTER
7/19/2023    Toshia Bailon MD  [address]  [facility] [address]    RE: Adia MASON Cecireyes       Dear Colleague,     I had the pleasure of seeing Adia Briceno in the [facility].  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 Nemesio Briceno, retired from endocrinology 2021.      Adia has a past medical history significant for seizure disorder, which she states consumes her life and if she were to die of a heart attack she states she would not care.  She was having syncopal spells and CardioNet ultimately demonstrated a 7-10 second pause for which a pacemaker was inserted.  This was unfortunately complicated by MicroLead dislodgement with R-wave sensing reduced at 3. adjustments were made to pacemaker and it has functioned fine since then.     Other issues as stated includes her seizure disorder, depression, migraines, gastroesophageal reflux disease and marked hypercholesterolemia consistent with probable familial hypercholesterolemia..  She has a very strong family history of coronary disease with her father having his first heart attack at age 45, but ultimately dying in his 60s, although she states his early heart attack was due to stress as he was persecuted by the [place], had to flee, ultimately came to [place], was a practicing physician, had to repeat his residency here.       Ultimately I convince Adia of a calcium score which fortunately returned at 0.    Adia now returns to clinic after a couple of recent hospital stays.  In January she had a fall due to her epilepsy.  This resulted in a hip fracture subsequently developing a urinary tract infection and sepsis.  Blood cultures are negative.  She has had no further fevers chills or night sweats.  No echocardiogram was performed.  I assume there was no concern for infected leads.     ASSESSMENT AND PLAN:   Adia returns to clinic and appears to be doing well from a cardiac standpoint without  clinical symptoms to should just ischemia, heart failure or endocarditis.    Interrogation of her device shows that she paces in the atrium 32% ventricle at 1%.  Her lower rate limit is set at 70.  She had 1 very brief run of PAT.     Last LDL was 189 which normal recommendations would be to treat with a statin.  However given her calcium score of 0 I will not initiate statin therapy.    Blood pressure is well controlled on no meds at this time at 133/86 with a pulse of 86.  Weight is 173 pounds giving her body mass index of 27.9.     She will follow in our device clinic quarterly.  I have her follow-up with my JOSEPH in 1 year I will see her back in 2 years if she should have any problems I be glad to see her sooner.     Thank you for allowing me to participate in her care.     Lance Richmond MD, [facility]    Today's clinic visit entailed:  Review of external notes as documented elsewhere in note  Review of the result(s) of each unique test - Device interrogation, hospital records, lab work  Ordering of each unique test  Prescription drug management  25 minutes spent by me on the date of the encounter doing chart review, history and exam, documentation and further activities per the note  Provider  Link to MDM Help Grid     The level of medical decision making during this visit was of moderate complexity.      Orders Placed This Encounter   Procedures    Follow-Up with Cardiologist    Follow-Up with Cardiology JOSEPH       No orders of the defined types were placed in this encounter.      There are no discontinued medications.      Encounter Diagnoses   Name Primary?    Sinoatrial node dysfunction (H) Yes    Cardiac pacemaker in situ     Hypercholesteremia     Family history of ischemic heart disease        CURRENT MEDICATIONS:  Current Outpatient Medications   Medication Sig Dispense Refill    acetaminophen (TYLENOL) 325 MG tablet Take 2 tablets (650 mg) by mouth every 6 hours as needed for mild pain or other (and  adjunct with moderate or severe pain or per patient request)      alendronate (FOSAMAX) 70 MG tablet Take 70 mg by mouth every 7 days Sunday      calcium carbonate (TUMS) 500 MG chewable tablet Take 1 tablet (500 mg) by mouth 3 times daily as needed for heartburn      cetirizine (ZYRTEC) 10 MG tablet Take 10 mg by mouth daily as needed Summer allergies      Cholecalciferol (VITAMIN D) 2000 UNITS CAPS Take 1 capsule by mouth daily      clonazePAM (KLONOPIN) 1 MG tablet Take 1.5 tablets (1.5 mg) by mouth At Bedtime 45 tablet 0    diclofenac (VOLTAREN) 1 % topical gel Apply 4 g topically 4 times daily      escitalopram (LEXAPRO) 10 MG tablet Take 1 tablet (10 mg) by mouth At Bedtime 30 tablet 11    ferrous sulfate (FEROSUL) 325 (65 Fe) MG tablet Take 1 tablet (325 mg) by mouth 2 times daily (with meals)      guaiFENesin-codeine (ROBITUSSIN AC) 100-10 MG/5ML solution Take 5 mLs by mouth daily as needed for cough PRN for cough 180 mL 0    levETIRAcetam (KEPPRA) 750 MG tablet Take one tablet three times daily and one and half tablets at night (Patient taking differently: Take 750 mg three times daily and then take 1125 mg at bedtime) 135 tablet 11    LORazepam (ATIVAN) 0.5 MG tablet TAKE 1 TABLET BY MOUTH TWICE DAILY FOR 2 DAYS FOLLOWING SEIZURE CLUSTER 20 tablet 0    menthol, Topical Analgesic, 2.5% (BENGAY VANISHING SCENT) 2.5 % GEL topical gel Apply topically every 6 hours      OXcarbazepine (TRILEPTAL) 300 MG tablet TAKE ONE AND ONE-HALF TABLET IN THE MORNING, ONE AND ONE-HALF TABLET AT NOON, 2 TABLETS IN THE EVENING AND 2 TABLETS AT BEDTIME. 210 tablet 11    perampanel (FYCOMPA) 8 MG tablet Take 1 tablet (8 mg) by mouth At Bedtime 30 tablet 0    progesterone (PROMETRIUM) 100 MG capsule Take 100 mg by mouth daily      psyllium (METAMUCIL/KONSYL) Packet Take 1 packet by mouth daily (Patient taking differently: Take 1 packet by mouth daily as needed (diarrhea))      SUMAtriptan (IMITREX) 100 MG tablet Take one by mouth  prn severe headache. No more than four per month. 9 tablet 1    topiramate (TOPAMAX) 100 MG tablet TAKE ONE-HALF TABLET BY MOUTH IN THE MORNING, AND ONE TABLET IN THE AFTERNOON/EVENING(TOTAL 150 MG PER DAY) (Patient taking differently: Take 100 mg by mouth At Bedtime) 48 tablet 11    traMADol (ULTRAM) 50 MG tablet Take 0.5 tablets (25 mg) by mouth 2 times daily as needed for severe pain PRN for pain 15 tablet 0       ALLERGIES     Allergies   Allergen Reactions    Lactose        PAST MEDICAL HISTORY:  Past Medical History:   Diagnosis Date    Depression     Epilepsy (H)     GERD (gastroesophageal reflux disease)     Migraine     Seizure (H)     vagal nerve stimulator    Shortness of breath     Syncope and collapse     bradyarrhythmia: dual chamber pacemaker implanted 2011       PAST SURGICAL HISTORY:  Past Surgical History:   Procedure Laterality Date    IMPLANT PACEMAKER  April 2011    [facility], [place] [address],     IMPLANT STIMULATOR VAGUS NERVE  DEC  2008    VNS batter depleated (8/21/2019)       FAMILY HISTORY:  No family history on file.    SOCIAL HISTORY:  Social History     Socioeconomic History    Marital status:      Spouse name: None    Number of children: None    Years of education: None    Highest education level: None   Tobacco Use    Smoking status: Never    Smokeless tobacco: Never   Substance and Sexual Activity    Alcohol use: No     Alcohol/week: 0.0 standard drinks of alcohol    Drug use: No    Sexual activity: Yes     Partners: Male   Other Topics Concern    Caffeine Concern Yes     Comment: 3 CUPS coffee daily     Weight Concern No    Special Diet No    Exercise Yes    Seat Belt Yes       Review of Systems:  Skin:          Eyes:         ENT:         Respiratory:  Negative       Cardiovascular:  Negative;palpitations;chest pain;syncope or near-syncope;cyanosis;edema Positive for;exercise intolerance    Gastroenterology: Negative      Genitourinary:  Positive for   recent UTI, has geiger  "cath  Musculoskeletal:  Positive for back pain pelvic pain, LE pain  Neurologic:  Positive for seizures    Psychiatric:  Positive for sleep disturbances    Heme/Lymph/Imm:  Negative      Endocrine:  Negative        Physical Exam:  Vitals: /86 (BP Location: Left arm, Cuff Size: Adult Large)   Pulse 86   Ht 1.676 m (5' 6\")   Wt 78.5 kg (173 lb)   BMI 27.92 kg/m      Constitutional:           Skin:             Head:           Eyes:           Lymph:      ENT:           Neck:           Respiratory:            Cardiac:                                                           GI:           Extremities and Muscular Skeletal:                 Neurological:           Psych:           CC  Tito Forbes MD  [address]  [place] [address]        Thank you for allowing me to participate in the care of your patient.      Sincerely,     Lance Richmond MD     [facility]      "

## 2023-07-19 NOTE — PROGRESS NOTES
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 Nemesio Briceno, retired from endocrinology 2021.      Adia has a past medical history significant for seizure disorder, which she states consumes her life and if she were to die of a heart attack she states she would not care.  She was having syncopal spells and CardioNet ultimately demonstrated a 7-10 second pause for which a pacemaker was inserted.  This was unfortunately complicated by MicroLead dislodgement with R-wave sensing reduced at 3. adjustments were made to pacemaker and it has functioned fine since then.     Other issues as stated includes her seizure disorder, depression, migraines, gastroesophageal reflux disease and marked hypercholesterolemia consistent with probable familial hypercholesterolemia..  She has a very strong family history of coronary disease with her father having his first heart attack at age 45, but ultimately dying in his 60s, although she states his early heart attack was due to stress as he was persecuted by the [place], had to flee, ultimately came to [place], was a practicing physician, had to repeat his residency here.       Ultimately I convince Adia of a calcium score which fortunately returned at 0.    Adia now returns to clinic after a couple of recent hospital stays.  In January she had a fall due to her epilepsy.  This resulted in a hip fracture subsequently developing a urinary tract infection and sepsis.  Blood cultures are negative.  She has had no further fevers chills or night sweats.  No echocardiogram was performed.  I assume there was no concern for infected leads.     ASSESSMENT AND PLAN:   Adia returns to clinic and appears to be doing well from a cardiac standpoint without clinical symptoms to should just ischemia, heart failure or endocarditis.    Interrogation of her device shows that she paces in the atrium 32% ventricle at 1%.  Her lower rate limit is set at 70.  She had 1  very brief run of PAT.     Last LDL was 189 which normal recommendations would be to treat with a statin.  However given her calcium score of 0 I will not initiate statin therapy.    Blood pressure is well controlled on no meds at this time at 133/86 with a pulse of 86.  Weight is 173 pounds giving her body mass index of 27.9.     She will follow in our device clinic quarterly.  I have her follow-up with my JOSEPH in 1 year I will see her back in 2 years if she should have any problems I be glad to see her sooner.     Thank you for allowing me to participate in her care.     Lance Richmond MD, [facility]    Today's clinic visit entailed:  Review of external notes as documented elsewhere in note  Review of the result(s) of each unique test - Device interrogation, hospital records, lab work  Ordering of each unique test  Prescription drug management  25 minutes spent by me on the date of the encounter doing chart review, history and exam, documentation and further activities per the note  Provider  Link to MDM Help Grid     The level of medical decision making during this visit was of moderate complexity.      Orders Placed This Encounter   Procedures     Follow-Up with Cardiologist     Follow-Up with Cardiology JOSEPH       No orders of the defined types were placed in this encounter.      There are no discontinued medications.      Encounter Diagnoses   Name Primary?     Sinoatrial node dysfunction (H) Yes     Cardiac pacemaker in situ      Hypercholesteremia      Family history of ischemic heart disease        CURRENT MEDICATIONS:  Current Outpatient Medications   Medication Sig Dispense Refill     acetaminophen (TYLENOL) 325 MG tablet Take 2 tablets (650 mg) by mouth every 6 hours as needed for mild pain or other (and adjunct with moderate or severe pain or per patient request)       alendronate (FOSAMAX) 70 MG tablet Take 70 mg by mouth every 7 days Sunday       calcium carbonate (TUMS) 500 MG chewable tablet Take 1  tablet (500 mg) by mouth 3 times daily as needed for heartburn       cetirizine (ZYRTEC) 10 MG tablet Take 10 mg by mouth daily as needed Summer allergies       Cholecalciferol (VITAMIN D) 2000 UNITS CAPS Take 1 capsule by mouth daily       clonazePAM (KLONOPIN) 1 MG tablet Take 1.5 tablets (1.5 mg) by mouth At Bedtime 45 tablet 0     diclofenac (VOLTAREN) 1 % topical gel Apply 4 g topically 4 times daily       escitalopram (LEXAPRO) 10 MG tablet Take 1 tablet (10 mg) by mouth At Bedtime 30 tablet 11     ferrous sulfate (FEROSUL) 325 (65 Fe) MG tablet Take 1 tablet (325 mg) by mouth 2 times daily (with meals)       guaiFENesin-codeine (ROBITUSSIN AC) 100-10 MG/5ML solution Take 5 mLs by mouth daily as needed for cough PRN for cough 180 mL 0     levETIRAcetam (KEPPRA) 750 MG tablet Take one tablet three times daily and one and half tablets at night (Patient taking differently: Take 750 mg three times daily and then take 1125 mg at bedtime) 135 tablet 11     LORazepam (ATIVAN) 0.5 MG tablet TAKE 1 TABLET BY MOUTH TWICE DAILY FOR 2 DAYS FOLLOWING SEIZURE CLUSTER 20 tablet 0     menthol, Topical Analgesic, 2.5% (BENGAY VANISHING SCENT) 2.5 % GEL topical gel Apply topically every 6 hours       OXcarbazepine (TRILEPTAL) 300 MG tablet TAKE ONE AND ONE-HALF TABLET IN THE MORNING, ONE AND ONE-HALF TABLET AT NOON, 2 TABLETS IN THE EVENING AND 2 TABLETS AT BEDTIME. 210 tablet 11     perampanel (FYCOMPA) 8 MG tablet Take 1 tablet (8 mg) by mouth At Bedtime 30 tablet 0     progesterone (PROMETRIUM) 100 MG capsule Take 100 mg by mouth daily       psyllium (METAMUCIL/KONSYL) Packet Take 1 packet by mouth daily (Patient taking differently: Take 1 packet by mouth daily as needed (diarrhea))       SUMAtriptan (IMITREX) 100 MG tablet Take one by mouth prn severe headache. No more than four per month. 9 tablet 1     topiramate (TOPAMAX) 100 MG tablet TAKE ONE-HALF TABLET BY MOUTH IN THE MORNING, AND ONE TABLET IN THE  AFTERNOON/EVENING(TOTAL 150 MG PER DAY) (Patient taking differently: Take 100 mg by mouth At Bedtime) 48 tablet 11     traMADol (ULTRAM) 50 MG tablet Take 0.5 tablets (25 mg) by mouth 2 times daily as needed for severe pain PRN for pain 15 tablet 0       ALLERGIES     Allergies   Allergen Reactions     Lactose        PAST MEDICAL HISTORY:  Past Medical History:   Diagnosis Date     Depression      Epilepsy (H)      GERD (gastroesophageal reflux disease)      Migraine      Seizure (H)     vagal nerve stimulator     Shortness of breath      Syncope and collapse     bradyarrhythmia: dual chamber pacemaker implanted 2011       PAST SURGICAL HISTORY:  Past Surgical History:   Procedure Laterality Date     IMPLANT PACEMAKER  April 2011    [facility], [place] [address],      IMPLANT STIMULATOR VAGUS NERVE  DEC  2008    VNS batter depleated (8/21/2019)       FAMILY HISTORY:  No family history on file.    SOCIAL HISTORY:  Social History     Socioeconomic History     Marital status:      Spouse name: None     Number of children: None     Years of education: None     Highest education level: None   Tobacco Use     Smoking status: Never     Smokeless tobacco: Never   Substance and Sexual Activity     Alcohol use: No     Alcohol/week: 0.0 standard drinks of alcohol     Drug use: No     Sexual activity: Yes     Partners: Male   Other Topics Concern     Caffeine Concern Yes     Comment: 3 CUPS coffee daily      Weight Concern No     Special Diet No     Exercise Yes     Seat Belt Yes       Review of Systems:  Skin:          Eyes:         ENT:         Respiratory:  Negative       Cardiovascular:  Negative;palpitations;chest pain;syncope or near-syncope;cyanosis;edema Positive for;exercise intolerance    Gastroenterology: Negative      Genitourinary:  Positive for   recent UTI, has geiger cath  Musculoskeletal:  Positive for back pain pelvic pain, LE pain  Neurologic:  Positive for seizures    Psychiatric:  Positive for sleep  "disturbances    Heme/Lymph/Imm:  Negative      Endocrine:  Negative        Physical Exam:  Vitals: /86 (BP Location: Left arm, Cuff Size: Adult Large)   Pulse 86   Ht 1.676 m (5' 6\")   Wt 78.5 kg (173 lb)   BMI 27.92 kg/m      Constitutional:           Skin:             Head:           Eyes:           Lymph:      ENT:           Neck:           Respiratory:            Cardiac:                                                           GI:           Extremities and Muscular Skeletal:                 Neurological:           Psych:           CC  Tito Bhargav Jules MD  [address]  [place] [address]              "

## 2023-07-20 LAB
MDC_IDC_LEAD_IMPLANT_DT: NORMAL
MDC_IDC_LEAD_IMPLANT_DT: NORMAL
MDC_IDC_LEAD_LOCATION: NORMAL
MDC_IDC_LEAD_LOCATION: NORMAL
MDC_IDC_LEAD_MFG: NORMAL
MDC_IDC_LEAD_MFG: NORMAL
MDC_IDC_LEAD_MODEL: NORMAL
MDC_IDC_LEAD_MODEL: NORMAL
MDC_IDC_LEAD_POLARITY_TYPE: NORMAL
MDC_IDC_LEAD_POLARITY_TYPE: NORMAL
MDC_IDC_LEAD_SERIAL: NORMAL
MDC_IDC_LEAD_SERIAL: NORMAL
MDC_IDC_MSMT_BATTERY_STATUS: NORMAL
MDC_IDC_MSMT_LEADCHNL_RA_PACING_THRESHOLD_AMPLITUDE: 0.5 V
MDC_IDC_MSMT_LEADCHNL_RA_PACING_THRESHOLD_PULSEWIDTH: 0.5 MS
MDC_IDC_MSMT_LEADCHNL_RA_SENSING_INTR_AMPL: 2.6 MV
MDC_IDC_MSMT_LEADCHNL_RV_PACING_THRESHOLD_AMPLITUDE: 1.2 V
MDC_IDC_MSMT_LEADCHNL_RV_PACING_THRESHOLD_PULSEWIDTH: 0.5 MS
MDC_IDC_MSMT_LEADCHNL_RV_SENSING_INTR_AMPL: 1.6 MV
MDC_IDC_PG_IMPLANT_DTM: NORMAL
MDC_IDC_PG_MFG: NORMAL
MDC_IDC_PG_MODEL: NORMAL
MDC_IDC_PG_SERIAL: NORMAL
MDC_IDC_PG_TYPE: NORMAL
MDC_IDC_SESS_CLINIC_NAME: NORMAL
MDC_IDC_SESS_DTM: NORMAL
MDC_IDC_SESS_TYPE: NORMAL
MDC_IDC_SET_BRADY_AT_MODE_SWITCH_MODE: NORMAL
MDC_IDC_SET_BRADY_AT_MODE_SWITCH_RATE: 170 {BEATS}/MIN
MDC_IDC_SET_BRADY_HYSTRATE: NORMAL
MDC_IDC_SET_BRADY_LOWRATE: 70 {BEATS}/MIN
MDC_IDC_SET_BRADY_MAX_SENSOR_RATE: 140 {BEATS}/MIN
MDC_IDC_SET_BRADY_MAX_TRACKING_RATE: 140 {BEATS}/MIN
MDC_IDC_SET_BRADY_MODE: NORMAL
MDC_IDC_SET_BRADY_PAV_DELAY_HIGH: 180 MS
MDC_IDC_SET_BRADY_PAV_DELAY_LOW: 300 MS
MDC_IDC_SET_BRADY_SAV_DELAY_HIGH: 180 MS
MDC_IDC_SET_BRADY_SAV_DELAY_LOW: 300 MS
MDC_IDC_SET_LEADCHNL_RA_PACING_AMPLITUDE: 2 V
MDC_IDC_SET_LEADCHNL_RA_PACING_CAPTURE_MODE: NORMAL
MDC_IDC_SET_LEADCHNL_RA_PACING_POLARITY: NORMAL
MDC_IDC_SET_LEADCHNL_RA_PACING_PULSEWIDTH: 0.5 MS
MDC_IDC_SET_LEADCHNL_RA_SENSING_ADAPTATION_MODE: NORMAL
MDC_IDC_SET_LEADCHNL_RA_SENSING_POLARITY: NORMAL
MDC_IDC_SET_LEADCHNL_RA_SENSING_SENSITIVITY: 0.75 MV
MDC_IDC_SET_LEADCHNL_RV_PACING_AMPLITUDE: 2 V
MDC_IDC_SET_LEADCHNL_RV_PACING_CAPTURE_MODE: NORMAL
MDC_IDC_SET_LEADCHNL_RV_PACING_POLARITY: NORMAL
MDC_IDC_SET_LEADCHNL_RV_PACING_PULSEWIDTH: 0.5 MS
MDC_IDC_SET_LEADCHNL_RV_SENSING_ADAPTATION_MODE: NORMAL
MDC_IDC_SET_LEADCHNL_RV_SENSING_POLARITY: NORMAL
MDC_IDC_SET_LEADCHNL_RV_SENSING_SENSITIVITY: 0.75 MV
MDC_IDC_STAT_AT_DTM_END: NORMAL
MDC_IDC_STAT_AT_DTM_START: NORMAL
MDC_IDC_STAT_AT_MODE_SW_COUNT: 2
MDC_IDC_STAT_AT_MODE_SW_MAX_DURATION: 0 S
MDC_IDC_STAT_BRADY_DTM_END: NORMAL
MDC_IDC_STAT_BRADY_DTM_START: NORMAL
MDC_IDC_STAT_BRADY_RA_PERCENT_PACED: 32 %
MDC_IDC_STAT_BRADY_RV_PERCENT_PACED: 1 %
MDC_IDC_STAT_EPISODE_RECENT_COUNT: 2
MDC_IDC_STAT_EPISODE_RECENT_COUNT_DTM_END: NORMAL
MDC_IDC_STAT_EPISODE_RECENT_COUNT_DTM_START: NORMAL
MDC_IDC_STAT_EPISODE_TYPE: NORMAL

## 2023-07-21 DIAGNOSIS — G40.211 LOCALZ-RLTED SYMPTOMATIC EPILEPSY W CMPLX PARTL SZ, INTRACT, W STATUS (H): ICD-10-CM

## 2023-07-21 RX ORDER — PERAMPANEL 8 MG/1
TABLET ORAL
Qty: 31 TABLET | Refills: 5 | Status: SHIPPED | OUTPATIENT
Start: 2023-07-21 | End: 2024-02-06

## 2023-07-21 NOTE — TELEPHONE ENCOUNTER
perampanel (FYCOMPA) 8 MG tablet  Last Written Prescription Date: 7/5/23  Last Fill Quantity: 30,   # refills: 0  Last Office Visit : 6/7/23  Future Office visit:  none    Routing refill request to provider for review/approval because: controlled. Last written by other provider. Local print.

## 2023-07-24 ENCOUNTER — HOSPITAL ENCOUNTER (OUTPATIENT)
Dept: NUCLEAR MEDICINE | Facility: CLINIC | Age: 68
Setting detail: NUCLEAR MEDICINE
Discharge: HOME | End: 2023-07-24
Attending: UROLOGY | Admitting: UROLOGY
Payer: COMMERCIAL

## 2023-07-24 DIAGNOSIS — N13.30 HYDRONEPHROSIS: ICD-10-CM

## 2023-07-24 PROCEDURE — 250N000011 HC RX IP 250 OP 636: Mod: JZ | Performed by: UROLOGY

## 2023-07-24 PROCEDURE — 78708 K FLOW/FUNCT IMAGE W/DRUG: CPT

## 2023-07-24 PROCEDURE — A9562 TC99M MERTIATIDE: HCPCS | Performed by: UROLOGY

## 2023-07-24 PROCEDURE — 343N000001 HC RX 343: Performed by: UROLOGY

## 2023-07-24 RX ORDER — FUROSEMIDE 10 MG/ML
40 INJECTION INTRAMUSCULAR; INTRAVENOUS ONCE
Status: COMPLETED | OUTPATIENT
Start: 2023-07-24 | End: 2023-07-24

## 2023-07-24 RX ADMIN — TECHNESCAN TC 99M MERTIATIDE 9.02 MILLICURIE: 1 INJECTION, POWDER, LYOPHILIZED, FOR SOLUTION INTRAVENOUS at 14:00

## 2023-07-24 RX ADMIN — FUROSEMIDE 40 MG: 10 INJECTION, SOLUTION INTRAVENOUS at 14:21

## 2023-07-25 DIAGNOSIS — F43.21 ADJUSTMENT DISORDER WITH DEPRESSED MOOD: ICD-10-CM

## 2023-07-26 DIAGNOSIS — G40.211 LOCALZ-RLTED SYMPTOMATIC EPILEPSY W CMPLX PARTL SZ, INTRACT, W STATUS (H): ICD-10-CM

## 2023-07-26 NOTE — TELEPHONE ENCOUNTER
What is the concern that needs to be addressed by a nurse? Patient needs refill for medication, patient wants refill to go to [id number] - [place], [place] - [address] AT [facility] OF [address]    May a detailed message be left on voicemail? yes    Date of last office visit:     Message routed to: mincep

## 2023-07-28 RX ORDER — CLONAZEPAM 1 MG/1
1.5 TABLET ORAL AT BEDTIME
Qty: 45 TABLET | Refills: 5 | Status: SHIPPED | OUTPATIENT
Start: 2023-07-28 | End: 2024-02-06

## 2023-07-28 RX ORDER — ESCITALOPRAM OXALATE 10 MG/1
10 TABLET ORAL AT BEDTIME
Qty: 30 TABLET | Refills: 11 | Status: SHIPPED | OUTPATIENT
Start: 2023-07-28 | End: 2024-02-07

## 2023-07-28 NOTE — TELEPHONE ENCOUNTER
CLONAZEPAM 1MG TABLETS  Last Written Prescription Date:   7/5/2023  Last Fill Quantity: 45,   # refills: 0  Last Office Visit :  6/7/2023  Future Office visit:  None    Routing refill request to provider for review/approval because:  Drug not on the [facility], P or [facility] refill protocol or controlled substance      Rachel Doe RN  Central Triage Red Flags/Med Refills

## 2023-07-28 NOTE — TELEPHONE ENCOUNTER
ESCITALOPRAM 10MG TABLETS  Last Written Prescription Date:   3/16/2023  Last Fill Quantity: 30,   # refills: 11  Last Office Visit :  6/7/2023  Future Office visit:  None  Routing refill request to provider for review/approval because:  Per Refill protocol, needing updated PHQ-9 for this medication.   Refer to Provider for review and refills Per Providers orders for Pt care.      SSRIs Protocol Failed 07/28/2023 07:27 AM   Protocol Details  PHQ-9 score less than 5 in past 6 months          Rachel Doe RN  Central Triage Red Flags/Med Refills

## 2023-08-07 NOTE — TELEPHONE ENCOUNTER
"Chart reviewed.  Orders entered as per MD note (AED levels and sodium)  Call placed to patient who had me talk with her .  He noted that patient will be having her blood drawn at [facility]  He wondered if  would consider ordering a calcium and vitamin D level, as the patient had a fall recently and had a \"small fracture\".  Will check with     "
Adia Briceno is calling stating that she was advised by Dr. Barboza to have labs drawn prior to appointment with provider on 01/24/22. Patient is asking that these lab orders be placed and she will have them drawn at a clinic closer to home. Patient is requesting a call back at [phone], once orders have been placed. It is okay to leave a detailed message.  
Discussed with john Hale to placed orders for VitD level and calcium levels  
Hannon

## 2023-08-15 ENCOUNTER — TELEPHONE (OUTPATIENT)
Dept: NEUROLOGY | Facility: CLINIC | Age: 68
End: 2023-08-15

## 2023-08-15 NOTE — TELEPHONE ENCOUNTER
What is the concern that needs to be addressed by a nurse? Patient says she would like to speak with nurse about finding information about who dr campbell would refer her to in order for lower back surgery.     May a detailed message be left on voicemail? yes    Date of last office visit:     Message routed to: mincep

## 2023-08-24 ENCOUNTER — TELEPHONE (OUTPATIENT)
Dept: NEUROLOGY | Facility: CLINIC | Age: 68
End: 2023-08-24

## 2023-08-24 NOTE — TELEPHONE ENCOUNTER
Patient is calling again for this same issues and is looking for a recommendation on a neurologist that specializes in lower back care

## 2023-09-25 DIAGNOSIS — Z95.0 CARDIAC PACEMAKER IN SITU: ICD-10-CM

## 2023-09-25 DIAGNOSIS — I49.5 SINOATRIAL NODE DYSFUNCTION (H): Primary | ICD-10-CM

## 2023-10-17 ENCOUNTER — HOSPITAL ENCOUNTER (OUTPATIENT)
Dept: MAMMOGRAPHY | Facility: CLINIC | Age: 68
Discharge: HOME | End: 2023-10-17
Attending: FAMILY MEDICINE
Payer: COMMERCIAL

## 2023-10-17 ENCOUNTER — HOSPITAL ENCOUNTER (OUTPATIENT)
Dept: BONE DENSITY | Facility: CLINIC | Age: 68
Discharge: HOME | End: 2023-10-17
Attending: FAMILY MEDICINE
Payer: COMMERCIAL

## 2023-10-17 DIAGNOSIS — Z78.0 MENOPAUSE: ICD-10-CM

## 2023-10-17 DIAGNOSIS — M85.80 OSTEOPENIA: ICD-10-CM

## 2023-10-17 DIAGNOSIS — Z12.31 VISIT FOR SCREENING MAMMOGRAM: ICD-10-CM

## 2023-10-17 PROCEDURE — 77080 DXA BONE DENSITY AXIAL: CPT

## 2023-10-17 PROCEDURE — 77067 SCR MAMMO BI INCL CAD: CPT

## 2023-10-20 ENCOUNTER — ANCILLARY PROCEDURE (OUTPATIENT)
Dept: CARDIOLOGY | Facility: CLINIC | Age: 68
End: 2023-10-20
Attending: INTERNAL MEDICINE
Payer: COMMERCIAL

## 2023-10-20 DIAGNOSIS — Z95.0 CARDIAC PACEMAKER IN SITU: ICD-10-CM

## 2023-10-20 DIAGNOSIS — Z95.0 CARDIAC PACEMAKER IN SITU: Primary | ICD-10-CM

## 2023-10-20 DIAGNOSIS — I49.5 SINOATRIAL NODE DYSFUNCTION (H): ICD-10-CM

## 2023-10-20 PROCEDURE — 93280 PM DEVICE PROGR EVAL DUAL: CPT | Performed by: INTERNAL MEDICINE

## 2023-10-26 ENCOUNTER — ANCILLARY PROCEDURE (OUTPATIENT)
Dept: CT IMAGING | Facility: CLINIC | Age: 68
End: 2023-10-26
Attending: PSYCHIATRY & NEUROLOGY
Payer: COMMERCIAL

## 2023-10-26 DIAGNOSIS — M25.551 PAIN OF RIGHT HIP: ICD-10-CM

## 2023-10-26 LAB
MDC_IDC_LEAD_CONNECTION_STATUS: NORMAL
MDC_IDC_LEAD_CONNECTION_STATUS: NORMAL
MDC_IDC_LEAD_IMPLANT_DT: NORMAL
MDC_IDC_LEAD_IMPLANT_DT: NORMAL
MDC_IDC_LEAD_LOCATION: NORMAL
MDC_IDC_LEAD_LOCATION: NORMAL
MDC_IDC_LEAD_MFG: NORMAL
MDC_IDC_LEAD_MFG: NORMAL
MDC_IDC_LEAD_MODEL: NORMAL
MDC_IDC_LEAD_MODEL: NORMAL
MDC_IDC_LEAD_POLARITY_TYPE: NORMAL
MDC_IDC_LEAD_POLARITY_TYPE: NORMAL
MDC_IDC_LEAD_SERIAL: NORMAL
MDC_IDC_LEAD_SERIAL: NORMAL
MDC_IDC_MSMT_BATTERY_REMAINING_LONGEVITY: 18 MO
MDC_IDC_MSMT_LEADCHNL_RA_IMPEDANCE_VALUE: 500 OHM
MDC_IDC_MSMT_LEADCHNL_RA_PACING_THRESHOLD_AMPLITUDE: 0.5 V
MDC_IDC_MSMT_LEADCHNL_RA_PACING_THRESHOLD_PULSEWIDTH: 0.4 MS
MDC_IDC_MSMT_LEADCHNL_RV_IMPEDANCE_VALUE: 490 OHM
MDC_IDC_MSMT_LEADCHNL_RV_PACING_THRESHOLD_AMPLITUDE: 1.2 V
MDC_IDC_MSMT_LEADCHNL_RV_PACING_THRESHOLD_PULSEWIDTH: 0.4 MS
MDC_IDC_PG_IMPLANT_DTM: NORMAL
MDC_IDC_PG_MFG: NORMAL
MDC_IDC_PG_MODEL: NORMAL
MDC_IDC_PG_SERIAL: NORMAL
MDC_IDC_PG_TYPE: NORMAL
MDC_IDC_SESS_CLINIC_NAME: NORMAL
MDC_IDC_SESS_DTM: NORMAL
MDC_IDC_SESS_TYPE: NORMAL

## 2023-10-26 PROCEDURE — 73700 CT LOWER EXTREMITY W/O DYE: CPT | Mod: RT

## 2023-11-20 DIAGNOSIS — G40.211 LOCALIZATION-RELATED SYMPTOMATIC EPILEPSY AND EPILEPTIC SYNDROMES WITH COMPLEX PARTIAL SEIZURES, INTRACTABLE, WITH STATUS EPILEPTICUS (H): Primary | ICD-10-CM

## 2023-11-27 RX ORDER — SUMATRIPTAN 100 MG/1
TABLET, FILM COATED ORAL
Qty: 9 TABLET | Refills: 1 | Status: SHIPPED | OUTPATIENT
Start: 2023-11-27

## 2023-12-18 DIAGNOSIS — G40.211 LOCALZ-RLTED SYMPTOMATIC EPILEPSY W CMPLX PARTL SZ, INTRACT, W STATUS (H): ICD-10-CM

## 2023-12-18 DIAGNOSIS — G40.211 LOCALIZATION-RELATED SYMPTOMATIC EPILEPSY AND EPILEPTIC SYNDROMES WITH COMPLEX PARTIAL SEIZURES, INTRACTABLE, WITH STATUS EPILEPTICUS (H): ICD-10-CM

## 2023-12-18 RX ORDER — OXCARBAZEPINE 300 MG/1
TABLET, FILM COATED ORAL
Qty: 210 TABLET | Refills: 11 | Status: SHIPPED | OUTPATIENT
Start: 2023-12-18

## 2023-12-18 RX ORDER — LEVETIRACETAM 750 MG/1
TABLET ORAL
Qty: 135 TABLET | Refills: 11 | Status: SHIPPED | OUTPATIENT
Start: 2023-12-18 | End: 2024-02-07

## 2023-12-18 NOTE — TELEPHONE ENCOUNTER
Last seen: 6/7/23  RTC: 3 months  Cancel: no  No-show: no  Next appt: 2/7/24    Incoming refill from patient via phone    Medication requested: levETIRAcetam (KEPPRA) 750 MG tablet   Directions: Take one tablet three times daily and one and half tablets at night   Qty: 135  Last refilled: 12/21/22    Medication refill approved per refill protocol    Last seen: 6/7/23  RTC: 3 months  Cancel: no  No-show: no  Next appt: 2/7/24    Incoming refill from patient via phone    Medication requested: OXcarbazepine (TRILEPTAL) 300 MG tablet   Directions: TAKE ONE AND ONE-HALF TABLET IN THE MORNING, ONE AND ONE-HALF TABLET AT NOON, 2 TABLETS IN THE EVENING AND 2 TABLETS AT BEDTIME   Qty: 210  Last refilled: 12/21/22    Medication refill approved per refill protocol

## 2024-02-02 DIAGNOSIS — G40.211 LOCALZ-RLTED SYMPTOMATIC EPILEPSY W CMPLX PARTL SZ, INTRACT, W STATUS (H): ICD-10-CM

## 2024-02-04 NOTE — TELEPHONE ENCOUNTER
clonazePAM (KLONOPIN) 1 MG tablet   45 tablet 5 7/28/2023     FYCOMPA 8 MG tablet   31 tablet 5 7/21/2023 6/7/2023  [facility]    Noam Barboza MD  Neurology    Nv 2/7/24    Routed because:  controlled

## 2024-02-06 RX ORDER — PERAMPANEL 8 MG/1
TABLET ORAL
Qty: 31 TABLET | Refills: 1 | Status: SHIPPED | OUTPATIENT
Start: 2024-02-06 | End: 2024-02-07

## 2024-02-06 RX ORDER — CLONAZEPAM 1 MG/1
TABLET ORAL
Qty: 45 TABLET | Refills: 5 | Status: SHIPPED | OUTPATIENT
Start: 2024-02-06 | End: 2024-07-29

## 2024-02-07 PROBLEM — F33.9 DEPRESSION, RECURRENT (H): Status: ACTIVE | Noted: 2024-02-07

## 2024-03-25 ENCOUNTER — TELEPHONE (OUTPATIENT)
Dept: NEUROLOGY | Facility: CLINIC | Age: 69
End: 2024-03-25

## 2024-03-25 DIAGNOSIS — F33.9 DEPRESSION, RECURRENT (H): Primary | ICD-10-CM

## 2024-03-25 NOTE — TELEPHONE ENCOUNTER
What is the concern that needs to be addressed by a nurse?     Adia Briceno is calling stating that she has been experiencing depression. Patient states she would like to speak with someone regarding referral to a psychiatrist for this matter.    May a detailed message be left on voicemail? YES    Date of last office visit: 02/07/2024    Message routed to: MINCEP RN

## 2024-03-26 NOTE — TELEPHONE ENCOUNTER
Writer placed referrals and called patient. Patient verbalized understanding and had no further questions.

## 2024-04-04 DIAGNOSIS — G40.211 LOCALZ-RLTED SYMPTOMATIC EPILEPSY W CMPLX PARTL SZ, INTRACT, W STATUS (H): ICD-10-CM

## 2024-04-07 NOTE — TELEPHONE ENCOUNTER
LORAZEPAM 0.5MG TABLETS   Last Written Prescription Date:  7/10/2023  Last Fill Quantity: 20,   # refills: 0  Last Office Visit : 2/7/2024  Future Office visit:  6/19/2024    Routing refill request to provider for review/approval because:  Drug not on the [facility], P or [facility] refill protocol or controlled substance    Rachel Doe RN  Central Triage Red Flags/Med Refills

## 2024-04-08 RX ORDER — LORAZEPAM 0.5 MG/1
TABLET ORAL
Qty: 12 TABLET | Refills: 0 | Status: SHIPPED | OUTPATIENT
Start: 2024-04-08 | End: 2024-09-23

## 2024-06-12 ENCOUNTER — TELEPHONE (OUTPATIENT)
Dept: CARDIOLOGY | Facility: CLINIC | Age: 69
End: 2024-06-12
Payer: COMMERCIAL

## 2024-06-12 DIAGNOSIS — I49.5 SINOATRIAL NODE DYSFUNCTION (H): ICD-10-CM

## 2024-06-12 DIAGNOSIS — Z95.0 CARDIAC PACEMAKER IN SITU: Primary | ICD-10-CM

## 2024-06-12 NOTE — TELEPHONE ENCOUNTER
Patient has a [employer]ru[employer] device (not dependent). Per new teletrace protocol, device should be checked q3 months until battery <1 year, then device should be checked q1 month until RAYMOND.  Patient had 1.5 years left on their battery when last checked on 10/20/23. Their next in clinic check should be scheduled for first available.  I spoke with patient. She recently suffered an epileptic episode that resulted in a fall - she is now recovering from a cracked pelvis and dislocated back so it is very difficult for her to make it into the clinic for an appt.  The first appt she was able to make work is on 7/1/24. Appt scheduled.  SANTY DE LA ROSA

## 2024-06-19 ENCOUNTER — OFFICE VISIT (OUTPATIENT)
Dept: NEUROLOGY | Facility: CLINIC | Age: 69
End: 2024-06-19
Payer: COMMERCIAL

## 2024-06-19 VITALS
OXYGEN SATURATION: 96 % | TEMPERATURE: 97.9 F | HEART RATE: 85 BPM | DIASTOLIC BLOOD PRESSURE: 100 MMHG | SYSTOLIC BLOOD PRESSURE: 179 MMHG

## 2024-06-19 DIAGNOSIS — G40.211 LOCALIZATION-RELATED SYMPTOMATIC EPILEPSY AND EPILEPTIC SYNDROMES WITH COMPLEX PARTIAL SEIZURES, INTRACTABLE, WITH STATUS EPILEPTICUS (H): ICD-10-CM

## 2024-06-19 DIAGNOSIS — G40.211 LOCALZ-RLTED SYMPTOMATIC EPILEPSY W CMPLX PARTL SZ, INTRACT, W STATUS (H): ICD-10-CM

## 2024-06-19 RX ORDER — FAMOTIDINE 20 MG/1
20 TABLET, FILM COATED ORAL DAILY
COMMUNITY

## 2024-06-19 NOTE — PATIENT INSTRUCTIONS
Times of Days  AM Noon Dinner Bed      Medication Tablet Size Number of Tablets/Capsules Total Daily Dosage    oxcarbazepine 300 mg  1 1/2  1  2  2    1950 mg                                                                                                                                   Keep levetiracetam the same.  Skip fycompa for one day every week on Sunday evening.  Reduce oxcarbazepine a little as above.    Carry this with you at all times.  CONTINUE TAKING YOUR OTHER MEDICATIONS AS PREVIOUSLY DIRECTED.      * * *Do not store medications in the bathroom.  Keep medications away from children!* * *

## 2024-06-19 NOTE — LETTER
2024       RE: Adia Briceno  : 1955   MRN: [id number]        Dear Colleague,    Thank you for referring your patient, Adia Briceno, to the [facility] EPILEPSY CARE at [facility]. Please see a copy of my visit note below.    [facility] Epilepsy Care Progress Note      Patient:  Adia Briceno  :  1955   Age:  69 year old   Today's Office Visit:  2024    Epilepsy Data:    Patient History  Primary Epileptologist/Provider: Noam Barboza M.D.  Epilepsy Syndrome: Localization-related epilepsy unspecified  Epilepsy Syndrome Status: Final  Age of Onset: 10  Other Relevant Dx/ Issues: Inpatient evaluation 1998.  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  Asystole  with subsequent pacemaker placement.  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 Seizure-related falls/injuries.  is endocrinologist.     Tests/Surgery History  Last EE2005  Last MRI: 2008  Last Neuropsych Testin2008  Last Case Management Conference: 2008  Epilepsy Surgery #1 Date: 08  Epilepsy Related Surgery #1 : Type: VNS placement    Seizure Record  Current Visit Date: 24  Previous Visit Date: 24  Months since last visit: 4.37  Seizure Type 1: Complex partial seizures unspecified  Description of Sz Type 1: Head slumps, amnesia  Seizure Type 2: Complex partial seizures with impairment of consciousness at onset  Description of Sz Type 2: Head slumps with collapse  # of Type 2 Seizure since last visit: 5  Freq. Type 2 / Month: 1.14  Seizure Type 3: Simple partial seizures unspecified  Description of Sz Type 3: strange feeling in her head, 30-60 seconds  # of Type 3 Seizure since last visit: 2  Freq. 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 Left more than right. All AEDs other than barbiturates, felbatol, ethetoin (but  serum sickness w PHT), brivaracetam  have been used. Seizures have worsened following 2007;previously would go up to 3 months without sz but after 2007 baseline increased to three sz per month.  mg per did not help and poorly tolerated. VNS increased to rapid cycling. Asystole detected April 2011 not thought due to epilepsy or VNS tho this was never demonstrated; pacemaker implanted. She collapses with some seizures and has continued collapsing after pacemaker was placed. Perampanel added with seizure improvement but moderate doses resulted in significant ataxia; this improve on 2 mg QHS which also helped with insomnia. With brief seizure recurrence fycompa increased to 4 mg. Seizures much better following Mar 2015 for no particular reason, VNS reached EOS Sep 2016 without significant worsening.    History of Present Illness:     Seizure numbers are as above. A little better than at last visit.  She feels seizures more common in last week for the month.  No falls with seizures.    Current Outpatient Medications   Medication Sig Dispense Refill    famotidine (PEPCID) 20 MG tablet Take 20 mg by mouth daily      acetaminophen (TYLENOL) 325 MG tablet Take 2 tablets (650 mg) by mouth every 6 hours as needed for mild pain or other (and adjunct with moderate or severe pain or per patient request)      alendronate (FOSAMAX) 70 MG tablet Take 70 mg by mouth every 7 days Sunday      calcium carbonate (TUMS) 500 MG chewable tablet Take 1 tablet (500 mg) by mouth 3 times daily as needed for heartburn      cetirizine (ZYRTEC) 10 MG tablet Take 10 mg by mouth daily as needed Summer allergies      Cholecalciferol (VITAMIN D) 2000 UNITS CAPS Take 1 capsule by mouth daily      clonazePAM (KLONOPIN) 1 MG tablet TAKE 1 AND 1/2 TABLETS(1.5 MG) BY MOUTH AT BEDTIME 45 tablet 5    diclofenac (VOLTAREN) 1 % topical gel Apply 4 g topically 4 times daily      escitalopram (LEXAPRO) 10 MG tablet Take 1.5 tablets (15 mg) by mouth at  bedtime 45 tablet 11    ferrous sulfate (FEROSUL) 325 (65 Fe) MG tablet Take 1 tablet (325 mg) by mouth 2 times daily (with meals)      guaiFENesin-codeine (ROBITUSSIN AC) 100-10 MG/5ML solution Take 5 mLs by mouth daily as needed for cough PRN for cough 180 mL 0    levETIRAcetam (KEPPRA) 750 MG tablet Take one tablet four times per day. 135 tablet 11    LORazepam (ATIVAN) 0.5 MG tablet TAKE 1 TABLET BY MOUTH TWICE DAILY FOR 2 DAYS FOLLOWING SEIZURE CLUSTER 12 tablet 0    menthol, Topical Analgesic, 2.5% (BENGAY VANISHING SCENT) 2.5 % GEL topical gel Apply topically every 6 hours      OXcarbazepine (TRILEPTAL) 300 MG tablet TAKE ONE AND ONE-HALF TABLET IN THE MORNING, ONE AND ONE-HALF TABLET AT NOON, 2 TABLETS IN THE EVENING AND 2 TABLETS AT BEDTIME. 210 tablet 11    perampanel (FYCOMPA) 8 MG tablet TAKE 1 TABLET(8 MG) BY MOUTH AT BEDTIME 31 tablet 5    progesterone (PROMETRIUM) 100 MG capsule Take 100 mg by mouth 2 times daily      psyllium (METAMUCIL/KONSYL) Packet Take 1 packet by mouth daily (Patient taking differently: Take 1 packet by mouth daily as needed (diarrhea))      SUMAtriptan (IMITREX) 100 MG tablet TAKE 1 TABLET BY MOUTH AS NEEDED FOR SEVERE HEADACHE. MAX 4 TABLETS PER MONTH 9 tablet 1    traMADol (ULTRAM) 50 MG tablet Take 0.5 tablets (25 mg) by mouth 2 times daily as needed for severe pain PRN for pain 15 tablet 0        Perceived AED Side Effects:  Yes; continues complaining of sedation. Did not reduce levetiracetam persistently because had seizures shortly after this happened. Notes that sedation worse after noon time dose.    Medication Notes:  Taking levetiracetam [phone]. Still taking KLN 1.5 mg at bedtime.      AED Medication Compliance:  compliant all of the time  Using a pill box:  Yes    Review of Systems:  No vomiting, diarrhea, fevers, hematuria or kidney stones.  Continues with pelvic pain.  Continues with bladder spasms; has catheter in place.  Have you experienced a traumatic  "fall since your last visit: NO; slow slumps  Are these falls related to your seizures:  NO      Other Issues:  Migraines variable; up to once per week. Had one today. Sumatriptan stops.  Is patient safe to drive:  No    Woman Care:   Patient is:  postmenopausal.  Exam:    BP (!) 179/100 (BP Location: Left arm, Patient Position: Sitting, Cuff Size: Adult Large)   Pulse 85   Temp 97.9  F (36.6  C) (Temporal)   SpO2 96%      Wt Readings from Last 5 Encounters:   02/07/24 173 lb (78.5 kg)   07/19/23 173 lb (78.5 kg)   07/17/23 174 lb 3.2 oz (79 kg)   07/12/23 173 lb 3.2 oz (78.6 kg)   07/05/23 186 lb 4.6 oz (84.5 kg)     BP repeated several times; at discharge 168/98. Heart exam without murmur. No headaches at time of visit. Eye grounds examined without papilledema.    Relates date as June 20 2024. Oriented to day, month, year. Short term memory 2/3 verbal objects after distractor; gets third item from list. \"World\" spelled backward: dlrow. Able to state 8 animals in 30 seconds. Able to draw intersecting pentagons correctly but has difficulty drawing cube. Normal language and her usual excellent vocabulary. In wheel chair. VFF. EOMI. Normal smooth pursuit. Smile symmetrical. Tongue midline. No drift, pronation, or tremor. FFN is done well. Rapid fine movements are done well. Leg strength full proximally and distally.    2/7/24 OXC = 52, levetiracetam = 54.8, per = 820, Na = 140. Previous perampanel level = 440; there was no dose change.    IMPRESSION   1) Focal seizures, intractable; focal aware, focal aware to focal impaired seizures. Some focal impaired seizures associated with falls and occasionally with trama. Independent bitemporal likely neocortical onset by scalp EEG. Improvement starting approxmiately March 2015 led to almost 1 3/4 years of  freedom from focal impaired seizures with falls. Fycompa and levetiracetam appeared to play important roles in this period of seizure freedom but I thought that " completing menopause may have been important.  Seizures wax and wane. Starting early 2022 seizures with impairment and periods of collapse with more signficant trauma returned including compression fracture in December. Seizures wax and wane, somewhat better since last visit. She is refractory to all AEDs other than barbiturates, felbamate, CLB off label, and cenobamate.  2) Migraine headaches; topiramate thought to have helped; under good control.  3) Asystole; likely primary and not related to seizures or VNS tho this has never been formally examined; has pacemaker in place.   4) Depression, overall did well on escitalopram, some worsening after recent trauma but nwo improved again. Therapy also helpful.   5) Mild hyponatremia in past likely related to oxcarbazpine and escitalopram. Normal recently.  6) Hypertension today; at acceptable levels at discharge. No indication of hypertensive encephalopathy.     DISCUSSION  Above discussed. We discussed remaining AED options and risks and benefits. They do not want to make changes given that situation has somewhat improved. Would like some modification to help with side effects. Given elevated oxcarbazepine and perampanel levels this is reasonable.     PLAN:   1) Same OXC, topiramate, and micronized progesterone. Same clonazapam 1.5 mg. Same levetiracetam 3750 mg per day. Reduce oxcarbazepine to [phone] on QID schedule. Skip perampanel dose on Sundays only to help with sedation. Precise instructions written in AVS.  2) , who is a physician will check BP regularly and seek help with primary care if SBP consistently higher than 150 or DBP consistently higher than 100  3) Continue escitalopram at 10 mg at HS.  4) Other AED options as above. Optimizing topiramate would be reasonable if she can tolerate. As she is uncomfortable with felbatol,  cenobamate would probably be best choice, perhaps replacing levetiracetam. We could also consider replacing  levetiracetam with brivaracetam or use eslicarbazpine instead of oxcarbazepine if formulary will allow. RNS should be a serious consideration if seizures with falls return and become more frequent. If RNS placed she would have three implanted devices which could also raise concern. Neither she nor her  were excited about this possibility when we last reviewed.  6) RTC 3 months.     Total time in person on day of visit 30 minutes. Additional 8 minutes to review previous records and coordinate care. Additional 8 minutes generating note. The longitudinal plan of care for this patient's epilepsy (including epilepsy comorbidities if appropriate) was addressed during this visit. Due to the added complexity in care, I will continue to support this patient in the subsequent management of this condition(s) and with the ongoing continuity of care of this condition(s).             Again, thank you for allowing me to participate in the care of your patient.      Sincerely,    Noam Barboza MD

## 2024-06-19 NOTE — PROGRESS NOTES
[facility] Epilepsy Care Progress Note      Patient:  Adia Briceno  :  1955   Age:  69 year old   Today's Office Visit:  2024    Epilepsy Data:    Patient History  Primary Epileptologist/Provider: Noam Barboza M.D.  Epilepsy Syndrome: Localization-related epilepsy unspecified  Epilepsy Syndrome Status: Final  Age of Onset: 10  Other Relevant Dx/ Issues: Inpatient evaluation 1998.  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  Asystole  with subsequent pacemaker placement.  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 Seizure-related falls/injuries.  is endocrinologist.     Tests/Surgery History  Last EE2005  Last MRI: 2008  Last Neuropsych Testin2008  Last Case Management Conference: 2008  Epilepsy Surgery #1 Date: 08  Epilepsy Related Surgery #1 : Type: VNS placement    Seizure Record  Current Visit Date: 24  Previous Visit Date: 24  Months since last visit: 4.37  Seizure Type 1: Complex partial seizures unspecified  Description of Sz Type 1: Head slumps, amnesia  Seizure Type 2: Complex partial seizures with impairment of consciousness at onset  Description of Sz Type 2: Head slumps with collapse  # of Type 2 Seizure since last visit: 5  Freq. Type 2 / Month: 1.14  Seizure Type 3: Simple partial seizures unspecified  Description of Sz Type 3: strange feeling in her head, 30-60 seconds  # of Type 3 Seizure since last visit: 2  Freq. 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 Left more than right. All AEDs other than barbiturates, felbatol, ethetoin (but serum sickness w PHT), brivaracetam  have been used. Seizures have worsened following ;previously would go up to 3 months without sz but after  baseline increased to three sz per month.  mg per did not help and poorly tolerated. VNS increased to rapid cycling. Asystole detected April  2011 not thought due to epilepsy or VNS tho this was never demonstrated; pacemaker implanted. She collapses with some seizures and has continued collapsing after pacemaker was placed. Perampanel added with seizure improvement but moderate doses resulted in significant ataxia; this improve on 2 mg QHS which also helped with insomnia. With brief seizure recurrence fycompa increased to 4 mg. Seizures much better following Mar 2015 for no particular reason, VNS reached EOS Sep 2016 without significant worsening.    History of Present Illness:     Seizure numbers are as above. A little better than at last visit.  She feels seizures more common in last week for the month.  No falls with seizures.    Current Outpatient Medications   Medication Sig Dispense Refill    famotidine (PEPCID) 20 MG tablet Take 20 mg by mouth daily      acetaminophen (TYLENOL) 325 MG tablet Take 2 tablets (650 mg) by mouth every 6 hours as needed for mild pain or other (and adjunct with moderate or severe pain or per patient request)      alendronate (FOSAMAX) 70 MG tablet Take 70 mg by mouth every 7 days Sunday      calcium carbonate (TUMS) 500 MG chewable tablet Take 1 tablet (500 mg) by mouth 3 times daily as needed for heartburn      cetirizine (ZYRTEC) 10 MG tablet Take 10 mg by mouth daily as needed Summer allergies      Cholecalciferol (VITAMIN D) 2000 UNITS CAPS Take 1 capsule by mouth daily      clonazePAM (KLONOPIN) 1 MG tablet TAKE 1 AND 1/2 TABLETS(1.5 MG) BY MOUTH AT BEDTIME 45 tablet 5    diclofenac (VOLTAREN) 1 % topical gel Apply 4 g topically 4 times daily      escitalopram (LEXAPRO) 10 MG tablet Take 1.5 tablets (15 mg) by mouth at bedtime 45 tablet 11    ferrous sulfate (FEROSUL) 325 (65 Fe) MG tablet Take 1 tablet (325 mg) by mouth 2 times daily (with meals)      guaiFENesin-codeine (ROBITUSSIN AC) 100-10 MG/5ML solution Take 5 mLs by mouth daily as needed for cough PRN for cough 180 mL 0    levETIRAcetam (KEPPRA) 750 MG tablet  Take one tablet four times per day. 135 tablet 11    LORazepam (ATIVAN) 0.5 MG tablet TAKE 1 TABLET BY MOUTH TWICE DAILY FOR 2 DAYS FOLLOWING SEIZURE CLUSTER 12 tablet 0    menthol, Topical Analgesic, 2.5% (BENGAY VANISHING SCENT) 2.5 % GEL topical gel Apply topically every 6 hours      OXcarbazepine (TRILEPTAL) 300 MG tablet TAKE ONE AND ONE-HALF TABLET IN THE MORNING, ONE AND ONE-HALF TABLET AT NOON, 2 TABLETS IN THE EVENING AND 2 TABLETS AT BEDTIME. 210 tablet 11    perampanel (FYCOMPA) 8 MG tablet TAKE 1 TABLET(8 MG) BY MOUTH AT BEDTIME 31 tablet 5    progesterone (PROMETRIUM) 100 MG capsule Take 100 mg by mouth 2 times daily      psyllium (METAMUCIL/KONSYL) Packet Take 1 packet by mouth daily (Patient taking differently: Take 1 packet by mouth daily as needed (diarrhea))      SUMAtriptan (IMITREX) 100 MG tablet TAKE 1 TABLET BY MOUTH AS NEEDED FOR SEVERE HEADACHE. MAX 4 TABLETS PER MONTH 9 tablet 1    traMADol (ULTRAM) 50 MG tablet Take 0.5 tablets (25 mg) by mouth 2 times daily as needed for severe pain PRN for pain 15 tablet 0        Perceived AED Side Effects:  Yes; continues complaining of sedation. Did not reduce levetiracetam persistently because had seizures shortly after this happened. Notes that sedation worse after noon time dose.    Medication Notes:  Taking levetiracetam [phone]. Still taking KLN 1.5 mg at bedtime.      AED Medication Compliance:  compliant all of the time  Using a pill box:  Yes    Review of Systems:  No vomiting, diarrhea, fevers, hematuria or kidney stones.  Continues with pelvic pain.  Continues with bladder spasms; has catheter in place.  Have you experienced a traumatic fall since your last visit: NO; slow slumps  Are these falls related to your seizures:  NO      Other Issues:  Migraines variable; up to once per week. Had one today. Sumatriptan stops.  Is patient safe to drive:  No    Woman Care:   Patient is:  postmenopausal.  Exam:    BP (!) 179/100 (BP Location:  "Left arm, Patient Position: Sitting, Cuff Size: Adult Large)   Pulse 85   Temp 97.9  F (36.6  C) (Temporal)   SpO2 96%      Wt Readings from Last 5 Encounters:   02/07/24 173 lb (78.5 kg)   07/19/23 173 lb (78.5 kg)   07/17/23 174 lb 3.2 oz (79 kg)   07/12/23 173 lb 3.2 oz (78.6 kg)   07/05/23 186 lb 4.6 oz (84.5 kg)     BP repeated several times; at discharge 168/98. Heart exam without murmur. No headaches at time of visit. Eye grounds examined without papilledema.    Relates date as June 20 2024. Oriented to day, month, year. Short term memory 2/3 verbal objects after distractor; gets third item from list. \"World\" spelled backward: dlrow. Able to state 8 animals in 30 seconds. Able to draw intersecting pentagons correctly but has difficulty drawing cube. Normal language and her usual excellent vocabulary. In wheel chair. VFF. EOMI. Normal smooth pursuit. Smile symmetrical. Tongue midline. No drift, pronation, or tremor. FFN is done well. Rapid fine movements are done well. Leg strength full proximally and distally.    2/7/24 OXC = 52, levetiracetam = 54.8, per = 820, Na = 140. Previous perampanel level = 440; there was no dose change.    IMPRESSION   1) Focal seizures, intractable; focal aware, focal aware to focal impaired seizures. Some focal impaired seizures associated with falls and occasionally with trama. Independent bitemporal likely neocortical onset by scalp EEG. Improvement starting approxmiately March 2015 led to almost 1 3/4 years of  freedom from focal impaired seizures with falls. Fycompa and levetiracetam appeared to play important roles in this period of seizure freedom but I thought that completing menopause may have been important.  Seizures wax and wane. Starting early 2022 seizures with impairment and periods of collapse with more signficant trauma returned including compression fracture in December. Seizures wax and wane, somewhat better since last visit. She is refractory to all AEDs " other than barbiturates, felbamate, CLB off label, and cenobamate.  2) Migraine headaches; topiramate thought to have helped; under good control.  3) Asystole; likely primary and not related to seizures or VNS tho this has never been formally examined; has pacemaker in place.   4) Depression, overall did well on escitalopram, some worsening after recent trauma but nwo improved again. Therapy also helpful.   5) Mild hyponatremia in past likely related to oxcarbazpine and escitalopram. Normal recently.  6) Hypertension today; at acceptable levels at discharge. No indication of hypertensive encephalopathy.     DISCUSSION  Above discussed. We discussed remaining AED options and risks and benefits. They do not want to make changes given that situation has somewhat improved. Would like some modification to help with side effects. Given elevated oxcarbazepine and perampanel levels this is reasonable.     PLAN:   1) Same OXC, topiramate, and micronized progesterone. Same clonazapam 1.5 mg. Same levetiracetam 3750 mg per day. Reduce oxcarbazepine to [phone] on QID schedule. Skip perampanel dose on Sundays only to help with sedation. Precise instructions written in AVS.  2) , who is a physician will check BP regularly and seek help with primary care if SBP consistently higher than 150 or DBP consistently higher than 100  3) Continue escitalopram at 10 mg at HS.  4) Other AED options as above. Optimizing topiramate would be reasonable if she can tolerate. As she is uncomfortable with felbatol,  cenobamate would probably be best choice, perhaps replacing levetiracetam. We could also consider replacing levetiracetam with brivaracetam or use eslicarbazpine instead of oxcarbazepine if formulary will allow. RNS should be a serious consideration if seizures with falls return and become more frequent. If RNS placed she would have three implanted devices which could also raise concern. Neither she nor her  were  excited about this possibility when we last reviewed.  6) RTC 3 months.     Total time in person on day of visit 30 minutes. Additional 8 minutes to review previous records and coordinate care. Additional 8 minutes generating note. The longitudinal plan of care for this patient's epilepsy (including epilepsy comorbidities if appropriate) was addressed during this visit. Due to the added complexity in care, I will continue to support this patient in the subsequent management of this condition(s) and with the ongoing continuity of care of this condition(s).     Noam Barboza MD

## 2024-07-29 DIAGNOSIS — G40.211 LOCALZ-RLTED SYMPTOMATIC EPILEPSY W CMPLX PARTL SZ, INTRACT, W STATUS (H): ICD-10-CM

## 2024-07-30 RX ORDER — CLONAZEPAM 1 MG/1
1.5 TABLET ORAL AT BEDTIME
Qty: 45 TABLET | Refills: 5 | Status: SHIPPED | OUTPATIENT
Start: 2024-07-30

## 2024-08-14 ENCOUNTER — TELEPHONE (OUTPATIENT)
Dept: CARDIOLOGY | Facility: CLINIC | Age: 69
End: 2024-08-14

## 2024-08-14 ENCOUNTER — OFFICE VISIT (OUTPATIENT)
Dept: CARDIOLOGY | Facility: CLINIC | Age: 69
End: 2024-08-14
Payer: COMMERCIAL

## 2024-08-14 ENCOUNTER — ANCILLARY PROCEDURE (OUTPATIENT)
Dept: CARDIOLOGY | Facility: CLINIC | Age: 69
End: 2024-08-14
Attending: INTERNAL MEDICINE
Payer: COMMERCIAL

## 2024-08-14 VITALS
SYSTOLIC BLOOD PRESSURE: 160 MMHG | BODY MASS INDEX: 29.73 KG/M2 | DIASTOLIC BLOOD PRESSURE: 83 MMHG | HEART RATE: 74 BPM | OXYGEN SATURATION: 96 % | WEIGHT: 185 LBS | HEIGHT: 66 IN

## 2024-08-14 DIAGNOSIS — Z82.49 FAMILY HISTORY OF ISCHEMIC HEART DISEASE: ICD-10-CM

## 2024-08-14 DIAGNOSIS — I49.5 SINOATRIAL NODE DYSFUNCTION (H): ICD-10-CM

## 2024-08-14 DIAGNOSIS — I10 ESSENTIAL HYPERTENSION: ICD-10-CM

## 2024-08-14 DIAGNOSIS — Z95.0 CARDIAC PACEMAKER IN SITU: Primary | ICD-10-CM

## 2024-08-14 DIAGNOSIS — E78.2 MIXED HYPERLIPIDEMIA: ICD-10-CM

## 2024-08-14 PROCEDURE — 99214 OFFICE O/P EST MOD 30 MIN: CPT | Mod: 25 | Performed by: NURSE PRACTITIONER

## 2024-08-14 PROCEDURE — 93280 PM DEVICE PROGR EVAL DUAL: CPT | Performed by: INTERNAL MEDICINE

## 2024-08-14 NOTE — LETTER
8/14/2024    Toshia Bailon MD  [address]  [facility] [address]    RE: Adia Briceno       Dear Colleague,     I had the pleasure of seeing Adia Briceno in the [facility].    Cardiology Clinic Progress Note    Service Date: August 14, 2024  Primary Cardiology Team: Dr. Richmond  Reason for Visit: Annual follow up     HISTORY OF PRESENT ILLNESS:  I had the pleasure of meeting Ms. Adia Briceno in the clinic today. She is a very pleasant 69 year old female with a past medical history notable for a seizure disorder, depression, migraines, gastroesophageal reflux disease, and marked hyperlipidemia consistent with probable familial hyperlipidemia. She was having syncopal spells going back to 2011 and a heart monitor ultimately demonstrated a 7-10 second pause for which a pacemaker was inserted. This was unfortunately complicated by MicroLead dislodgement with R-wave sensing reduced at 3. adjustments were made to pacemaker and it has functioned fine since then.     She has a strong family history of coronary artery disease with her father having a heart attack at age 45. Given this history along with her significant hyperlipidemia, Dr. Richmond had her complete a CT coronary calcium scan in 2021 which fortunately came back with a calcium score of 0.    Today, Ms. Briceno presents to the clinic today accompanied by her  who is a retired Endocrinologist for a routine annual follow-up. She had a device check prior to the visit today which showed 32% atrial pacing, 2% ventricular pacing and underlying sinus rhythm. No arrhythmias were noted. The RV lead has a new elevated threshold and low sensing which was reported as stable. There is a message out to Dr. Jules regarding the elevated RV threshold. Estimated battery longevity is 1.5 yrs.     She tells me she has been feeling well from a cardiac standpoint. She remains burdened by a lot of symptoms and issues secondary to her seizure  disorder. She is primarily wheelchair-bound due to an injury to her hip from seizure-related falls. She denies any symptoms of chest pain, shortness of breath, palpitations, dizziness, or lightheadedness.  Blood pressure is elevated in the clinic today, but the patient's  states that she typically has whitecoat syndrome with blood pressures running much higher in the clinic and then at home.  Blood pressure readings at home have been stable primarily around the 120s-130s systolic range.  Most recent lipid profile through her primary care team in the [facility] system showed an LDL cholesterol of 185.    ASSESSMENT:  1. Cardiac pacemaker in situ.  2. Mixed hyperlipidemia. Not currently on statin therapy given CT coronary calcium score of 0 in 2021.  3. Essential hypertension. Blood pressure elevated in clinic today, but reportedly has whitecoat syndrome with blood pressure readings reasonably controlled at home.  4. Strong family history of CAD, both with a CT coronary calcium score of 0 in 2021 as noted above.    PLAN:  - Again discussed the option of statin therapy to try to manage her hyperlipidemia more aggressively, but given the negative CT coronary calcium scan she would prefer to hold off for now. Also dicussed consideration for adding a low dose antihypertensive agent but with adequate blood pressures reading in her checks at home we will hold off for now.  - Continue with routine device checks.  - Follow-up with Dr. Richmond for a routine annual visit around this time next year.    Thank you for the opportunity to participate in this pleasant patient's care. We would be happy to see her sooner if needed for any concerns in the meantime.    30 total minutes was spent today including chart review, precharting, history and exam, post visit documentation, and reviewing studies as outlined above.     RAMON Smith, CNP   Nurse Practitioner  [facility]  Pager: [phone]  Text Page   (8am - 5pm, M-F)    Orders this Visit:  No orders of the defined types were placed in this encounter.    No orders of the defined types were placed in this encounter.    There are no discontinued medications.  No diagnosis found.    CURRENT MEDICATIONS:  Current Outpatient Medications   Medication Sig Dispense Refill     acetaminophen (TYLENOL) 325 MG tablet Take 2 tablets (650 mg) by mouth every 6 hours as needed for mild pain or other (and adjunct with moderate or severe pain or per patient request)       alendronate (FOSAMAX) 70 MG tablet Take 70 mg by mouth every 7 days Sunday       calcium carbonate (TUMS) 500 MG chewable tablet Take 1 tablet (500 mg) by mouth 3 times daily as needed for heartburn       cetirizine (ZYRTEC) 10 MG tablet Take 10 mg by mouth daily as needed Summer allergies       Cholecalciferol (VITAMIN D) 2000 UNITS CAPS Take 1 capsule by mouth daily       clonazePAM (KLONOPIN) 1 MG tablet Take 1.5 tablets (1.5 mg) by mouth at bedtime 45 tablet 5     diclofenac (VOLTAREN) 1 % topical gel Apply 4 g topically 4 times daily       escitalopram (LEXAPRO) 10 MG tablet Take 1.5 tablets (15 mg) by mouth at bedtime 45 tablet 11     famotidine (PEPCID) 20 MG tablet Take 20 mg by mouth daily       ferrous sulfate (FEROSUL) 325 (65 Fe) MG tablet Take 1 tablet (325 mg) by mouth 2 times daily (with meals)       guaiFENesin-codeine (ROBITUSSIN AC) 100-10 MG/5ML solution Take 5 mLs by mouth daily as needed for cough PRN for cough 180 mL 0     levETIRAcetam (KEPPRA) 750 MG tablet Take one tablet four times per day. 135 tablet 11     LORazepam (ATIVAN) 0.5 MG tablet TAKE 1 TABLET BY MOUTH TWICE DAILY FOR 2 DAYS FOLLOWING SEIZURE CLUSTER 12 tablet 0     menthol, Topical Analgesic, 2.5% (BENGAY VANISHING SCENT) 2.5 % GEL topical gel Apply topically every 6 hours       OXcarbazepine (TRILEPTAL) 300 MG tablet TAKE ONE AND ONE-HALF TABLET IN THE MORNING, ONE AND ONE-HALF TABLET AT NOON, 2 TABLETS IN THE EVENING AND 2  TABLETS AT BEDTIME. 210 tablet 11     perampanel (FYCOMPA) 8 MG tablet TAKE 1 TABLET(8 MG) BY MOUTH AT BEDTIME 31 tablet 5     progesterone (PROMETRIUM) 100 MG capsule Take 100 mg by mouth 2 times daily       psyllium (METAMUCIL/KONSYL) Packet Take 1 packet by mouth daily (Patient taking differently: Take 1 packet by mouth daily as needed (diarrhea))       SUMAtriptan (IMITREX) 100 MG tablet TAKE 1 TABLET BY MOUTH AS NEEDED FOR SEVERE HEADACHE. MAX 4 TABLETS PER MONTH 9 tablet 1     traMADol (ULTRAM) 50 MG tablet Take 0.5 tablets (25 mg) by mouth 2 times daily as needed for severe pain PRN for pain 15 tablet 0       ALLERGIES  Allergies   Allergen Reactions     Lactose        PAST MEDICAL, SURGICAL, FAMILY HISTORY:  History was reviewed and updated as needed, see medical record.    SOCIAL HISTORY:  Social History     Socioeconomic History     Marital status:      Spouse name: Not on file     Number of children: Not on file     Years of education: Not on file     Highest education level: Not on file   Occupational History     Not on file   Tobacco Use     Smoking status: Never     Smokeless tobacco: Never   Substance and Sexual Activity     Alcohol use: No     Alcohol/week: 0.0 standard drinks of alcohol     Drug use: No     Sexual activity: Yes     Partners: Male   Other Topics Concern     Parent/sibling w/ CABG, MI or angioplasty before 65F 55M? Not Asked      Service Not Asked     Blood Transfusions Not Asked     Caffeine Concern Yes     Comment: 3 CUPS coffee daily      Occupational Exposure Not Asked     Hobby Hazards Not Asked     Sleep Concern Not Asked     Stress Concern Not Asked     Weight Concern No     Special Diet No     Back Care Not Asked     Exercise Yes     Bike Helmet Not Asked     Seat Belt Yes     Self-Exams Not Asked   Social History Narrative     Not on file     Social Determinants of Health     Financial Resource Strain: Low Risk  (5/13/2024)    Received from [facility]    Financial Resource Strain      Difficulty of Paying Living Expenses: 3      Difficulty of Paying Living Expenses: Not on file   Food Insecurity: No Food Insecurity (5/13/2024)    Received from [facility]    Food Insecurity      Worried About Running Out of Food in the Last Year: 1   Transportation Needs: No Transportation Needs (5/13/2024)    Received from [facility]    Transportation Needs      Lack of Transportation (Medical): 1   Physical Activity: Not on file   Stress: Not on file   Social Connections: Socially Integrated (5/13/2024)    Received [facility]    Social Connections      Frequency of Communication with Friends and Family: 0   Interpersonal Safety: Not on file   Housing Stability: Low Risk  (5/13/2024)    Received from [facility]    Housing Stability      Unable to Pay for Housing in the Last Year: 1     Review of Systems:  Focused cardiovascular and respiratory review of systems is negative other than the symptoms noted above in the HPI.     Physical Exam:  Vitals: There were no vitals taken for this visit.   Wt Readings from Last 4 Encounters:   02/07/24 78.5 kg (173 lb)   07/19/23 78.5 kg (173 lb)   07/17/23 79 kg (174 lb 3.2 oz)   07/12/23 78.6 kg (173 lb 3.2 oz)     Constitutional: Alert and in no acute distress.  Neck: No JVD.  Cardiovascular:  Regular rate and rhythm, no murmur, rub or gallop.   Respiratory: Breathing non-labored. Lungs are clear to auscultation with no wheezes or crackles bilaterally.  Gastrointestinal: Abdomen non-distended.  Extremities: No lower extremity edema.   Neuropsychiatric: No gross focal deficits. Affect appropriate. Mentation normal.     Recent Lab Results:  LIPID RESULTS:  Lab Results   Component Value Date    CHOL 309 (A) 10/03/2018     10/03/2018     10/03/2018    TRIG 72  07/23/2021    TRIG 72 07/23/2021    TRIG 54 10/03/2018     LIVER ENZYME RESULTS:  Lab Results   Component Value Date    AST 23 11/13/2013    ALT 13 U/L 07/23/2013     CBC RESULTS:  Lab Results   Component Value Date    WBC 5.9 07/10/2023    WBC 3.0 (L) 06/08/2011    RBC 3.62 (L) 07/10/2023    RBC 4.40 06/08/2011    HGB 11.0 (L) 07/10/2023    HGB 13.5 06/08/2011    HCT 34.8 (L) 07/10/2023    HCT 38.5 06/08/2011    MCV 96 07/10/2023    MCV 88 06/08/2011    MCH 30.4 07/10/2023    MCH 30.7 06/08/2011    MCHC 31.6 07/10/2023    MCHC 35.1 06/08/2011    RDW 14.5 07/10/2023    RDW 12.4 06/08/2011     07/10/2023     (L) 06/08/2011     BMP RESULTS:  Lab Results   Component Value Date     02/07/2024     (A) 10/03/2018    POTASSIUM 3.7 07/10/2023    POTASSIUM 3.7 10/03/2018    CHLORIDE 107 07/10/2023    CHLORIDE 102 07/23/2021    CHLORIDE 101 10/03/2018    CO2 21 (L) 07/10/2023    CO2 25 10/03/2018    ANIONGAP 12 07/10/2023    ANIONGAP 6 10/03/2018    GLC 92 07/10/2023    GLC 86 10/03/2018    BUN 13.6 07/10/2023    BUN 14 10/03/2018    CR 0.68 07/10/2023    CR 0.74 10/03/2018    GFRESTIMATED >90 07/10/2023    GFRESTIMATED >60 10/03/2018    GFRESTBLACK 107 10/25/2016    TATIANA 8.4 (L) 07/10/2023    TATIANA 8.7 10/03/2018      A1C RESULTS:  Lab Results   Component Value Date    A1C 5.5 07/03/2023     INR RESULTS:  Lab Results   Component Value Date    INR 1.00 06/08/2011       CC  Lance Richmond MD  [address]  [place] [id number]    Please kindly note that this document was completed in part using Dragon voice recognition software. Although reviewed after completion, some word substitutions and typographical errors may occur. Please contact me if clarification is needed.       Thank you for allowing me to participate in the care of your patient.      Sincerely,     Greg Rogel NP     [facility]  cc:   Lance Richmond MD  [id number]  [place] [id number]

## 2024-08-14 NOTE — PROGRESS NOTES
Cardiology Clinic Progress Note    Service Date: August 14, 2024  Primary Cardiology Team: Dr. Richmond  Reason for Visit: Annual follow up     HISTORY OF PRESENT ILLNESS:  I had the pleasure of meeting Ms. Adia Briceno in the clinic today. She is a very pleasant 69 year old female with a past medical history notable for a seizure disorder, depression, migraines, gastroesophageal reflux disease, and marked hyperlipidemia consistent with probable familial hyperlipidemia. She was having syncopal spells going back to 2011 and a heart monitor ultimately demonstrated a 7-10 second pause for which a pacemaker was inserted. This was unfortunately complicated by MicroLead dislodgement with R-wave sensing reduced at 3. adjustments were made to pacemaker and it has functioned fine since then.     She has a strong family history of coronary artery disease with her father having a heart attack at age 45. Given this history along with her significant hyperlipidemia, Dr. Richmond had her complete a CT coronary calcium scan in 2021 which fortunately came back with a calcium score of 0.    Today, Ms. Briceno presents to the clinic today accompanied by her  who is a retired Endocrinologist for a routine annual follow-up. She had a device check prior to the visit today which showed 32% atrial pacing, 2% ventricular pacing and underlying sinus rhythm. No arrhythmias were noted. The RV lead has a new elevated threshold and low sensing which was reported as stable. There is a message out to Dr. Jules regarding the elevated RV threshold. Estimated battery longevity is 1.5 yrs.     She tells me she has been feeling well from a cardiac standpoint. She remains burdened by a lot of symptoms and issues secondary to her seizure disorder. She is primarily wheelchair-bound due to an injury to her hip from seizure-related falls. She denies any symptoms of chest pain, shortness of breath, palpitations, dizziness, or lightheadedness.   Blood pressure is elevated in the clinic today, but the patient's  states that she typically has whitecoat syndrome with blood pressures running much higher in the clinic and then at home.  Blood pressure readings at home have been stable primarily around the 120s-130s systolic range.  Most recent lipid profile through her primary care team in the [facility] system showed an LDL cholesterol of 185.    ASSESSMENT:  1. Cardiac pacemaker in situ.  2. Mixed hyperlipidemia. Not currently on statin therapy given CT coronary calcium score of 0 in 2021.  3. Essential hypertension. Blood pressure elevated in clinic today, but reportedly has whitecoat syndrome with blood pressure readings reasonably controlled at home.  4. Strong family history of CAD, both with a CT coronary calcium score of 0 in 2021 as noted above.    PLAN:  - Again discussed the option of statin therapy to try to manage her hyperlipidemia more aggressively, but given the negative CT coronary calcium scan she would prefer to hold off for now. Also dicussed consideration for adding a low dose antihypertensive agent but with adequate blood pressures reading in her checks at home we will hold off for now.  - Continue with routine device checks.  - Follow-up with Dr. Richmond for a routine annual visit around this time next year.    Thank you for the opportunity to participate in this pleasant patient's care. We would be happy to see her sooner if needed for any concerns in the meantime.    30 total minutes was spent today including chart review, precharting, history and exam, post visit documentation, and reviewing studies as outlined above.     RAMON Smith, CNP   Nurse Practitioner  [facility]  Pager: [phone]  Text Page  (8am - 5pm, M-F)    Orders this Visit:  No orders of the defined types were placed in this encounter.    No orders of the defined types were placed in this encounter.    There are no discontinued  medications.  No diagnosis found.    CURRENT MEDICATIONS:  Current Outpatient Medications   Medication Sig Dispense Refill    acetaminophen (TYLENOL) 325 MG tablet Take 2 tablets (650 mg) by mouth every 6 hours as needed for mild pain or other (and adjunct with moderate or severe pain or per patient request)      alendronate (FOSAMAX) 70 MG tablet Take 70 mg by mouth every 7 days Sunday      calcium carbonate (TUMS) 500 MG chewable tablet Take 1 tablet (500 mg) by mouth 3 times daily as needed for heartburn      cetirizine (ZYRTEC) 10 MG tablet Take 10 mg by mouth daily as needed Summer allergies      Cholecalciferol (VITAMIN D) 2000 UNITS CAPS Take 1 capsule by mouth daily      clonazePAM (KLONOPIN) 1 MG tablet Take 1.5 tablets (1.5 mg) by mouth at bedtime 45 tablet 5    diclofenac (VOLTAREN) 1 % topical gel Apply 4 g topically 4 times daily      escitalopram (LEXAPRO) 10 MG tablet Take 1.5 tablets (15 mg) by mouth at bedtime 45 tablet 11    famotidine (PEPCID) 20 MG tablet Take 20 mg by mouth daily      ferrous sulfate (FEROSUL) 325 (65 Fe) MG tablet Take 1 tablet (325 mg) by mouth 2 times daily (with meals)      guaiFENesin-codeine (ROBITUSSIN AC) 100-10 MG/5ML solution Take 5 mLs by mouth daily as needed for cough PRN for cough 180 mL 0    levETIRAcetam (KEPPRA) 750 MG tablet Take one tablet four times per day. 135 tablet 11    LORazepam (ATIVAN) 0.5 MG tablet TAKE 1 TABLET BY MOUTH TWICE DAILY FOR 2 DAYS FOLLOWING SEIZURE CLUSTER 12 tablet 0    menthol, Topical Analgesic, 2.5% (BENGAY VANISHING SCENT) 2.5 % GEL topical gel Apply topically every 6 hours      OXcarbazepine (TRILEPTAL) 300 MG tablet TAKE ONE AND ONE-HALF TABLET IN THE MORNING, ONE AND ONE-HALF TABLET AT NOON, 2 TABLETS IN THE EVENING AND 2 TABLETS AT BEDTIME. 210 tablet 11    perampanel (FYCOMPA) 8 MG tablet TAKE 1 TABLET(8 MG) BY MOUTH AT BEDTIME 31 tablet 5    progesterone (PROMETRIUM) 100 MG capsule Take 100 mg by mouth 2 times daily       psyllium (METAMUCIL/KONSYL) Packet Take 1 packet by mouth daily (Patient taking differently: Take 1 packet by mouth daily as needed (diarrhea))      SUMAtriptan (IMITREX) 100 MG tablet TAKE 1 TABLET BY MOUTH AS NEEDED FOR SEVERE HEADACHE. MAX 4 TABLETS PER MONTH 9 tablet 1    traMADol (ULTRAM) 50 MG tablet Take 0.5 tablets (25 mg) by mouth 2 times daily as needed for severe pain PRN for pain 15 tablet 0       ALLERGIES  Allergies   Allergen Reactions    Lactose        PAST MEDICAL, SURGICAL, FAMILY HISTORY:  History was reviewed and updated as needed, see medical record.    SOCIAL HISTORY:  Social History     Socioeconomic History    Marital status:      Spouse name: Not on file    Number of children: Not on file    Years of education: Not on file    Highest education level: Not on file   Occupational History    Not on file   Tobacco Use    Smoking status: Never    Smokeless tobacco: Never   Substance and Sexual Activity    Alcohol use: No     Alcohol/week: 0.0 standard drinks of alcohol    Drug use: No    Sexual activity: Yes     Partners: Male   Other Topics Concern    Parent/sibling w/ CABG, MI or angioplasty before 65F 55M? Not Asked     Service Not Asked    Blood Transfusions Not Asked    Caffeine Concern Yes     Comment: 3 CUPS coffee daily     Occupational Exposure Not Asked    Hobby Hazards Not Asked    Sleep Concern Not Asked    Stress Concern Not Asked    Weight Concern No    Special Diet No    Back Care Not Asked    Exercise Yes    Bike Helmet Not Asked    Seat Belt Yes    Self-Exams Not Asked   Social History Narrative    Not on file     Social Determinants of Health     Financial Resource Strain: Low Risk  (5/13/2024)    [facility] from [facility]    Financial Resource Strain     Difficulty of Paying Living Expenses: 3     Difficulty of Paying Living Expenses: Not on file   Food Insecurity: No Food Insecurity (5/13/2024)    Received from [facility]    Food Insecurity     Worried About Running Out of Food in the Last Year: 1   Transportation Needs: No Transportation Needs (5/13/2024)    Received from [facility]    Transportation Needs     Lack of Transportation (Medical): 1   Physical Activity: Not on file   Stress: Not on file   Social Connections: Socially Integrated (5/13/2024)    Received from [facility]    Social Connections     Frequency of Communication with Friends and Family: 0   Interpersonal Safety: Not on file   Housing Stability: Low Risk  (5/13/2024)    Received from [facility]    Housing Stability     Unable to Pay for Housing in the Last Year: 1     Review of Systems:  Focused cardiovascular and respiratory review of systems is negative other than the symptoms noted above in the HPI.     Physical Exam:  Vitals: There were no vitals taken for this visit.   Wt Readings from Last 4 Encounters:   02/07/24 78.5 kg (173 lb)   07/19/23 78.5 kg (173 lb)   07/17/23 79 kg (174 lb 3.2 oz)   07/12/23 78.6 kg (173 lb 3.2 oz)     Constitutional: Alert and in no acute distress.  Neck: No JVD.  Cardiovascular:  Regular rate and rhythm, no murmur, rub or gallop.   Respiratory: Breathing non-labored. Lungs are clear to auscultation with no wheezes or crackles bilaterally.  Gastrointestinal: Abdomen non-distended.  Extremities: No lower extremity edema.   Neuropsychiatric: No gross focal deficits. Affect appropriate. Mentation normal.     Recent Lab Results:  LIPID RESULTS:  Lab Results   Component Value Date    CHOL 309 (A) 10/03/2018     10/03/2018     10/03/2018    TRIG 72 07/23/2021    TRIG 72 07/23/2021    TRIG 54 10/03/2018     LIVER ENZYME RESULTS:  Lab Results   Component Value Date    AST 23 11/13/2013    ALT 13 U/L 07/23/2013     CBC RESULTS:  Lab Results   Component Value Date    WBC 5.9 07/10/2023    WBC 3.0  (L) 06/08/2011    RBC 3.62 (L) 07/10/2023    RBC 4.40 06/08/2011    HGB 11.0 (L) 07/10/2023    HGB 13.5 06/08/2011    HCT 34.8 (L) 07/10/2023    HCT 38.5 06/08/2011    MCV 96 07/10/2023    MCV 88 06/08/2011    MCH 30.4 07/10/2023    MCH 30.7 06/08/2011    MCHC 31.6 07/10/2023    MCHC 35.1 06/08/2011    RDW 14.5 07/10/2023    RDW 12.4 06/08/2011     07/10/2023     (L) 06/08/2011     BMP RESULTS:  Lab Results   Component Value Date     02/07/2024     (A) 10/03/2018    POTASSIUM 3.7 07/10/2023    POTASSIUM 3.7 10/03/2018    CHLORIDE 107 07/10/2023    CHLORIDE 102 07/23/2021    CHLORIDE 101 10/03/2018    CO2 21 (L) 07/10/2023    CO2 25 10/03/2018    ANIONGAP 12 07/10/2023    ANIONGAP 6 10/03/2018    GLC 92 07/10/2023    GLC 86 10/03/2018    BUN 13.6 07/10/2023    BUN 14 10/03/2018    CR 0.68 07/10/2023    CR 0.74 10/03/2018    GFRESTIMATED >90 07/10/2023    GFRESTIMATED >60 10/03/2018    GFRESTBLACK 107 10/25/2016    TATIANA 8.4 (L) 07/10/2023    TATIANA 8.7 10/03/2018      A1C RESULTS:  Lab Results   Component Value Date    A1C 5.5 07/03/2023     INR RESULTS:  Lab Results   Component Value Date    INR 1.00 06/08/2011       CC  Lance Richmond MD  [address]  [place] [id number]    Please kindly note that this document was completed in part using Dragon voice recognition software. Although reviewed after completion, some word substitutions and typographical errors may occur. Please contact me if clarification is needed.

## 2024-08-14 NOTE — TELEPHONE ENCOUNTER
Pt in clinic for annual threshold today. She had PPM implanted in 2011 for sinus pauses. Battery has 1.5 yrs remaining. RV lead has elevated threshold (new) and low sensing (stable). RV lead is Guidant [id number], also implanted in 2011. Impedance is stable.     AP: 32%  : 2%  Underlying Rhythm: SR 80    Dr. Jules saw pt in 2011 and implanted the PPM. Will review lead measurement changes with Dr. Jules. Discussed with pt and  that we will likely continue to monitor, and discuss the possible need for new RV lead placement when device triggers RAYMOND. Will call pt if Dr. Jules recommends anything different.         Lead measurements today 8/14/2024:        Lead measurements 10/20/2023:      Lead measurements 7/14/2021:        Lead measurements at 6 week check 8/2011:            Copy of full device check note below:     [facility] [employer] (D) Pacemaker Device Check  Patient seen in clinic for device evaluation and iterative programming.   AP: 32 %    : 2 %    Mode: DDD     Underlying Rhythm: SR 80    Heart Rate: good variability     Sensing: slightly low in A (0.8mV), low in V (1.6mV)   Pacing Threshold: WNL in A, elevated in V (1.7V @ 0.5ms)    Impedance: WNL    Battery Status: 1.5 yrs estimated longevity     Device Site: [facility]    Atrial Arrhythmia: 0    Ventricular Arrhythmia: 0    Setting Change: changed A Lead Pacing Amplituce from 1.0V to 2.0V per clinic protocol (minimum of 2.0V). changed A Lead Programmed Sensitivity from 0.75mV to 0.5mV due to Sensing of 0.8mV today.   Changed V Lead Pacing Amplitude from 2.2V to 3.4V, to maintain 2x safety margin.   Care Plan: in-clinic check in 3 months per teletrace protocol. Estimated battery longevity is 1.5 yrs today, after changes made today. Discussed changes in V lead measurements with pt and her , and will notify Dr. Jules

## 2024-08-14 NOTE — TELEPHONE ENCOUNTER
Per Dr. Jules's routing comment:   Monitor       Pt aware we were only going to call her if any changes were needed. Will close this encounter.

## 2024-08-14 NOTE — PATIENT INSTRUCTIONS
Thank you for your visit with the [facility] today.    Medication changes and/or recommendations from today:   -Continue current medications without changes.    Follow up plan:   -Follow-up with Dr. Rcihmond for a routine annual visit around this time next year.    If you have questions or concerns in the meantime please call the nurse team at [phone] or send a [employer] message.     Scheduling phone number: [phone]    It was a pleasure seeing you today!     RAMON Smith, CNP  Nurse Practitioner  [facility]

## 2024-08-16 DIAGNOSIS — G40.211 LOCALZ-RLTED SYMPTOMATIC EPILEPSY W CMPLX PARTL SZ, INTRACT, W STATUS (H): ICD-10-CM

## 2024-09-23 DIAGNOSIS — G40.211 LOCALZ-RLTED SYMPTOMATIC EPILEPSY W CMPLX PARTL SZ, INTRACT, W STATUS (H): ICD-10-CM

## 2024-09-23 RX ORDER — LORAZEPAM 0.5 MG/1
TABLET ORAL
Qty: 12 TABLET | Refills: 0 | Status: SHIPPED | OUTPATIENT
Start: 2024-09-23

## 2024-09-23 NOTE — TELEPHONE ENCOUNTER
LORAZEPAM 0.5MG TABLETS       Last Written Prescription Date:  4-8-24  Last Fill Quantity: 12,   # refills: 0  Last Office Visit : 6-19-24  Future Office visit:  11-20-24    Routing refill request to provider for review/approval because:  Drug not on the [facility], P or [facility] refill protocol or controlled substance

## 2024-11-19 ENCOUNTER — ANCILLARY PROCEDURE (OUTPATIENT)
Dept: CARDIOLOGY | Facility: CLINIC | Age: 69
End: 2024-11-19
Attending: INTERNAL MEDICINE
Payer: COMMERCIAL

## 2024-11-19 DIAGNOSIS — I49.5 SINOATRIAL NODE DYSFUNCTION (H): ICD-10-CM

## 2024-11-19 DIAGNOSIS — Z95.0 CARDIAC PACEMAKER IN SITU: ICD-10-CM

## 2024-11-19 PROCEDURE — 93280 PM DEVICE PROGR EVAL DUAL: CPT | Performed by: INTERNAL MEDICINE

## 2024-11-20 ENCOUNTER — OFFICE VISIT (OUTPATIENT)
Dept: NEUROLOGY | Facility: CLINIC | Age: 69
End: 2024-11-20
Payer: COMMERCIAL

## 2024-11-20 DIAGNOSIS — G40.211 LOCALZ-RLTED SYMPTOMATIC EPILEPSY W CMPLX PARTL SZ, INTRACT, W STATUS (H): ICD-10-CM

## 2024-11-20 DIAGNOSIS — G40.211 LOCALIZATION-RELATED SYMPTOMATIC EPILEPSY AND EPILEPTIC SYNDROMES WITH COMPLEX PARTIAL SEIZURES, INTRACTABLE, WITH STATUS EPILEPTICUS (H): ICD-10-CM

## 2024-11-20 LAB
MDC_IDC_LEAD_CONNECTION_STATUS: NORMAL
MDC_IDC_LEAD_CONNECTION_STATUS: NORMAL
MDC_IDC_LEAD_IMPLANT_DT: NORMAL
MDC_IDC_LEAD_IMPLANT_DT: NORMAL
MDC_IDC_LEAD_LOCATION: NORMAL
MDC_IDC_LEAD_LOCATION: NORMAL
MDC_IDC_LEAD_MFG: NORMAL
MDC_IDC_LEAD_MFG: NORMAL
MDC_IDC_LEAD_MODEL: NORMAL
MDC_IDC_LEAD_MODEL: NORMAL
MDC_IDC_LEAD_POLARITY_TYPE: NORMAL
MDC_IDC_LEAD_POLARITY_TYPE: NORMAL
MDC_IDC_LEAD_SERIAL: NORMAL
MDC_IDC_LEAD_SERIAL: NORMAL
MDC_IDC_MSMT_LEADCHNL_RA_IMPEDANCE_VALUE: 480 OHM
MDC_IDC_MSMT_LEADCHNL_RA_PACING_THRESHOLD_AMPLITUDE: 0.8 V
MDC_IDC_MSMT_LEADCHNL_RA_PACING_THRESHOLD_PULSEWIDTH: 0.5 MS
MDC_IDC_MSMT_LEADCHNL_RA_SENSING_INTR_AMPL: 0.9 MV
MDC_IDC_MSMT_LEADCHNL_RV_IMPEDANCE_VALUE: 480 OHM
MDC_IDC_MSMT_LEADCHNL_RV_PACING_THRESHOLD_AMPLITUDE: 1.2 V
MDC_IDC_MSMT_LEADCHNL_RV_PACING_THRESHOLD_PULSEWIDTH: 0.5 MS
MDC_IDC_MSMT_LEADCHNL_RV_SENSING_INTR_AMPL: 1.1 MV
MDC_IDC_PG_IMPLANT_DTM: NORMAL
MDC_IDC_PG_MFG: NORMAL
MDC_IDC_PG_MODEL: NORMAL
MDC_IDC_PG_SERIAL: NORMAL
MDC_IDC_PG_TYPE: NORMAL
MDC_IDC_SESS_CLINIC_NAME: NORMAL
MDC_IDC_SESS_DTM: NORMAL
MDC_IDC_SESS_TYPE: NORMAL

## 2024-11-20 RX ORDER — OXCARBAZEPINE 300 MG/1
TABLET, FILM COATED ORAL
Qty: 210 TABLET | Refills: 11 | Status: SHIPPED | OUTPATIENT
Start: 2024-11-20

## 2024-11-20 RX ORDER — LEVETIRACETAM 750 MG/1
TABLET ORAL
Qty: 135 TABLET | Refills: 11 | Status: SHIPPED | OUTPATIENT
Start: 2024-11-20

## 2024-11-20 NOTE — PATIENT INSTRUCTIONS
Times of Days  5 AM  3 to 4 PM  9 PM     Medication Tablet Size Number of Tablets/Capsules Total Daily Dosage    oxcarbazepine  300 mg  2    2    2  1800 mg    levetiracetam  750 mg  1    1 1/2    1 1/2  3000 mg    perampanel  8   0    0    1  8 mg                                                                                               Take medications as above on day of surgery.    Otherwise keep taking medications the way you are now.    If you get blood work done at [facility], let us know several days after test is done so that we can look up results. They usually will not send the results to us.    Carry this with you at all times.  CONTINUE TAKING YOUR OTHER MEDICATIONS AS PREVIOUSLY DIRECTED.      * * *Do not store medications in the bathroom.  Keep medications away from children!* * *

## 2024-11-20 NOTE — LETTER
2024       RE: Adia Briceno  : 1955   MRN: [id number]      Dear Colleague,    Thank you for referring your patient, Adia Briceno, to the [facility] EPILEPSY [facility]ARE at [facility]. Please see a copy of my visit note below.    [facility][1] Epilepsy Care Progress Note      Patient:  Adia Briceno  :  1955   Age:  69 year old   Today's Office Visit:  2024    Epilepsy Data:    Patient History  Primary Epileptologist/Provider: Noam Barboza M.D.  Epilepsy Syndrome: Localization-related epilepsy unspecified  Epilepsy Syndrome Status: Final  Age of Onset: 10  Other Relevant Dx/ Issues: Inpatient evaluation 1998.  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  Asystole  with subsequent pacemaker placement.  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 Seizure-related falls/injuries.  is endocrinologist.     Tests/Surgery History  Last EE2005  Last MRI: 2008  Last Neuropsych Testin2008  Last Case Management Conference: 2008  Epilepsy Surgery #1 Date: 08  Epilepsy Related Surgery #1 : Type: VNS placement    Seizure Record  Current Visit Date: 24  Previous Visit Date: 24  Months since last visit: 5.06  Seizure Type 1: Complex partial seizures unspecified  Description of Sz Type 1: Head slumps, amnesia  Seizure Type 2: Complex partial seizures with impairment of consciousness at onset  Description of Sz Type 2: Head slumps with collapse  # of Type 2 Seizure since last visit: 13  Freq. Type 2 / Month: 2.57  Seizure Type 3: Simple partial seizures unspecified  Description of Sz Type 3: strange feeling in her head, 30-60 seconds  # of Type 3 Seizure since last visit: 3  Freq. 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 Left more than right. All AEDs other than barbiturates, felbatol, ethetoin (but  serum sickness w PHT), brivaracetam  have been used. Seizures have worsened following 2007;previously would go up to 3 months without sz but after 2007 baseline increased to three sz per month.  mg per did not help and poorly tolerated. VNS increased to rapid cycling. Asystole detected April 2011 not thought due to epilepsy or VNS tho this was never demonstrated; pacemaker implanted. She collapses with some seizures and has continued collapsing after pacemaker was placed. Perampanel added with seizure improvement but moderate doses resulted in significant ataxia; this improve on 2 mg QHS which also helped with insomnia. With brief seizure recurrence fycompa increased to 4 mg. Seizures much better following Mar 2015 for no particular reason, VNS reached EOS Sep 2016 without significant worsening.    History of Present Illness:      feels that seizures are about the same. However, numbers in her seizure calendar are a little higher. Seizures tend to occur in clusters.   describes confusion and stupor for several minutes. No automatic activity. Minor head slump. NO fall.     feel that sleep deprivation worsens seizures. Stress also plays a role. She has not fallen but spends most time in wheel chair.    Current Outpatient Medications   Medication Sig Dispense Refill     acetaminophen (TYLENOL) 325 MG tablet Take 2 tablets (650 mg) by mouth every 6 hours as needed for mild pain or other (and adjunct with moderate or severe pain or per patient request)       alendronate (FOSAMAX) 70 MG tablet Take 70 mg by mouth every 7 days Sunday       calcium carbonate (TUMS) 500 MG chewable tablet Take 1 tablet (500 mg) by mouth 3 times daily as needed for heartburn       cetirizine (ZYRTEC) 10 MG tablet Take 10 mg by mouth daily as needed Summer allergies       Cholecalciferol (VITAMIN D) 2000 UNITS CAPS Take 1 capsule by mouth daily       clonazePAM (KLONOPIN) 1 MG tablet Take 1.5 tablets (1.5 mg) by  mouth at bedtime 45 tablet 5     diclofenac (VOLTAREN) 1 % topical gel Apply 4 g topically 4 times daily       escitalopram (LEXAPRO) 10 MG tablet Take 1.5 tablets (15 mg) by mouth at bedtime 45 tablet 11     famotidine (PEPCID) 20 MG tablet Take 20 mg by mouth daily       ferrous sulfate (FEROSUL) 325 (65 Fe) MG tablet Take 1 tablet (325 mg) by mouth 2 times daily (with meals)       guaiFENesin-codeine (ROBITUSSIN AC) 100-10 MG/5ML solution Take 5 mLs by mouth daily as needed for cough PRN for cough 180 mL 0     levETIRAcetam (KEPPRA) 750 MG tablet Take one tablet four times per day. 135 tablet 11     LORazepam (ATIVAN) 0.5 MG tablet TAKE 1 TABLET BY MOUTH TWICE DAILY FOR 2 DAYS FOLLOWING SEIZURE CLUSTER 12 tablet 0     menthol, Topical Analgesic, 2.5% (BENGAY VANISHING SCENT) 2.5 % GEL topical gel Apply topically every 6 hours       OXcarbazepine (TRILEPTAL) 300 MG tablet TAKE ONE AND ONE-HALF TABLET IN THE MORNING, ONE AND ONE-HALF TABLET AT NOON, 2 TABLETS IN THE EVENING AND 2 TABLETS AT BEDTIME. 210 tablet 11     perampanel (FYCOMPA) 8 MG tablet TAKE 1 TABLET(8 MG) BY MOUTH AT BEDTIME 31 tablet 5     progesterone (PROMETRIUM) 100 MG capsule Take 100 mg by mouth 2 times daily       psyllium (METAMUCIL/KONSYL) Packet Take 1 packet by mouth daily (Patient taking differently: Take 1 packet by mouth daily as needed (diarrhea))       SUMAtriptan (IMITREX) 100 MG tablet TAKE 1 TABLET BY MOUTH AS NEEDED FOR SEVERE HEADACHE. MAX 4 TABLETS PER MONTH 9 tablet 1     traMADol (ULTRAM) 50 MG tablet Take 0.5 tablets (25 mg) by mouth 2 times daily as needed for severe pain PRN for pain (Patient not taking: Reported on 8/14/2024) 15 tablet 0        Perceived AED Side Effects:  Uncertain; continues with some fatigue and drowsiness.     Medication Notes:    Off topiramate for about 6 months. Sumatriptan four times per month. One progesterone per day.  She reduced to levetiracetam 750 mg qid but seizures worsened so now back to  [phone]   AED Medication Compliance:  compliant all of the time  Using a pill box:  Yes    Review of Systems:  No vomiting, diarrhea, fevers, hematuria or kidney stones.  Has chronic geiger in place. Suprapubic catheter to be placed in one week.  Reports chronic cough. Wonders whether VNS wire is responsible.  Have you experienced a traumatic fall since your last visit: NO  Are these falls related to your seizures:  NO    Other Issues:  IN wheel chair most of the time.  Is patient safe to drive:  No    Woman Care:   Patient is:  postmenopausal.    Exam:    There were no vitals taken for this visit.     Wt Readings from Last 5 Encounters:   08/14/24 185 lb (83.9 kg)   02/07/24 173 lb (78.5 kg)   07/19/23 173 lb (78.5 kg)   07/17/23 174 lb 3.2 oz (79 kg)   07/12/23 173 lb 3.2 oz (78.6 kg)     Normal language and her usual excellent vocabulary. In wheel chair. VFF. EOMI. Disrupted smooth pursuit today. Bilateral ptosis. Palpebral fissure widens when looks up. No worsening of ptosis with repeated upgaze. Pupils equal. Smile symmetrical. Tongue midline. No drift, pronation, or tremor. FFN is done well. Rapid fine movements are done well. Leg strength full proximally and distally. Arm strength full proximally and distally. No weakening with repeated arm abduction or hip flexion.    Heart exam without murmur. RRR. BP with cuff 150/90 today.     IMPRESSION   1) Focal seizures, intractable; focal aware, focal aware to focal impaired seizures. Some focal impaired seizures associated with falls and occasionally with trama. Independent bitemporal likely neocortical onset by scalp EEG. Improvement starting approxmiately March 2015 led to almost 1 3/4 years of  freedom from focal impaired seizures with falls. Fycompa and levetiracetam appeared to play important roles in this period of seizure freedom but I thought that completing menopause may have been important.  Seizures wax and wane. Starting early 2022 seizures with  impairment and periods of collapse with more signficant trauma returned including compression fracture in December. Seizures wax and wane, somewhat worse since last visit. She is refractory to all AEDs other than barbiturates, felbamate, CLB off label, and cenobamate. It appears TPM was stopped; one wonders whether this explains minor seizure worsening.  2) Migraine headaches; topiramate thought to have helped; but remain under good control off topiramate.  3) Asystole; likely primary and not related to seizures or VNS tho this has never been formally examined; has pacemaker in place.   4) Depression, overall did well on escitalopram, some worsening after recent trauma but nwo improved again. Therapy also helpful.   5) Mild hyponatremia in past likely related to oxcarbazpine and escitalopram. Normal recently.     DISCUSSION  Above discussed. We discussed remaining AED options and risks and benefits. They do not want to make changes. We again discussed potential for DBS and RNS but they do not feel that current situation severe enough to warrant this.     PLAN:   1) Same OXC. Same clonazapam 1.5 mg. Same levetiracetam 3750 mg per day. Same oxcarbazepine to [phone] on QID schedule. Skip perampanel dose on Sundays only to help with sedation. Precise instructions written in AVS.  2) Provided with specific regimen for how to take AEDs during upcoming surgery during which she will not be able to take po for about 8 hours.  3) Continue escitalopram at 10 mg at HS.  4) Other AED options as above. Consider restarting TPM. As she is uncomfortable with felbatol,  cenobamate would probably be best choice, perhaps replacing levetiracetam. We could also consider replacing levetiracetam with brivaracetam or use eslicarbazpine instead of oxcarbazepine if formulary will allow. RNS should be a serious consideration if seizures with falls return and become more frequent. If RNS placed she would have three implanted devices  which could also raise concern. Neither she nor her  were excited about this possibility when we last reviewed.  5) AED levels during upcoming primary care visit; patient wanted it to be done there because preop labs are anticipated. We discussed need for them to let us know so that we can follow up in Care Everywhere because it is unlikely that lbas will be sent to us.  6) RTC 4 months.     Total time in person on day of visit 30 minutes. Additional 6 minutes to review previous records and coordinate care. Additional 6 minutes generating note. The longitudinal plan of care for this patient's epilepsy (including epilepsy comorbidities if appropriate) was addressed during this visit. Due to the added complexity in care, I will continue to support this patient in the subsequent management of this condition(s) and with the ongoing continuity of care of this condition(s).     Noam Barboza MD      Again, thank you for allowing me to participate in the care of your patient.      Sincerely,    Noam Barboza MD

## 2024-11-20 NOTE — PROGRESS NOTES
[facility] Epilepsy Care Progress Note      Patient:  Adia Briceno  :  1955   Age:  69 year old   Today's Office Visit:  2024    Epilepsy Data:    Patient History  Primary Epileptologist/Provider: Noam Barboza M.D.  Epilepsy Syndrome: Localization-related epilepsy unspecified  Epilepsy Syndrome Status: Final  Age of Onset: 10  Other Relevant Dx/ Issues: Inpatient evaluation 1998.  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  Asystole  with subsequent pacemaker placement.  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 Seizure-related falls/injuries.  is endocrinologist.     Tests/Surgery History  Last EE2005  Last MRI: 2008  Last Neuropsych Testin2008  Last Case Management Conference: 2008  Epilepsy Surgery #1 Date: 08  Epilepsy Related Surgery #1 : Type: VNS placement    Seizure Record  Current Visit Date: 24  Previous Visit Date: 24  Months since last visit: 5.06  Seizure Type 1: Complex partial seizures unspecified  Description of Sz Type 1: Head slumps, amnesia  Seizure Type 2: Complex partial seizures with impairment of consciousness at onset  Description of Sz Type 2: Head slumps with collapse  # of Type 2 Seizure since last visit: 13  Freq. Type 2 / Month: 2.57  Seizure Type 3: Simple partial seizures unspecified  Description of Sz Type 3: strange feeling in her head, 30-60 seconds  # of Type 3 Seizure since last visit: 3  Freq. 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 Left more than right. All AEDs other than barbiturates, felbatol, ethetoin (but serum sickness w PHT), brivaracetam  have been used. Seizures have worsened following ;previously would go up to 3 months without sz but after  baseline increased to three sz per month.  mg per did not help and poorly tolerated. VNS increased to rapid cycling. Asystole detected  April 2011 not thought due to epilepsy or VNS tho this was never demonstrated; pacemaker implanted. She collapses with some seizures and has continued collapsing after pacemaker was placed. Perampanel added with seizure improvement but moderate doses resulted in significant ataxia; this improve on 2 mg QHS which also helped with insomnia. With brief seizure recurrence fycompa increased to 4 mg. Seizures much better following Mar 2015 for no particular reason, VNS reached EOS Sep 2016 without significant worsening.    History of Present Illness:      feels that seizures are about the same. However, numbers in her seizure calendar are a little higher. Seizures tend to occur in clusters.   describes confusion and stupor for several minutes. No automatic activity. Minor head slump. NO fall.     feel that sleep deprivation worsens seizures. Stress also plays a role. She has not fallen but spends most time in wheel chair.    Current Outpatient Medications   Medication Sig Dispense Refill    acetaminophen (TYLENOL) 325 MG tablet Take 2 tablets (650 mg) by mouth every 6 hours as needed for mild pain or other (and adjunct with moderate or severe pain or per patient request)      alendronate (FOSAMAX) 70 MG tablet Take 70 mg by mouth every 7 days Sunday      calcium carbonate (TUMS) 500 MG chewable tablet Take 1 tablet (500 mg) by mouth 3 times daily as needed for heartburn      cetirizine (ZYRTEC) 10 MG tablet Take 10 mg by mouth daily as needed Summer allergies      Cholecalciferol (VITAMIN D) 2000 UNITS CAPS Take 1 capsule by mouth daily      clonazePAM (KLONOPIN) 1 MG tablet Take 1.5 tablets (1.5 mg) by mouth at bedtime 45 tablet 5    diclofenac (VOLTAREN) 1 % topical gel Apply 4 g topically 4 times daily      escitalopram (LEXAPRO) 10 MG tablet Take 1.5 tablets (15 mg) by mouth at bedtime 45 tablet 11    famotidine (PEPCID) 20 MG tablet Take 20 mg by mouth daily      ferrous sulfate (FEROSUL) 325 (65  Fe) MG tablet Take 1 tablet (325 mg) by mouth 2 times daily (with meals)      guaiFENesin-codeine (ROBITUSSIN AC) 100-10 MG/5ML solution Take 5 mLs by mouth daily as needed for cough PRN for cough 180 mL 0    levETIRAcetam (KEPPRA) 750 MG tablet Take one tablet four times per day. 135 tablet 11    LORazepam (ATIVAN) 0.5 MG tablet TAKE 1 TABLET BY MOUTH TWICE DAILY FOR 2 DAYS FOLLOWING SEIZURE CLUSTER 12 tablet 0    menthol, Topical Analgesic, 2.5% (BENGAY VANISHING SCENT) 2.5 % GEL topical gel Apply topically every 6 hours      OXcarbazepine (TRILEPTAL) 300 MG tablet TAKE ONE AND ONE-HALF TABLET IN THE MORNING, ONE AND ONE-HALF TABLET AT NOON, 2 TABLETS IN THE EVENING AND 2 TABLETS AT BEDTIME. 210 tablet 11    perampanel (FYCOMPA) 8 MG tablet TAKE 1 TABLET(8 MG) BY MOUTH AT BEDTIME 31 tablet 5    progesterone (PROMETRIUM) 100 MG capsule Take 100 mg by mouth 2 times daily      psyllium (METAMUCIL/KONSYL) Packet Take 1 packet by mouth daily (Patient taking differently: Take 1 packet by mouth daily as needed (diarrhea))      SUMAtriptan (IMITREX) 100 MG tablet TAKE 1 TABLET BY MOUTH AS NEEDED FOR SEVERE HEADACHE. MAX 4 TABLETS PER MONTH 9 tablet 1    traMADol (ULTRAM) 50 MG tablet Take 0.5 tablets (25 mg) by mouth 2 times daily as needed for severe pain PRN for pain (Patient not taking: Reported on 8/14/2024) 15 tablet 0        Perceived AED Side Effects:  Uncertain; continues with some fatigue and drowsiness.     Medication Notes:    Off topiramate for about 6 months. Sumatriptan four times per month. One progesterone per day.  She reduced to levetiracetam 750 mg qid but seizures worsened so now back to [phone]   AED Medication Compliance:  compliant all of the time  Using a pill box:  Yes    Review of Systems:  No vomiting, diarrhea, fevers, hematuria or kidney stones.  Has chronic geiger in place. Suprapubic catheter to be placed in one week.  Reports chronic cough. Wonders whether VNS wire is  responsible.  Have you experienced a traumatic fall since your last visit: NO  Are these falls related to your seizures:  NO    Other Issues:  IN wheel chair most of the time.  Is patient safe to drive:  No    Woman Care:   Patient is:  postmenopausal.    Exam:    There were no vitals taken for this visit.     Wt Readings from Last 5 Encounters:   08/14/24 185 lb (83.9 kg)   02/07/24 173 lb (78.5 kg)   07/19/23 173 lb (78.5 kg)   07/17/23 174 lb 3.2 oz (79 kg)   07/12/23 173 lb 3.2 oz (78.6 kg)     Normal language and her usual excellent vocabulary. In wheel chair. VFF. EOMI. Disrupted smooth pursuit today. Bilateral ptosis. Palpebral fissure widens when looks up. No worsening of ptosis with repeated upgaze. Pupils equal. Smile symmetrical. Tongue midline. No drift, pronation, or tremor. FFN is done well. Rapid fine movements are done well. Leg strength full proximally and distally. Arm strength full proximally and distally. No weakening with repeated arm abduction or hip flexion.    Heart exam without murmur. RRR. BP with cuff 150/90 today.     IMPRESSION   1) Focal seizures, intractable; focal aware, focal aware to focal impaired seizures. Some focal impaired seizures associated with falls and occasionally with trama. Independent bitemporal likely neocortical onset by scalp EEG. Improvement starting approxmiately March 2015 led to almost 1 3/4 years of  freedom from focal impaired seizures with falls. Fycompa and levetiracetam appeared to play important roles in this period of seizure freedom but I thought that completing menopause may have been important.  Seizures wax and wane. Starting early 2022 seizures with impairment and periods of collapse with more signficant trauma returned including compression fracture in December. Seizures wax and wane, somewhat worse since last visit. She is refractory to all AEDs other than barbiturates, felbamate, CLB off label, and cenobamate. It appears TPM was stopped; one  wonders whether this explains minor seizure worsening.  2) Migraine headaches; topiramate thought to have helped; but remain under good control off topiramate.  3) Asystole; likely primary and not related to seizures or VNS tho this has never been formally examined; has pacemaker in place.   4) Depression, overall did well on escitalopram, some worsening after recent trauma but nwo improved again. Therapy also helpful.   5) Mild hyponatremia in past likely related to oxcarbazpine and escitalopram. Normal recently.     DISCUSSION  Above discussed. We discussed remaining AED options and risks and benefits. They do not want to make changes. We again discussed potential for DBS and RNS but they do not feel that current situation severe enough to warrant this.     PLAN:   1) Same OXC. Same clonazapam 1.5 mg. Same levetiracetam 3750 mg per day. Same oxcarbazepine to [phone] on QID schedule. Skip perampanel dose on Sundays only to help with sedation. Precise instructions written in AVS.  2) Provided with specific regimen for how to take AEDs during upcoming surgery during which she will not be able to take po for about 8 hours.  3) Continue escitalopram at 10 mg at HS.  4) Other AED options as above. Consider restarting TPM. As she is uncomfortable with felbatol,  cenobamate would probably be best choice, perhaps replacing levetiracetam. We could also consider replacing levetiracetam with brivaracetam or use eslicarbazpine instead of oxcarbazepine if formulary will allow. RNS should be a serious consideration if seizures with falls return and become more frequent. If RNS placed she would have three implanted devices which could also raise concern. Neither she nor her  were excited about this possibility when we last reviewed.  5) AED levels during upcoming primary care visit; patient wanted it to be done there because preop labs are anticipated. We discussed need for them to let us know so that we can follow  up in Care Everywhere because it is unlikely that lbas will be sent to us.  6) RTC 4 months.     Total time in person on day of visit 30 minutes. Additional 6 minutes to review previous records and coordinate care. Additional 6 minutes generating note. The longitudinal plan of care for this patient's epilepsy (including epilepsy comorbidities if appropriate) was addressed during this visit. Due to the added complexity in care, I will continue to support this patient in the subsequent management of this condition(s) and with the ongoing continuity of care of this condition(s).     Noam Barboza MD

## 2024-11-27 RX ORDER — MULTIVITAMIN
1 TABLET ORAL DAILY
COMMUNITY

## 2024-11-27 RX ORDER — IBUPROFEN 200 MG
200 TABLET ORAL EVERY 4 HOURS PRN
COMMUNITY

## 2024-12-03 ENCOUNTER — ANESTHESIA EVENT (OUTPATIENT)
Dept: SURGERY | Facility: CLINIC | Age: 69
End: 2024-12-03
Payer: COMMERCIAL

## 2024-12-03 ASSESSMENT — ENCOUNTER SYMPTOMS: SEIZURES: 1

## 2024-12-03 NOTE — ANESTHESIA PREPROCEDURE EVALUATION
Anesthesia Pre-Procedure Evaluation    Patient: Adia Briceno   MRN: [id number] : 1955        Procedure : Procedure(s):  open suprapubic tube placement , cystoscopy          Past Medical History:   Diagnosis Date    Cardiac pacemaker in situ     Depression     Epilepsy (H)     GERD (gastroesophageal reflux disease)     Hyperlipidemia     Migraine     Multiple falls due to seizures     Neuromuscular dysfunction of bladder     Osteopenia     Seizure (H)     vagal nerve stimulator    Shortness of breath     Sinoatrial node dysfunction (H)     Syncope and collapse     bradyarrhythmia: dual chamber pacemaker implanted     Vaginal delivery     x2    Vitamin D deficiency       Past Surgical History:   Procedure Laterality Date     SECTION      CLAVICAL REPAIR DUE TO FRACTURE Right     IMPLANT PACEMAKER  2011    [employer] [place] S[address],     IMPLANT STIMULATOR VAGUS NERVE  2008    VNS batter depleated (2019)      Allergies   Allergen Reactions    Lactose     Seasonal Allergies Nausea and GI Disturbance      Social History     Tobacco Use    Smoking status: Never    Smokeless tobacco: Never   Substance Use Topics    Alcohol use: No     Alcohol/week: 0.0 standard drinks of alcohol      Wt Readings from Last 1 Encounters:   24 83.9 kg (185 lb)        HGB 13.1  K 4.4    Device check  [employer] S606 ALTRUA (D) Pacemaker Device Check  Patient seen in clinic for device evaluation and iterative programming.   AP: 35    %    : <1 %    Mode: DDD     Underlying Rhythm: SR 70s    Heart Rate: Good variability    Sensing: WNL    Pacing Threshold: WNL    Impedance: WNL    Battery Status: estimated longevity 1 year    Device Site: WNL    Atrial Arrhythmia: 0    Ventricular Arrhythmia: 0    Setting Change: None    Care Plan: Scheduled Q month in- clinic visit on 24. Due to see  Dr Richmond on 25.     Anesthesia Evaluation   Pt has had prior anesthetic.     History of anesthetic  complications       ROS/MED HX  ENT/Pulmonary:    (-) sleep apnea and recent URI   Neurologic: Comment: Frequent falls related to seizures    Vagal nerve stimulator - device no longer works, battery depleted 8/21/2019    (+)      migraines, seizures, last seizure: last seizure about a week ago; typically has 2 to 3 seizures per month, features: petite mal,                       Cardiovascular:     (+) Dyslipidemia - -   -  - -              pacemaker, Reason placed: asytole 2011, SA node dysfunction.                       METS/Exercise Tolerance:     Hematologic:       Musculoskeletal: Comment: Osteopenia    Hx of falls in past secondary to seizures, pelvic fracture in past, gets around with wheelchair      GI/Hepatic:     (+) GERD,                   Renal/Genitourinary: Comment: Neuromuscular dysfunction of bladder      Endo:     (+)               Obesity,    (-) Type II DM and thyroid disease   Psychiatric/Substance Use:     (+) psychiatric history depression       Infectious Disease:       Malignancy:       Other:            Physical Exam    Airway        Mallampati: II   TM distance: > 3 FB   Neck ROM: full   Mouth opening: > 3 cm    Respiratory Devices and Support         Dental       (+) Minor Abnormalities - some fillings, tiny chips      Cardiovascular   cardiovascular exam normal       Rhythm and rate: regular and normal     Pulmonary   pulmonary exam normal        breath sounds clear to auscultation           OUTSIDE LABS:  CBC:   Lab Results   Component Value Date    WBC 5.9 07/10/2023    WBC 11.0 07/03/2023    HGB 11.0 (L) 07/10/2023    HGB 10.5 (L) 07/03/2023    HCT 34.8 (L) 07/10/2023    HCT 31.8 (L) 07/03/2023     07/10/2023     07/04/2023     BMP:   Lab Results   Component Value Date     02/07/2024     07/10/2023    POTASSIUM 3.7 07/10/2023    POTASSIUM 3.8 07/05/2023    CHLORIDE 107 07/10/2023    CHLORIDE 106 07/03/2023    CO2 21 (L) 07/10/2023    CO2 19 (L) 07/03/2023     "BUN 13.6 07/10/2023    BUN 12.5 07/03/2023    CR 0.68 07/10/2023    CR 0.60 07/04/2023    GLC 92 07/10/2023     (H) 07/03/2023     COAGS:   Lab Results   Component Value Date    INR 1.00 06/08/2011     POC: No results found for: \"BGM\", \"HCG\", \"HCGS\"  HEPATIC:   Lab Results   Component Value Date    ALT 13 U/L 07/23/2013    AST 23 11/13/2013     OTHER:   Lab Results   Component Value Date    LACT 1.3 07/01/2023    A1C 5.5 07/03/2023    TATIANA 8.4 (L) 07/10/2023    MAG 2.2 07/03/2023    LIPASE 12 (L) 06/28/2023    TSH 1.85 02/07/2018    SED 6 mm/h 10/27/2015       Anesthesia Plan    ASA Status:  3    NPO Status:  NPO Appropriate    Anesthesia Type: General.     - Airway: LMA   Induction: Propofol.   Maintenance: TIVA.        Consents    Anesthesia Plan(s) and associated risks, benefits, and realistic alternatives discussed. Questions answered and patient/representative(s) expressed understanding.     - Discussed:     - Discussed with:  Patient, Spouse            Postoperative Care       PONV prophylaxis: Ondansetron (or other 5HT-3)     Comments:    Other Comments: No grace Reynoso MD    I have reviewed the pertinent notes and labs in the chart from the past 30 days and (re)examined the patient.  Any updates or changes from those notes are reflected in this note.                              # Pacemaker present      "

## 2024-12-04 ENCOUNTER — HOSPITAL ENCOUNTER (OUTPATIENT)
Facility: CLINIC | Age: 69
Discharge: HOME | End: 2024-12-04
Attending: UROLOGY | Admitting: UROLOGY
Payer: COMMERCIAL

## 2024-12-04 ENCOUNTER — ANESTHESIA (OUTPATIENT)
Dept: SURGERY | Facility: CLINIC | Age: 69
End: 2024-12-04
Payer: COMMERCIAL

## 2024-12-04 VITALS
DIASTOLIC BLOOD PRESSURE: 89 MMHG | OXYGEN SATURATION: 96 % | RESPIRATION RATE: 19 BRPM | TEMPERATURE: 98.5 F | WEIGHT: 180.1 LBS | HEIGHT: 64 IN | HEART RATE: 70 BPM | BODY MASS INDEX: 30.75 KG/M2 | SYSTOLIC BLOOD PRESSURE: 161 MMHG

## 2024-12-04 DIAGNOSIS — N30.01 ACUTE CYSTITIS WITH HEMATURIA: Primary | ICD-10-CM

## 2024-12-04 PROCEDURE — 999N000141 HC STATISTIC PRE-PROCEDURE NURSING ASSESSMENT: Performed by: UROLOGY

## 2024-12-04 PROCEDURE — 250N000011 HC RX IP 250 OP 636: Performed by: PHYSICIAN ASSISTANT

## 2024-12-04 PROCEDURE — 250N000009 HC RX 250: Performed by: NURSE ANESTHETIST, CERTIFIED REGISTERED

## 2024-12-04 PROCEDURE — 710N000012 HC RECOVERY PHASE 2, PER MINUTE: Performed by: UROLOGY

## 2024-12-04 PROCEDURE — 250N000011 HC RX IP 250 OP 636: Performed by: NURSE ANESTHETIST, CERTIFIED REGISTERED

## 2024-12-04 PROCEDURE — 360N000075 HC SURGERY LEVEL 2, PER MIN: Performed by: UROLOGY

## 2024-12-04 PROCEDURE — 250N000025 HC SEVOFLURANE, PER MIN: Performed by: UROLOGY

## 2024-12-04 PROCEDURE — 250N000009 HC RX 250: Performed by: UROLOGY

## 2024-12-04 PROCEDURE — 258N000003 HC RX IP 258 OP 636: Performed by: NURSE ANESTHETIST, CERTIFIED REGISTERED

## 2024-12-04 PROCEDURE — 250N000013 HC RX MED GY IP 250 OP 250 PS 637: Performed by: UROLOGY

## 2024-12-04 PROCEDURE — 710N000009 HC RECOVERY PHASE 1, LEVEL 1, PER MIN: Performed by: UROLOGY

## 2024-12-04 PROCEDURE — 258N000001 HC RX 258: Performed by: UROLOGY

## 2024-12-04 PROCEDURE — 250N000013 HC RX MED GY IP 250 OP 250 PS 637: Performed by: PHYSICIAN ASSISTANT

## 2024-12-04 PROCEDURE — 370N000017 HC ANESTHESIA TECHNICAL FEE, PER MIN: Performed by: UROLOGY

## 2024-12-04 PROCEDURE — 272N000001 HC OR GENERAL SUPPLY STERILE: Performed by: UROLOGY

## 2024-12-04 RX ORDER — SODIUM CHLORIDE, SODIUM LACTATE, POTASSIUM CHLORIDE, CALCIUM CHLORIDE 600; 310; 30; 20 MG/100ML; MG/100ML; MG/100ML; MG/100ML
INJECTION, SOLUTION INTRAVENOUS CONTINUOUS PRN
Status: DISCONTINUED | OUTPATIENT
Start: 2024-12-04 | End: 2024-12-04

## 2024-12-04 RX ORDER — PROPOFOL 10 MG/ML
INJECTION, EMULSION INTRAVENOUS CONTINUOUS PRN
Status: DISCONTINUED | OUTPATIENT
Start: 2024-12-04 | End: 2024-12-04

## 2024-12-04 RX ORDER — OXYCODONE HYDROCHLORIDE 5 MG/1
5 TABLET ORAL ONCE
Status: COMPLETED | OUTPATIENT
Start: 2024-12-04 | End: 2024-12-04

## 2024-12-04 RX ORDER — OXYCODONE HYDROCHLORIDE 5 MG/1
5 TABLET ORAL EVERY 6 HOURS PRN
Qty: 15 TABLET | Refills: 0 | Status: SHIPPED | OUTPATIENT
Start: 2024-12-04

## 2024-12-04 RX ORDER — ACETAMINOPHEN 650 MG/1
650 SUPPOSITORY RECTAL ONCE
Status: COMPLETED | OUTPATIENT
Start: 2024-12-04 | End: 2024-12-04

## 2024-12-04 RX ORDER — CEPHALEXIN 500 MG/1
500 CAPSULE ORAL
Qty: 6 CAPSULE | Refills: 0 | Status: SHIPPED | OUTPATIENT
Start: 2024-12-04 | End: 2024-12-06

## 2024-12-04 RX ORDER — CEFTRIAXONE 1 G/1
1 INJECTION, POWDER, FOR SOLUTION INTRAMUSCULAR; INTRAVENOUS
Status: COMPLETED | OUTPATIENT
Start: 2024-12-04 | End: 2024-12-04

## 2024-12-04 RX ORDER — LIDOCAINE HYDROCHLORIDE 20 MG/ML
INJECTION, SOLUTION INFILTRATION; PERINEURAL PRN
Status: DISCONTINUED | OUTPATIENT
Start: 2024-12-04 | End: 2024-12-04

## 2024-12-04 RX ORDER — PROPOFOL 10 MG/ML
INJECTION, EMULSION INTRAVENOUS PRN
Status: DISCONTINUED | OUTPATIENT
Start: 2024-12-04 | End: 2024-12-04

## 2024-12-04 RX ORDER — BUPIVACAINE HYDROCHLORIDE AND EPINEPHRINE 2.5; 5 MG/ML; UG/ML
INJECTION, SOLUTION INFILTRATION; PERINEURAL PRN
Status: DISCONTINUED | OUTPATIENT
Start: 2024-12-04 | End: 2024-12-04 | Stop reason: HOSPADM

## 2024-12-04 RX ORDER — ONDANSETRON 2 MG/ML
INJECTION INTRAMUSCULAR; INTRAVENOUS PRN
Status: DISCONTINUED | OUTPATIENT
Start: 2024-12-04 | End: 2024-12-04

## 2024-12-04 RX ORDER — LABETALOL 20 MG/4 ML (5 MG/ML) INTRAVENOUS SYRINGE
PRN
Status: DISCONTINUED | OUTPATIENT
Start: 2024-12-04 | End: 2024-12-04

## 2024-12-04 RX ORDER — MAGNESIUM HYDROXIDE 1200 MG/15ML
LIQUID ORAL PRN
Status: DISCONTINUED | OUTPATIENT
Start: 2024-12-04 | End: 2024-12-04 | Stop reason: HOSPADM

## 2024-12-04 RX ORDER — FENTANYL CITRATE 50 UG/ML
INJECTION, SOLUTION INTRAMUSCULAR; INTRAVENOUS PRN
Status: DISCONTINUED | OUTPATIENT
Start: 2024-12-04 | End: 2024-12-04

## 2024-12-04 RX ORDER — ACETAMINOPHEN 325 MG/1
975 TABLET ORAL ONCE
Status: COMPLETED | OUTPATIENT
Start: 2024-12-04 | End: 2024-12-04

## 2024-12-04 RX ADMIN — Medication 200 MG: at 14:59

## 2024-12-04 RX ADMIN — LIDOCAINE HYDROCHLORIDE 100 MG: 20 INJECTION, SOLUTION INFILTRATION; PERINEURAL at 13:37

## 2024-12-04 RX ADMIN — ROCURONIUM BROMIDE 40 MG: 50 INJECTION, SOLUTION INTRAVENOUS at 14:17

## 2024-12-04 RX ADMIN — OXYCODONE HYDROCHLORIDE 5 MG: 5 TABLET ORAL at 15:45

## 2024-12-04 RX ADMIN — LABETALOL 20 MG/4 ML (5 MG/ML) INTRAVENOUS SYRINGE 5 MG: at 15:14

## 2024-12-04 RX ADMIN — PROPOFOL 150 MG: 10 INJECTION, EMULSION INTRAVENOUS at 13:37

## 2024-12-04 RX ADMIN — ROCURONIUM BROMIDE 10 MG: 50 INJECTION, SOLUTION INTRAVENOUS at 14:39

## 2024-12-04 RX ADMIN — ACETAMINOPHEN 975 MG: 325 TABLET, FILM COATED ORAL at 12:39

## 2024-12-04 RX ADMIN — PROPOFOL 100 MCG/KG/MIN: 10 INJECTION, EMULSION INTRAVENOUS at 13:39

## 2024-12-04 RX ADMIN — FENTANYL CITRATE 50 MCG: 50 INJECTION INTRAMUSCULAR; INTRAVENOUS at 13:37

## 2024-12-04 RX ADMIN — CEFTRIAXONE SODIUM 1 G: 1 INJECTION, POWDER, FOR SOLUTION INTRAMUSCULAR; INTRAVENOUS at 13:34

## 2024-12-04 RX ADMIN — FENTANYL CITRATE 50 MCG: 50 INJECTION INTRAMUSCULAR; INTRAVENOUS at 14:03

## 2024-12-04 RX ADMIN — SODIUM CHLORIDE, POTASSIUM CHLORIDE, SODIUM LACTATE AND CALCIUM CHLORIDE: 600; 310; 30; 20 INJECTION, SOLUTION INTRAVENOUS at 13:31

## 2024-12-04 RX ADMIN — MIDAZOLAM 1 MG: 1 INJECTION INTRAMUSCULAR; INTRAVENOUS at 13:37

## 2024-12-04 RX ADMIN — ONDANSETRON 4 MG: 2 INJECTION INTRAMUSCULAR; INTRAVENOUS at 14:59

## 2024-12-04 ASSESSMENT — ACTIVITIES OF DAILY LIVING (ADL)
ADLS_ACUITY_SCORE: 57
ADLS_ACUITY_SCORE: 61
ADLS_ACUITY_SCORE: 57
ADLS_ACUITY_SCORE: 55
ADLS_ACUITY_SCORE: 57
ADLS_ACUITY_SCORE: 57
ADLS_ACUITY_SCORE: 55
ADLS_ACUITY_SCORE: 57
ADLS_ACUITY_SCORE: 56

## 2024-12-04 NOTE — OR NURSING
VSS. A&O. Incision CDI. Caden PO. Pain improving, tolerable. DC instructions, meds, catheter cares and follow up reviewed with patient and . Questions answered, DC to home with .

## 2024-12-04 NOTE — ANESTHESIA PROCEDURE NOTES
Airway       Patient location during procedure: OR  Staff -        CRNA: Crista Danielle APRN CRNA       Performed By: CRNA  Consent for Airway        Urgency: elective  Indications and Patient Condition       Indications for airway management: fredy-procedural       Induction type:intravenous       Mask difficulty assessment: 1 - vent by mask    Final Airway Details       Final airway type: supraglottic airway    Supraglottic Airway Details        Type: LMA       Brand: Ambu AuraGain       LMA size: 4    Post intubation assessment        Placement verified by: capnometry, equal breath sounds and chest rise        Number of attempts at approach: 1       Number of other approaches attempted: 0       Ease of procedure: easy       Dentition: Intact and Unchanged

## 2024-12-04 NOTE — OP NOTE
UROLOGY OPERATIVE REPORT    PREOP DIAGNOSIS  Neurogenic bladder     Plan: cephALEXin (KEFLEX) 500 MG capsule, oxyCODONE         (ROXICODONE) 5 MG tablet                POSTOP DIAGNOSIS  Same    ANESTHESIA  General    PROCEDURE  Procedure(s):  open suprapubic tube placement , cystoscopy    STAFF  Circulator: Wanda Alejandro RN  Relief Circulator: Zeb Means RN  Relief Scrub: Onelia Dyer  Scrub Person: Danielle Aguirre    SURGEON  Surgeon(s):  Mia Lopez MD      FINDINGS  Cystitis though out the bladder; small bladder capacity     EBL  min      DESCRIPTION OF PROCEDURE:  69 year old  With neurogenic bladder who presents for open suprapubic tube catheter placement.  She has significant discomfort related to wheelchair-bound and sitting on the Mills coming from the urethra.  At this point she would like to proceed with suprapubic tube placement with the risks of bleeding, infection, injury, need for additional surgery, continuation of the bladder spasms.  She would like to proceed.      Adia was brought to the operating room.  She was positioned in the dorsolithotomy position.  4 cm incision was made over suprapubic area rectus fascia was opened up rectus muscle was split and the space of Retzius was accessed using retractors.  Cystoscopy was performed using a 22 Indonesian cystoscope unfortunately given your neurogenic bladder she started having a lot of spasms on the table.  Evaluation of the bladder did confirm significant cystitis throughout the bladder with some bleeding upon gentle hydrodistention.  I have to intubate the patient and paralyze him so better evaluation of the bladder was attempted.     I positioned the Lowsley retractor through the urethra and palpated the tip of the Lowsley retractor through the suprapubic incision.  I placed a stay 3-0 chromic suture on the bladder and sharply opened the bladder using the cautery.  Of note there were significant adhesions near the bladder  and very deep dissection going towards the bladder.  In addition , immobile bladder, small bladder capacity, and continued bladder spasms in spite of paralytics made the case more challenging.    22 [place] Mills was introduced through the suprapubic incision and tied to the Lowsley retractor.  Under direct visualization cystoscopically I inflated the balloon.  I irrigated the suprapubic tube confirming minimal leak around the tube.  I also tied the bladder mucosa using preplaced chronic suture around the suprapubic catheter.    Rectus fascia was closed using 0 Vicryl in a running fashion subcutaneous layer also used 2-0 Vicryl to close the subcutaneous tissue.  Monocryl and Dermabond were applied to the incisional site.  1 more time I irrigated suprapubic tube and confirmed good return with no leak around the tube.  Dressing was applied and patient was transferred to the recovery in stable condition.        Mia Lopez MD

## 2024-12-04 NOTE — ANESTHESIA POSTPROCEDURE EVALUATION
Patient: Adia Briceno    Procedure: Procedure(s):  open suprapubic tube placement , cystoscopy       Anesthesia Type:  General    Note:     Postop Pain Control: Uneventful            Sign Out: Well controlled pain   PONV: No   Neuro/Psych: Uneventful            Sign Out: Acceptable/Baseline neuro status   Airway/Respiratory: Uneventful            Sign Out: Acceptable/Baseline resp. status   CV/Hemodynamics: Uneventful            Sign Out: Acceptable CV status; No obvious hypovolemia; No obvious fluid overload   Other NRE: NONE   DID A NON-ROUTINE EVENT OCCUR? No           Last vitals:  Vitals Value Taken Time   /93 12/04/24 1615   Temp     Pulse 71 12/04/24 1631   Resp 19 12/04/24 1631   SpO2 94 % 12/04/24 1631   Vitals shown include unfiled device data.    Electronically Signed By: Manish Reynoso MD  December 4, 2024  5:09 PM

## 2024-12-04 NOTE — ANESTHESIA CARE TRANSFER NOTE
Patient: Adia Briceno    Procedure: Procedure(s):  open suprapubic tube placement , cystoscopy       Diagnosis: Neuromuscular dysfunction of bladder [N31.9]  Diagnosis Additional Information: No value filed.    Anesthesia Type:   General     Note:    Oropharynx: oropharynx clear of all foreign objects and spontaneously breathing  Level of Consciousness: awake  Oxygen Supplementation: face mask  Level of Supplemental Oxygen (L/min / FiO2): 6  Independent Airway: airway patency satisfactory and stable  Dentition: dentition unchanged  Vital Signs Stable: post-procedure vital signs reviewed and stable  Report to RN Given: handoff report given  Patient transferred to: PACU  Comments: Neuromuscular blockade reversed with sugammadex, spontaneous respirations, adequate tidal volumes, followed commands to voice, oropharynx suctioned with soft flexible catheter, extubated atraumatically, extubated with suction, airway patent after extubation.  Oxygen via facemask at 6 liters per minute to PACU. Oxygen tubing connected to wall O2 in PACU, SpO2, NiBP, and EKG monitors and alarms on and functioning, Dwight Hugger warmer connected to patient gown, report on patient's clinical status given to PACU RN, RN questions answered.         Handoff Report: Identifed the Patient, Identified the Reponsible Provider, Reviewed the pertinent medical history, Discussed the surgical course, Reviewed Intra-OP anesthesia mangement and issues during anesthesia, Set expectations for post-procedure period and Allowed opportunity for questions and acknowledgement of understanding      Vitals:  Vitals Value Taken Time   /79    Temp     Pulse 73 12/04/24 1522   Resp 25 12/04/24 1522   SpO2 100 % 12/04/24 1522   Vitals shown include unfiled device data.    Electronically Signed By: RAMON Rios CRNA  December 4, 2024  3:23 PM

## 2024-12-04 NOTE — DISCHARGE INSTRUCTIONS
Today you were given 975 mg of Tylenol at 12:40PM. The recommended daily maximum dose is 4000 mg.    Same Day Surgery Discharge Instructions for  Sedation and General Anesthesia     It's not unusual to feel dizzy, light-headed or faint for up to 24 hours after surgery or while taking pain medication.  If you have these symptoms: sit for a few minutes before standing and have someone assist you when you get up to walk or use the bathroom.    You should rest and relax for the next 24 hours. We recommend you make arrangements to have an adult stay with you for at least 24 hours after your discharge.  Avoid hazardous and strenuous activity.    DO NOT DRIVE any vehicle or operate mechanical equipment for 24 hours following the end of your surgery.  Even though you may feel normal, your reactions may be affected by the medication you have received.    Do not drink alcoholic beverages for 24 hours following surgery.     Slowly progress to your regular diet as you feel able. It's not unusual to feel nauseated and/or vomit after receiving anesthesia.  If you develop these symptoms, drink clear liquids (apple juice, ginger ale, broth, 7-up, etc. ) until you feel better.  If your nausea and vomiting persists for 24 hours, please notify your surgeon.      All narcotic pain medications, along with inactivity and anesthesia, can cause constipation. Drinking plenty of liquids and increasing fiber intake will help.    For any questions of a medical nature, call your surgeon.    Do not make important decisions for 24 hours.    If you had general anesthesia, you may have a sore throat for a couple of days related to the breathing tube used during surgery.  You may use Cepacol lozenges to help with this discomfort.  If it worsens or if you develop a fever, contact your surgeon.     If you feel your pain is not well managed with the pain medications prescribed by your surgeon, please contact your surgeon's office to let them know so  they can address your concerns.      Caring for Your Suprapubic Catheter at Home    A suprapubic catheter is a tube that drains urine from the bladder through an opening in your abdomen.     Basic Care   Always wash hands before and after handling your catheter.  Keep the catheter taped to your abdomen. This helps to prevent the tube from being pulled out. You should change the tape every 2-3 days using a different area each time.   If your catheter does come out, notify your doctor immediately.   Showering is permitted, but do not take a tub bath without your doctor's approval.     Site Care        1.  If you have a gauze dressing around the catheter, this needs to be changed daily.         2.  Everyday clean the area around the catheter with soap and water, or other                         solution recommended by your doctor.     Leg Bag        1.  This is a small plastic bag that collects urine draining from your catheter and then               strapped around your thigh.         2.  It will need to be emptied when the bag is 1/2 to 3/4 full.     Large Drainage Bag  This bag is larger than the leg bag and holds more urine.  It is to be used while at home, especially at night.    Before you go to bed, change the leg bag to the large drainage bag.  Pinch off the catheter with your fingers and swab the connection between the catheter and leg bag with alcohol sponge.  Disconnect the leg bag and connect the large drainage bag to your catheter.  When you get into bed, arrange the drainage tubing so that it doesn t kink.  Be sure to keep the bag below the level of your bladder and allow enough slack for turning.    Cleaning Your Drainage Bags    Wash hands.  Using funnel or syringe, fill the bag half full with a solution of 1/2  vinegar and 1/2 water.  Shake bag, allowing mixture to cleanse inside of bag.  Empty out all vinegar and water mixture from your bag.  Hang bag to dry when not in use.  Clean your bags anytime  you change them.      Helpful Hints  Always keep drainage bags below bladder level to insure adequate drainage.  Drink 4-6 glasses of water daily along with other fluids you normally drink to keep urine free of infection and / or clots.  If you notice no urine in your bag for 2 to 4 hours or you develop extreme discomfort in bladder area, your catheter maybe plugged.  Notify your doctor.  If you notice urine leaking around the outside of the catheter, check to be sure catheter or tubing is not kinked.  Don t use leg bag while in bed.  Notify your doctor if you:  Notice your urine becoming cloudy, foul smelling  Have fever, chills  Have excessive blood or clots in your urine   Notice the skin around the tube is reddened or has purulent drainage      **If you have questions or concerns about your procedure,   call Dr. Lopez at [phone]**

## 2024-12-05 ASSESSMENT — ACTIVITIES OF DAILY LIVING (ADL)
ADLS_ACUITY_SCORE: 57

## 2024-12-30 ENCOUNTER — LAB (OUTPATIENT)
Dept: LAB | Facility: CLINIC | Age: 69
End: 2024-12-30
Payer: COMMERCIAL

## 2024-12-30 DIAGNOSIS — G40.211 LOCALIZATION-RELATED SYMPTOMATIC EPILEPSY AND EPILEPTIC SYNDROMES WITH COMPLEX PARTIAL SEIZURES, INTRACTABLE, WITH STATUS EPILEPTICUS (H): ICD-10-CM

## 2024-12-30 LAB
LEVETIRACETAM SERPL-MCNC: 50.1 ÂΜG/ML (ref 10–40)
Lab: NORMAL
PERFORMING LABORATORY: NORMAL
SODIUM SERPL-SCNC: 132 MMOL/L (ref 135–145)
SPECIMEN STATUS: NORMAL
TEST NAME: NORMAL

## 2025-01-01 LAB — 10OH-CARBAZEPINE SERPL-MCNC: 37.4 UG/ML

## 2025-01-06 LAB — MISCELLANEOUS TEST 1 (ARUP): NORMAL

## 2025-01-22 DIAGNOSIS — G40.211 LOCALZ-RLTED SYMPTOMATIC EPILEPSY W CMPLX PARTL SZ, INTRACT, W STATUS (H): ICD-10-CM

## 2025-01-22 RX ORDER — CLONAZEPAM 1 MG/1
TABLET ORAL
Start: 2025-01-22

## 2025-01-22 NOTE — TELEPHONE ENCOUNTER
CLONAZEPAM 1MG TABLETS: addressed in 1-22-25 rf encounter- sent as  high priority to provider. This request removed

## 2025-01-22 NOTE — TELEPHONE ENCOUNTER
Which pharmacy does pt go to?  [id number] - [place], [place] - [address] AT [facility] OF [address]      Which medication is pt calling about?  Clonazepam 1MG tablet    How much medication does pt have left?  Two day left     Does pt have refills?  NO    Does pt need the refill within 24 hours?  YES    Is medication a controlled substance?  YES    Is pt scheduled for provider follow-up visit?  YES, Appt Date: 3/28/2025

## 2025-01-23 RX ORDER — CLONAZEPAM 1 MG/1
1.5 TABLET ORAL AT BEDTIME
Qty: 45 TABLET | Refills: 5 | Status: SHIPPED | OUTPATIENT
Start: 2025-01-23

## 2025-02-26 ENCOUNTER — ANCILLARY PROCEDURE (OUTPATIENT)
Dept: CARDIOLOGY | Facility: CLINIC | Age: 70
End: 2025-02-26
Attending: INTERNAL MEDICINE
Payer: COMMERCIAL

## 2025-02-26 DIAGNOSIS — I49.5 SINOATRIAL NODE DYSFUNCTION (H): ICD-10-CM

## 2025-02-26 DIAGNOSIS — Z95.0 CARDIAC PACEMAKER IN SITU: ICD-10-CM

## 2025-02-26 LAB
MDC_IDC_LEAD_CONNECTION_STATUS: NORMAL
MDC_IDC_LEAD_CONNECTION_STATUS: NORMAL
MDC_IDC_LEAD_IMPLANT_DT: NORMAL
MDC_IDC_LEAD_IMPLANT_DT: NORMAL
MDC_IDC_LEAD_LOCATION: NORMAL
MDC_IDC_LEAD_LOCATION: NORMAL
MDC_IDC_LEAD_MFG: NORMAL
MDC_IDC_LEAD_MFG: NORMAL
MDC_IDC_LEAD_MODEL: NORMAL
MDC_IDC_LEAD_MODEL: NORMAL
MDC_IDC_LEAD_POLARITY_TYPE: NORMAL
MDC_IDC_LEAD_POLARITY_TYPE: NORMAL
MDC_IDC_LEAD_SERIAL: NORMAL
MDC_IDC_LEAD_SERIAL: NORMAL
MDC_IDC_MSMT_BATTERY_STATUS: NORMAL
MDC_IDC_PG_IMPLANT_DTM: NORMAL
MDC_IDC_PG_MFG: NORMAL
MDC_IDC_PG_MODEL: NORMAL
MDC_IDC_PG_SERIAL: NORMAL
MDC_IDC_PG_TYPE: NORMAL
MDC_IDC_SESS_CLINIC_NAME: NORMAL
MDC_IDC_SESS_DTM: NORMAL
MDC_IDC_SESS_TYPE: NORMAL
MDC_IDC_SET_BRADY_AT_MODE_SWITCH_MODE: NORMAL
MDC_IDC_SET_BRADY_AT_MODE_SWITCH_RATE: 170 {BEATS}/MIN
MDC_IDC_SET_BRADY_HYSTRATE: NORMAL
MDC_IDC_SET_BRADY_LOWRATE: 70 {BEATS}/MIN
MDC_IDC_SET_BRADY_MAX_SENSOR_RATE: 140 {BEATS}/MIN
MDC_IDC_SET_BRADY_MAX_TRACKING_RATE: 140 {BEATS}/MIN
MDC_IDC_SET_BRADY_MODE: NORMAL
MDC_IDC_SET_BRADY_PAV_DELAY_HIGH: 180 MS
MDC_IDC_SET_BRADY_PAV_DELAY_LOW: 300 MS
MDC_IDC_SET_BRADY_SAV_DELAY_HIGH: 180 MS
MDC_IDC_SET_BRADY_SAV_DELAY_LOW: 300 MS
MDC_IDC_SET_LEADCHNL_RA_PACING_AMPLITUDE: 2 V
MDC_IDC_SET_LEADCHNL_RA_PACING_CAPTURE_MODE: NORMAL
MDC_IDC_SET_LEADCHNL_RA_PACING_POLARITY: NORMAL
MDC_IDC_SET_LEADCHNL_RA_PACING_PULSEWIDTH: 0.5 MS
MDC_IDC_SET_LEADCHNL_RA_SENSING_ADAPTATION_MODE: NORMAL
MDC_IDC_SET_LEADCHNL_RA_SENSING_POLARITY: NORMAL
MDC_IDC_SET_LEADCHNL_RA_SENSING_SENSITIVITY: 0.5 MV
MDC_IDC_SET_LEADCHNL_RV_PACING_AMPLITUDE: 3.4 V
MDC_IDC_SET_LEADCHNL_RV_PACING_CAPTURE_MODE: NORMAL
MDC_IDC_SET_LEADCHNL_RV_PACING_POLARITY: NORMAL
MDC_IDC_SET_LEADCHNL_RV_PACING_PULSEWIDTH: 0.5 MS
MDC_IDC_SET_LEADCHNL_RV_SENSING_ADAPTATION_MODE: NORMAL
MDC_IDC_SET_LEADCHNL_RV_SENSING_POLARITY: NORMAL
MDC_IDC_SET_LEADCHNL_RV_SENSING_SENSITIVITY: 0.75 MV
MDC_IDC_STAT_AT_DTM_END: NORMAL
MDC_IDC_STAT_AT_DTM_START: NORMAL
MDC_IDC_STAT_AT_MODE_SW_COUNT: 0
MDC_IDC_STAT_AT_MODE_SW_MAX_DURATION: 0 S
MDC_IDC_STAT_BRADY_DTM_END: NORMAL
MDC_IDC_STAT_BRADY_DTM_START: NORMAL
MDC_IDC_STAT_BRADY_RA_PERCENT_PACED: 26 %
MDC_IDC_STAT_BRADY_RV_PERCENT_PACED: 1 %
MDC_IDC_STAT_EPISODE_RECENT_COUNT: 0
MDC_IDC_STAT_EPISODE_RECENT_COUNT_DTM_END: NORMAL
MDC_IDC_STAT_EPISODE_RECENT_COUNT_DTM_START: NORMAL
MDC_IDC_STAT_EPISODE_TYPE: NORMAL

## 2025-02-26 PROCEDURE — 93288 INTERROG EVL PM/LDLS PM IP: CPT | Performed by: INTERNAL MEDICINE

## 2025-03-10 DIAGNOSIS — F43.21 ADJUSTMENT DISORDER WITH DEPRESSED MOOD: ICD-10-CM

## 2025-03-10 NOTE — TELEPHONE ENCOUNTER
Which pharmacy does pt go to?  [id number] - [place], [place] - [address] AT [facility] [address]     Which medication is pt calling about?  escitalopram (LEXAPRO) 10 MG tablet     How much medication does pt have left?  <5 days left    Does pt have refills?  NO    Does pt need the refill within 24 hours?  YES    Is medication a controlled substance?  NO    Is pt scheduled for provider follow-up visit?  YES, Appt Date: 3/28/2025    Patient needing medication she is running low.

## 2025-03-11 ENCOUNTER — HOSPITAL ENCOUNTER (OUTPATIENT)
Dept: MAMMOGRAPHY | Facility: CLINIC | Age: 70
Discharge: HOME | End: 2025-03-11
Attending: FAMILY MEDICINE | Admitting: FAMILY MEDICINE
Payer: COMMERCIAL

## 2025-03-11 DIAGNOSIS — G40.211 LOCALZ-RLTED SYMPTOMATIC EPILEPSY W CMPLX PARTL SZ, INTRACT, W STATUS (H): ICD-10-CM

## 2025-03-11 DIAGNOSIS — Z12.31 VISIT FOR SCREENING MAMMOGRAM: ICD-10-CM

## 2025-03-11 PROCEDURE — 77067 SCR MAMMO BI INCL CAD: CPT

## 2025-03-11 PROCEDURE — 77063 BREAST TOMOSYNTHESIS BI: CPT

## 2025-03-11 RX ORDER — ESCITALOPRAM OXALATE 10 MG/1
15 TABLET ORAL AT BEDTIME
Qty: 45 TABLET | Refills: 11 | Status: SHIPPED | OUTPATIENT
Start: 2025-03-11

## 2025-03-12 RX ORDER — PERAMPANEL 8 MG/1
TABLET ORAL
Qty: 31 TABLET | Refills: 5 | Status: SHIPPED | OUTPATIENT
Start: 2025-03-12

## 2025-03-12 NOTE — TELEPHONE ENCOUNTER
Which pharmacy does pt go to?  [id number] - [place], [place] - [address] AT [facility] OF [address]    Which medication is pt calling about?  perampanel (FYCOMPA) 8 MG tablet     How much medication does pt have left?  >5 days left    Does pt have refills?  NO    Does pt need the refill within 24 hours?  No    Is medication a controlled substance?  YES    Is pt scheduled for provider follow-up visit?  YES, Appt Date: 03/28/25

## 2025-04-29 DIAGNOSIS — G40.211 LOCALZ-RLTED SYMPTOMATIC EPILEPSY W CMPLX PARTL SZ, INTRACT, W STATUS (H): ICD-10-CM

## 2025-05-01 RX ORDER — LORAZEPAM 0.5 MG/1
TABLET ORAL
Qty: 12 TABLET | Refills: 0 | Status: SHIPPED | OUTPATIENT
Start: 2025-05-01

## 2025-05-01 NOTE — TELEPHONE ENCOUNTER
Last Written Prescription:  LORazepam (ATIVAN) 0.5 MG tablet 12 tablet 0 9/23/2024     ----------------------  Last Visit Date: 3/28/25  Future Visit Date: 8/7/25  ----------------------    Refill decision: Medication unable to be refilled by RN due to: Controlled medication        Request from pharmacy:  Requested Prescriptions   Pending Prescriptions Disp Refills    LORazepam (ATIVAN) 0.5 MG tablet [Pharmacy Med Name: LORAZEPAM 0.5MG TABLETS] 12 tablet      Sig: TAKE 1 TABLET BY MOUTH TWICE DAILY FOR 2 DAYS FOLLOWING SEIZURE CLUSTER       Rx Protocol Controlled Substance Failed - 5/1/2025  7:48 AM        Failed - Urine drug screeen results on file in past 12 months     [unfilled]           Failed - Controlled Substance Agreement on file in last 12 months     Please review last Controlled Substance Pain agreement document.   CSA -- Encounter Level:    CSA: None found at the encounter level.       CSA -- Patient Level:    CSA: None found at the patient level.               Failed - Auto Fail - Please forward to Provider        Failed - No Opioids on active med list        Passed - Visit with relevant provider in past 3 months or upcoming 3 months        Passed - Medication is active on med list and the sig matches        Passed - Medication not refilled in past 28 days     Invalid Medication Grouper          Passed - No active pregnancy on record        Passed - No pregnancy test in past 12 months or most recent test was negative

## 2025-05-06 NOTE — TELEPHONE ENCOUNTER
Pt called wanting to know time of appt. With Miroslava Wheeler. Called and informed her of that time.    none

## 2025-05-14 ENCOUNTER — TELEPHONE (OUTPATIENT)
Facility: CLINIC | Age: 70
End: 2025-05-14
Payer: COMMERCIAL

## 2025-05-14 NOTE — TELEPHONE ENCOUNTER
Pt called [facility] asking if she needs another device check besides her in clinic device check in May- device says no.   Called pt twice- pt hangs up after I identify myself- MK

## 2025-06-14 ENCOUNTER — HEALTH MAINTENANCE LETTER (OUTPATIENT)
Age: 70
End: 2025-06-14

## 2025-07-17 ENCOUNTER — TELEPHONE (OUTPATIENT)
Dept: NEUROLOGY | Facility: CLINIC | Age: 70
End: 2025-07-17

## 2025-07-17 DIAGNOSIS — G40.211 LOCALZ-RLTED SYMPTOMATIC EPILEPSY W CMPLX PARTL SZ, INTRACT, W STATUS (H): ICD-10-CM

## 2025-07-17 RX ORDER — CLONAZEPAM 1 MG/1
1.5 TABLET ORAL AT BEDTIME
Qty: 45 TABLET | Refills: 5 | Status: SHIPPED | OUTPATIENT
Start: 2025-07-17

## 2025-07-17 NOTE — TELEPHONE ENCOUNTER
Outgoing call placed to Adia. She reports that she needs a refill of clonazePAM (KLONOPIN) 1 MG tablet as the order is . She has four days left of the medicaiton. Medication pended and sent to Dr. Barboza.

## 2025-08-06 ASSESSMENT — PATIENT HEALTH QUESTIONNAIRE - PHQ9
SUM OF ALL RESPONSES TO PHQ QUESTIONS 1-9: 11
SUM OF ALL RESPONSES TO PHQ QUESTIONS 1-9: 11
10. IF YOU CHECKED OFF ANY PROBLEMS, HOW DIFFICULT HAVE THESE PROBLEMS MADE IT FOR YOU TO DO YOUR WORK, TAKE CARE OF THINGS AT HOME, OR GET ALONG WITH OTHER PEOPLE: VERY DIFFICULT

## 2025-08-07 ENCOUNTER — OFFICE VISIT (OUTPATIENT)
Dept: NEUROLOGY | Facility: CLINIC | Age: 70
End: 2025-08-07
Payer: COMMERCIAL

## 2025-08-07 VITALS
HEART RATE: 78 BPM | SYSTOLIC BLOOD PRESSURE: 151 MMHG | DIASTOLIC BLOOD PRESSURE: 94 MMHG | BODY MASS INDEX: 33.23 KG/M2 | HEIGHT: 64 IN | OXYGEN SATURATION: 96 % | TEMPERATURE: 97.9 F

## 2025-08-07 DIAGNOSIS — G40.211 LOCALZ-RLTED SYMPTOMATIC EPILEPSY W CMPLX PARTL SZ, INTRACT, W STATUS (H): ICD-10-CM

## 2025-08-07 RX ORDER — VIBEGRON 75 MG/1
1 TABLET, FILM COATED ORAL DAILY
COMMUNITY
Start: 2025-06-04

## 2025-08-07 RX ORDER — PERAMPANEL 8 MG/1
8 TABLET, FILM COATED ORAL AT BEDTIME
Qty: 31 TABLET | Refills: 5 | Status: SHIPPED | OUTPATIENT
Start: 2025-08-07 | End: 2025-08-07

## 2025-08-07 RX ORDER — GABAPENTIN 300 MG/1
CAPSULE ORAL
Qty: 120 CAPSULE | Refills: 5 | Status: SHIPPED | OUTPATIENT
Start: 2025-08-07

## 2025-08-07 RX ORDER — PERAMPANEL 8 MG/1
8 TABLET, FILM COATED ORAL AT BEDTIME
Qty: 90 TABLET | Refills: 1 | Status: SHIPPED | OUTPATIENT
Start: 2025-08-07

## 2025-08-09 LAB — 10OH-CARBAZEPINE SERPL-MCNC: 40.1 UG/ML

## 2025-08-16 LAB — MISCELLANEOUS TEST 1 (ARUP): ABNORMAL

## (undated) DEVICE — CATH FOLEY 22FR 5ML LATEX 0165SI22

## (undated) DEVICE — WIPES FOLEY CARE SURESTEP PROVON DFC100

## (undated) DEVICE — Device

## (undated) DEVICE — DRAPE LAVH/LAPAROSCOPY W/POUCH 29474

## (undated) DEVICE — SU SILK 0 FSL 18" 678H

## (undated) DEVICE — BLADE KNIFE SURG 11 371111

## (undated) DEVICE — SYR 10ML SLIP TIP W/O NDL

## (undated) DEVICE — JELLY LUBRICATING SURGILUBE 4OZ TUBE

## (undated) DEVICE — ESU PENCIL W/HOLSTER E2350H

## (undated) DEVICE — SU MONOCRYL 4-0 PS-2 18" UND Y496G

## (undated) DEVICE — LINEN TOWEL PACK X5 5464

## (undated) DEVICE — SU VICRYL 2-0 CT-2 27" J333H

## (undated) DEVICE — DRAPE LEGGINGS 8421

## (undated) DEVICE — SU CHROMIC 3-0 SH 27" G122H

## (undated) DEVICE — PACK TUR CUSTOM SBA15RUFSE

## (undated) DEVICE — DRSG GAUZE 4X4" 3033

## (undated) DEVICE — SYR 50ML CATH TIP W/O NDL 309620

## (undated) DEVICE — SU VICRYL 0 UR-6 27" J603H

## (undated) DEVICE — NDL COUNTER 20CT 31142493

## (undated) RX ORDER — FENTANYL CITRATE 50 UG/ML
INJECTION, SOLUTION INTRAMUSCULAR; INTRAVENOUS
Status: DISPENSED
Start: 2024-12-04

## (undated) RX ORDER — PROPOFOL 10 MG/ML
INJECTION, EMULSION INTRAVENOUS
Status: DISPENSED
Start: 2024-12-04

## (undated) RX ORDER — ONDANSETRON 2 MG/ML
INJECTION INTRAMUSCULAR; INTRAVENOUS
Status: DISPENSED
Start: 2024-12-04

## (undated) RX ORDER — FUROSEMIDE 10 MG/ML
INJECTION INTRAMUSCULAR; INTRAVENOUS
Status: DISPENSED
Start: 2023-07-24

## (undated) RX ORDER — GINSENG 100 MG
CAPSULE ORAL
Status: DISPENSED
Start: 2024-12-04

## (undated) RX ORDER — DEXAMETHASONE SODIUM PHOSPHATE 4 MG/ML
INJECTION, SOLUTION INTRA-ARTICULAR; INTRALESIONAL; INTRAMUSCULAR; INTRAVENOUS; SOFT TISSUE
Status: DISPENSED
Start: 2024-12-04

## (undated) RX ORDER — ACETAMINOPHEN 325 MG/1
TABLET ORAL
Status: DISPENSED
Start: 2024-12-04

## (undated) RX ORDER — KETOROLAC TROMETHAMINE 30 MG/ML
INJECTION, SOLUTION INTRAMUSCULAR; INTRAVENOUS
Status: DISPENSED
Start: 2024-12-04

## (undated) RX ORDER — OXYCODONE HYDROCHLORIDE 5 MG/1
TABLET ORAL
Status: DISPENSED
Start: 2024-12-04